# Patient Record
Sex: FEMALE | Race: WHITE | Employment: OTHER | URBAN - METROPOLITAN AREA
[De-identification: names, ages, dates, MRNs, and addresses within clinical notes are randomized per-mention and may not be internally consistent; named-entity substitution may affect disease eponyms.]

---

## 2019-04-09 ENCOUNTER — APPOINTMENT (OUTPATIENT)
Dept: GENERAL RADIOLOGY | Age: 79
DRG: 392 | End: 2019-04-09
Payer: MEDICARE

## 2019-04-09 ENCOUNTER — APPOINTMENT (OUTPATIENT)
Dept: CT IMAGING | Age: 79
DRG: 392 | End: 2019-04-09
Payer: MEDICARE

## 2019-04-09 ENCOUNTER — HOSPITAL ENCOUNTER (INPATIENT)
Age: 79
LOS: 2 days | Discharge: HOME | DRG: 392 | End: 2019-04-11
Attending: EMERGENCY MEDICINE | Admitting: INTERNAL MEDICINE
Payer: MEDICARE

## 2019-04-09 DIAGNOSIS — K57.32 DIVERTICULITIS OF COLON: Primary | ICD-10-CM

## 2019-04-09 DIAGNOSIS — K56.699 OTHER SPECIFIED INTESTINAL OBSTRUCTION, UNSPECIFIED WHETHER PARTIAL OR COMPLETE (HCC): ICD-10-CM

## 2019-04-09 PROBLEM — K57.92 DIVERTICULITIS: Status: ACTIVE | Noted: 2019-04-09

## 2019-04-09 LAB
ALBUMIN SERPL-MCNC: 4.2 G/DL (ref 3.5–5.2)
ALP BLD-CCNC: 88 U/L (ref 35–104)
ALT SERPL-CCNC: 29 U/L (ref 0–32)
AMYLASE: 79 U/L (ref 20–100)
ANION GAP SERPL CALCULATED.3IONS-SCNC: 9 MMOL/L (ref 7–16)
AST SERPL-CCNC: 30 U/L (ref 0–31)
BILIRUB SERPL-MCNC: 0.4 MG/DL (ref 0–1.2)
BILIRUBIN DIRECT: <0.2 MG/DL (ref 0–0.3)
BILIRUBIN, INDIRECT: NORMAL MG/DL (ref 0–1)
BUN BLDV-MCNC: 13 MG/DL (ref 8–23)
CALCIUM SERPL-MCNC: 9.5 MG/DL (ref 8.6–10.2)
CHLORIDE BLD-SCNC: 103 MMOL/L (ref 98–107)
CO2: 25 MMOL/L (ref 22–29)
CREAT SERPL-MCNC: 0.7 MG/DL (ref 0.5–1)
GFR AFRICAN AMERICAN: >60
GFR NON-AFRICAN AMERICAN: >60 ML/MIN/1.73
GLUCOSE BLD-MCNC: 118 MG/DL (ref 74–99)
HCT VFR BLD CALC: 43.9 % (ref 34–48)
HEMOGLOBIN: 14.2 G/DL (ref 11.5–15.5)
LACTIC ACID: 1.5 MMOL/L (ref 0.5–2.2)
LIPASE: 36 U/L (ref 13–60)
MCH RBC QN AUTO: 29.3 PG (ref 26–35)
MCHC RBC AUTO-ENTMCNC: 32.3 % (ref 32–34.5)
MCV RBC AUTO: 90.7 FL (ref 80–99.9)
PDW BLD-RTO: 13 FL (ref 11.5–15)
PLATELET # BLD: 196 E9/L (ref 130–450)
PMV BLD AUTO: 11.9 FL (ref 7–12)
POTASSIUM SERPL-SCNC: 4 MMOL/L (ref 3.5–5)
RBC # BLD: 4.84 E12/L (ref 3.5–5.5)
SODIUM BLD-SCNC: 137 MMOL/L (ref 132–146)
TOTAL PROTEIN: 7.5 G/DL (ref 6.4–8.3)
WBC # BLD: 16.4 E9/L (ref 4.5–11.5)

## 2019-04-09 PROCEDURE — 82150 ASSAY OF AMYLASE: CPT

## 2019-04-09 PROCEDURE — 73502 X-RAY EXAM HIP UNI 2-3 VIEWS: CPT

## 2019-04-09 PROCEDURE — 36415 COLL VENOUS BLD VENIPUNCTURE: CPT

## 2019-04-09 PROCEDURE — 83605 ASSAY OF LACTIC ACID: CPT

## 2019-04-09 PROCEDURE — 99284 EMERGENCY DEPT VISIT MOD MDM: CPT

## 2019-04-09 PROCEDURE — 80048 BASIC METABOLIC PNL TOTAL CA: CPT

## 2019-04-09 PROCEDURE — 83690 ASSAY OF LIPASE: CPT

## 2019-04-09 PROCEDURE — 81003 URINALYSIS AUTO W/O SCOPE: CPT

## 2019-04-09 PROCEDURE — 85027 COMPLETE CBC AUTOMATED: CPT

## 2019-04-09 PROCEDURE — 80076 HEPATIC FUNCTION PANEL: CPT

## 2019-04-09 PROCEDURE — 6360000004 HC RX CONTRAST MEDICATION: Performed by: RADIOLOGY

## 2019-04-09 PROCEDURE — 2580000003 HC RX 258: Performed by: EMERGENCY MEDICINE

## 2019-04-09 PROCEDURE — 1200000000 HC SEMI PRIVATE

## 2019-04-09 PROCEDURE — 74177 CT ABD & PELVIS W/CONTRAST: CPT

## 2019-04-09 PROCEDURE — 87088 URINE BACTERIA CULTURE: CPT

## 2019-04-09 RX ORDER — SODIUM CHLORIDE 9 MG/ML
INJECTION, SOLUTION INTRAVENOUS CONTINUOUS
Status: DISCONTINUED | OUTPATIENT
Start: 2019-04-09 | End: 2019-04-11

## 2019-04-09 RX ADMIN — IOPAMIDOL 110 ML: 755 INJECTION, SOLUTION INTRAVENOUS at 22:38

## 2019-04-09 RX ADMIN — SODIUM CHLORIDE: 9 INJECTION, SOLUTION INTRAVENOUS at 21:23

## 2019-04-09 ASSESSMENT — PAIN SCALES - GENERAL: PAINLEVEL_OUTOF10: 8

## 2019-04-09 ASSESSMENT — PAIN DESCRIPTION - DESCRIPTORS: DESCRIPTORS: SHARP

## 2019-04-09 ASSESSMENT — PAIN DESCRIPTION - PAIN TYPE: TYPE: ACUTE PAIN

## 2019-04-09 ASSESSMENT — PAIN DESCRIPTION - LOCATION: LOCATION: HIP;FLANK

## 2019-04-09 ASSESSMENT — PAIN DESCRIPTION - ORIENTATION: ORIENTATION: LEFT

## 2019-04-10 LAB
BILIRUBIN URINE: NEGATIVE
BLOOD, URINE: NEGATIVE
CLARITY: CLEAR
COLOR: NORMAL
GLUCOSE URINE: NEGATIVE MG/DL
KETONES, URINE: NEGATIVE MG/DL
LEUKOCYTE ESTERASE, URINE: NEGATIVE
NITRITE, URINE: NEGATIVE
PH UA: 6.5 (ref 5–9)
PROTEIN UA: NEGATIVE MG/DL
SPECIFIC GRAVITY UA: <=1.005 (ref 1–1.03)
UROBILINOGEN, URINE: 0.2 E.U./DL

## 2019-04-10 PROCEDURE — 2580000003 HC RX 258: Performed by: INTERNAL MEDICINE

## 2019-04-10 PROCEDURE — 1200000000 HC SEMI PRIVATE

## 2019-04-10 PROCEDURE — 2580000003 HC RX 258: Performed by: EMERGENCY MEDICINE

## 2019-04-10 PROCEDURE — 6360000002 HC RX W HCPCS: Performed by: EMERGENCY MEDICINE

## 2019-04-10 PROCEDURE — 87040 BLOOD CULTURE FOR BACTERIA: CPT

## 2019-04-10 PROCEDURE — 6360000002 HC RX W HCPCS: Performed by: INTERNAL MEDICINE

## 2019-04-10 PROCEDURE — 2500000003 HC RX 250 WO HCPCS: Performed by: EMERGENCY MEDICINE

## 2019-04-10 PROCEDURE — 6370000000 HC RX 637 (ALT 250 FOR IP): Performed by: INTERNAL MEDICINE

## 2019-04-10 PROCEDURE — 36415 COLL VENOUS BLD VENIPUNCTURE: CPT

## 2019-04-10 PROCEDURE — 2500000003 HC RX 250 WO HCPCS: Performed by: INTERNAL MEDICINE

## 2019-04-10 RX ORDER — SODIUM CHLORIDE 0.9 % (FLUSH) 0.9 %
10 SYRINGE (ML) INJECTION EVERY 12 HOURS SCHEDULED
Status: DISCONTINUED | OUTPATIENT
Start: 2019-04-10 | End: 2019-04-10 | Stop reason: SDUPTHER

## 2019-04-10 RX ORDER — BRIMONIDINE TARTRATE 2 MG/ML
1 SOLUTION/ DROPS OPHTHALMIC 2 TIMES DAILY
Status: DISCONTINUED | OUTPATIENT
Start: 2019-04-10 | End: 2019-04-11 | Stop reason: HOSPADM

## 2019-04-10 RX ORDER — METOPROLOL SUCCINATE 25 MG/1
25 TABLET, EXTENDED RELEASE ORAL DAILY
COMMUNITY

## 2019-04-10 RX ORDER — METOPROLOL SUCCINATE 25 MG/1
25 TABLET, EXTENDED RELEASE ORAL DAILY
Status: DISCONTINUED | OUTPATIENT
Start: 2019-04-10 | End: 2019-04-11 | Stop reason: HOSPADM

## 2019-04-10 RX ORDER — ONDANSETRON 2 MG/ML
4 INJECTION INTRAMUSCULAR; INTRAVENOUS EVERY 6 HOURS PRN
Status: DISCONTINUED | OUTPATIENT
Start: 2019-04-10 | End: 2019-04-11 | Stop reason: HOSPADM

## 2019-04-10 RX ORDER — LISINOPRIL 10 MG/1
20 TABLET ORAL EVERY EVENING
Status: DISCONTINUED | OUTPATIENT
Start: 2019-04-10 | End: 2019-04-11 | Stop reason: HOSPADM

## 2019-04-10 RX ORDER — TIMOLOL MALEATE 5 MG/ML
1 SOLUTION/ DROPS OPHTHALMIC 2 TIMES DAILY
Status: DISCONTINUED | OUTPATIENT
Start: 2019-04-10 | End: 2019-04-10

## 2019-04-10 RX ORDER — BRIMONIDINE TARTRATE, TIMOLOL MALEATE 2; 5 MG/ML; MG/ML
1 SOLUTION/ DROPS OPHTHALMIC EVERY 12 HOURS
Status: DISCONTINUED | OUTPATIENT
Start: 2019-04-10 | End: 2019-04-10 | Stop reason: CLARIF

## 2019-04-10 RX ORDER — SODIUM CHLORIDE 0.9 % (FLUSH) 0.9 %
10 SYRINGE (ML) INJECTION PRN
Status: DISCONTINUED | OUTPATIENT
Start: 2019-04-10 | End: 2019-04-10 | Stop reason: SDUPTHER

## 2019-04-10 RX ORDER — LATANOPROST 50 UG/ML
1 SOLUTION/ DROPS OPHTHALMIC NIGHTLY
Status: DISCONTINUED | OUTPATIENT
Start: 2019-04-10 | End: 2019-04-11 | Stop reason: HOSPADM

## 2019-04-10 RX ORDER — DORZOLAMIDE HCL 20 MG/ML
1 SOLUTION/ DROPS OPHTHALMIC 2 TIMES DAILY
Status: DISCONTINUED | OUTPATIENT
Start: 2019-04-10 | End: 2019-04-11 | Stop reason: HOSPADM

## 2019-04-10 RX ORDER — SODIUM CHLORIDE, SODIUM LACTATE, POTASSIUM CHLORIDE, CALCIUM CHLORIDE 600; 310; 30; 20 MG/100ML; MG/100ML; MG/100ML; MG/100ML
INJECTION, SOLUTION INTRAVENOUS CONTINUOUS
Status: DISCONTINUED | OUTPATIENT
Start: 2019-04-10 | End: 2019-04-11

## 2019-04-10 RX ORDER — RALOXIFENE HYDROCHLORIDE 60 MG/1
60 TABLET, FILM COATED ORAL EVERY EVENING
Status: DISCONTINUED | OUTPATIENT
Start: 2019-04-10 | End: 2019-04-10 | Stop reason: CLARIF

## 2019-04-10 RX ORDER — TIMOLOL MALEATE 5 MG/ML
1 SOLUTION/ DROPS OPHTHALMIC 2 TIMES DAILY
Status: DISCONTINUED | OUTPATIENT
Start: 2019-04-10 | End: 2019-04-11 | Stop reason: HOSPADM

## 2019-04-10 RX ORDER — ACETAMINOPHEN 325 MG/1
650 TABLET ORAL EVERY 4 HOURS PRN
Status: DISCONTINUED | OUTPATIENT
Start: 2019-04-10 | End: 2019-04-11 | Stop reason: HOSPADM

## 2019-04-10 RX ORDER — TRAVOPROST OPHTHALMIC SOLUTION 0.04 MG/ML
1 SOLUTION OPHTHALMIC NIGHTLY
Status: DISCONTINUED | OUTPATIENT
Start: 2019-04-10 | End: 2019-04-10 | Stop reason: CLARIF

## 2019-04-10 RX ORDER — DORZOLAMIDE HCL 20 MG/ML
1 SOLUTION/ DROPS OPHTHALMIC 2 TIMES DAILY
COMMUNITY

## 2019-04-10 RX ORDER — DORZOLAMIDE HCL 20 MG/ML
2 SOLUTION/ DROPS OPHTHALMIC 3 TIMES DAILY
Status: DISCONTINUED | OUTPATIENT
Start: 2019-04-10 | End: 2019-04-10

## 2019-04-10 RX ORDER — SODIUM CHLORIDE 0.9 % (FLUSH) 0.9 %
10 SYRINGE (ML) INJECTION EVERY 12 HOURS SCHEDULED
Status: DISCONTINUED | OUTPATIENT
Start: 2019-04-10 | End: 2019-04-11 | Stop reason: HOSPADM

## 2019-04-10 RX ORDER — BRIMONIDINE TARTRATE 2 MG/ML
1 SOLUTION/ DROPS OPHTHALMIC 2 TIMES DAILY
Status: DISCONTINUED | OUTPATIENT
Start: 2019-04-10 | End: 2019-04-10

## 2019-04-10 RX ORDER — SODIUM CHLORIDE 0.9 % (FLUSH) 0.9 %
10 SYRINGE (ML) INJECTION PRN
Status: DISCONTINUED | OUTPATIENT
Start: 2019-04-10 | End: 2019-04-11 | Stop reason: HOSPADM

## 2019-04-10 RX ADMIN — ENOXAPARIN SODIUM 40 MG: 40 INJECTION SUBCUTANEOUS at 11:56

## 2019-04-10 RX ADMIN — DORZOLAMIDE HYDROCHLORIDE 1 DROP: 20 SOLUTION/ DROPS OPHTHALMIC at 21:11

## 2019-04-10 RX ADMIN — METOPROLOL SUCCINATE 25 MG: 25 TABLET, EXTENDED RELEASE ORAL at 11:56

## 2019-04-10 RX ADMIN — BRIMONIDINE TARTRATE 1 DROP: 2 SOLUTION OPHTHALMIC at 21:11

## 2019-04-10 RX ADMIN — METRONIDAZOLE 500 MG: 500 INJECTION, SOLUTION INTRAVENOUS at 11:57

## 2019-04-10 RX ADMIN — CEFTRIAXONE SODIUM 1 G: 1 INJECTION, POWDER, FOR SOLUTION INTRAMUSCULAR; INTRAVENOUS at 07:55

## 2019-04-10 RX ADMIN — CEFTRIAXONE SODIUM 1 G: 1 INJECTION, POWDER, FOR SOLUTION INTRAMUSCULAR; INTRAVENOUS at 00:03

## 2019-04-10 RX ADMIN — TIMOLOL MALEATE 1 DROP: 5 SOLUTION OPHTHALMIC at 21:10

## 2019-04-10 RX ADMIN — METRONIDAZOLE 500 MG: 500 INJECTION, SOLUTION INTRAVENOUS at 00:50

## 2019-04-10 RX ADMIN — BRIMONIDINE TARTRATE 1 DROP: 2 SOLUTION OPHTHALMIC at 11:55

## 2019-04-10 RX ADMIN — ACETAMINOPHEN 650 MG: 325 TABLET, FILM COATED ORAL at 12:12

## 2019-04-10 RX ADMIN — LATANOPROST 1 DROP: 50 SOLUTION OPHTHALMIC at 21:15

## 2019-04-10 RX ADMIN — METRONIDAZOLE 500 MG: 500 INJECTION, SOLUTION INTRAVENOUS at 21:09

## 2019-04-10 RX ADMIN — DORZOLAMIDE HYDROCHLORIDE 1 DROP: 20 SOLUTION/ DROPS OPHTHALMIC at 11:55

## 2019-04-10 RX ADMIN — Medication 10 ML: at 11:57

## 2019-04-10 RX ADMIN — SODIUM CHLORIDE, POTASSIUM CHLORIDE, SODIUM LACTATE AND CALCIUM CHLORIDE: 600; 310; 30; 20 INJECTION, SOLUTION INTRAVENOUS at 07:55

## 2019-04-10 ASSESSMENT — PAIN DESCRIPTION - ORIENTATION
ORIENTATION: LEFT;LOWER
ORIENTATION: LEFT;LOWER

## 2019-04-10 ASSESSMENT — PAIN DESCRIPTION - FREQUENCY: FREQUENCY: INTERMITTENT

## 2019-04-10 ASSESSMENT — PAIN DESCRIPTION - DESCRIPTORS
DESCRIPTORS: ACHING;CRAMPING;DISCOMFORT
DESCRIPTORS: ACHING;CRAMPING;DISCOMFORT

## 2019-04-10 ASSESSMENT — PAIN SCALES - GENERAL
PAINLEVEL_OUTOF10: 3
PAINLEVEL_OUTOF10: 3
PAINLEVEL_OUTOF10: 1

## 2019-04-10 ASSESSMENT — PAIN DESCRIPTION - PAIN TYPE
TYPE: ACUTE PAIN
TYPE: ACUTE PAIN

## 2019-04-10 ASSESSMENT — PAIN - FUNCTIONAL ASSESSMENT: PAIN_FUNCTIONAL_ASSESSMENT: ACTIVITIES ARE NOT PREVENTED

## 2019-04-10 ASSESSMENT — PAIN DESCRIPTION - LOCATION
LOCATION: ABDOMEN
LOCATION: ABDOMEN

## 2019-04-10 ASSESSMENT — PAIN DESCRIPTION - ONSET: ONSET: ON-GOING

## 2019-04-10 ASSESSMENT — PAIN DESCRIPTION - PROGRESSION
CLINICAL_PROGRESSION: GRADUALLY IMPROVING
CLINICAL_PROGRESSION: RAPIDLY IMPROVING

## 2019-04-10 NOTE — CARE COORDINATION
JACI Discharge planning:    SW met with patient. Patient lives alone in a 2 story home with 4 steps to enter. Patient owns a ww, shower chair, bsc, and a cane. Patient has no history of paloma or hhc. Pharmacy is [facility] on [address]. Plan is to return home when medically stable with no anticipated discharge needs. Patients friend Radha Kim to provide the transportation home.  Agnes [place]

## 2019-04-10 NOTE — CONSULTS
Department of General Surgery - Adult  Surgical Service   Attending Consult Note      Reason for Consult:  Abdominal pain  Requesting Physician:  Renuka Mckeon MD    CHIEF COMPLAINT:  Left sided abdominal pain    History Obtained From:  patient, electronic medical record    HISTORY OF PRESENT ILLNESS:                The patient is a [de-identified] y.o. female who presents with 48 hours of left sided abdominal pain.     Past Medical History:        Diagnosis Date    Glaucoma associated with ocular disorder     Hyperlipidemia     Hypertension     Osteopenia      Past Surgical History:        Procedure Laterality Date    ANKLE SURGERY Right     orif    EYE SURGERY      shunt right eye     Current Medications:   Current Facility-Administered Medications: sodium chloride flush 0.9 % injection 10 mL, 10 mL, Intravenous, 2 times per day  sodium chloride flush 0.9 % injection 10 mL, 10 mL, Intravenous, PRN  acetaminophen (TYLENOL) tablet 650 mg, 650 mg, Oral, Q4H PRN  cefTRIAXone (ROCEPHIN) 1 g in dextrose 5 % 50 mL IVPB (vial-mate), 1 g, Intravenous, Q24H  metronidazole (FLAGYL) 500 mg in NaCl 100 mL IVPB premix, 500 mg, Intravenous, Q8H  lisinopril (PRINIVIL;ZESTRIL) tablet 20 mg, 20 mg, Oral, QPM  metoprolol succinate (TOPROL XL) extended release tablet 25 mg, 25 mg, Oral, Daily  magnesium hydroxide (MILK OF MAGNESIA) 400 MG/5ML suspension 30 mL, 30 mL, Oral, Daily PRN  ondansetron (ZOFRAN) injection 4 mg, 4 mg, Intravenous, Q6H PRN  enoxaparin (LOVENOX) injection 40 mg, 40 mg, Subcutaneous, Daily  lactated ringers infusion, , Intravenous, Continuous  latanoprost (XALATAN) 0.005 % ophthalmic solution 1 drop, 1 drop, Both Eyes, Nightly  dorzolamide (TRUSOPT) 2 % ophthalmic solution 1 drop, 1 drop, Left Eye, BID  brimonidine (ALPHAGAN) 0.2 % ophthalmic solution 1 drop, 1 drop, Both Eyes, BID **OR** timolol (TIMOPTIC) 0.5 % ophthalmic solution 1 drop, 1 drop, Right Eye, BID  0.9 % sodium chloride infusion, , Intravenous, Continuous  Allergies:  Ciprofloxacin and Sulfa antibiotics    Social History:   N/C  Family History:   History reviewed. No pertinent family history. REVIEW OF SYSTEMS:    H&P    PHYSICAL EXAM:    VITALS:  /66   Pulse 73   Temp 97.9 °F (36.6 °C) (Temporal)   Resp 14   Ht 5' 6\" (1.676 m)   Wt 191 lb (86.6 kg)   SpO2 94%   BMI 30.83 kg/m²   24HR INTAKE/OUTPUT:  No intake or output data in the 24 hours ending 04/10/19 1524  TEMPERATURE:  Current - Temp: 97.9 °F (36.6 °C);  Max - Temp  Av.3 °F (36.8 °C)  Min: 97.9 °F (36.6 °C)  Max: 98.6 °F (37 °C)  RESPIRATIONS RANGE: Resp  Av.8  Min: 14  Max: 16  PULSE RANGE: Pulse  Av.6  Min: 72  Max: 82  BLOOD PRESSURE RANGE:  Systolic (27EVQ), EKM:459 , Min:139 , FBC:601   ; Diastolic (00JCN), VEK:98, Min:66, Max:92    PULSE OXIMETRY RANGE: SpO2  Av.6 %  Min: 94 %  Max: 96 %  CONSTITUTIONAL:  awake, alert, cooperative, no apparent distress, appears older than stated age and moderately obese  EYES:  lids and lashes normal, pupils equal, round and reactive to light, extra-ocular muscles intact, sclera clear, conjunctiva normal   NECK:  supple, symmetrical, trachea midline, skin normal, no stridor and no jugular venous distension  LUNGS:  no increased work of breathing, good air exchange, no retractions and clear to auscultation  CARDIOVASCULAR:  normal apical pulses, regular rate and rhythm and normal S1 and S2  ABDOMEN:  hypoactive bowel sounds, soft, non-distended, tenderness noted in the left upper quadrant, voluntary guarding absent and no masses palpated  DATA:    CBC with Differential:    Lab Results   Component Value Date    WBC 16.4 2019    RBC 4.84 2019    HGB 14.2 2019    HCT 43.9 2019     2019    MCV 90.7 2019    MCH 29.3 2019    MCHC 32.3 2019    RDW 13.0 2019    SEGSPCT 77 2011    LYMPHOPCT 36.0 10/30/2017    MONOPCT 11.2 10/30/2017    BASOPCT 0.5 10/30/2017    MONOSABS 1.09 10/30/2017    LYMPHSABS 3.52 10/30/2017    EOSABS 0.14 10/30/2017    BASOSABS 0.05 10/30/2017     CMP:    Lab Results   Component Value Date     04/09/2019    K 4.0 04/09/2019     04/09/2019    CO2 25 04/09/2019    BUN 13 04/09/2019    CREATININE 0.7 04/09/2019    GFRAA >60 04/09/2019    LABGLOM >60 04/09/2019    GLUCOSE 118 04/09/2019    GLUCOSE 113 06/27/2011    PROT 7.5 04/09/2019    LABALBU 4.2 04/09/2019    CALCIUM 9.5 04/09/2019    BILITOT 0.4 04/09/2019    ALKPHOS 88 04/09/2019    AST 30 04/09/2019    ALT 29 04/09/2019       IMPRESSION/RECOMMENDATIONS: acute diverticulitis with early improvement with ABX  Plan 24 hours of IV ABX then reassess before transitioning to oral ABX   Follow up CT with oral and IV contrast as out patient

## 2019-04-10 NOTE — ED PROVIDER NOTES
HPI:  4/9/19, Time: 9:03 PM         Nighat Murillo is a [de-identified] y.o. female presenting to the ED for flank pain abdominal pain, beginning The complaint has been persistent, moderate in severity, and worsened by nothing. Patient reports left-sided abdominal  pain and hip pain. Patient reports no fever chills or GI bleed. There is no vomiting there is no headache there is no leg pain. Patient has history of diverticulosis    ROS:   Pertinent positives and negatives are stated within HPI, all other systems reviewed and are negative.  --------------------------------------------- PAST HISTORY ---------------------------------------------  Past Medical History:  has a past medical history of Glaucoma associated with ocular disorder, Hyperlipidemia, Hypertension, and Osteopenia. Past Surgical History:  has a past surgical history that includes Ankle surgery (Right) and eye surgery. Social History:  reports that she has never smoked. She has never used smokeless tobacco. She reports that she does not drink alcohol or use drugs. Family History: family history is not on file. The patients home medications have been reviewed. Allergies: Ciprofloxacin and Sulfa antibiotics    ---------------------------------------------------PHYSICAL EXAM--------------------------------------    Constitutional/General: Alert and oriented x3, well appearing, non toxic in NAD  Head: Normocephalic and atraumatic  Eyes: PERRL, EOMI  Mouth: Oropharynx clear, handling secretions, no trismus  Neck: Supple, full ROM, non tender to palpation in the midline, no stridor, no crepitus, no meningeal signs  Pulmonary: Lungs clear to auscultation bilaterally, no wheezes, rales, or rhonchi. Not in respiratory distress  Cardiovascular:  Regular rate. Regular rhythm. No murmurs, gallops, or rubs. 2+ distal pulses  Chest: no chest wall tenderness  Abdomen: Soft. Tender left lateral abdomen Non distended. +BS. No rebound, guarding, or rigidity.  No pulsatile masses appreciated. Musculoskeletal: Moves all extremities x 4. Warm and well perfused, no clubbing, cyanosis, or edema. Capillary refill <3 seconds  Skin: warm and dry. No rashes. Neurologic: GCS 15, CN 2-12 grossly intact, no focal deficits, symmetric strength 5/5 in the upper and lower extremities bilaterally  Psych: Normal Affect    -------------------------------------------------- RESULTS -------------------------------------------------  I have personally reviewed all laboratory and imaging results for this patient. Results are listed below.      LABS:  Results for orders placed or performed during the hospital encounter of 04/09/19   CBC   Result Value Ref Range    WBC 16.4 (H) 4.5 - 11.5 E9/L    RBC 4.84 3.50 - 5.50 E12/L    Hemoglobin 14.2 11.5 - 15.5 g/dL    Hematocrit 43.9 34.0 - 48.0 %    MCV 90.7 80.0 - 99.9 fL    MCH 29.3 26.0 - 35.0 pg    MCHC 32.3 32.0 - 34.5 %    RDW 13.0 11.5 - 15.0 fL    Platelets 498 674 - 332 E9/L    MPV 11.9 7.0 - 12.0 fL   Basic metabolic panel   Result Value Ref Range    Sodium 137 132 - 146 mmol/L    Potassium 4.0 3.5 - 5.0 mmol/L    Chloride 103 98 - 107 mmol/L    CO2 25 22 - 29 mmol/L    Anion Gap 9 7 - 16 mmol/L    Glucose 118 (H) 74 - 99 mg/dL    BUN 13 8 - 23 mg/dL    CREATININE 0.7 0.5 - 1.0 mg/dL    GFR Non-African American >60 >=60 mL/min/1.73    GFR African American >60     Calcium 9.5 8.6 - 10.2 mg/dL   Lipase   Result Value Ref Range    Lipase 36 13 - 60 U/L   Amylase   Result Value Ref Range    Amylase 79 20 - 100 U/L   Lactic acid, plasma   Result Value Ref Range    Lactic Acid 1.5 0.5 - 2.2 mmol/L   Hepatic function panel   Result Value Ref Range    Total Protein 7.5 6.4 - 8.3 g/dL    Alb 4.2 3.5 - 5.2 g/dL    Alkaline Phosphatase 88 35 - 104 U/L    ALT 29 0 - 32 U/L    AST 30 0 - 31 U/L    Total Bilirubin 0.4 0.0 - 1.2 mg/dL    Bilirubin, Direct <0.2 0.0 - 0.3 mg/dL    Bilirubin, Indirect see below 0.0 - 1.0 mg/dL       RADIOLOGY:  Interpreted by change  Patient rechecked and made aware of findings     Consultations:      Spoke to Dr Arya Rouse and surgeon Dr Damon Constant: This patient's ED course included: a personal history and physicial eaxmination    This patient has remained hemodynamically stable during their ED course. Counseling: The emergency provider has spoken with the patient and discussed todays results, in addition to providing specific details for the plan of care and counseling regarding the diagnosis and prognosis. Questions are answered at this time and they are agreeable with the plan.       --------------------------------- IMPRESSION AND DISPOSITION ---------------------------------    IMPRESSION  1. Diverticulitis of colon    2. Other specified intestinal obstruction, unspecified whether partial or complete ([facility] [address]        DISPOSITION  Disposition: Admit  Patient condition is stable        NOTE: This report was transcribed using voice recognition software.  Every effort was made to ensure accuracy; however, inadvertent computerized transcription errors may be present          Katia Kearns MD  04/09/19 3044       Katia Kearns MD  04/09/19 8197

## 2019-04-10 NOTE — H&P
History and Physical                                                                                    Natalia Will MD, FACP                   Patient Name: Benjamin Molnia                   Age:  [de-identified] y.o. Gender:   female    CC: abdominal pain    HPI: History conveyed by ER:            Benjamin Molina is a [de-identified] y.o. female presenting to the ED for flank pain abdominal pain, Patient reports left-sided abdominal  pain and hip pain. Patient reports no fever chills or GI bleed. There is no vomiting there is no headache there is no leg pain. Patient has history of diverticulosis    Patient was interviewed and examined in the ER:  She states she started having left lower quadrant pain-thought it was her hip-then as it evolved she recognized this as having had it before-came to the ER:  She had no other GI or constitutional symptoms    She is still having the pain but is otherwise comfortable          Past Medical History:   Diagnosis Date    Glaucoma associated with ocular disorder     Hyperlipidemia     Hypertension     Osteopenia        Past Surgical History:   Procedure Laterality Date    ANKLE SURGERY Right     orif    EYE SURGERY      shunt right eye       No family status information on file. Prior to Admission medications    Medication Sig Start Date End Date Taking? Authorizing Provider   metoprolol succinate (TOPROL XL) 25 MG extended release tablet Take 25 mg by mouth daily   Yes Historical Provider, MD   dorzolamide (TRUSOPT) 2 % ophthalmic solution Place 2 drops into the right eye 3 times daily   Yes Historical Provider, MD   lisinopril (PRINIVIL;ZESTRIL) 20 MG tablet Take 20 mg by mouth every evening. Yes Historical Provider, MD   simvastatin (ZOCOR) 20 MG tablet Take 20 mg by mouth nightly. Yes Historical Provider, MD   raloxifene (EVISTA) 60 MG tablet Take 60 mg by mouth every evening.    Yes Historical Provider, MD brimonidine-timolol (COMBIGAN) 0.2-0.5 % ophthalmic solution Place 1 drop into the right eye every 12 hours. Yes Historical Provider, MD   Travoprost, ISABELLA Free, (TRAVATAN Z) 0.004 % SOLN ophthalmic solution Place 1 drop into both eyes nightly. Yes Historical Provider, MD   Multiple Vitamin (MULTI-VITAMIN PO) Take 1 tablet by mouth daily. Yes Historical Provider, MD   atenolol (TENORMIN) 25 MG tablet Take 25 mg by mouth daily. Historical Provider, MD        Social History     Socioeconomic History    Marital status:      Spouse name: None    Number of children: None    Years of education: None    Highest education level: None   Occupational History    None   Social Needs    Financial resource strain: None    Food insecurity:     Worry: None     Inability: None    Transportation needs:     Medical: None     Non-medical: None   Tobacco Use    Smoking status: Never Smoker    Smokeless tobacco: Never Used   Substance and Sexual Activity    Alcohol use: No    Drug use: No    Sexual activity: None   Lifestyle    Physical activity:     Days per week: None     Minutes per session: None    Stress: None   Relationships    Social connections:     Talks on phone: None     Gets together: None     Attends Sikhism service: None     Active member of club or organization: None     Attends meetings of clubs or organizations: None     Relationship status: None    Intimate partner violence:     Fear of current or ex partner: None     Emotionally abused: None     Physically abused: None     Forced sexual activity: None   Other Topics Concern    None   Social History Narrative    None       Allergies   Allergen Reactions    Ciprofloxacin Diarrhea and Nausea And Vomiting    Sulfa Antibiotics Rash       The patient's medical records have been reviewed. Review of Systems:   · General: Denies malaise or weakness. Denies fever or chills. · Eyes: No visual changes or diplopia.  No swelling or pain.  · ENT: No Headaches, tinnitus or vertigo. No mouth sores or sore throat. · Cardiovascular: No chest pain, dyspnea on exertion, palpitations, syncope. · Respiratory: No cough or wheezing, hemoptysis, sob, pleuritic pain. · Gastrointestinal: No anorexia, hematochezia, melena, hematemesis or change in bowels. · Genitourinary: No dysuria, trouble voiding, or hematuria. No change in urination. · Musculoskeletal:  No joint pain or inflammation. No limb weakness. · Integumentary: No rash or pruritis. No abnormal pigmentation,  masses, hair or nail changes  · Neurological: No unusual headaches, weakness, numbness or tingling. No change in gait, balance, coordination, memory, mentation, behavior. · Psychiatric: No anxiety, or depression. Mood and affect reported as normal  · Endocrine: No temperature intolerance. No polydipsia or polyuria. · Hematologic: No abnormal bruising or bleeding, blood clots or swollen lymph nodes. no anemia, abnormal bleeding/bruising, fever,chills, night sweats, swollen glands. · Allergic/Immunologic: No nasal congestion or hives. Physical Examination:      Wt Readings from Last 3 Encounters:   04/09/19 191 lb (86.6 kg)   10/30/17 193 lb (87.5 kg)   06/24/14 190 lb (86.2 kg)     Temp Readings from Last 3 Encounters:   04/10/19 98.5 °F (36.9 °C) (Oral)   10/30/17 96.6 °F (35.9 °C) (Temporal)   06/24/14 98 °F (36.7 °C) (Temporal)     BP Readings from Last 3 Encounters:   04/10/19 (!) 166/80   10/30/17 (!) 140/66   06/24/14 118/54     Pulse Readings from Last 3 Encounters:   04/10/19 72   10/30/17 72   06/24/14 58       General appearance: Normal, awake, alert no distress. Skin: Color, texture, turgor normal. No rashes or lesions. Head: Normocephalic. No masses, lesions, tenderness or abnormalities   Face: Symmetric no visible lesions  Eyes: Conjunctivae/cornea clear. Gevena Snowball. Sclera non icteric.    Ears: External appearance normal. Hearing grossly normal  Nose/Sinuses: Nares normal. No paranasal sinus tenderness. Mouth: Lips and tongue appear normal. Dentition noted  Neck:  Symmetric. No adenopathy. Thyroid symmetric, normal size, without nodules. Trachea is midline. Carotids palpable-and assessed. Chest: Even excursion   Lungs: Clear to auscultation. No rhonchi, crackles or rales. Heart: S1 > S2. Regular rate and rhythm. No gallop rub or murmur. Abdomen: Soft, mildly protuberant, LLQ tenderness with guarding. BS normal. No masses, organomegaly. Anatomic contours appear normal.   Extremities: No deformities, edema, or skin discoloration. Peripheral perfusion assessed in all exremities. No cyanosis  Musculoskeletal: No unusual pain or swelling. Muscular strength intact. Neuro:   · Cranial nerves grossly intact. · Motor: Strength grossly normal. No focal weakness. · Sensory: grossly normal to touch. · No cerebellar signs---Coordination intact. Mental status: Awake, alert, cognizant and interactive. Patient appears capable of directing self care   Mood: Normal and appropriate affect  Gait & balance: not assessed:     Labs     CBC:   Lab Results   Component Value Date    WBC 16.4 04/09/2019    RBC 4.84 04/09/2019    HGB 14.2 04/09/2019    HCT 43.9 04/09/2019     04/09/2019    MCV 90.7 04/09/2019     BMP:    Lab Results   Component Value Date     04/09/2019    K 4.0 04/09/2019     04/09/2019    CO2 25 04/09/2019    BUN 13 04/09/2019    CREATININE 0.7 04/09/2019    GLUCOSE 118 04/09/2019    GLUCOSE 113 06/27/2011    CALCIUM 9.5 04/09/2019     Hepatic Function Panel:    Lab Results   Component Value Date    ALKPHOS 88 04/09/2019    AST 30 04/09/2019    ALT 29 04/09/2019    PROT 7.5 04/09/2019    LABALBU 4.2 04/09/2019    BILITOT 0.4 04/09/2019     Magnesium:    Lab Results   Component Value Date    MG 2.2 10/30/2017     Cardiac Enzymes:   Lab Results   Component Value Date    CKTOTAL 137 06/27/2011    CKMB 2.5 06/27/2011    TROPONINI <0.01 10/30/2017    TROPONINI 0.02 06/27/2011     LDH:  No results found for: LDH  PT/INR:    Lab Results   Component Value Date    PROTIME 11.7 06/28/2011    INR 1.2 06/28/2011     BNP: No results for input(s): BNP in the last 72 hours. TSH: No results found for: TSH   Cardiac Injury Profile: No results for input(s): CKTOTAL, CKMB, CKMBINDEX, TROPONINI in the last 72 hours.    Lipid Profile: No results found for: TRIG, HDL, LDLCALC, CHOL   Hemoglobin A1C: No components found for: HGBA1C   U/A:   Lab Results   Component Value Date    LEUKOCYTESUR Negative 04/09/2019    PHUR 6.5 04/09/2019    WBCUA NONE 06/27/2011    RBCUA NONE 06/27/2011    BACTERIA NONE 06/27/2011    SPECGRAV <=1.005 04/09/2019    BLOODU Negative 04/09/2019    GLUCOSEU Negative 04/09/2019    GLUCOSEU NEGATIVE 06/27/2011         ADMISSION SCHEDULED MEDS:   Current Facility-Administered Medications   Medication Dose Route Frequency Provider Last Rate Last Dose    sodium chloride flush 0.9 % injection 10 mL  10 mL Intravenous 2 times per day Fadumo Valenzuela MD        sodium chloride flush 0.9 % injection 10 mL  10 mL Intravenous PRN Fadumo Valenzuela MD        acetaminophen (TYLENOL) tablet 650 mg  650 mg Oral Q4H PRN Fadumo Valenzuela MD        cefTRIAXone (ROCEPHIN) 1 g in dextrose 5 % 50 mL IVPB (vial-mate)  1 g Intravenous Q24H Fadumo Valenzuela  mL/hr at 04/10/19 0755 1 g at 04/10/19 0755    metronidazole (FLAGYL) 500 mg in NaCl 100 mL IVPB premix  500 mg Intravenous Q8H Fadumo Valenzuela MD        dorzolamide (TRUSOPT) 2 % ophthalmic solution 2 drop  2 drop Right Eye TID Fadumo Valenzuela MD        lisinopril (PRINIVIL;ZESTRIL) tablet 20 mg  20 mg Oral QPM Fadumo Valenzuela MD        metoprolol succinate (TOPROL XL) extended release tablet 25 mg  25 mg Oral Daily Fadumo Valenzuela MD        magnesium hydroxide (MILK OF MAGNESIA) 400 MG/5ML suspension 30 mL  30 mL Oral Daily PRN Fadumo Valenzuela MD  ondansetron (ZOFRAN) injection 4 mg  4 mg Intravenous Q6H PRN Dewitte Pain, MD        enoxaparin (LOVENOX) injection 40 mg  40 mg Subcutaneous Daily Dewitte Pain, MD        lactated ringers infusion   Intravenous Continuous Dewitte Pain, MD 75 mL/hr at 04/10/19 0755      latanoprost (XALATAN) 0.005 % ophthalmic solution 1 drop  1 drop Both Eyes Nightly Dewitte Pain, MD        brimonidine (ALPHAGAN) 0.2 % ophthalmic solution 1 drop  1 drop Right Eye BID Dewitte Pain, MD        Or    timolol (TIMOPTIC) 0.5 % ophthalmic solution 1 drop  1 drop Right Eye BID Dewitte Pain, MD        0.9 % sodium chloride infusion   Intravenous Continuous Dewitte Pain,  mL/hr at 04/09/19 2123         Current  Infusions   lactated ringers 75 mL/hr at 04/10/19 0755    sodium chloride 100 mL/hr at 04/09/19 2123       Prn Meds  sodium chloride flush, acetaminophen, magnesium hydroxide, ondansetron    Radiology Review:  CT ABDOMEN PELVIS W IV CONTRAST Additional Contrast? None   Final Result   1. Multifocal lesions in the spleen. Differential diagnostic can   include metastasis. 2. Hypoenhancing area in the inner anterior wall of the uterus to the   right of midline impressing the central endometrial cavity. This could   be manifestation of endometrial malleolus. See above comments and   recommendations. 3. Pattern of small bowel obstruction. The etiology of the small bowel   obstruction is not determined. Point of obstruction is towards the   distal jejunum. 4. Signs for acute diverticulitis the proximal segment of the sigmoid   colon. ALERT:  THIS IS AN ABNORMAL REPORT      XR HIP 2-3 VW W PELVIS LEFT   Final Result   Unremarkable x-ray series of the pelvic bones including   both hip joints.             ASSESSMENT:  Acute diverticulitis without systemic symptoms or signs-appears uncomplicated  CT shows mild inflammation sigmoid  Patient Active Problem List   Diagnosis   

## 2019-04-11 VITALS
SYSTOLIC BLOOD PRESSURE: 151 MMHG | RESPIRATION RATE: 16 BRPM | WEIGHT: 191 LBS | DIASTOLIC BLOOD PRESSURE: 68 MMHG | HEART RATE: 63 BPM | OXYGEN SATURATION: 95 % | BODY MASS INDEX: 30.7 KG/M2 | HEIGHT: 66 IN | TEMPERATURE: 98.3 F

## 2019-04-11 LAB
ALBUMIN SERPL-MCNC: 3.4 G/DL (ref 3.5–5.2)
ALP BLD-CCNC: 54 U/L (ref 35–104)
ALT SERPL-CCNC: 18 U/L (ref 0–32)
ANION GAP SERPL CALCULATED.3IONS-SCNC: 8 MMOL/L (ref 7–16)
AST SERPL-CCNC: 20 U/L (ref 0–31)
BILIRUB SERPL-MCNC: 0.5 MG/DL (ref 0–1.2)
BUN BLDV-MCNC: 8 MG/DL (ref 8–23)
CALCIUM SERPL-MCNC: 8.5 MG/DL (ref 8.6–10.2)
CHLORIDE BLD-SCNC: 107 MMOL/L (ref 98–107)
CO2: 24 MMOL/L (ref 22–29)
CREAT SERPL-MCNC: 0.7 MG/DL (ref 0.5–1)
GFR AFRICAN AMERICAN: >60
GFR NON-AFRICAN AMERICAN: >60 ML/MIN/1.73
GLUCOSE BLD-MCNC: 103 MG/DL (ref 74–99)
HCT VFR BLD CALC: 38.7 % (ref 34–48)
HEMOGLOBIN: 12.7 G/DL (ref 11.5–15.5)
MCH RBC QN AUTO: 30.2 PG (ref 26–35)
MCHC RBC AUTO-ENTMCNC: 32.8 % (ref 32–34.5)
MCV RBC AUTO: 92.1 FL (ref 80–99.9)
PDW BLD-RTO: 13.3 FL (ref 11.5–15)
PLATELET # BLD: 173 E9/L (ref 130–450)
PMV BLD AUTO: 11.9 FL (ref 7–12)
POTASSIUM REFLEX MAGNESIUM: 3.9 MMOL/L (ref 3.5–5)
POTASSIUM SERPL-SCNC: 3.9 MMOL/L (ref 3.5–5)
RBC # BLD: 4.2 E12/L (ref 3.5–5.5)
SEDIMENTATION RATE, ERYTHROCYTE: 25 MM/HR (ref 0–20)
SODIUM BLD-SCNC: 139 MMOL/L (ref 132–146)
TOTAL PROTEIN: 6.2 G/DL (ref 6.4–8.3)
WBC # BLD: 8.5 E9/L (ref 4.5–11.5)

## 2019-04-11 PROCEDURE — 2500000003 HC RX 250 WO HCPCS: Performed by: INTERNAL MEDICINE

## 2019-04-11 PROCEDURE — 6370000000 HC RX 637 (ALT 250 FOR IP): Performed by: INTERNAL MEDICINE

## 2019-04-11 PROCEDURE — 6360000002 HC RX W HCPCS: Performed by: INTERNAL MEDICINE

## 2019-04-11 PROCEDURE — 80048 BASIC METABOLIC PNL TOTAL CA: CPT

## 2019-04-11 PROCEDURE — 85651 RBC SED RATE NONAUTOMATED: CPT

## 2019-04-11 PROCEDURE — 36415 COLL VENOUS BLD VENIPUNCTURE: CPT

## 2019-04-11 PROCEDURE — 85027 COMPLETE CBC AUTOMATED: CPT

## 2019-04-11 PROCEDURE — 2580000003 HC RX 258: Performed by: INTERNAL MEDICINE

## 2019-04-11 PROCEDURE — 80053 COMPREHEN METABOLIC PANEL: CPT

## 2019-04-11 RX ORDER — METRONIDAZOLE 500 MG/1
500 TABLET ORAL EVERY 8 HOURS SCHEDULED
Qty: 30 TABLET | Refills: 0 | Status: SHIPPED | OUTPATIENT
Start: 2019-04-11 | End: 2019-04-21

## 2019-04-11 RX ORDER — METRONIDAZOLE 500 MG/1
500 TABLET ORAL EVERY 8 HOURS SCHEDULED
Status: DISCONTINUED | OUTPATIENT
Start: 2019-04-11 | End: 2019-04-11 | Stop reason: HOSPADM

## 2019-04-11 RX ADMIN — METOPROLOL SUCCINATE 25 MG: 25 TABLET, EXTENDED RELEASE ORAL at 09:19

## 2019-04-11 RX ADMIN — DORZOLAMIDE HYDROCHLORIDE 1 DROP: 20 SOLUTION/ DROPS OPHTHALMIC at 09:18

## 2019-04-11 RX ADMIN — METRONIDAZOLE 500 MG: 500 TABLET, FILM COATED ORAL at 12:34

## 2019-04-11 RX ADMIN — Medication 10 ML: at 08:29

## 2019-04-11 RX ADMIN — METRONIDAZOLE 500 MG: 500 INJECTION, SOLUTION INTRAVENOUS at 04:24

## 2019-04-11 RX ADMIN — CEFTRIAXONE SODIUM 1 G: 1 INJECTION, POWDER, FOR SOLUTION INTRAMUSCULAR; INTRAVENOUS at 08:26

## 2019-04-11 RX ADMIN — Medication 10 ML: at 09:08

## 2019-04-11 RX ADMIN — TIMOLOL MALEATE 1 DROP: 5 SOLUTION OPHTHALMIC at 09:17

## 2019-04-11 RX ADMIN — ENOXAPARIN SODIUM 40 MG: 40 INJECTION SUBCUTANEOUS at 08:58

## 2019-04-11 RX ADMIN — BRIMONIDINE TARTRATE 1 DROP: 2 SOLUTION OPHTHALMIC at 09:17

## 2019-04-11 ASSESSMENT — PAIN SCALES - GENERAL: PAINLEVEL_OUTOF10: 0

## 2019-04-11 NOTE — PROGRESS NOTES
Department of General Surgery - Adult  Surgical Service   Attending Progress Note      SUBJECTIVE:  Much improved clinically    OBJECTIVE  WBC normal range    Physical    VITALS:  BP (!) 151/68   Pulse 63   Temp 98.3 °F (36.8 °C) (Oral)   Resp 16   Ht 5' 6\" (1.676 m)   Wt 191 lb (86.6 kg)   SpO2 95%   BMI 30.83 kg/m²   INTAKE/OUTPUT:    Intake/Output Summary (Last 24 hours) at 2019 1200  Last data filed at 4/10/2019 1929  Gross per 24 hour   Intake 540 ml   Output --   Net 540 ml     TEMPERATURE:  Current - Temp: 98.3 °F (36.8 °C);  Max - Temp  Av.3 °F (36.8 °C)  Min: 97.8 °F (36.6 °C)  Max: 98.6 °F (37 °C)  RESPIRATIONS RANGE: Resp  Av.3  Min: 16  Max: 17  PULSE RANGE: Pulse  Av.3  Min: 60  Max: 72  BLOOD PRESSURE RANGE:  Systolic (91RRV), TDV:103 , Min:121 , GDJ:597   ; Diastolic (36FUQ), OMN:81, Min:58, Max:78    CONSTITUTIONAL:  awake, alert, cooperative, no apparent distress, appears stated age and mildly obese  EYES:  lids and lashes normal, pupils equal, round and reactive to light, extra-ocular muscles intact, sclera clear and conjunctiva normal  NECK:  supple, symmetrical, trachea midline, skin normal and no stridor  LUNGS:  no increased work of breathing, good air exchange, no retractions and clear to auscultation  CARDIOVASCULAR:  normal apical pulses, regular rate and rhythm and normal S1 and S2  ABDOMEN:  normal bowel sounds, soft, non-distended, non-tender, voluntary guarding absent and no masses palpated  Data  CBC with Differential:    Lab Results   Component Value Date    WBC 8.5 2019    RBC 4.20 2019    HGB 12.7 2019    HCT 38.7 2019     2019    MCV 92.1 2019    MCH 30.2 2019    MCHC 32.8 2019    RDW 13.3 2019    SEGSPCT 77 2011    LYMPHOPCT 36.0 10/30/2017    MONOPCT 11.2 10/30/2017    BASOPCT 0.5 10/30/2017    MONOSABS 1.09 10/30/2017    LYMPHSABS 3.52 10/30/2017    EOSABS 0.14 10/30/2017    BASOSABS 0.05 10/30/2017     CMP:    Lab Results   Component Value Date     04/11/2019    K 3.9 04/11/2019    K 3.9 04/11/2019     04/11/2019    CO2 24 04/11/2019    BUN 8 04/11/2019    CREATININE 0.7 04/11/2019    GFRAA >60 04/11/2019    LABGLOM >60 04/11/2019    GLUCOSE 103 04/11/2019    GLUCOSE 113 06/27/2011    PROT 6.2 04/11/2019    LABALBU 3.4 04/11/2019    CALCIUM 8.5 04/11/2019    BILITOT 0.5 04/11/2019    ALKPHOS 54 04/11/2019    AST 20 04/11/2019    ALT 18 04/11/2019       Current Inpatient Medications    Current Facility-Administered Medications: metroNIDAZOLE (FLAGYL) tablet 500 mg, 500 mg, Oral, 3 times per day  sodium chloride flush 0.9 % injection 10 mL, 10 mL, Intravenous, 2 times per day  sodium chloride flush 0.9 % injection 10 mL, 10 mL, Intravenous, PRN  acetaminophen (TYLENOL) tablet 650 mg, 650 mg, Oral, Q4H PRN  lisinopril (PRINIVIL;ZESTRIL) tablet 20 mg, 20 mg, Oral, QPM  metoprolol succinate (TOPROL XL) extended release tablet 25 mg, 25 mg, Oral, Daily  magnesium hydroxide (MILK OF MAGNESIA) 400 MG/5ML suspension 30 mL, 30 mL, Oral, Daily PRN  ondansetron (ZOFRAN) injection 4 mg, 4 mg, Intravenous, Q6H PRN  enoxaparin (LOVENOX) injection 40 mg, 40 mg, Subcutaneous, Daily  latanoprost (XALATAN) 0.005 % ophthalmic solution 1 drop, 1 drop, Both Eyes, Nightly  dorzolamide (TRUSOPT) 2 % ophthalmic solution 1 drop, 1 drop, Left Eye, BID  brimonidine (ALPHAGAN) 0.2 % ophthalmic solution 1 drop, 1 drop, Both Eyes, BID **AND** timolol (TIMOPTIC) 0.5 % ophthalmic solution 1 drop, 1 drop, Both Eyes, BID    ASSESSMENT AND PLAN favorable response to Rx  Oral ABX for 10 days  Low residue diet for 2 weeks  Out patient follow up to schedule

## 2019-04-11 NOTE — DISCHARGE SUMMARY
Discharge Summary    Baylee Mitchell  :  1940  MRN:  [id number]    Admit date:  2019  Discharge date:  2019 10:24 AM    Admitting Physician:  Katherine King MD    Discharge Diagnoses:    Patient Active Problem List    Diagnosis Date Noted    Diverticulitis 2019       Past Medical Hx :   Past Medical History:   Diagnosis Date    Glaucoma associated with ocular disorder     Hyperlipidemia     Hypertension     Osteopenia        Past Surgical Hx :   Past Surgical History:   Procedure Laterality Date    ANKLE SURGERY Right     orif    EYE SURGERY      shunt right eye       Admission Condition:  fair    Discharged Condition:  good    Labs:  CBC:   Lab Results   Component Value Date    WBC 8.5 2019    RBC 4.20 2019    HGB 12.7 2019    HCT 38.7 2019    MCV 92.1 2019    MCH 30.2 2019    MCHC 32.8 2019    RDW 13.3 2019     2019    MPV 11.9 2019     CMP:    Lab Results   Component Value Date     2019    K 3.9 2019    K 3.9 2019     2019    CO2 24 2019    BUN 8 2019    CREATININE 0.7 2019    GFRAA >60 2019    LABGLOM >60 2019    GLUCOSE 103 2019    GLUCOSE 113 2011    PROT 6.2 2019    LABALBU 3.4 2019    CALCIUM 8.5 2019    BILITOT 0.5 2019    ALKPHOS 54 2019    AST 20 2019    ALT 18 2019        Radiology Results: Ct Abdomen Pelvis W Iv Contrast Additional Contrast? None    Result Date: 2019  Patient MRN:  [id number] : 1940 Age: [de-identified] years Gender: Female Order Date:  2019 9:30 PM TECHNIQUE/NUMBER OF IMAGES/COMPARISON/CLINICAL HISTORY: CT abdomen and pelvis IV contrast Axial images were obtained with sagittal and coronal reconstructions. 348 images. Clinical history abdominal pain. Flank pain. 4year-old female patient.  FINDINGS: The jejunal segments appears to be moderately distended with predominant fluid articulation but also demonstrate air-fluid levels with discrete thickening of the wall and enhancement. There is a overall decompressed ileum. These findings indicate small bowel obstruction. Maximum diameter is up to 2.4 cm of the jejunal loops. Point of the obstruction is more likely in the distal jejunum. The cause for the obstruction is not conspicuously determined from this study. The colon demonstrates a normal distribution of fecal content and gas and there are is scattered diverticula formation throughout the entire colon. Some mild inflammatory changes are seen in the proximal sigmoid colon compatible with diverticulitis. There appears to be some thickening and enhancement of the bowel wall multiple diverticula formation is seen in these areas including renetta diverticula formations which are chronic findings. There is no free intraperitoneal air. There is no free fluid in the correlation the peritoneal cavity. There are normal size and enhancement for the liver. A small cyst like lesion is seen in the left lobe the liver, segment II diaphragmatic surface, measuring 9 mm. The the gallbladder is normally distended, biliary tree is not dilated. The pancreas demonstrates some relative atrophy. The pancreatic duct is well visualized measuring close to 2 mm but within normal range. The spleen has normal size. There are multifocal hyperintense this lesion is seen in the spleen the largest one measures up to 1.4 cm. Adrenals demonstrates some fullness with slight nodularity more on the left than on the right. Nodules are up to 6 mm. Kidneys have normal size and cortical thickness and enhancement and excretion of the AV contrast. The bladder appear unremarkable. The uterus measures 6.8 x 3.9 x 5.4 cm. There is a hypoenhancing ill-defined lesion in the anterior wall of the uterus measuring about 2 x 0.8 x 1.6 cm which impresses the central endometrial cavity.  This finding requires additional evaluation

## 2019-04-11 NOTE — PLAN OF CARE
Problem: Pain:  Goal: Pain level will decrease  Description  Pain level will decrease  Outcome: Met This Shift

## 2019-04-11 NOTE — PROGRESS NOTES
Impression:        1. Multifocal lesions in the spleen. Differential diagnostic can  include metastasis. 2. Hypoenhancing area in the inner anterior wall of the uterus to the  right of midline impressing the central endometrial cavity. This could  be manifestation of endometrial malleolus. See above comments and  recommendations. 3. Pattern of small bowel obstruction. The etiology of the small bowel  obstruction is not determined. Point of obstruction is towards the  distal jejunum. 4. Signs for acute diverticulitis the proximal segment of the sigmoid  colon.

## 2019-04-12 LAB — URINE CULTURE, ROUTINE: NORMAL

## 2019-04-15 LAB
BLOOD CULTURE, ROUTINE: NORMAL
CULTURE, BLOOD 2: NORMAL

## 2020-03-03 VITALS
DIASTOLIC BLOOD PRESSURE: 72 MMHG | HEIGHT: 66 IN | HEART RATE: 66 BPM | SYSTOLIC BLOOD PRESSURE: 150 MMHG | BODY MASS INDEX: 30.05 KG/M2 | WEIGHT: 187 LBS

## 2020-03-03 RX ORDER — ESTRADIOL 0.1 MG/G
2 CREAM VAGINAL
COMMUNITY
End: 2020-11-23 | Stop reason: SDUPTHER

## 2020-09-08 NOTE — PROGRESS NOTES
[facility] Cardiology Progress Note  Dr. Edmund Dunne      Referring Physician: Aaron Wang MD  CHIEF COMPLAINT:   Chief Complaint   Patient presents with    Follow-up     Annual. Pt has no cardiac complaints today       HISTORY OF PRESENT ILLNESS:   Patient is 78year old female referred to Cardiology for PVCs and abnormal is here for annual follow-up appointment  Infrequent palpitations, Patient denies any chest pain, no shortness of breath, no lightheadedness, no dizziness, no pedal edema, no PND, no orthopnea, no syncope, no presyncopal episodes. Functional capacity is good for age         Past Medical History:   Diagnosis Date    Glaucoma associated with ocular disorder     Hyperlipidemia     Hypertension     Osteopenia     Palpitations     PVC (premature ventricular contraction)          Past Surgical History:   Procedure Laterality Date    ANKLE SURGERY Right     orif    EYE SURGERY      shunt right eye         Current Outpatient Medications   Medication Sig Dispense Refill    vitamin D (CHOLECALCIFEROL) 25 MCG (1000 UT) TABS tablet Vitamin D3 1,000 unit capsule   Take 1 capsule every day by oral route.  Multiple Vitamins-Minerals (THERAPEUTIC MULTIVITAMIN-MINERALS) tablet Take 1 tablet by mouth daily      estradiol (ESTRACE) 0.1 MG/GM vaginal cream Place 2 g vaginally Twice a Week      metoprolol succinate (TOPROL XL) 25 MG extended release tablet Take 25 mg by mouth daily      dorzolamide (TRUSOPT) 2 % ophthalmic solution Place 1 drop into the left eye 2 times daily       vitamin D (CHOLECALCIFEROL) 1000 UNIT TABS tablet Take 1,000 Units by mouth daily      lisinopril (PRINIVIL;ZESTRIL) 20 MG tablet Take 20 mg by mouth every evening.  simvastatin (ZOCOR) 20 MG tablet Take 20 mg by mouth nightly.  raloxifene (EVISTA) 60 MG tablet Take 60 mg by mouth every evening.       brimonidine-timolol (COMBIGAN) 0.2-0.5 % ophthalmic solution Place 1 drop into both eyes every 12 hours  Travoprost, ISABELLA Free, (TRAVATAN Z) 0.004 % SOLN ophthalmic solution Place 1 drop into both eyes nightly. No current facility-administered medications for this visit.           Allergies as of 09/11/2020 - Review Complete 09/11/2020   Allergen Reaction Noted    Ciprofloxacin Diarrhea and Nausea And Vomiting 10/30/2017    Sulfa antibiotics Rash 06/19/2014       Social History     Socioeconomic History    Marital status:      Spouse name: Not on file    Number of children: Not on file    Years of education: Not on file    Highest education level: Not on file   Occupational History    Not on file   Social Needs    Financial resource strain: Not on file    Food insecurity     Worry: Not on file     Inability: Not on file   [employer] needs     Medical: Not on file     Non-medical: Not on file   Tobacco Use    Smoking status: Never Smoker    Smokeless tobacco: Never Used   Substance and Sexual Activity    Alcohol use: No     Comment: occasional pop    Drug use: No    Sexual activity: Not on file   Lifestyle    Physical activity     Days per week: Not on file     Minutes per session: Not on file    Stress: Not on file   Relationships    Social connections     Talks on phone: Not on file     Gets together: Not on file     Attends [employer] service: Not on file     Active member of club or organization: Not on file     Attends meetings of clubs or organizations: Not on file     Relationship status: Not on file    Intimate partner violence     Fear of current or ex partner: Not on file     Emotionally abused: Not on file     Physically abused: Not on file     Forced sexual activity: Not on file   Other Topics Concern    Not on file   Social History Narrative    Not on file       No family history of early CAD    REVIEW OF SYSTEMS:     CONSTITUTIONAL:  negative for  fevers, chills, sweats and fatigue  HEENT:  negative for  tinnitus, earaches, nasal congestion and epistaxis  RESPIRATORY:  negative for  dry cough, cough with sputum, dyspnea, wheezing and hemoptysis  GASTROINTESTINAL:  negative for nausea, vomiting, diarrhea, constipation, pruritus and jaundice  HEMATOLOGIC/LYMPHATIC:  negative for easy bruising, bleeding, lymphadenopathy and petechiae  ENDOCRINE:  negative for heat intolerance, cold intolerance, tremor, hair loss and diabetic symptoms including neither polyuria nor polydipsia nor blurred vision  MUSCULOSKELETAL:  negative for  myalgias, arthralgias, joint swelling, stiff joints and decreased range of motion  NEUROLOGICAL:  negative for memory problems, speech problems, visual disturbance, dysphagia, weakness and numbness    PHYSICAL EXAM:   Constitutional:  Awake, alert cooperative, no apparent distress, and appears stated age. HEENT:  Moist and pink mucous membranes, normocephalic, without obvious abnormality, atraumatic, normal ears and nose. NECK:  Supple, symmetrical, trachea midline, no JVD, no adenopathy, thyroid symmetric, not enlarged and no tenderness, good carotid upstroke bilaterally, no carotid bruit, skin normal.   LUNGS: No increased work of breathing, good air exchange, clear to auscultation bilaterally, no crackles or wheezing. Cardiovascular: Normal apical impulse, regular rate and rhythm, normal S1 and S2, no S3 or S4, no murmur, no pedal edema, good carotid upstroke bilaterally, no carotid bruit, no JVD, no abdominal pulsating masses. ABDOMEN: Soft, nontender, no hepatomegaly, no splenomegaly, bowel sound positive. CHEST:  Expands symmetrically, nontender to palpation. Musculoskeletal:  No clubbing or cyanosis. No redness, warmth, or swelling of the joints. Neurological: Alert, awake, and oriented X3. SKIN: No bruises, no bleeding, normal skin color, texture, turgor and no redness, warmth or swelling.     /68 (Site: Right Upper Arm, Position: Sitting, Cuff Size: Medium Adult)   Pulse 60   Ht 5' 6\" (1.676 m)   Wt 182 lb TROP  BNP:  No results found for: BNP  FASTING LIPID PANEL:  No results found for: CHOL, HDL, TRIG  No orders to display     I have personally reviewed the laboratory, cardiac diagnostic and radiographic testing as outlined above:      IMPRESSION:  1. Abnormal EKG: With left bundle branch block         2. Palpitations: Secondary to PVCs, significantly better    3. Essential (primary) hypertension  :   controlled, continue current treatment. 4.  Hyperlipidemia, unspecified   : On statin                  RECOMMENDATIONS:   1. Continue current treatment  2. Preventive cardiology: Low-salt, low-cholesterol diet, daily exercise,were all advised. 3.  Follow-up with Dr. Bekah Krishna as scheduled  4. Follow-up with Dr. Danny Mitchell in 1 year, sooner if symptomatic for any reason    I have reviewed my findings and recommendations with patient    Electronically signed by Shaina Rosado MD on 9/11/2020 at 11:48 AM    NOTE: This report was transcribed using voice recognition software.  Every effort was made to ensure accuracy; however, inadvertent computerized transcription errors may be present  Please see scanned note in epic

## 2020-09-11 ENCOUNTER — OFFICE VISIT (OUTPATIENT)
Dept: CARDIOLOGY CLINIC | Age: 80
End: 2020-09-11
Payer: MEDICARE

## 2020-09-11 VITALS
DIASTOLIC BLOOD PRESSURE: 68 MMHG | BODY MASS INDEX: 29.25 KG/M2 | WEIGHT: 182 LBS | HEIGHT: 66 IN | SYSTOLIC BLOOD PRESSURE: 130 MMHG | HEART RATE: 60 BPM

## 2020-09-11 PROCEDURE — 99213 OFFICE O/P EST LOW 20 MIN: CPT | Performed by: INTERNAL MEDICINE

## 2020-09-11 RX ORDER — M-VIT,TX,IRON,MINS/CALC/FOLIC 27MG-0.4MG
1 TABLET ORAL DAILY
COMMUNITY

## 2020-09-14 PROCEDURE — 93000 ELECTROCARDIOGRAM COMPLETE: CPT | Performed by: INTERNAL MEDICINE

## 2021-10-27 NOTE — PROGRESS NOTES
eyes nightly. No current facility-administered medications for this visit. Allergies as of 10/28/2021 - Fully Reviewed 10/28/2021   Allergen Reaction Noted    Ciprofloxacin Diarrhea and Nausea And Vomiting 10/30/2017    Sulfa antibiotics Rash 06/19/2014       Social History     Socioeconomic History    Marital status:      Spouse name: Not on file    Number of children: Not on file    Years of education: Not on file    Highest education level: Not on file   Occupational History    Not on file   Tobacco Use    Smoking status: Never Smoker    Smokeless tobacco: Never Used   Vaping Use    Vaping Use: Never used   Substance and Sexual Activity    Alcohol use: No     Comment: occasional pop    Drug use: No    Sexual activity: Not Currently   Other Topics Concern    Not on file   Social History Narrative    Not on file     Social Determinants of Health     Financial Resource Strain:     Difficulty of Paying Living Expenses:    Food Insecurity:     Worried About [address] in the Last Year:     [address] [employer] in the Last Year:    Transportation Needs:     Lack of Transportation (Medical):      Lack of Transportation (Non-Medical):    Physical Activity:     Days of Exercise per Week:     Minutes of Exercise per Session:    Stress:     Feeling of Stress :    Social Connections:     Frequency of Communication with Friends and Family:     Frequency of Social Gatherings with Friends and Family:     Attends Mu-ism Services:     Active Member of Clubs or Organizations:     Attends Club or Organization Meetings:     Marital Status:    Intimate Partner Violence:     Fear of Current or Ex-Partner:     Emotionally Abused:     Physically Abused:     Sexually Abused:        No family history of early CAD    REVIEW OF SYSTEMS:     CONSTITUTIONAL:  negative for  fevers, chills, sweats and fatigue  HEENT:  negative for  tinnitus, earaches, nasal congestion and epistaxis  RESPIRATORY:  negative for  dry cough, cough with sputum, dyspnea, wheezing and hemoptysis  GASTROINTESTINAL:  negative for nausea, vomiting, diarrhea, constipation, pruritus and jaundice  HEMATOLOGIC/LYMPHATIC:  negative for easy bruising, bleeding, lymphadenopathy and petechiae  ENDOCRINE:  negative for heat intolerance, cold intolerance, tremor, hair loss and diabetic symptoms including neither polyuria nor polydipsia nor blurred vision  MUSCULOSKELETAL:  negative for  myalgias, arthralgias, joint swelling, stiff joints and decreased range of motion  NEUROLOGICAL:  negative for memory problems, speech problems, visual disturbance, dysphagia, weakness and numbness    PHYSICAL EXAM:   Constitutional:  Awake, alert cooperative, no apparent distress, and appears stated age. HEENT:  Moist and pink mucous membranes, normocephalic, without obvious abnormality, atraumatic, normal ears and nose. NECK:  Supple, symmetrical, trachea midline, no JVD, no adenopathy, thyroid symmetric, not enlarged and no tenderness, good carotid upstroke bilaterally, no carotid bruit, skin normal.   LUNGS: No increased work of breathing, good air exchange, clear to auscultation bilaterally, no crackles or wheezing. Cardiovascular: Normal apical impulse, regular rate and rhythm, normal S1 and S2, no S3 or S4, no murmur, no pedal edema, good carotid upstroke bilaterally, no carotid bruit, no JVD, no abdominal pulsating masses. ABDOMEN: Soft, nontender, no hepatomegaly, no splenomegaly, bowel sound positive. CHEST:  Expands symmetrically, nontender to palpation. Musculoskeletal:  No clubbing or cyanosis. No redness, warmth, or swelling of the joints. Neurological: Alert, awake, and oriented X3. SKIN: No bruises, no bleeding, normal skin color, texture, turgor and no redness, warmth or swelling.     /74 (Site: Left Upper Arm, Position: Sitting, Cuff Size: Large Adult)   Pulse 61   Ht 5' 6\" (1.676 m)   Wt 183 lb (83 kg)   BMI 29.54 kg/m²     DATA:   I personally reviewed the visit EKG with the following interpretation: Sinus rhythm, left bundle branch block,normal axis. EKG - 9/11/20 Sinus rhythm, left bundle branch block    ECHO: Not performed to date    Stress Test: 1/14/17 1. Negative Lexiscan stress test for ischemic symptoms  2. Probably normal Cardiolite perfusion scan showed mild decrease uptake of the radioactive tracer in the apex and the septum most likely represents soft tissue/breast attenuation artifacts  3. Normal left ventricular systolic function with normal motion  4. There is no comparison study  5.   The results of the study predict a low probability for significant coronary artery disease or future cardiac events    Angiography: Not performed to date  Cardiology Labs: BMP:    Lab Results   Component Value Date     04/11/2019    K 3.9 04/11/2019    K 3.9 04/11/2019     04/11/2019    CO2 24 04/11/2019    BUN 8 04/11/2019    CREATININE 0.7 04/11/2019     CMP:    Lab Results   Component Value Date     04/11/2019    K 3.9 04/11/2019    K 3.9 04/11/2019     04/11/2019    CO2 24 04/11/2019    BUN 8 04/11/2019    CREATININE 0.7 04/11/2019    PROT 6.2 04/11/2019     CBC:    Lab Results   Component Value Date    WBC 8.5 04/11/2019    RBC 4.20 04/11/2019    HGB 12.7 04/11/2019    HCT 38.7 04/11/2019    MCV 92.1 04/11/2019    RDW 13.3 04/11/2019     04/11/2019     PT/INR:  No results found for: PTINR  PT/INR Warfarin:  No components found for: PTPATWAR, PTINRWAR  PTT:    Lab Results   Component Value Date    APTT 26.4 06/28/2011     PTT Heparin:  No components found for: APTTHEP  Magnesium:    Lab Results   Component Value Date    MG 2.2 10/30/2017     TSH:  No results found for: TSH  TROPONIN:  No components found for: TROP  BNP:  No results found for: BNP  FASTING LIPID PANEL:  No results found for: CHOL, HDL, TRIG  No orders to display     I have personally reviewed the laboratory, cardiac diagnostic and radiographic testing as outlined above:      IMPRESSION:  1. Abnormal EKG: With left bundle branch block       2. Palpitations: Resolved  3. Hypertension: Controlled, continue current treatment. 4.  Hyperlipidemia, unspecified   : On statin                  RECOMMENDATIONS:   1. Continue current treatment  2. Preventive cardiology: Low-salt, low-cholesterol diet, daily exercise,were all advised. 3.  Follow-up with Dr. Kati Russ as scheduled  4. Follow-up with Dr. Mckay Joyce as needed    I have reviewed my findings and recommendations with patient    Electronically signed by Esequiel Thomas MD on 10/28/2021 at 1:42 PM    NOTE: This report was transcribed using voice recognition software.  Every effort was made to ensure accuracy; however, inadvertent computerized transcription errors may be present  Please see scanned note in epic

## 2021-10-28 ENCOUNTER — OFFICE VISIT (OUTPATIENT)
Dept: CARDIOLOGY CLINIC | Age: 81
End: 2021-10-28
Payer: MEDICARE

## 2021-10-28 VITALS
DIASTOLIC BLOOD PRESSURE: 74 MMHG | HEART RATE: 61 BPM | SYSTOLIC BLOOD PRESSURE: 130 MMHG | WEIGHT: 183 LBS | HEIGHT: 66 IN | BODY MASS INDEX: 29.41 KG/M2

## 2021-10-28 DIAGNOSIS — I10 PRIMARY HYPERTENSION: Primary | ICD-10-CM

## 2021-10-28 PROCEDURE — 93000 ELECTROCARDIOGRAM COMPLETE: CPT | Performed by: INTERNAL MEDICINE

## 2021-10-28 PROCEDURE — 4040F PNEUMOC VAC/ADMIN/RCVD: CPT | Performed by: INTERNAL MEDICINE

## 2021-10-28 PROCEDURE — G8417 CALC BMI ABV UP PARAM F/U: HCPCS | Performed by: INTERNAL MEDICINE

## 2021-10-28 PROCEDURE — 99213 OFFICE O/P EST LOW 20 MIN: CPT | Performed by: INTERNAL MEDICINE

## 2021-10-28 PROCEDURE — 1123F ACP DISCUSS/DSCN MKR DOCD: CPT | Performed by: INTERNAL MEDICINE

## 2021-10-28 PROCEDURE — G8427 DOCREV CUR MEDS BY ELIG CLIN: HCPCS | Performed by: INTERNAL MEDICINE

## 2021-10-28 PROCEDURE — 1090F PRES/ABSN URINE INCON ASSESS: CPT | Performed by: INTERNAL MEDICINE

## 2021-10-28 PROCEDURE — 1036F TOBACCO NON-USER: CPT | Performed by: INTERNAL MEDICINE

## 2021-10-28 PROCEDURE — G8484 FLU IMMUNIZE NO ADMIN: HCPCS | Performed by: INTERNAL MEDICINE

## 2021-10-28 PROCEDURE — G8400 PT W/DXA NO RESULTS DOC: HCPCS | Performed by: INTERNAL MEDICINE

## 2024-04-07 ENCOUNTER — APPOINTMENT (OUTPATIENT)
Dept: GENERAL RADIOLOGY | Age: 84
End: 2024-04-07
Payer: MEDICARE

## 2024-04-07 ENCOUNTER — APPOINTMENT (OUTPATIENT)
Dept: CT IMAGING | Age: 84
End: 2024-04-07
Payer: MEDICARE

## 2024-04-07 ENCOUNTER — HOSPITAL ENCOUNTER (OUTPATIENT)
Age: 84
Setting detail: OBSERVATION
Discharge: HOME | End: 2024-04-08
Attending: EMERGENCY MEDICINE | Admitting: INTERNAL MEDICINE
Payer: MEDICARE

## 2024-04-07 DIAGNOSIS — G43.109 OCULAR MIGRAINE: ICD-10-CM

## 2024-04-07 DIAGNOSIS — R00.2 PALPITATIONS: Primary | ICD-10-CM

## 2024-04-07 DIAGNOSIS — R42 LIGHTHEADEDNESS: ICD-10-CM

## 2024-04-07 DIAGNOSIS — R79.89 ELEVATED TROPONIN: ICD-10-CM

## 2024-04-07 DIAGNOSIS — T88.7XXA MEDICATION SIDE EFFECT: ICD-10-CM

## 2024-04-07 LAB
ALBUMIN SERPL-MCNC: 3.9 G/DL (ref 3.5–5.2)
ALP SERPL-CCNC: 73 U/L (ref 35–104)
ALT SERPL-CCNC: 17 U/L (ref 0–32)
ANION GAP SERPL CALCULATED.3IONS-SCNC: 12 MMOL/L (ref 7–16)
AST SERPL-CCNC: 43 U/L (ref 0–31)
BASOPHILS # BLD: 0.04 K/UL (ref 0–0.2)
BASOPHILS NFR BLD: 0 % (ref 0–2)
BILIRUB SERPL-MCNC: 0.3 MG/DL (ref 0–1.2)
BILIRUB UR QL STRIP: NEGATIVE
BUN SERPL-MCNC: 14 MG/DL (ref 6–23)
CALCIUM SERPL-MCNC: 9.5 MG/DL (ref 8.6–10.2)
CHLORIDE SERPL-SCNC: 100 MMOL/L (ref 98–107)
CLARITY UR: CLEAR
CO2 SERPL-SCNC: 23 MMOL/L (ref 22–29)
COLOR UR: YELLOW
CREAT SERPL-MCNC: 0.6 MG/DL (ref 0.5–1)
EOSINOPHIL # BLD: 0.19 K/UL (ref 0.05–0.5)
EOSINOPHILS RELATIVE PERCENT: 2 % (ref 0–6)
ERYTHROCYTE [DISTWIDTH] IN BLOOD BY AUTOMATED COUNT: 14.1 % (ref 11.5–15)
FLUAV RNA RESP QL NAA+PROBE: NOT DETECTED
FLUBV RNA RESP QL NAA+PROBE: NOT DETECTED
GFR SERPL CREATININE-BSD FRML MDRD: 88 ML/MIN/1.73M2
GLUCOSE SERPL-MCNC: 111 MG/DL (ref 74–99)
GLUCOSE UR STRIP-MCNC: NEGATIVE MG/DL
HCT VFR BLD AUTO: 46.3 % (ref 34–48)
HGB BLD-MCNC: 14.8 G/DL (ref 11.5–15.5)
HGB UR QL STRIP.AUTO: ABNORMAL
IMM GRANULOCYTES # BLD AUTO: 0.03 K/UL (ref 0–0.58)
IMM GRANULOCYTES NFR BLD: 0 % (ref 0–5)
KETONES UR STRIP-MCNC: NEGATIVE MG/DL
LACTATE BLDV-SCNC: 2.3 MMOL/L (ref 0.5–2.2)
LEUKOCYTE ESTERASE UR QL STRIP: NEGATIVE
LYMPHOCYTES NFR BLD: 4.13 K/UL (ref 1.5–4)
LYMPHOCYTES RELATIVE PERCENT: 37 % (ref 20–42)
MAGNESIUM SERPL-MCNC: 2.3 MG/DL (ref 1.6–2.6)
MCH RBC QN AUTO: 27.8 PG (ref 26–35)
MCHC RBC AUTO-ENTMCNC: 32 G/DL (ref 32–34.5)
MCV RBC AUTO: 87 FL (ref 80–99.9)
MONOCYTES NFR BLD: 1.28 K/UL (ref 0.1–0.95)
MONOCYTES NFR BLD: 11 % (ref 2–12)
NEUTROPHILS NFR BLD: 50 % (ref 43–80)
NEUTS SEG NFR BLD: 5.61 K/UL (ref 1.8–7.3)
NITRITE UR QL STRIP: NEGATIVE
PH UR STRIP: 6 [PH] (ref 5–9)
PLATELET CONFIRMATION: NORMAL
PLATELET, FLUORESCENCE: 189 K/UL (ref 130–450)
PMV BLD AUTO: 12.5 FL (ref 7–12)
POTASSIUM SERPL-SCNC: 4.9 MMOL/L (ref 3.5–5)
PROT SERPL-MCNC: 7.3 G/DL (ref 6.4–8.3)
PROT UR STRIP-MCNC: NEGATIVE MG/DL
RBC # BLD AUTO: 5.32 M/UL (ref 3.5–5.5)
RBC #/AREA URNS HPF: NORMAL /HPF
SARS-COV-2 RNA RESP QL NAA+PROBE: NOT DETECTED
SODIUM SERPL-SCNC: 135 MMOL/L (ref 132–146)
SOURCE: NORMAL
SP GR UR STRIP: 1.02 (ref 1–1.03)
SPECIMEN DESCRIPTION: NORMAL
UROBILINOGEN UR STRIP-ACNC: 0.2 EU/DL (ref 0–1)
WBC #/AREA URNS HPF: NORMAL /HPF
WBC OTHER # BLD: 11.3 K/UL (ref 4.5–11.5)

## 2024-04-07 PROCEDURE — 80053 COMPREHEN METABOLIC PANEL: CPT

## 2024-04-07 PROCEDURE — 6370000000 HC RX 637 (ALT 250 FOR IP): Performed by: NURSE PRACTITIONER

## 2024-04-07 PROCEDURE — 84484 ASSAY OF TROPONIN QUANT: CPT

## 2024-04-07 PROCEDURE — 83605 ASSAY OF LACTIC ACID: CPT

## 2024-04-07 PROCEDURE — 96374 THER/PROPH/DIAG INJ IV PUSH: CPT

## 2024-04-07 PROCEDURE — 96361 HYDRATE IV INFUSION ADD-ON: CPT

## 2024-04-07 PROCEDURE — 71045 X-RAY EXAM CHEST 1 VIEW: CPT

## 2024-04-07 PROCEDURE — 93005 ELECTROCARDIOGRAM TRACING: CPT | Performed by: NURSE PRACTITIONER

## 2024-04-07 PROCEDURE — 85025 COMPLETE CBC W/AUTO DIFF WBC: CPT

## 2024-04-07 PROCEDURE — 87636 SARSCOV2 & INF A&B AMP PRB: CPT

## 2024-04-07 PROCEDURE — 81001 URINALYSIS AUTO W/SCOPE: CPT

## 2024-04-07 PROCEDURE — 83735 ASSAY OF MAGNESIUM: CPT

## 2024-04-07 PROCEDURE — 6360000002 HC RX W HCPCS: Performed by: NURSE PRACTITIONER

## 2024-04-07 PROCEDURE — 96375 TX/PRO/DX INJ NEW DRUG ADDON: CPT

## 2024-04-07 PROCEDURE — 2580000003 HC RX 258: Performed by: NURSE PRACTITIONER

## 2024-04-07 PROCEDURE — 99285 EMERGENCY DEPT VISIT HI MDM: CPT

## 2024-04-07 PROCEDURE — 70450 CT HEAD/BRAIN W/O DYE: CPT

## 2024-04-07 RX ORDER — 0.9 % SODIUM CHLORIDE 0.9 %
1000 INTRAVENOUS SOLUTION INTRAVENOUS ONCE
Status: COMPLETED | OUTPATIENT
Start: 2024-04-07 | End: 2024-04-07

## 2024-04-07 RX ORDER — DIPHENHYDRAMINE HYDROCHLORIDE 50 MG/ML
12.5 INJECTION INTRAMUSCULAR; INTRAVENOUS ONCE
Status: COMPLETED | OUTPATIENT
Start: 2024-04-07 | End: 2024-04-07

## 2024-04-07 RX ORDER — METOCLOPRAMIDE HYDROCHLORIDE 5 MG/ML
10 INJECTION INTRAMUSCULAR; INTRAVENOUS ONCE
Status: COMPLETED | OUTPATIENT
Start: 2024-04-07 | End: 2024-04-07

## 2024-04-07 RX ORDER — ACETAMINOPHEN 500 MG
1000 TABLET ORAL ONCE
Status: COMPLETED | OUTPATIENT
Start: 2024-04-07 | End: 2024-04-07

## 2024-04-07 RX ADMIN — DIPHENHYDRAMINE HYDROCHLORIDE 12.5 MG: 50 INJECTION INTRAMUSCULAR; INTRAVENOUS at 22:12

## 2024-04-07 RX ADMIN — METOCLOPRAMIDE 10 MG: 5 INJECTION, SOLUTION INTRAMUSCULAR; INTRAVENOUS at 22:13

## 2024-04-07 RX ADMIN — ACETAMINOPHEN 1000 MG: 500 TABLET ORAL at 22:11

## 2024-04-07 RX ADMIN — SODIUM CHLORIDE 1000 ML: 9 INJECTION, SOLUTION INTRAVENOUS at 22:09

## 2024-04-07 ASSESSMENT — PAIN SCALES - GENERAL: PAINLEVEL_OUTOF10: 10

## 2024-04-07 ASSESSMENT — PAIN DESCRIPTION - LOCATION: LOCATION: HEAD

## 2024-04-07 ASSESSMENT — PAIN DESCRIPTION - DESCRIPTORS: DESCRIPTORS: ACHING

## 2024-04-08 ENCOUNTER — APPOINTMENT (OUTPATIENT)
Dept: NUCLEAR MEDICINE | Age: 84
End: 2024-04-08
Payer: MEDICARE

## 2024-04-08 ENCOUNTER — APPOINTMENT (OUTPATIENT)
Age: 84
End: 2024-04-08
Payer: MEDICARE

## 2024-04-08 ENCOUNTER — TELEPHONE (OUTPATIENT)
Dept: CARDIOLOGY CLINIC | Age: 84
End: 2024-04-08

## 2024-04-08 VITALS
HEIGHT: 66 IN | RESPIRATION RATE: 16 BRPM | DIASTOLIC BLOOD PRESSURE: 77 MMHG | BODY MASS INDEX: 27 KG/M2 | HEART RATE: 63 BPM | OXYGEN SATURATION: 98 % | SYSTOLIC BLOOD PRESSURE: 151 MMHG | TEMPERATURE: 97.6 F | WEIGHT: 168 LBS

## 2024-04-08 PROBLEM — R00.2 PALPITATIONS: Status: ACTIVE | Noted: 2024-04-08

## 2024-04-08 PROBLEM — R42 LIGHTHEADEDNESS: Status: ACTIVE | Noted: 2024-04-08

## 2024-04-08 PROBLEM — R79.89 ELEVATED TROPONIN: Status: ACTIVE | Noted: 2024-04-08

## 2024-04-08 LAB
ALBUMIN SERPL-MCNC: 3.7 G/DL (ref 3.5–5.2)
ALBUMIN SERPL-MCNC: 3.8 G/DL (ref 3.5–5.2)
ALP SERPL-CCNC: 68 U/L (ref 35–104)
ALP SERPL-CCNC: 72 U/L (ref 35–104)
ALT SERPL-CCNC: 15 U/L (ref 0–32)
ALT SERPL-CCNC: 16 U/L (ref 0–32)
ANION GAP SERPL CALCULATED.3IONS-SCNC: 11 MMOL/L (ref 7–16)
ANION GAP SERPL CALCULATED.3IONS-SCNC: 11 MMOL/L (ref 7–16)
AST SERPL-CCNC: 21 U/L (ref 0–31)
AST SERPL-CCNC: 28 U/L (ref 0–31)
BASOPHILS # BLD: 0.04 K/UL (ref 0–0.2)
BASOPHILS NFR BLD: 0 % (ref 0–2)
BILIRUB SERPL-MCNC: 0.3 MG/DL (ref 0–1.2)
BILIRUB SERPL-MCNC: 0.4 MG/DL (ref 0–1.2)
BUN SERPL-MCNC: 13 MG/DL (ref 6–23)
BUN SERPL-MCNC: 13 MG/DL (ref 6–23)
CALCIUM SERPL-MCNC: 8.6 MG/DL (ref 8.6–10.2)
CALCIUM SERPL-MCNC: 9.2 MG/DL (ref 8.6–10.2)
CHLORIDE SERPL-SCNC: 100 MMOL/L (ref 98–107)
CHLORIDE SERPL-SCNC: 106 MMOL/L (ref 98–107)
CO2 SERPL-SCNC: 23 MMOL/L (ref 22–29)
CO2 SERPL-SCNC: 25 MMOL/L (ref 22–29)
CREAT SERPL-MCNC: 0.6 MG/DL (ref 0.5–1)
CREAT SERPL-MCNC: 0.6 MG/DL (ref 0.5–1)
ECHO BSA: 1.88 M2
EOSINOPHIL # BLD: 0.15 K/UL (ref 0.05–0.5)
EOSINOPHILS RELATIVE PERCENT: 1 % (ref 0–6)
ERYTHROCYTE [DISTWIDTH] IN BLOOD BY AUTOMATED COUNT: 14.1 % (ref 11.5–15)
GFR SERPL CREATININE-BSD FRML MDRD: 87 ML/MIN/1.73M2
GFR SERPL CREATININE-BSD FRML MDRD: 88 ML/MIN/1.73M2
GLUCOSE SERPL-MCNC: 109 MG/DL (ref 74–99)
GLUCOSE SERPL-MCNC: 93 MG/DL (ref 74–99)
HCT VFR BLD AUTO: 40.6 % (ref 34–48)
HGB BLD-MCNC: 12.8 G/DL (ref 11.5–15.5)
IMM GRANULOCYTES # BLD AUTO: 0.04 K/UL (ref 0–0.58)
IMM GRANULOCYTES NFR BLD: 0 % (ref 0–5)
LACTATE BLDV-SCNC: 1.4 MMOL/L (ref 0.5–2.2)
LYMPHOCYTES NFR BLD: 3.82 K/UL (ref 1.5–4)
LYMPHOCYTES RELATIVE PERCENT: 34 % (ref 20–42)
MAGNESIUM SERPL-MCNC: 2 MG/DL (ref 1.6–2.6)
MCH RBC QN AUTO: 27.3 PG (ref 26–35)
MCHC RBC AUTO-ENTMCNC: 31.5 G/DL (ref 32–34.5)
MCV RBC AUTO: 86.6 FL (ref 80–99.9)
MONOCYTES NFR BLD: 1.21 K/UL (ref 0.1–0.95)
MONOCYTES NFR BLD: 11 % (ref 2–12)
NEUTROPHILS NFR BLD: 53 % (ref 43–80)
NEUTS SEG NFR BLD: 6.05 K/UL (ref 1.8–7.3)
NUC STRESS EJECTION FRACTION: 76 %
PLATELET # BLD AUTO: 216 K/UL (ref 130–450)
PMV BLD AUTO: 12 FL (ref 7–12)
POTASSIUM SERPL-SCNC: 4.1 MMOL/L (ref 3.5–5)
POTASSIUM SERPL-SCNC: 4.2 MMOL/L (ref 3.5–5)
PROT SERPL-MCNC: 6.8 G/DL (ref 6.4–8.3)
PROT SERPL-MCNC: 6.8 G/DL (ref 6.4–8.3)
RBC # BLD AUTO: 4.69 M/UL (ref 3.5–5.5)
SODIUM SERPL-SCNC: 134 MMOL/L (ref 132–146)
SODIUM SERPL-SCNC: 142 MMOL/L (ref 132–146)
STRESS BASELINE DIAS BP: 80 MMHG
STRESS BASELINE HR: 70 BPM
STRESS BASELINE SYS BP: 182 MMHG
STRESS ESTIMATED WORKLOAD: 1 METS
STRESS PEAK DIAS BP: 80 MMHG
STRESS PEAK SYS BP: 182 MMHG
STRESS PERCENT HR ACHIEVED: 67 %
STRESS POST PEAK HR: 90 BPM
STRESS RATE PRESSURE PRODUCT: NORMAL BPM*MMHG
STRESS TARGET HR: 135 BPM
TROPONIN I SERPL HS-MCNC: 61 NG/L (ref 0–9)
TROPONIN I SERPL HS-MCNC: 63 NG/L (ref 0–9)
WBC OTHER # BLD: 11.3 K/UL (ref 4.5–11.5)

## 2024-04-08 PROCEDURE — 6360000002 HC RX W HCPCS: Performed by: INTERNAL MEDICINE

## 2024-04-08 PROCEDURE — 93017 CV STRESS TEST TRACING ONLY: CPT

## 2024-04-08 PROCEDURE — 6370000000 HC RX 637 (ALT 250 FOR IP): Performed by: INTERNAL MEDICINE

## 2024-04-08 PROCEDURE — 6370000000 HC RX 637 (ALT 250 FOR IP)

## 2024-04-08 PROCEDURE — G0378 HOSPITAL OBSERVATION PER HR: HCPCS

## 2024-04-08 PROCEDURE — 83735 ASSAY OF MAGNESIUM: CPT

## 2024-04-08 PROCEDURE — 84484 ASSAY OF TROPONIN QUANT: CPT

## 2024-04-08 PROCEDURE — 2580000003 HC RX 258: Performed by: INTERNAL MEDICINE

## 2024-04-08 PROCEDURE — 96372 THER/PROPH/DIAG INJ SC/IM: CPT

## 2024-04-08 PROCEDURE — 78452 HT MUSCLE IMAGE SPECT MULT: CPT

## 2024-04-08 PROCEDURE — A9500 TC99M SESTAMIBI: HCPCS | Performed by: RADIOLOGY

## 2024-04-08 PROCEDURE — 99222 1ST HOSP IP/OBS MODERATE 55: CPT

## 2024-04-08 PROCEDURE — 93016 CV STRESS TEST SUPVJ ONLY: CPT | Performed by: INTERNAL MEDICINE

## 2024-04-08 PROCEDURE — 85025 COMPLETE CBC W/AUTO DIFF WBC: CPT

## 2024-04-08 PROCEDURE — 78452 HT MUSCLE IMAGE SPECT MULT: CPT | Performed by: INTERNAL MEDICINE

## 2024-04-08 PROCEDURE — 80053 COMPREHEN METABOLIC PANEL: CPT

## 2024-04-08 PROCEDURE — 6360000002 HC RX W HCPCS

## 2024-04-08 PROCEDURE — 3430000000 HC RX DIAGNOSTIC RADIOPHARMACEUTICAL: Performed by: RADIOLOGY

## 2024-04-08 PROCEDURE — 93018 CV STRESS TEST I&R ONLY: CPT | Performed by: INTERNAL MEDICINE

## 2024-04-08 PROCEDURE — 36415 COLL VENOUS BLD VENIPUNCTURE: CPT

## 2024-04-08 RX ORDER — BRIMONIDINE TARTRATE AND TIMOLOL MALEATE 2; 5 MG/ML; MG/ML
1 SOLUTION OPHTHALMIC EVERY 12 HOURS
Status: DISCONTINUED | OUTPATIENT
Start: 2024-04-08 | End: 2024-04-08 | Stop reason: CLARIF

## 2024-04-08 RX ORDER — REGADENOSON 0.08 MG/ML
0.4 INJECTION, SOLUTION INTRAVENOUS
Status: COMPLETED | OUTPATIENT
Start: 2024-04-08 | End: 2024-04-08

## 2024-04-08 RX ORDER — POLYETHYLENE GLYCOL 3350 17 G/17G
17 POWDER, FOR SOLUTION ORAL DAILY PRN
Status: DISCONTINUED | OUTPATIENT
Start: 2024-04-08 | End: 2024-04-08 | Stop reason: HOSPADM

## 2024-04-08 RX ORDER — RALOXIFENE HYDROCHLORIDE 60 MG/1
60 TABLET, FILM COATED ORAL EVERY EVENING
Status: DISCONTINUED | OUTPATIENT
Start: 2024-04-08 | End: 2024-04-08 | Stop reason: CLARIF

## 2024-04-08 RX ORDER — ONDANSETRON 2 MG/ML
4 INJECTION INTRAMUSCULAR; INTRAVENOUS EVERY 6 HOURS PRN
Status: DISCONTINUED | OUTPATIENT
Start: 2024-04-08 | End: 2024-04-08 | Stop reason: HOSPADM

## 2024-04-08 RX ORDER — DORZOLAMIDE HCL 20 MG/ML
1 SOLUTION/ DROPS OPHTHALMIC 2 TIMES DAILY
Status: DISCONTINUED | OUTPATIENT
Start: 2024-04-08 | End: 2024-04-08 | Stop reason: HOSPADM

## 2024-04-08 RX ORDER — POTASSIUM CHLORIDE 20 MEQ/1
40 TABLET, EXTENDED RELEASE ORAL PRN
Status: DISCONTINUED | OUTPATIENT
Start: 2024-04-08 | End: 2024-04-08 | Stop reason: HOSPADM

## 2024-04-08 RX ORDER — ATORVASTATIN CALCIUM 10 MG/1
10 TABLET, FILM COATED ORAL DAILY
Status: DISCONTINUED | OUTPATIENT
Start: 2024-04-08 | End: 2024-04-08 | Stop reason: HOSPADM

## 2024-04-08 RX ORDER — SODIUM CHLORIDE 0.9 % (FLUSH) 0.9 %
5-40 SYRINGE (ML) INJECTION PRN
Status: DISCONTINUED | OUTPATIENT
Start: 2024-04-08 | End: 2024-04-08 | Stop reason: HOSPADM

## 2024-04-08 RX ORDER — LATANOPROST 50 UG/ML
1 SOLUTION/ DROPS OPHTHALMIC NIGHTLY
Status: DISCONTINUED | OUTPATIENT
Start: 2024-04-08 | End: 2024-04-08 | Stop reason: HOSPADM

## 2024-04-08 RX ORDER — ACETAMINOPHEN 325 MG/1
650 TABLET ORAL EVERY 6 HOURS PRN
Status: DISCONTINUED | OUTPATIENT
Start: 2024-04-08 | End: 2024-04-08 | Stop reason: HOSPADM

## 2024-04-08 RX ORDER — LANOLIN ALCOHOL/MO/W.PET/CERES
3 CREAM (GRAM) TOPICAL NIGHTLY
Status: DISCONTINUED | OUTPATIENT
Start: 2024-04-08 | End: 2024-04-08 | Stop reason: HOSPADM

## 2024-04-08 RX ORDER — MAGNESIUM SULFATE IN WATER 40 MG/ML
2000 INJECTION, SOLUTION INTRAVENOUS PRN
Status: DISCONTINUED | OUTPATIENT
Start: 2024-04-08 | End: 2024-04-08 | Stop reason: HOSPADM

## 2024-04-08 RX ORDER — TETRAKIS(2-METHOXYISOBUTYLISOCYANIDE)COPPER(I) TETRAFLUOROBORATE 1 MG/ML
12 INJECTION, POWDER, LYOPHILIZED, FOR SOLUTION INTRAVENOUS
Status: COMPLETED | OUTPATIENT
Start: 2024-04-08 | End: 2024-04-08

## 2024-04-08 RX ORDER — M-VIT,TX,IRON,MINS/CALC/FOLIC 27MG-0.4MG
1 TABLET ORAL DAILY
Status: DISCONTINUED | OUTPATIENT
Start: 2024-04-08 | End: 2024-04-08 | Stop reason: HOSPADM

## 2024-04-08 RX ORDER — TETRAKIS(2-METHOXYISOBUTYLISOCYANIDE)COPPER(I) TETRAFLUOROBORATE 1 MG/ML
35 INJECTION, POWDER, LYOPHILIZED, FOR SOLUTION INTRAVENOUS
Status: COMPLETED | OUTPATIENT
Start: 2024-04-08 | End: 2024-04-08

## 2024-04-08 RX ORDER — LISINOPRIL 20 MG/1
20 TABLET ORAL EVERY EVENING
Status: DISCONTINUED | OUTPATIENT
Start: 2024-04-08 | End: 2024-04-08 | Stop reason: HOSPADM

## 2024-04-08 RX ORDER — ONDANSETRON 4 MG/1
4 TABLET, ORALLY DISINTEGRATING ORAL EVERY 8 HOURS PRN
Status: DISCONTINUED | OUTPATIENT
Start: 2024-04-08 | End: 2024-04-08 | Stop reason: HOSPADM

## 2024-04-08 RX ORDER — METOPROLOL SUCCINATE 25 MG/1
25 TABLET, EXTENDED RELEASE ORAL DAILY
Status: DISCONTINUED | OUTPATIENT
Start: 2024-04-08 | End: 2024-04-08 | Stop reason: HOSPADM

## 2024-04-08 RX ORDER — MAGNESIUM HYDROXIDE/ALUMINUM HYDROXICE/SIMETHICONE 120; 1200; 1200 MG/30ML; MG/30ML; MG/30ML
30 SUSPENSION ORAL EVERY 6 HOURS PRN
Status: DISCONTINUED | OUTPATIENT
Start: 2024-04-08 | End: 2024-04-08 | Stop reason: HOSPADM

## 2024-04-08 RX ORDER — POTASSIUM CHLORIDE 7.45 MG/ML
10 INJECTION INTRAVENOUS PRN
Status: DISCONTINUED | OUTPATIENT
Start: 2024-04-08 | End: 2024-04-08 | Stop reason: HOSPADM

## 2024-04-08 RX ORDER — VITAMIN B COMPLEX
1000 TABLET ORAL DAILY
Status: DISCONTINUED | OUTPATIENT
Start: 2024-04-08 | End: 2024-04-08 | Stop reason: HOSPADM

## 2024-04-08 RX ORDER — SODIUM CHLORIDE 0.9 % (FLUSH) 0.9 %
5-40 SYRINGE (ML) INJECTION EVERY 12 HOURS SCHEDULED
Status: DISCONTINUED | OUTPATIENT
Start: 2024-04-08 | End: 2024-04-08 | Stop reason: HOSPADM

## 2024-04-08 RX ORDER — SODIUM CHLORIDE 9 MG/ML
INJECTION, SOLUTION INTRAVENOUS PRN
Status: DISCONTINUED | OUTPATIENT
Start: 2024-04-08 | End: 2024-04-08 | Stop reason: HOSPADM

## 2024-04-08 RX ORDER — TIMOLOL MALEATE 5 MG/ML
1 SOLUTION/ DROPS OPHTHALMIC EVERY 12 HOURS
Status: DISCONTINUED | OUTPATIENT
Start: 2024-04-08 | End: 2024-04-08 | Stop reason: HOSPADM

## 2024-04-08 RX ORDER — BRIMONIDINE TARTRATE 2 MG/ML
1 SOLUTION/ DROPS OPHTHALMIC EVERY 12 HOURS
Status: DISCONTINUED | OUTPATIENT
Start: 2024-04-08 | End: 2024-04-08 | Stop reason: HOSPADM

## 2024-04-08 RX ORDER — ACETAMINOPHEN 650 MG/1
650 SUPPOSITORY RECTAL EVERY 6 HOURS PRN
Status: DISCONTINUED | OUTPATIENT
Start: 2024-04-08 | End: 2024-04-08 | Stop reason: HOSPADM

## 2024-04-08 RX ORDER — ENOXAPARIN SODIUM 100 MG/ML
40 INJECTION SUBCUTANEOUS DAILY
Status: DISCONTINUED | OUTPATIENT
Start: 2024-04-08 | End: 2024-04-08 | Stop reason: HOSPADM

## 2024-04-08 RX ADMIN — TIMOLOL MALEATE 1 DROP: 5 SOLUTION OPHTHALMIC at 08:49

## 2024-04-08 RX ADMIN — ENOXAPARIN SODIUM 40 MG: 100 INJECTION SUBCUTANEOUS at 08:44

## 2024-04-08 RX ADMIN — METOPROLOL SUCCINATE 25 MG: 25 TABLET, EXTENDED RELEASE ORAL at 08:45

## 2024-04-08 RX ADMIN — Medication 35 MILLICURIE: at 11:26

## 2024-04-08 RX ADMIN — REGADENOSON 0.4 MG: 0.08 INJECTION, SOLUTION INTRAVENOUS at 11:22

## 2024-04-08 RX ADMIN — DORZOLAMIDE HYDROCHLORIDE 1 DROP: 20 SOLUTION/ DROPS OPHTHALMIC at 08:46

## 2024-04-08 RX ADMIN — BRIMONIDINE TARTRATE 1 DROP: 2 SOLUTION/ DROPS OPHTHALMIC at 08:49

## 2024-04-08 RX ADMIN — SODIUM CHLORIDE, PRESERVATIVE FREE 10 ML: 5 INJECTION INTRAVENOUS at 08:44

## 2024-04-08 RX ADMIN — Medication 1000 UNITS: at 08:51

## 2024-04-08 RX ADMIN — Medication 1 TABLET: at 08:46

## 2024-04-08 RX ADMIN — Medication 12 MILLICURIE: at 09:42

## 2024-04-08 ASSESSMENT — PAIN SCALES - GENERAL: PAINLEVEL_OUTOF10: 0

## 2024-04-08 NOTE — CONSULTS
Inpatient Cardiology Consultation      Reason for Consult:  Palpitations, PVCs.  Elevated Troponin     Consulting Physician: Dr Quintero    Requesting Physician:  Dr CON Nick     Date of Consultation: 4/8/2024    HISTORY OF PRESENT ILLNESS: 84 yo elderly  female known to Dr Williamson last seen in office 10/2021       PMH: PVC's, HTN, HLD, cLBBB, ocular migraines, and glaucoma with L eye shunt.    12/2023 diagnosed with ocular migraines    1 week ago developed lightheadedness when she gets up and moves around but denies any worsening of lightheadedness when ambulating up or down a flight of steps (laundry in basement).     4/3/2024  seen @ urgent care a few days ago and told she had an infected tonsil> placed on Doxycycline BID x 10 days after day 2 she felt \"weird\" and stopped taking the doxycyline, because after reading the side effects (one was blindness) she stopped it given her issues with glaucoma and ocular migraines.    2 days prior to admission started seeing \"threads/stripes\" on the wall.     Denies any CP/pressure, SOB. Does state she has been having intermittent infrequent palpitations. Telemetry reviewed rare PVC on monitor.     [facility] 4/7/2024 /93  ST, afebrile, and O2 saturation 95% on RA.    Troponin 63>61, Bun/Cr 14/0.6, K+ 4.9, Magnesium 2.3, lactic acid 2.3>1.4, AST 43, CBC WNL, influenza/COVID-19 NEGATIVE, UA negative for UTI    ED Reglan, 1 liter NSS, 1 gram Tylenol, and benadryl     Orthostatic BP's done last evening on 4/7/2024 with a 12 point drop in systolic BP from sitting to standing.     Currently /80 HR 60's SR, with rare PVC on monitor, remains afebrile, and O@ saturation not recorded this am.    4/8/2024 Lexiscan MPS: No evidence of ischemia. Probable breast attenuation artifact. EF 76%. SR with 1st degree AVB. LBBB.       Please note: past medical records were reviewed per electronic medical record (EMR) - see detailed reports under Past Medical/

## 2024-04-08 NOTE — CARE COORDINATION
4/8/24, Patient admitted for Lightheadedness.  SW went to meet with patient-who was off floor for testing.  Last admission-patient was residing alone in a 2 story home with 4 steps to enter the home.  DME is [facility], shower chair, BSC and a cane.  PCP is Dr. Collins and Pharmacy is [facility] on [address].  SW will follow for any home going needs.      DARVIN Khanna  [facility] Case Management  [phone]

## 2024-04-08 NOTE — PLAN OF CARE
Patient is an 83-year-old female with past medical history of hyperlipidemia, hypertension, osteopenia, migraine headaches and PVCs who was admitted overnight by my colleague.  She presented to the emergency department with palpitations and headache.  Patient was found to have a first-degree AV block with PVCs and elevated troponin.  Cardiology is consulted.  Patient also complaining of visual changes.  Ophthalmology was also consulted.  Continue plan as per H&P.

## 2024-04-08 NOTE — TELEPHONE ENCOUNTER
Joaquina Quintero MD McGee, Shelia, MA  I ordered an outpatient echo (patient belongs to Dr Williamson). Patient to be discharged today, can you please call patient to set up/schedule echo?    Thank you    Can we get this scheduled.

## 2024-04-08 NOTE — PROGRESS NOTES
Stress test showed no evidence of stress-induced ischemia with normal left ventricular systolic function.  Patient may be discharged from cardiac standpoint.  Cardiology will sign off.    Electronically signed by Joaquina Quintero MD on 4/8/2024 at 2:47 PM

## 2024-04-08 NOTE — H&P
Hospitalist History & Physical      PCP: Ed Collins Jr., MD    Date of Service: Pt seen/examined on 4/8/2024       Placed in Observation.    Chief Complaint:  had concerns including Headache (Headache, sore throat, and intermittent lightheadedness. Pt reports being seen Wednesday at  for sore throat symptoms, was started on Doxy, and then abruptly stopped abx 2 days after reading that blindness is in the possible adverse effects. Hx of migraines.).    History of Present Illness:    Ms. Malissa Chen, a 83 y.o. year old female  who  has a past medical history of Diverticulitis, Glaucoma associated with ocular disorder, Hyperlipidemia, Hypertension, Osteopenia, Palpitations, and PVC (premature ventricular contraction).  Patient presented to emergency department with multiple complaints that include: Upper respiratory symptoms, headache, palpitations as well as visual hallucinations.  Patient states that she began to develop upper respiratory symptoms last week then went to urgent care while she was started on doxycycline, took a dose of Doxy on Thursday, as well as Friday, Friday evening she began to have migraine headache with visual hallucinations and immediately stopped the medication.  Also states that she may have had visual hallucinations prior to starting Doxy and describes them as lites, fluid and leaf-like shapes in her eyes..  Patient denies any psychiatric history.  Patient also has complaints of heart palpitations and states that her \"PVCs are acting up\".  Patient does have a history of heart palpitations and PVCs and follows with cardiology.  The last appointment was on 10/28/2021 and EKG was read as abnormal with left bundle branch block.  Last stress test was 1/14/2017 and was read as low risk.  Current hospital course largely unremarkable including CT of head and chest x-ray.  EKG with first-degree AV block with PVCs and also elevated troponin of 63.  Patient was admitted to medicine for

## 2024-04-08 NOTE — ED PROVIDER NOTES
tenderness  Extremities: Moves all extremities x 4. Warm and well perfused  Skin: warm and dry without rash  Neurologic: GCS 15, cranial nerves II through XII grossly intact no acute neurovascular deficit noted.  Speech clear and coherent strength strong and equal bilaterally.  Negative for meningeal irritation.  NIH 0.  Psych: Normal Affect-clear thought process.  Patient reports that when she does close her eyes and is completely awake she does have visual hallucinations but the minute she opens her eyes they are completely gone-patient did report now that the headache is letting up the hallucinations are not letting up      ------------------------------ ED COURSE/MEDICAL DECISION MAKING----------------------  Medications   sodium chloride 0.9 % bolus 1,000 mL (0 mLs IntraVENous Stopped 4/7/24 2335)   diphenhydrAMINE (BENADRYL) injection 12.5 mg (12.5 mg IntraVENous Given 4/7/24 2212)   metoclopramide (REGLAN) injection 10 mg (10 mg IntraVENous Given 4/7/24 2213)   acetaminophen (TYLENOL) tablet 1,000 mg (1,000 mg Oral Given 4/7/24 2211)         ED COURSE: Twelve-lead EKG as interpreted by the emergency room attending showing heart rate of 85, normal sinus rhythm with first-degree AV block with frequent PVCs cyst, left axis deviation, left bundle branch block.  No acute ST elevation or depression noted.  This was compared to previous EKG from September 2020 and November 2021 which at that time also showed a left bundle branch block as well as left axis deviation.  Unchanged.       Medical Decision Making:      Malissa Chen is a 83 y.o. female presenting to the ED for sinus pain and pressure as well as headache.  Patient presents to the emergency department states that over a week ago she began to not feel well.  She does follow closely with her eye doctor and she was informed that she has ocular migraines.  She does complain of a headache to the mid area of her forehead that is been constant over the last

## 2024-04-08 NOTE — DISCHARGE SUMMARY
[facility] Hospitalist Physician Discharge Summary       Ed Collins Jr., MD  [address] [address]  [phone]    Call in 3 days        Activity level: As tolerated     Dispo: Home      Condition on discharge: Stable     Patient ID:  Malissa Chen  [id number]  83 y.o.  1940    Admit date: 4/7/2024    Discharge date and time:  4/8/2024  3:51 PM    Admission Diagnoses: Principal Problem:    Lightheadedness  Active Problems:    Elevated troponin    Palpitations  Resolved Problems:    * No resolved hospital problems. *      Discharge Diagnoses: Principal Problem:    Lightheadedness  Active Problems:    Elevated troponin    Palpitations  Resolved Problems:    * No resolved hospital problems. *      Consults:  IP CONSULT TO INTERNAL MEDICINE  IP CONSULT TO OPHTHALMOLOGY  IP CONSULT TO CARDIOLOGY    Hospital Course:   Patient Malissa Chen is a 83 y.o. presented with Palpitations [R00.2]  Lightheadedness [R42]  Ocular migraine [G43.109]  Elevated troponin [R79.89]  Medication side effect [T88.7XXA]    Ms. Malissa Chen, a 83 y.o. year old female  who  has a past medical history of Diverticulitis, Glaucoma associated with ocular disorder, Hyperlipidemia, Hypertension, Osteopenia, Palpitations, and PVC (premature ventricular contraction).  Patient presented to emergency department with multiple complaints that include: Upper respiratory symptoms, headache, palpitations as well as visual hallucinations.  Patient states that she began to develop upper respiratory symptoms last week then went to urgent care while she was started on doxycycline, took a dose of Doxy on Thursday, as well as Friday, Friday evening she began to have migraine headache with visual hallucinations and immediately stopped the medication.  Also states that she may have had visual hallucinations prior to starting Doxy and describes them as lites, fluid and leaf-like shapes in her eyes..  Patient denies any

## 2024-04-08 NOTE — PROGRESS NOTES
4 Eyes Skin Assessment     NAME:  Malissa Chen  YOB: 1940  MEDICAL RECORD NUMBER:  [id number]    The patient is being assessed for  Admission    I agree that at least one RN has performed a thorough Head to Toe Skin Assessment on the patient. ALL assessment sites listed below have been assessed.      Areas assessed by both nurses:    Head, Face, Ears, Shoulders, Back, Chest, Arms, Elbows, Hands, Sacrum. Buttock, Coccyx, Ischium, and Legs. Feet and Heels        Does the Patient have a Wound? No noted wound(s)       Homero Prevention initiated by RN: No  Wound Care Orders initiated by RN: No    Pressure Injury (Stage 3,4, Unstageable, DTI, NWPT, and Complex wounds) if present, place Wound referral order by RN under : No    New Ostomies, if present place, Ostomy referral order under : No     Nurse 1 eSignature: Electronically signed by Cherise Elizondo RN on 4/8/24 at 3:52 AM EDT    **SHARE this note so that the co-signing nurse can place an eSignature**    Nurse 2 eSignature: Electronically signed by Wes Dial RN on 4/8/24 at 4:04 AM EDT

## 2024-04-09 LAB
EKG ATRIAL RATE: 85 BPM
EKG P AXIS: 74 DEGREES
EKG P-R INTERVAL: 224 MS
EKG Q-T INTERVAL: 400 MS
EKG QRS DURATION: 132 MS
EKG QTC CALCULATION (BAZETT): 476 MS
EKG R AXIS: -35 DEGREES
EKG T AXIS: 119 DEGREES
EKG VENTRICULAR RATE: 85 BPM

## 2024-04-09 PROCEDURE — 93010 ELECTROCARDIOGRAM REPORT: CPT | Performed by: INTERNAL MEDICINE

## 2024-04-16 ENCOUNTER — TRANSCRIBE ORDERS (OUTPATIENT)
Dept: ADMINISTRATIVE | Age: 84
End: 2024-04-16

## 2024-04-16 DIAGNOSIS — R09.89 BRUIT: Primary | ICD-10-CM

## 2024-04-19 ENCOUNTER — HOSPITAL ENCOUNTER (OUTPATIENT)
Dept: CARDIOLOGY | Age: 84
End: 2024-04-19
Attending: INTERNAL MEDICINE
Payer: MEDICARE

## 2024-04-19 VITALS — HEIGHT: 66 IN | WEIGHT: 168 LBS | BODY MASS INDEX: 27 KG/M2

## 2024-04-19 DIAGNOSIS — R79.89 ELEVATED TROPONIN: ICD-10-CM

## 2024-04-19 DIAGNOSIS — R00.2 PALPITATIONS: ICD-10-CM

## 2024-04-19 LAB
ECHO AV AREA PEAK VELOCITY: 2.3 CM2
ECHO AV AREA VTI: 2.3 CM2
ECHO AV AREA/BSA PEAK VELOCITY: 1.2 CM2/M2
ECHO AV AREA/BSA VTI: 1.2 CM2/M2
ECHO AV CUSP MM: 2.1 CM
ECHO AV MEAN GRADIENT: 3 MMHG
ECHO AV MEAN VELOCITY: 0.8 M/S
ECHO AV PEAK GRADIENT: 5 MMHG
ECHO AV PEAK VELOCITY: 1.1 M/S
ECHO AV VELOCITY RATIO: 0.73
ECHO AV VTI: 28.9 CM
ECHO BSA: 1.88 M2
ECHO LA DIAMETER INDEX: 1.77 CM/M2
ECHO LA DIAMETER: 3.3 CM
ECHO LA VOL A-L A2C: 52 ML (ref 22–52)
ECHO LA VOL A-L A4C: 44 ML (ref 22–52)
ECHO LA VOL MOD A2C: 50 ML (ref 22–52)
ECHO LA VOL MOD A4C: 41 ML (ref 22–52)
ECHO LA VOLUME AREA LENGTH: 48 ML
ECHO LA VOLUME INDEX A-L A2C: 28 ML/M2 (ref 16–34)
ECHO LA VOLUME INDEX A-L A4C: 24 ML/M2 (ref 16–34)
ECHO LA VOLUME INDEX AREA LENGTH: 26 ML/M2 (ref 16–34)
ECHO LA VOLUME INDEX MOD A2C: 27 ML/M2 (ref 16–34)
ECHO LA VOLUME INDEX MOD A4C: 22 ML/M2 (ref 16–34)
ECHO LV EJECTION FRACTION A2C: 55 %
ECHO LV EJECTION FRACTION A4C: 52 %
ECHO LV FRACTIONAL SHORTENING: 34 % (ref 28–44)
ECHO LV INTERNAL DIMENSION DIASTOLE INDEX: 2.37 CM/M2
ECHO LV INTERNAL DIMENSION DIASTOLIC: 4.4 CM (ref 3.9–5.3)
ECHO LV INTERNAL DIMENSION SYSTOLIC INDEX: 1.56 CM/M2
ECHO LV INTERNAL DIMENSION SYSTOLIC: 2.9 CM
ECHO LV ISOVOLUMETRIC RELAXATION TIME (IVRT): 117.7 MS
ECHO LV IVSD: 1.3 CM (ref 0.6–0.9)
ECHO LV MASS 2D: 191.3 G (ref 67–162)
ECHO LV MASS INDEX 2D: 102.9 G/M2 (ref 43–95)
ECHO LV POSTERIOR WALL DIASTOLIC: 1.1 CM (ref 0.6–0.9)
ECHO LV RELATIVE WALL THICKNESS RATIO: 0.5
ECHO LVOT AREA: 3.1 CM2
ECHO LVOT AV VTI INDEX: 0.72
ECHO LVOT DIAM: 2 CM
ECHO LVOT MEAN GRADIENT: 2 MMHG
ECHO LVOT PEAK GRADIENT: 3 MMHG
ECHO LVOT PEAK VELOCITY: 0.8 M/S
ECHO LVOT STROKE VOLUME INDEX: 35.1 ML/M2
ECHO LVOT SV: 65.3 ML
ECHO LVOT VTI: 20.8 CM
ECHO MV "A" WAVE DURATION: 138.4 MSEC
ECHO MV A VELOCITY: 0.98 M/S
ECHO MV AREA PHT: 2.9 CM2
ECHO MV AREA VTI: 2.4 CM2
ECHO MV E DECELERATION TIME (DT): 243.7 MS
ECHO MV E VELOCITY: 0.61 M/S
ECHO MV E/A RATIO: 0.62
ECHO MV LVOT VTI INDEX: 1.29
ECHO MV MAX VELOCITY: 1.1 M/S
ECHO MV MEAN GRADIENT: 1 MMHG
ECHO MV MEAN VELOCITY: 0.6 M/S
ECHO MV PEAK GRADIENT: 4 MMHG
ECHO MV PRESSURE HALF TIME (PHT): 76.4 MS
ECHO MV VTI: 26.9 CM
ECHO PV MAX VELOCITY: 0.9 M/S
ECHO PV MEAN GRADIENT: 2 MMHG
ECHO PV MEAN VELOCITY: 0.7 M/S
ECHO PV PEAK GRADIENT: 3 MMHG
ECHO PV VTI: 18.2 CM
ECHO RV INTERNAL DIMENSION: 2.4 CM
ECHO TV REGURGITANT MAX VELOCITY: 2.66 M/S
ECHO TV REGURGITANT PEAK GRADIENT: 28 MMHG

## 2024-04-19 PROCEDURE — 93306 TTE W/DOPPLER COMPLETE: CPT

## 2024-04-23 LAB
ECHO AV AREA PEAK VELOCITY: 2.3 CM2
ECHO AV AREA VTI: 2.3 CM2
ECHO AV AREA/BSA PEAK VELOCITY: 1.2 CM2/M2
ECHO AV AREA/BSA VTI: 1.2 CM2/M2
ECHO AV CUSP MM: 2.1 CM
ECHO AV MEAN GRADIENT: 3 MMHG
ECHO AV MEAN VELOCITY: 0.8 M/S
ECHO AV PEAK GRADIENT: 5 MMHG
ECHO AV PEAK VELOCITY: 1.1 M/S
ECHO AV VELOCITY RATIO: 0.73
ECHO AV VTI: 28.9 CM
ECHO BSA: 1.88 M2
ECHO LA DIAMETER INDEX: 1.77 CM/M2
ECHO LA DIAMETER: 3.3 CM
ECHO LA VOL A-L A2C: 52 ML (ref 22–52)
ECHO LA VOL A-L A4C: 44 ML (ref 22–52)
ECHO LA VOL MOD A2C: 50 ML (ref 22–52)
ECHO LA VOL MOD A4C: 41 ML (ref 22–52)
ECHO LA VOLUME AREA LENGTH: 48 ML
ECHO LA VOLUME INDEX A-L A2C: 28 ML/M2 (ref 16–34)
ECHO LA VOLUME INDEX A-L A4C: 24 ML/M2 (ref 16–34)
ECHO LA VOLUME INDEX AREA LENGTH: 26 ML/M2 (ref 16–34)
ECHO LA VOLUME INDEX MOD A2C: 27 ML/M2 (ref 16–34)
ECHO LA VOLUME INDEX MOD A4C: 22 ML/M2 (ref 16–34)
ECHO LV EF PHYSICIAN: 60 %
ECHO LV EJECTION FRACTION A2C: 55 %
ECHO LV EJECTION FRACTION A4C: 52 %
ECHO LV FRACTIONAL SHORTENING: 34 % (ref 28–44)
ECHO LV INTERNAL DIMENSION DIASTOLE INDEX: 2.37 CM/M2
ECHO LV INTERNAL DIMENSION DIASTOLIC: 4.4 CM (ref 3.9–5.3)
ECHO LV INTERNAL DIMENSION SYSTOLIC INDEX: 1.56 CM/M2
ECHO LV INTERNAL DIMENSION SYSTOLIC: 2.9 CM
ECHO LV ISOVOLUMETRIC RELAXATION TIME (IVRT): 117.7 MS
ECHO LV IVSD: 1.3 CM (ref 0.6–0.9)
ECHO LV MASS 2D: 191.3 G (ref 67–162)
ECHO LV MASS INDEX 2D: 102.9 G/M2 (ref 43–95)
ECHO LV POSTERIOR WALL DIASTOLIC: 1.1 CM (ref 0.6–0.9)
ECHO LV RELATIVE WALL THICKNESS RATIO: 0.5
ECHO LVOT AREA: 3.1 CM2
ECHO LVOT AV VTI INDEX: 0.72
ECHO LVOT DIAM: 2 CM
ECHO LVOT MEAN GRADIENT: 2 MMHG
ECHO LVOT PEAK GRADIENT: 3 MMHG
ECHO LVOT PEAK VELOCITY: 0.8 M/S
ECHO LVOT STROKE VOLUME INDEX: 35.1 ML/M2
ECHO LVOT SV: 65.3 ML
ECHO LVOT VTI: 20.8 CM
ECHO MV "A" WAVE DURATION: 138.4 MSEC
ECHO MV A VELOCITY: 0.98 M/S
ECHO MV AREA PHT: 2.9 CM2
ECHO MV AREA VTI: 2.4 CM2
ECHO MV E DECELERATION TIME (DT): 243.7 MS
ECHO MV E VELOCITY: 0.61 M/S
ECHO MV E/A RATIO: 0.62
ECHO MV LVOT VTI INDEX: 1.29
ECHO MV MAX VELOCITY: 1.1 M/S
ECHO MV MEAN GRADIENT: 1 MMHG
ECHO MV MEAN VELOCITY: 0.6 M/S
ECHO MV PEAK GRADIENT: 4 MMHG
ECHO MV PRESSURE HALF TIME (PHT): 76.4 MS
ECHO MV VTI: 26.9 CM
ECHO PV MAX VELOCITY: 0.9 M/S
ECHO PV MEAN GRADIENT: 2 MMHG
ECHO PV MEAN VELOCITY: 0.7 M/S
ECHO PV PEAK GRADIENT: 3 MMHG
ECHO PV VTI: 18.2 CM
ECHO RV INTERNAL DIMENSION: 2.4 CM
ECHO TV REGURGITANT MAX VELOCITY: 2.66 M/S
ECHO TV REGURGITANT PEAK GRADIENT: 28 MMHG

## 2024-04-23 PROCEDURE — 93306 TTE W/DOPPLER COMPLETE: CPT | Performed by: INTERNAL MEDICINE

## 2024-04-24 ENCOUNTER — HOSPITAL ENCOUNTER (OUTPATIENT)
Dept: INTERVENTIONAL RADIOLOGY/VASCULAR | Age: 84
Discharge: HOME | End: 2024-04-26
Payer: MEDICARE

## 2024-04-24 DIAGNOSIS — R09.89 BRUIT: ICD-10-CM

## 2024-04-24 PROCEDURE — 93880 EXTRACRANIAL BILAT STUDY: CPT

## 2024-05-08 PROBLEM — R79.89 ELEVATED TROPONIN: Status: RESOLVED | Noted: 2024-04-08 | Resolved: 2024-05-08

## 2024-05-30 ENCOUNTER — APPOINTMENT (OUTPATIENT)
Dept: CT IMAGING | Age: 84
DRG: 003 | End: 2024-05-30
Attending: STUDENT IN AN ORGANIZED HEALTH CARE EDUCATION/TRAINING PROGRAM
Payer: MEDICARE

## 2024-05-30 ENCOUNTER — HOSPITAL ENCOUNTER (INPATIENT)
Age: 84
LOS: 33 days | Discharge: NURSING, REHAB OR LONG-TERM CARE | DRG: 003 | End: 2024-07-03
Attending: STUDENT IN AN ORGANIZED HEALTH CARE EDUCATION/TRAINING PROGRAM | Admitting: STUDENT IN AN ORGANIZED HEALTH CARE EDUCATION/TRAINING PROGRAM
Payer: MEDICARE

## 2024-05-30 ENCOUNTER — APPOINTMENT (OUTPATIENT)
Dept: GENERAL RADIOLOGY | Age: 84
DRG: 003 | End: 2024-05-30
Payer: MEDICARE

## 2024-05-30 DIAGNOSIS — I72.9 PSEUDOANEURYSM (HCC): ICD-10-CM

## 2024-05-30 DIAGNOSIS — R29.90 STROKE-LIKE SYMPTOM: ICD-10-CM

## 2024-05-30 DIAGNOSIS — R29.90 STROKE-LIKE SYMPTOMS: Primary | ICD-10-CM

## 2024-05-30 DIAGNOSIS — I77.74 VERTEBRAL ARTERY DISSECTION (HCC): ICD-10-CM

## 2024-05-30 DIAGNOSIS — I16.0 HYPERTENSIVE URGENCY: ICD-10-CM

## 2024-05-30 DIAGNOSIS — I77.77 DISSECTION OF RIGHT FEMORAL ARTERY (HCC): ICD-10-CM

## 2024-05-30 LAB
ALBUMIN SERPL-MCNC: 4.1 G/DL (ref 3.5–5.2)
ALP SERPL-CCNC: 74 U/L (ref 35–104)
ALT SERPL-CCNC: 14 U/L (ref 0–32)
ANION GAP SERPL CALCULATED.3IONS-SCNC: 12 MMOL/L (ref 7–16)
AST SERPL-CCNC: 24 U/L (ref 0–31)
BASOPHILS # BLD: 0.06 K/UL (ref 0–0.2)
BASOPHILS NFR BLD: 0 % (ref 0–2)
BILIRUB SERPL-MCNC: 0.4 MG/DL (ref 0–1.2)
BILIRUB UR QL STRIP: NEGATIVE
BUN SERPL-MCNC: 16 MG/DL (ref 6–23)
CALCIUM SERPL-MCNC: 9.3 MG/DL (ref 8.6–10.2)
CHLORIDE SERPL-SCNC: 101 MMOL/L (ref 98–107)
CLARITY UR: CLEAR
CO2 SERPL-SCNC: 24 MMOL/L (ref 22–29)
COLOR UR: YELLOW
CREAT SERPL-MCNC: 0.7 MG/DL (ref 0.5–1)
EKG ATRIAL RATE: 65 BPM
EKG P AXIS: 82 DEGREES
EKG P-R INTERVAL: 226 MS
EKG Q-T INTERVAL: 424 MS
EKG QRS DURATION: 98 MS
EKG QTC CALCULATION (BAZETT): 440 MS
EKG T AXIS: 120 DEGREES
EKG VENTRICULAR RATE: 65 BPM
EOSINOPHIL # BLD: 0.21 K/UL (ref 0.05–0.5)
EOSINOPHILS RELATIVE PERCENT: 2 % (ref 0–6)
ERYTHROCYTE [DISTWIDTH] IN BLOOD BY AUTOMATED COUNT: 15.4 % (ref 11.5–15)
GFR, ESTIMATED: 82 ML/MIN/1.73M2
GLUCOSE BLD-MCNC: 146 MG/DL (ref 74–99)
GLUCOSE SERPL-MCNC: 121 MG/DL (ref 74–99)
GLUCOSE UR STRIP-MCNC: NEGATIVE MG/DL
HCT VFR BLD AUTO: 44.3 % (ref 34–48)
HGB BLD-MCNC: 13.8 G/DL (ref 11.5–15.5)
HGB UR QL STRIP.AUTO: NEGATIVE
IMM GRANULOCYTES # BLD AUTO: 0.06 K/UL (ref 0–0.58)
IMM GRANULOCYTES NFR BLD: 0 % (ref 0–5)
KETONES UR STRIP-MCNC: ABNORMAL MG/DL
LEUKOCYTE ESTERASE UR QL STRIP: NEGATIVE
LYMPHOCYTES NFR BLD: 3.64 K/UL (ref 1.5–4)
LYMPHOCYTES RELATIVE PERCENT: 27 % (ref 20–42)
MAGNESIUM SERPL-MCNC: 2.4 MG/DL (ref 1.6–2.6)
MCH RBC QN AUTO: 27.6 PG (ref 26–35)
MCHC RBC AUTO-ENTMCNC: 31.2 G/DL (ref 32–34.5)
MCV RBC AUTO: 88.6 FL (ref 80–99.9)
MONOCYTES NFR BLD: 1.41 K/UL (ref 0.1–0.95)
MONOCYTES NFR BLD: 10 % (ref 2–12)
NEUTROPHILS NFR BLD: 61 % (ref 43–80)
NEUTS SEG NFR BLD: 8.38 K/UL (ref 1.8–7.3)
NITRITE UR QL STRIP: NEGATIVE
PH UR STRIP: 6 [PH] (ref 5–9)
PLATELET # BLD AUTO: 247 K/UL (ref 130–450)
PMV BLD AUTO: 11.8 FL (ref 7–12)
POTASSIUM SERPL-SCNC: 4.4 MMOL/L (ref 3.5–5)
PROT SERPL-MCNC: 7.1 G/DL (ref 6.4–8.3)
PROT UR STRIP-MCNC: NEGATIVE MG/DL
RBC # BLD AUTO: 5 M/UL (ref 3.5–5.5)
RBC #/AREA URNS HPF: ABNORMAL /HPF
SODIUM SERPL-SCNC: 137 MMOL/L (ref 132–146)
SP GR UR STRIP: 1.02 (ref 1–1.03)
TROPONIN I SERPL HS-MCNC: 20 NG/L (ref 0–9)
TROPONIN I SERPL HS-MCNC: 29 NG/L (ref 0–9)
UROBILINOGEN UR STRIP-ACNC: 0.2 EU/DL (ref 0–1)
WBC #/AREA URNS HPF: ABNORMAL /HPF
WBC OTHER # BLD: 13.8 K/UL (ref 4.5–11.5)

## 2024-05-30 PROCEDURE — 83735 ASSAY OF MAGNESIUM: CPT

## 2024-05-30 PROCEDURE — 2580000003 HC RX 258: Performed by: FAMILY MEDICINE

## 2024-05-30 PROCEDURE — 6370000000 HC RX 637 (ALT 250 FOR IP): Performed by: STUDENT IN AN ORGANIZED HEALTH CARE EDUCATION/TRAINING PROGRAM

## 2024-05-30 PROCEDURE — 6370000000 HC RX 637 (ALT 250 FOR IP): Performed by: FAMILY MEDICINE

## 2024-05-30 PROCEDURE — 82962 GLUCOSE BLOOD TEST: CPT

## 2024-05-30 PROCEDURE — 80053 COMPREHEN METABOLIC PANEL: CPT

## 2024-05-30 PROCEDURE — 93005 ELECTROCARDIOGRAM TRACING: CPT | Performed by: STUDENT IN AN ORGANIZED HEALTH CARE EDUCATION/TRAINING PROGRAM

## 2024-05-30 PROCEDURE — 81001 URINALYSIS AUTO W/SCOPE: CPT

## 2024-05-30 PROCEDURE — 71045 X-RAY EXAM CHEST 1 VIEW: CPT

## 2024-05-30 PROCEDURE — 93010 ELECTROCARDIOGRAM REPORT: CPT | Performed by: INTERNAL MEDICINE

## 2024-05-30 PROCEDURE — 99222 1ST HOSP IP/OBS MODERATE 55: CPT | Performed by: FAMILY MEDICINE

## 2024-05-30 PROCEDURE — G0378 HOSPITAL OBSERVATION PER HR: HCPCS

## 2024-05-30 PROCEDURE — 99285 EMERGENCY DEPT VISIT HI MDM: CPT

## 2024-05-30 PROCEDURE — 85025 COMPLETE CBC W/AUTO DIFF WBC: CPT

## 2024-05-30 PROCEDURE — 84484 ASSAY OF TROPONIN QUANT: CPT

## 2024-05-30 PROCEDURE — 2580000003 HC RX 258: Performed by: STUDENT IN AN ORGANIZED HEALTH CARE EDUCATION/TRAINING PROGRAM

## 2024-05-30 PROCEDURE — 70450 CT HEAD/BRAIN W/O DYE: CPT

## 2024-05-30 RX ORDER — RALOXIFENE HYDROCHLORIDE 60 MG/1
60 TABLET, FILM COATED ORAL EVERY EVENING
Status: DISCONTINUED | OUTPATIENT
Start: 2024-05-30 | End: 2024-06-06

## 2024-05-30 RX ORDER — ONDANSETRON 4 MG/1
4 TABLET, ORALLY DISINTEGRATING ORAL EVERY 8 HOURS PRN
Status: DISCONTINUED | OUTPATIENT
Start: 2024-05-30 | End: 2024-06-03 | Stop reason: SDUPTHER

## 2024-05-30 RX ORDER — BRIMONIDINE TARTRATE 2 MG/ML
1 SOLUTION/ DROPS OPHTHALMIC 2 TIMES DAILY
Status: DISCONTINUED | OUTPATIENT
Start: 2024-05-30 | End: 2024-07-03 | Stop reason: HOSPADM

## 2024-05-30 RX ORDER — ATORVASTATIN CALCIUM 40 MG/1
80 TABLET, FILM COATED ORAL NIGHTLY
Status: DISCONTINUED | OUTPATIENT
Start: 2024-05-30 | End: 2024-06-01

## 2024-05-30 RX ORDER — BRIMONIDINE TARTRATE AND TIMOLOL MALEATE 2; 5 MG/ML; MG/ML
1 SOLUTION OPHTHALMIC EVERY 12 HOURS
Status: DISCONTINUED | OUTPATIENT
Start: 2024-05-30 | End: 2024-05-30 | Stop reason: CLARIF

## 2024-05-30 RX ORDER — LATANOPROST 50 UG/ML
1 SOLUTION/ DROPS OPHTHALMIC NIGHTLY
Status: DISCONTINUED | OUTPATIENT
Start: 2024-05-30 | End: 2024-07-03 | Stop reason: HOSPADM

## 2024-05-30 RX ORDER — LISINOPRIL 20 MG/1
20 TABLET ORAL EVERY EVENING
Status: DISCONTINUED | OUTPATIENT
Start: 2024-05-30 | End: 2024-05-30

## 2024-05-30 RX ORDER — METOPROLOL SUCCINATE 25 MG/1
25 TABLET, EXTENDED RELEASE ORAL DAILY
Status: DISCONTINUED | OUTPATIENT
Start: 2024-05-31 | End: 2024-07-03 | Stop reason: HOSPADM

## 2024-05-30 RX ORDER — 0.9 % SODIUM CHLORIDE 0.9 %
1000 INTRAVENOUS SOLUTION INTRAVENOUS ONCE
Status: COMPLETED | OUTPATIENT
Start: 2024-05-30 | End: 2024-05-30

## 2024-05-30 RX ORDER — LISINOPRIL 20 MG/1
20 TABLET ORAL EVERY EVENING
Status: DISCONTINUED | OUTPATIENT
Start: 2024-05-31 | End: 2024-05-31

## 2024-05-30 RX ORDER — TIMOLOL MALEATE 5 MG/ML
1 SOLUTION/ DROPS OPHTHALMIC 2 TIMES DAILY
Status: DISCONTINUED | OUTPATIENT
Start: 2024-05-30 | End: 2024-07-03 | Stop reason: HOSPADM

## 2024-05-30 RX ORDER — DORZOLAMIDE HCL 20 MG/ML
1 SOLUTION/ DROPS OPHTHALMIC 2 TIMES DAILY
Status: DISCONTINUED | OUTPATIENT
Start: 2024-05-30 | End: 2024-07-03 | Stop reason: HOSPADM

## 2024-05-30 RX ORDER — ASPIRIN 81 MG/1
324 TABLET, CHEWABLE ORAL ONCE
Status: COMPLETED | OUTPATIENT
Start: 2024-05-30 | End: 2024-05-30

## 2024-05-30 RX ORDER — SODIUM CHLORIDE 0.9 % (FLUSH) 0.9 %
5-40 SYRINGE (ML) INJECTION PRN
Status: DISCONTINUED | OUTPATIENT
Start: 2024-05-30 | End: 2024-07-03 | Stop reason: HOSPADM

## 2024-05-30 RX ORDER — ESTRADIOL 0.1 MG/G
0.5 CREAM VAGINAL
Status: DISCONTINUED | OUTPATIENT
Start: 2024-05-30 | End: 2024-05-30

## 2024-05-30 RX ORDER — VITAMIN B COMPLEX
1000 TABLET ORAL DAILY
Status: DISCONTINUED | OUTPATIENT
Start: 2024-05-31 | End: 2024-06-08

## 2024-05-30 RX ORDER — CLOPIDOGREL BISULFATE 75 MG/1
75 TABLET ORAL DAILY
Status: DISCONTINUED | OUTPATIENT
Start: 2024-05-31 | End: 2024-06-03

## 2024-05-30 RX ORDER — HYDRALAZINE HYDROCHLORIDE 20 MG/ML
5 INJECTION INTRAMUSCULAR; INTRAVENOUS EVERY 6 HOURS PRN
Status: DISCONTINUED | OUTPATIENT
Start: 2024-05-30 | End: 2024-06-01

## 2024-05-30 RX ORDER — M-VIT,TX,IRON,MINS/CALC/FOLIC 27MG-0.4MG
1 TABLET ORAL DAILY
Status: DISCONTINUED | OUTPATIENT
Start: 2024-05-31 | End: 2024-06-08

## 2024-05-30 RX ORDER — ASPIRIN 81 MG/1
81 TABLET ORAL DAILY
Status: DISCONTINUED | OUTPATIENT
Start: 2024-05-31 | End: 2024-06-05

## 2024-05-30 RX ORDER — SODIUM CHLORIDE 0.9 % (FLUSH) 0.9 %
5-40 SYRINGE (ML) INJECTION EVERY 12 HOURS SCHEDULED
Status: DISCONTINUED | OUTPATIENT
Start: 2024-05-30 | End: 2024-06-13

## 2024-05-30 RX ORDER — SODIUM CHLORIDE 9 MG/ML
INJECTION, SOLUTION INTRAVENOUS PRN
Status: DISCONTINUED | OUTPATIENT
Start: 2024-05-30 | End: 2024-06-03 | Stop reason: SDUPTHER

## 2024-05-30 RX ORDER — ONDANSETRON 2 MG/ML
4 INJECTION INTRAMUSCULAR; INTRAVENOUS EVERY 6 HOURS PRN
Status: DISCONTINUED | OUTPATIENT
Start: 2024-05-30 | End: 2024-06-03 | Stop reason: SDUPTHER

## 2024-05-30 RX ORDER — ENOXAPARIN SODIUM 100 MG/ML
40 INJECTION SUBCUTANEOUS DAILY
Status: DISCONTINUED | OUTPATIENT
Start: 2024-05-30 | End: 2024-06-03 | Stop reason: SDUPTHER

## 2024-05-30 RX ORDER — POLYETHYLENE GLYCOL 3350 17 G/17G
17 POWDER, FOR SOLUTION ORAL DAILY PRN
Status: DISCONTINUED | OUTPATIENT
Start: 2024-05-30 | End: 2024-06-13

## 2024-05-30 RX ORDER — METOPROLOL SUCCINATE 25 MG/1
25 TABLET, EXTENDED RELEASE ORAL DAILY
Status: DISCONTINUED | OUTPATIENT
Start: 2024-05-30 | End: 2024-05-30

## 2024-05-30 RX ADMIN — BRIMONIDINE TARTRATE 1 DROP: 2 SOLUTION OPHTHALMIC at 21:43

## 2024-05-30 RX ADMIN — SODIUM CHLORIDE 1000 ML: 9 INJECTION, SOLUTION INTRAVENOUS at 13:22

## 2024-05-30 RX ADMIN — ASPIRIN 324 MG: 81 TABLET, CHEWABLE ORAL at 16:42

## 2024-05-30 RX ADMIN — TIMOLOL MALEATE 1 DROP: 5 SOLUTION OPHTHALMIC at 21:43

## 2024-05-30 RX ADMIN — ATORVASTATIN CALCIUM 80 MG: 40 TABLET, FILM COATED ORAL at 21:42

## 2024-05-30 RX ADMIN — DORZOLAMIDE HYDROCHLORIDE 1 DROP: 20 SOLUTION/ DROPS OPHTHALMIC at 21:43

## 2024-05-30 RX ADMIN — LATANOPROST 1 DROP: 50 SOLUTION OPHTHALMIC at 21:43

## 2024-05-30 RX ADMIN — SODIUM CHLORIDE, PRESERVATIVE FREE 10 ML: 5 INJECTION INTRAVENOUS at 21:42

## 2024-05-30 ASSESSMENT — LIFESTYLE VARIABLES
HOW MANY STANDARD DRINKS CONTAINING ALCOHOL DO YOU HAVE ON A TYPICAL DAY: PATIENT DOES NOT DRINK
HOW OFTEN DO YOU HAVE A DRINK CONTAINING ALCOHOL: NEVER

## 2024-05-30 NOTE — ED PROVIDER NOTES
[facility]  EMERGENCY DEPARTMENT ENCOUNTER    Pt Name: Malissa Chen  MRN: [id number]  Birthdate 1940  Date of evaluation: 5/30/2024  Provider: Milton Andrade MD  PCP: Ed Collins Jr., MD  Note Started: 12:55 PM EDT 5/30/24    HPI     Patient is a 83 y.o. female presents with a chief complaint of   Chief Complaint   Patient presents with    Dizziness     Patient states she was having weakness and \"felt floppy\" lkw 11:00   .  Patient presents for left-sided weakness.  Patient stated that it started about at 11:00 today.  Has since resolved.  Patient stated that he has been having dizziness since April and was recently admitted to the hospital in April for this.  Patient denies any chest pain or shortness of breath.  States that the symptoms are now resolved.  Denies any fevers, chills, nausea, vomiting, abdominal pain, changes in urinary or bowel habits    Nursing Notes were all reviewed and agreed with or any disagreements were addressed in the HPI.    History From: Patient    Review of Systems   Pertinent positives and negatives as per HPI.     Physical Exam  Vitals and nursing note reviewed.   Constitutional:       Appearance: She is well-developed.   HENT:      Head: Normocephalic and atraumatic.   Eyes:      Conjunctiva/sclera: Conjunctivae normal.   Cardiovascular:      Rate and Rhythm: Normal rate and regular rhythm.      Heart sounds: Normal heart sounds. No murmur heard.  Pulmonary:      Effort: Pulmonary effort is normal. No respiratory distress.      Breath sounds: Normal breath sounds. No wheezing or rales.   Abdominal:      General: Bowel sounds are normal.      Palpations: Abdomen is soft.      Tenderness: There is no abdominal tenderness. There is no guarding or rebound.   Musculoskeletal:      Cervical back: Normal range of motion and neck supple.   Skin:     General: Skin is warm and dry.   Neurological:      Mental Status: She is alert and oriented to person, place, and time.

## 2024-05-30 NOTE — H&P
Hospital Medicine History & Physical      PCP: Ed Collins Jr., MD    Date of Admission: 5/30/2024    Date of Service: Pt seen/examined on 5/30/2024 and Placed in Observation.    Chief Complaint: Strokelike symptoms      History Of Present Illness:    83 y.o. female who presented to [facility] with strokelike symptoms with left-sided weakness lasted for an hour or started around 11 AM today.  Hypertension presented to ER NIHSS was 0.  CT head obtained negative for any acute findings.  She was found to have elevated blood pressure of 202/81 on presentation.  Of note patient recently had a stress test about a month ago which was negative for ischemia.  Patient also reported she has had some intermittent dizziness.      Past Medical History:          Diagnosis Date    Diverticulitis     Glaucoma associated with ocular disorder     Hyperlipidemia     Hypertension     Osteopenia     Palpitations     PVC (premature ventricular contraction)        Past Surgical History:          Procedure Laterality Date    ANKLE SURGERY Right     orif    EYE SURGERY      shunt right eye       Medications Prior to Admission:      Prior to Admission medications    Medication Sig Start Date End Date Taking? Authorizing Provider   estradiol (ESTRACE VAGINAL) 0.1 MG/GM vaginal cream Place 0.5 g vaginally Twice a Week 3/10/22   Ancelmo Norton MD   Multiple Vitamins-Minerals (THERAPEUTIC MULTIVITAMIN-MINERALS) tablet Take 1 tablet by mouth daily    Ezequiel Lincoln MD   metoprolol succinate (TOPROL XL) 25 MG extended release tablet Take 1 tablet by mouth daily    Ezequiel Lincoln MD   dorzolamide (TRUSOPT) 2 % ophthalmic solution Place 1 drop into the left eye 2 times daily    Ezequiel Lincoln MD   vitamin D (CHOLECALCIFEROL) 1000 UNIT TABS tablet Take 1 tablet by mouth daily    Ezequiel Lincoln MD   lisinopril (PRINIVIL;ZESTRIL) 20 MG tablet Take 1 tablet by mouth every evening    Provider

## 2024-05-31 ENCOUNTER — APPOINTMENT (OUTPATIENT)
Dept: GENERAL RADIOLOGY | Age: 84
DRG: 003 | End: 2024-05-31
Payer: MEDICARE

## 2024-05-31 ENCOUNTER — APPOINTMENT (OUTPATIENT)
Dept: CT IMAGING | Age: 84
DRG: 003 | End: 2024-05-31
Attending: FAMILY MEDICINE
Payer: MEDICARE

## 2024-05-31 ENCOUNTER — APPOINTMENT (OUTPATIENT)
Dept: MRI IMAGING | Age: 84
DRG: 003 | End: 2024-05-31
Payer: MEDICARE

## 2024-05-31 PROBLEM — R29.90 STROKE-LIKE SYMPTOM: Status: ACTIVE | Noted: 2024-05-31

## 2024-05-31 LAB
B.E.: 0.5 MMOL/L (ref -3–3)
CHOLEST SERPL-MCNC: 159 MG/DL
COHB: 0.4 % (ref 0–1.5)
CRITICAL: ABNORMAL
CRP SERPL HS-MCNC: <3 MG/L (ref 0–5)
DATE ANALYZED: ABNORMAL
DATE OF COLLECTION: ABNORMAL
ERYTHROCYTE [DISTWIDTH] IN BLOOD BY AUTOMATED COUNT: 15.3 % (ref 11.5–15)
ERYTHROCYTE [SEDIMENTATION RATE] IN BLOOD BY WESTERGREN METHOD: 4 MM/HR (ref 0–20)
FOLATE SERPL-MCNC: 19.4 NG/ML (ref 4.8–24.2)
HBA1C MFR BLD: 5.9 % (ref 4–5.6)
HCO3: 25.2 MMOL/L (ref 22–26)
HCT VFR BLD AUTO: 42.4 % (ref 34–48)
HDLC SERPL-MCNC: 48 MG/DL
HGB BLD-MCNC: 13.1 G/DL (ref 11.5–15.5)
HHB: 4.9 % (ref 0–5)
LAB: ABNORMAL
LDLC SERPL CALC-MCNC: 85 MG/DL
Lab: 100
MCH RBC QN AUTO: 27.9 PG (ref 26–35)
MCHC RBC AUTO-ENTMCNC: 30.9 G/DL (ref 32–34.5)
MCV RBC AUTO: 90.2 FL (ref 80–99.9)
METHB: 0.4 % (ref 0–1.5)
MODE: ABNORMAL
O2 CONTENT: 18.2 ML/DL
O2 SATURATION: 95.1 % (ref 92–98.5)
O2HB: 94.3 % (ref 94–97)
OPERATOR ID: 2860
PATIENT TEMP: 37 C
PCO2: 40.8 MMHG (ref 35–45)
PH BLOOD GAS: 7.41 (ref 7.35–7.45)
PLATELET # BLD AUTO: 211 K/UL (ref 130–450)
PMV BLD AUTO: 12.2 FL (ref 7–12)
PO2: 74.2 MMHG (ref 75–100)
RBC # BLD AUTO: 4.7 M/UL (ref 3.5–5.5)
SOURCE, BLOOD GAS: ABNORMAL
THB: 13.7 G/DL (ref 11.5–16.5)
TIME ANALYZED: 103
TRIGL SERPL-MCNC: 131 MG/DL
TSH SERPL DL<=0.05 MIU/L-ACNC: 0.73 UIU/ML (ref 0.27–4.2)
VIT B12 SERPL-MCNC: 551 PG/ML (ref 211–946)
VLDLC SERPL CALC-MCNC: 26 MG/DL
WBC OTHER # BLD: 12.9 K/UL (ref 4.5–11.5)

## 2024-05-31 PROCEDURE — 70498 CT ANGIOGRAPHY NECK: CPT

## 2024-05-31 PROCEDURE — 82805 BLOOD GASES W/O2 SATURATION: CPT

## 2024-05-31 PROCEDURE — 99222 1ST HOSP IP/OBS MODERATE 55: CPT | Performed by: PSYCHIATRY & NEUROLOGY

## 2024-05-31 PROCEDURE — 97165 OT EVAL LOW COMPLEX 30 MIN: CPT

## 2024-05-31 PROCEDURE — 84155 ASSAY OF PROTEIN SERUM: CPT

## 2024-05-31 PROCEDURE — 97535 SELF CARE MNGMENT TRAINING: CPT

## 2024-05-31 PROCEDURE — 86039 ANTINUCLEAR ANTIBODIES (ANA): CPT

## 2024-05-31 PROCEDURE — 94660 CPAP INITIATION&MGMT: CPT

## 2024-05-31 PROCEDURE — 6370000000 HC RX 637 (ALT 250 FOR IP): Performed by: FAMILY MEDICINE

## 2024-05-31 PROCEDURE — 84443 ASSAY THYROID STIM HORMONE: CPT

## 2024-05-31 PROCEDURE — 6370000000 HC RX 637 (ALT 250 FOR IP): Performed by: STUDENT IN AN ORGANIZED HEALTH CARE EDUCATION/TRAINING PROGRAM

## 2024-05-31 PROCEDURE — 80061 LIPID PANEL: CPT

## 2024-05-31 PROCEDURE — 2060000000 HC ICU INTERMEDIATE R&B

## 2024-05-31 PROCEDURE — 86334 IMMUNOFIX E-PHORESIS SERUM: CPT

## 2024-05-31 PROCEDURE — 5A09557 ASSISTANCE WITH RESPIRATORY VENTILATION, GREATER THAN 96 CONSECUTIVE HOURS, CONTINUOUS POSITIVE AIRWAY PRESSURE: ICD-10-PCS | Performed by: STUDENT IN AN ORGANIZED HEALTH CARE EDUCATION/TRAINING PROGRAM

## 2024-05-31 PROCEDURE — 83036 HEMOGLOBIN GLYCOSYLATED A1C: CPT

## 2024-05-31 PROCEDURE — 82607 VITAMIN B-12: CPT

## 2024-05-31 PROCEDURE — 2580000003 HC RX 258: Performed by: FAMILY MEDICINE

## 2024-05-31 PROCEDURE — 86038 ANTINUCLEAR ANTIBODIES: CPT

## 2024-05-31 PROCEDURE — 6360000002 HC RX W HCPCS: Performed by: FAMILY MEDICINE

## 2024-05-31 PROCEDURE — 97161 PT EVAL LOW COMPLEX 20 MIN: CPT

## 2024-05-31 PROCEDURE — 97530 THERAPEUTIC ACTIVITIES: CPT

## 2024-05-31 PROCEDURE — 85652 RBC SED RATE AUTOMATED: CPT

## 2024-05-31 PROCEDURE — 86140 C-REACTIVE PROTEIN: CPT

## 2024-05-31 PROCEDURE — 85027 COMPLETE CBC AUTOMATED: CPT

## 2024-05-31 PROCEDURE — 36600 WITHDRAWAL OF ARTERIAL BLOOD: CPT

## 2024-05-31 PROCEDURE — 6360000004 HC RX CONTRAST MEDICATION: Performed by: RADIOLOGY

## 2024-05-31 PROCEDURE — 36415 COLL VENOUS BLD VENIPUNCTURE: CPT

## 2024-05-31 PROCEDURE — 2580000003 HC RX 258: Performed by: RADIOLOGY

## 2024-05-31 PROCEDURE — 82746 ASSAY OF FOLIC ACID SERUM: CPT

## 2024-05-31 PROCEDURE — 84165 PROTEIN E-PHORESIS SERUM: CPT

## 2024-05-31 PROCEDURE — 99232 SBSQ HOSP IP/OBS MODERATE 35: CPT | Performed by: STUDENT IN AN ORGANIZED HEALTH CARE EDUCATION/TRAINING PROGRAM

## 2024-05-31 PROCEDURE — 70030 X-RAY EYE FOR FOREIGN BODY: CPT

## 2024-05-31 PROCEDURE — 70496 CT ANGIOGRAPHY HEAD: CPT

## 2024-05-31 RX ORDER — SODIUM CHLORIDE 9 MG/ML
INJECTION, SOLUTION INTRAVENOUS CONTINUOUS
Status: ACTIVE | OUTPATIENT
Start: 2024-06-01 | End: 2024-06-01

## 2024-05-31 RX ORDER — LOSARTAN POTASSIUM 50 MG/1
50 TABLET ORAL DAILY
Status: DISCONTINUED | OUTPATIENT
Start: 2024-05-31 | End: 2024-07-03 | Stop reason: HOSPADM

## 2024-05-31 RX ORDER — OLMESARTAN MEDOXOMIL 20 MG/1
20 TABLET ORAL DAILY
COMMUNITY
Start: 2024-05-15

## 2024-05-31 RX ORDER — SODIUM CHLORIDE 0.9 % (FLUSH) 0.9 %
10 SYRINGE (ML) INJECTION 2 TIMES DAILY
Status: DISCONTINUED | OUTPATIENT
Start: 2024-05-31 | End: 2024-06-06

## 2024-05-31 RX ADMIN — ENOXAPARIN SODIUM 40 MG: 100 INJECTION SUBCUTANEOUS at 08:43

## 2024-05-31 RX ADMIN — SODIUM CHLORIDE, PRESERVATIVE FREE 10 ML: 5 INJECTION INTRAVENOUS at 20:09

## 2024-05-31 RX ADMIN — DORZOLAMIDE HYDROCHLORIDE 1 DROP: 20 SOLUTION/ DROPS OPHTHALMIC at 08:44

## 2024-05-31 RX ADMIN — BRIMONIDINE TARTRATE 1 DROP: 2 SOLUTION OPHTHALMIC at 21:47

## 2024-05-31 RX ADMIN — CLOPIDOGREL BISULFATE 75 MG: 75 TABLET ORAL at 08:42

## 2024-05-31 RX ADMIN — IOPAMIDOL 60 ML: 755 INJECTION, SOLUTION INTRAVENOUS at 06:53

## 2024-05-31 RX ADMIN — ASPIRIN 81 MG: 81 TABLET, COATED ORAL at 08:42

## 2024-05-31 RX ADMIN — TIMOLOL MALEATE 1 DROP: 5 SOLUTION OPHTHALMIC at 21:47

## 2024-05-31 RX ADMIN — DORZOLAMIDE HYDROCHLORIDE 1 DROP: 20 SOLUTION/ DROPS OPHTHALMIC at 21:47

## 2024-05-31 RX ADMIN — METOPROLOL SUCCINATE 25 MG: 25 TABLET, EXTENDED RELEASE ORAL at 08:42

## 2024-05-31 RX ADMIN — LATANOPROST 1 DROP: 50 SOLUTION OPHTHALMIC at 21:47

## 2024-05-31 RX ADMIN — TIMOLOL MALEATE 1 DROP: 5 SOLUTION OPHTHALMIC at 08:43

## 2024-05-31 RX ADMIN — Medication 1 TABLET: at 08:43

## 2024-05-31 RX ADMIN — LOSARTAN POTASSIUM 50 MG: 50 TABLET, FILM COATED ORAL at 18:03

## 2024-05-31 RX ADMIN — BRIMONIDINE TARTRATE 1 DROP: 2 SOLUTION OPHTHALMIC at 08:43

## 2024-05-31 RX ADMIN — Medication 1000 UNITS: at 08:42

## 2024-05-31 RX ADMIN — SODIUM CHLORIDE, PRESERVATIVE FREE 10 ML: 5 INJECTION INTRAVENOUS at 08:44

## 2024-05-31 RX ADMIN — ATORVASTATIN CALCIUM 80 MG: 40 TABLET, FILM COATED ORAL at 20:09

## 2024-05-31 ASSESSMENT — PAIN SCALES - GENERAL
PAINLEVEL_OUTOF10: 0

## 2024-05-31 NOTE — CARE COORDINATION
05/31/24 Transition of care:  Pt admitted for stroke like symptoms CT shows Acute 4 cm infarct within the medial aspect of the right occipital lobe Neurology consulted PT/OT pending Met with pt to discuss transition of care and discharge preferences Pt does not wish to include family in discharge planning Pt lives by herself in 2 story home with 4 stairs and handrail to enter Pt is independent with ADLS PTA and uses no AD for ambulation PCP is [facility] is [facility] [address] Pt is unsure about discharge plan home VS ARU Will need therapy evals to determine if pt has any deficits from stroke If home family will transport CM/SW to follow Electronically signed by Franko James RN CM on 5/31/2024 at 10:37 AM     3:53pm Pt is agreeable to [facility] bed [facility] is limited at [facility] pt is agreeable to [facility] as well Referral made to Marilee at [facility] she will follow Electronically signed by Franko James RN on 5/31/2024 at 3:55 PM      Case Management Assessment  Initial Evaluation    Date/Time of Evaluation: 5/31/2024 10:37 AM  Assessment Completed by: Franko James RN    If patient is discharged prior to next notation, then this note serves as note for discharge by case management.    Patient Name: Malissa Chen                   YOB: 1940  Diagnosis: Stroke-like symptoms [R29.90]  Stroke-like symptom [R29.90]                   Date / Time: 5/30/2024 12:53 PM    Patient Admission Status: Inpatient   Readmission Risk (Low < 19, Mod (19-27), High > 27): No data recorded  Current PCP: Ed Collins Jr., MD  PCP verified by ? Yes    Chart Reviewed: Yes      History Provided by: Patient  Patient Orientation: Alert and Oriented    Patient Cognition: Alert    Hospitalization in the last 30 days (Readmission):  No    If yes, Readmission Assessment in  Navigator will be completed.    Advance Directives:      Code Status: Full Code   Patient's Primary Decision Maker is: Legal Next of

## 2024-06-01 LAB
ANION GAP SERPL CALCULATED.3IONS-SCNC: 13 MMOL/L (ref 7–16)
BASOPHILS # BLD: 0.05 K/UL (ref 0–0.2)
BASOPHILS NFR BLD: 0 % (ref 0–2)
BUN SERPL-MCNC: 11 MG/DL (ref 6–23)
CALCIUM SERPL-MCNC: 8.7 MG/DL (ref 8.6–10.2)
CHLORIDE SERPL-SCNC: 105 MMOL/L (ref 98–107)
CO2 SERPL-SCNC: 23 MMOL/L (ref 22–29)
CREAT SERPL-MCNC: 0.7 MG/DL (ref 0.5–1)
EOSINOPHIL # BLD: 0.12 K/UL (ref 0.05–0.5)
EOSINOPHILS RELATIVE PERCENT: 1 % (ref 0–6)
ERYTHROCYTE [DISTWIDTH] IN BLOOD BY AUTOMATED COUNT: 15.1 % (ref 11.5–15)
GFR, ESTIMATED: 81 ML/MIN/1.73M2
GLUCOSE SERPL-MCNC: 101 MG/DL (ref 74–99)
HCT VFR BLD AUTO: 41.8 % (ref 34–48)
HGB BLD-MCNC: 13.4 G/DL (ref 11.5–15.5)
IMM GRANULOCYTES # BLD AUTO: 0.04 K/UL (ref 0–0.58)
IMM GRANULOCYTES NFR BLD: 0 % (ref 0–5)
INR PPP: 1.1
LYMPHOCYTES NFR BLD: 3.03 K/UL (ref 1.5–4)
LYMPHOCYTES RELATIVE PERCENT: 26 % (ref 20–42)
MCH RBC QN AUTO: 28.5 PG (ref 26–35)
MCHC RBC AUTO-ENTMCNC: 32.1 G/DL (ref 32–34.5)
MCV RBC AUTO: 88.9 FL (ref 80–99.9)
MONOCYTES NFR BLD: 1.24 K/UL (ref 0.1–0.95)
MONOCYTES NFR BLD: 11 % (ref 2–12)
NEUTROPHILS NFR BLD: 61 % (ref 43–80)
NEUTS SEG NFR BLD: 7.08 K/UL (ref 1.8–7.3)
PARTIAL THROMBOPLASTIN TIME: 31.3 SEC (ref 24.5–35.1)
PLATELET # BLD AUTO: 203 K/UL (ref 130–450)
PMV BLD AUTO: 12.1 FL (ref 7–12)
POTASSIUM SERPL-SCNC: 4.1 MMOL/L (ref 3.5–5)
PROTHROMBIN TIME: 12 SEC (ref 9.3–12.4)
RBC # BLD AUTO: 4.7 M/UL (ref 3.5–5.5)
SODIUM SERPL-SCNC: 141 MMOL/L (ref 132–146)
WBC OTHER # BLD: 11.6 K/UL (ref 4.5–11.5)

## 2024-06-01 PROCEDURE — 2580000003 HC RX 258: Performed by: STUDENT IN AN ORGANIZED HEALTH CARE EDUCATION/TRAINING PROGRAM

## 2024-06-01 PROCEDURE — 99232 SBSQ HOSP IP/OBS MODERATE 35: CPT | Performed by: STUDENT IN AN ORGANIZED HEALTH CARE EDUCATION/TRAINING PROGRAM

## 2024-06-01 PROCEDURE — 85730 THROMBOPLASTIN TIME PARTIAL: CPT

## 2024-06-01 PROCEDURE — 2580000003 HC RX 258: Performed by: RADIOLOGY

## 2024-06-01 PROCEDURE — 2060000000 HC ICU INTERMEDIATE R&B

## 2024-06-01 PROCEDURE — 6370000000 HC RX 637 (ALT 250 FOR IP): Performed by: STUDENT IN AN ORGANIZED HEALTH CARE EDUCATION/TRAINING PROGRAM

## 2024-06-01 PROCEDURE — 36415 COLL VENOUS BLD VENIPUNCTURE: CPT

## 2024-06-01 PROCEDURE — 2580000003 HC RX 258: Performed by: FAMILY MEDICINE

## 2024-06-01 PROCEDURE — 99223 1ST HOSP IP/OBS HIGH 75: CPT | Performed by: STUDENT IN AN ORGANIZED HEALTH CARE EDUCATION/TRAINING PROGRAM

## 2024-06-01 PROCEDURE — 6370000000 HC RX 637 (ALT 250 FOR IP): Performed by: FAMILY MEDICINE

## 2024-06-01 PROCEDURE — 85025 COMPLETE CBC W/AUTO DIFF WBC: CPT

## 2024-06-01 PROCEDURE — 85610 PROTHROMBIN TIME: CPT

## 2024-06-01 PROCEDURE — 80048 BASIC METABOLIC PNL TOTAL CA: CPT

## 2024-06-01 RX ORDER — AMLODIPINE BESYLATE 5 MG/1
5 TABLET ORAL DAILY
Status: DISCONTINUED | OUTPATIENT
Start: 2024-06-01 | End: 2024-06-08

## 2024-06-01 RX ORDER — ATORVASTATIN CALCIUM 40 MG/1
40 TABLET, FILM COATED ORAL NIGHTLY
Status: DISCONTINUED | OUTPATIENT
Start: 2024-06-01 | End: 2024-06-08

## 2024-06-01 RX ORDER — HYDRALAZINE HYDROCHLORIDE 20 MG/ML
10 INJECTION INTRAMUSCULAR; INTRAVENOUS EVERY 6 HOURS PRN
Status: DISCONTINUED | OUTPATIENT
Start: 2024-06-01 | End: 2024-06-05

## 2024-06-01 RX ADMIN — LATANOPROST 1 DROP: 50 SOLUTION OPHTHALMIC at 21:09

## 2024-06-01 RX ADMIN — BRIMONIDINE TARTRATE 1 DROP: 2 SOLUTION OPHTHALMIC at 09:00

## 2024-06-01 RX ADMIN — BRIMONIDINE TARTRATE 1 DROP: 2 SOLUTION OPHTHALMIC at 21:09

## 2024-06-01 RX ADMIN — AMLODIPINE BESYLATE 5 MG: 5 TABLET ORAL at 10:54

## 2024-06-01 RX ADMIN — CLOPIDOGREL BISULFATE 75 MG: 75 TABLET ORAL at 08:58

## 2024-06-01 RX ADMIN — Medication 1 TABLET: at 08:58

## 2024-06-01 RX ADMIN — DORZOLAMIDE HYDROCHLORIDE 1 DROP: 20 SOLUTION/ DROPS OPHTHALMIC at 09:01

## 2024-06-01 RX ADMIN — TIMOLOL MALEATE 1 DROP: 5 SOLUTION OPHTHALMIC at 09:00

## 2024-06-01 RX ADMIN — SODIUM CHLORIDE, PRESERVATIVE FREE 10 ML: 5 INJECTION INTRAVENOUS at 21:06

## 2024-06-01 RX ADMIN — DORZOLAMIDE HYDROCHLORIDE 1 DROP: 20 SOLUTION/ DROPS OPHTHALMIC at 21:09

## 2024-06-01 RX ADMIN — SODIUM CHLORIDE, PRESERVATIVE FREE 10 ML: 5 INJECTION INTRAVENOUS at 08:59

## 2024-06-01 RX ADMIN — ATORVASTATIN CALCIUM 40 MG: 40 TABLET, FILM COATED ORAL at 21:06

## 2024-06-01 RX ADMIN — TIMOLOL MALEATE 1 DROP: 5 SOLUTION OPHTHALMIC at 21:09

## 2024-06-01 RX ADMIN — SODIUM CHLORIDE, PRESERVATIVE FREE 10 ML: 5 INJECTION INTRAVENOUS at 09:01

## 2024-06-01 RX ADMIN — METOPROLOL SUCCINATE 25 MG: 25 TABLET, EXTENDED RELEASE ORAL at 08:58

## 2024-06-01 RX ADMIN — SODIUM CHLORIDE: 9 INJECTION, SOLUTION INTRAVENOUS at 01:00

## 2024-06-01 RX ADMIN — Medication 1000 UNITS: at 08:58

## 2024-06-01 RX ADMIN — ASPIRIN 81 MG: 81 TABLET, COATED ORAL at 08:58

## 2024-06-01 RX ADMIN — LOSARTAN POTASSIUM 50 MG: 50 TABLET, FILM COATED ORAL at 08:58

## 2024-06-01 ASSESSMENT — PAIN SCALES - GENERAL
PAINLEVEL_OUTOF10: 0

## 2024-06-01 NOTE — PROCEDURES
PROCEDURE NOTE  Date: 5/31/2024   Name: Malissa Chen  YOB: 1940    Procedures        Pt stated her glaucoma shunt may have been put in at the [facility].

## 2024-06-01 NOTE — PROCEDURES
PROCEDURE NOTE  Date: 5/31/2024   Name: Malissa Chen  YOB: 1940    Procedures        Per Dr. Mahoney Pt's shunt in Lt eye has a metallic componet therefore cannot be deemed MRI safe at this time.  RN will need to find make and model of shunt usually from OP report from hospital/facility that did the shunt insertion in the first place.

## 2024-06-02 LAB
ANION GAP SERPL CALCULATED.3IONS-SCNC: 10 MMOL/L (ref 7–16)
BASOPHILS # BLD: 0 K/UL (ref 0–0.2)
BASOPHILS NFR BLD: 0 % (ref 0–2)
BUN SERPL-MCNC: 15 MG/DL (ref 6–23)
CALCIUM SERPL-MCNC: 9 MG/DL (ref 8.6–10.2)
CHLORIDE SERPL-SCNC: 102 MMOL/L (ref 98–107)
CO2 SERPL-SCNC: 24 MMOL/L (ref 22–29)
CREAT SERPL-MCNC: 0.8 MG/DL (ref 0.5–1)
EOSINOPHIL # BLD: 0.12 K/UL (ref 0.05–0.5)
EOSINOPHILS RELATIVE PERCENT: 1 % (ref 0–6)
ERYTHROCYTE [DISTWIDTH] IN BLOOD BY AUTOMATED COUNT: 15.4 % (ref 11.5–15)
GFR, ESTIMATED: 76 ML/MIN/1.73M2
GLUCOSE SERPL-MCNC: 100 MG/DL (ref 74–99)
HCT VFR BLD AUTO: 41.7 % (ref 34–48)
HGB BLD-MCNC: 13.5 G/DL (ref 11.5–15.5)
LYMPHOCYTES NFR BLD: 2.88 K/UL (ref 1.5–4)
LYMPHOCYTES RELATIVE PERCENT: 21 % (ref 20–42)
MCH RBC QN AUTO: 28.8 PG (ref 26–35)
MCHC RBC AUTO-ENTMCNC: 32.4 G/DL (ref 32–34.5)
MCV RBC AUTO: 89.1 FL (ref 80–99.9)
MONOCYTES NFR BLD: 0.96 K/UL (ref 0.1–0.95)
MONOCYTES NFR BLD: 7 % (ref 2–12)
NEUTROPHILS NFR BLD: 71 % (ref 43–80)
NEUTS SEG NFR BLD: 9.73 K/UL (ref 1.8–7.3)
PLATELET # BLD AUTO: 205 K/UL (ref 130–450)
PMV BLD AUTO: 12.3 FL (ref 7–12)
POTASSIUM SERPL-SCNC: 4 MMOL/L (ref 3.5–5)
RBC # BLD AUTO: 4.68 M/UL (ref 3.5–5.5)
RBC # BLD: ABNORMAL 10*6/UL
SODIUM SERPL-SCNC: 136 MMOL/L (ref 132–146)
WBC OTHER # BLD: 13.7 K/UL (ref 4.5–11.5)

## 2024-06-02 PROCEDURE — 85025 COMPLETE CBC W/AUTO DIFF WBC: CPT

## 2024-06-02 PROCEDURE — 6370000000 HC RX 637 (ALT 250 FOR IP): Performed by: FAMILY MEDICINE

## 2024-06-02 PROCEDURE — 2580000003 HC RX 258: Performed by: RADIOLOGY

## 2024-06-02 PROCEDURE — 36415 COLL VENOUS BLD VENIPUNCTURE: CPT

## 2024-06-02 PROCEDURE — 2580000003 HC RX 258: Performed by: FAMILY MEDICINE

## 2024-06-02 PROCEDURE — 80048 BASIC METABOLIC PNL TOTAL CA: CPT

## 2024-06-02 PROCEDURE — 97530 THERAPEUTIC ACTIVITIES: CPT

## 2024-06-02 PROCEDURE — 97535 SELF CARE MNGMENT TRAINING: CPT

## 2024-06-02 PROCEDURE — 6370000000 HC RX 637 (ALT 250 FOR IP): Performed by: STUDENT IN AN ORGANIZED HEALTH CARE EDUCATION/TRAINING PROGRAM

## 2024-06-02 PROCEDURE — 2060000000 HC ICU INTERMEDIATE R&B

## 2024-06-02 PROCEDURE — 99232 SBSQ HOSP IP/OBS MODERATE 35: CPT | Performed by: STUDENT IN AN ORGANIZED HEALTH CARE EDUCATION/TRAINING PROGRAM

## 2024-06-02 RX ADMIN — BRIMONIDINE TARTRATE 1 DROP: 2 SOLUTION OPHTHALMIC at 10:12

## 2024-06-02 RX ADMIN — BRIMONIDINE TARTRATE 1 DROP: 2 SOLUTION OPHTHALMIC at 21:36

## 2024-06-02 RX ADMIN — SODIUM CHLORIDE, PRESERVATIVE FREE 10 ML: 5 INJECTION INTRAVENOUS at 10:11

## 2024-06-02 RX ADMIN — LATANOPROST 1 DROP: 50 SOLUTION OPHTHALMIC at 21:36

## 2024-06-02 RX ADMIN — Medication 1000 UNITS: at 10:11

## 2024-06-02 RX ADMIN — ASPIRIN 81 MG: 81 TABLET, COATED ORAL at 10:11

## 2024-06-02 RX ADMIN — AMLODIPINE BESYLATE 5 MG: 5 TABLET ORAL at 10:11

## 2024-06-02 RX ADMIN — TIMOLOL MALEATE 1 DROP: 5 SOLUTION OPHTHALMIC at 10:12

## 2024-06-02 RX ADMIN — TIMOLOL MALEATE 1 DROP: 5 SOLUTION OPHTHALMIC at 21:36

## 2024-06-02 RX ADMIN — DORZOLAMIDE HYDROCHLORIDE 1 DROP: 20 SOLUTION/ DROPS OPHTHALMIC at 21:36

## 2024-06-02 RX ADMIN — Medication 1 TABLET: at 10:11

## 2024-06-02 RX ADMIN — ATORVASTATIN CALCIUM 40 MG: 40 TABLET, FILM COATED ORAL at 21:33

## 2024-06-02 RX ADMIN — LOSARTAN POTASSIUM 50 MG: 50 TABLET, FILM COATED ORAL at 10:11

## 2024-06-02 RX ADMIN — SODIUM CHLORIDE, PRESERVATIVE FREE 10 ML: 5 INJECTION INTRAVENOUS at 21:33

## 2024-06-02 RX ADMIN — DORZOLAMIDE HYDROCHLORIDE 1 DROP: 20 SOLUTION/ DROPS OPHTHALMIC at 10:12

## 2024-06-02 RX ADMIN — CLOPIDOGREL BISULFATE 75 MG: 75 TABLET ORAL at 10:11

## 2024-06-02 RX ADMIN — METOPROLOL SUCCINATE 25 MG: 25 TABLET, EXTENDED RELEASE ORAL at 10:11

## 2024-06-02 ASSESSMENT — PAIN SCALES - GENERAL
PAINLEVEL_OUTOF10: 0

## 2024-06-03 ENCOUNTER — APPOINTMENT (OUTPATIENT)
Dept: INTERVENTIONAL RADIOLOGY/VASCULAR | Age: 84
DRG: 003 | End: 2024-06-03
Payer: MEDICARE

## 2024-06-03 ENCOUNTER — ANESTHESIA (OUTPATIENT)
Dept: INTERVENTIONAL RADIOLOGY/VASCULAR | Age: 84
End: 2024-06-03
Payer: MEDICARE

## 2024-06-03 ENCOUNTER — APPOINTMENT (OUTPATIENT)
Dept: CT IMAGING | Age: 84
DRG: 003 | End: 2024-06-03
Payer: MEDICARE

## 2024-06-03 ENCOUNTER — ANESTHESIA EVENT (OUTPATIENT)
Dept: INTERVENTIONAL RADIOLOGY/VASCULAR | Age: 84
End: 2024-06-03
Payer: MEDICARE

## 2024-06-03 PROBLEM — I77.74 VERTEBRAL ARTERY DISSECTION (HCC): Status: ACTIVE | Noted: 2024-06-03

## 2024-06-03 LAB
ALBUMIN SERPL-MCNC: 3.1 G/DL (ref 3.5–4.7)
ALPHA1 GLOB SERPL ELPH-MCNC: 0.3 G/DL (ref 0.2–0.4)
ALPHA2 GLOB SERPL ELPH-MCNC: 1 G/DL (ref 0.5–1)
ANA SER QL IA: NEGATIVE
ANION GAP SERPL CALCULATED.3IONS-SCNC: 15 MMOL/L (ref 7–16)
B-GLOBULIN SERPL ELPH-MCNC: 1.1 G/DL (ref 0.8–1.3)
BASOPHILS # BLD: 0 K/UL (ref 0–0.2)
BASOPHILS NFR BLD: 0 % (ref 0–2)
BUN SERPL-MCNC: 18 MG/DL (ref 6–23)
CALCIUM SERPL-MCNC: 8.9 MG/DL (ref 8.6–10.2)
CHLORIDE SERPL-SCNC: 104 MMOL/L (ref 98–107)
CO2 SERPL-SCNC: 19 MMOL/L (ref 22–29)
CREAT SERPL-MCNC: 0.7 MG/DL (ref 0.5–1)
EOSINOPHIL # BLD: 0.12 K/UL (ref 0.05–0.5)
EOSINOPHILS RELATIVE PERCENT: 1 % (ref 0–6)
ERYTHROCYTE [DISTWIDTH] IN BLOOD BY AUTOMATED COUNT: 15.2 % (ref 11.5–15)
GAMMA GLOB SERPL ELPH-MCNC: 1.2 G/DL (ref 0.7–1.6)
GFR, ESTIMATED: 86 ML/MIN/1.73M2
GLUCOSE SERPL-MCNC: 102 MG/DL (ref 74–99)
HCT VFR BLD AUTO: 43.4 % (ref 34–48)
HGB BLD-MCNC: 14.3 G/DL (ref 11.5–15.5)
INTERPRETATION SERPL IFE-IMP: NORMAL
LYMPHOCYTES NFR BLD: 3.68 K/UL (ref 1.5–4)
LYMPHOCYTES RELATIVE PERCENT: 26 % (ref 20–42)
MCH RBC QN AUTO: 29.4 PG (ref 26–35)
MCHC RBC AUTO-ENTMCNC: 32.9 G/DL (ref 32–34.5)
MCV RBC AUTO: 89.1 FL (ref 80–99.9)
MONOCYTES NFR BLD: 1.1 K/UL (ref 0.1–0.95)
MONOCYTES NFR BLD: 8 % (ref 2–12)
NEUTROPHILS NFR BLD: 65 % (ref 43–80)
NEUTS SEG NFR BLD: 9.2 K/UL (ref 1.8–7.3)
PATH REV: NORMAL
PATHOLOGIST: ABNORMAL
PLATELET # BLD AUTO: 222 K/UL (ref 130–450)
PMV BLD AUTO: 12.1 FL (ref 7–12)
POTASSIUM SERPL-SCNC: 3.9 MMOL/L (ref 3.5–5)
PROT PATTERN SERPL ELPH-IMP: ABNORMAL
PROT SERPL-MCNC: 6.7 G/DL (ref 6.4–8.3)
RBC # BLD AUTO: 4.87 M/UL (ref 3.5–5.5)
RBC # BLD: NORMAL 10*6/UL
SODIUM SERPL-SCNC: 138 MMOL/L (ref 132–146)
WBC OTHER # BLD: 14.1 K/UL (ref 4.5–11.5)

## 2024-06-03 PROCEDURE — 92523 SPEECH SOUND LANG COMPREHEN: CPT | Performed by: SPEECH-LANGUAGE PATHOLOGIST

## 2024-06-03 PROCEDURE — 99291 CRITICAL CARE FIRST HOUR: CPT | Performed by: PHYSICIAN ASSISTANT

## 2024-06-03 PROCEDURE — 36140 INTRO NDL ICATH UPR/LXTR ART: CPT | Performed by: PSYCHIATRY & NEUROLOGY

## 2024-06-03 PROCEDURE — 36140 INTRO NDL ICATH UPR/LXTR ART: CPT

## 2024-06-03 PROCEDURE — 70450 CT HEAD/BRAIN W/O DYE: CPT

## 2024-06-03 PROCEDURE — 36620 INSERTION CATHETER ARTERY: CPT | Performed by: ANESTHESIOLOGY

## 2024-06-03 PROCEDURE — 6360000004 HC RX CONTRAST MEDICATION: Performed by: RADIOLOGY

## 2024-06-03 PROCEDURE — 03CG3Z7 EXTIRPATION OF MATTER FROM INTRACRANIAL ARTERY USING STENT RETRIEVER, PERCUTANEOUS APPROACH: ICD-10-PCS | Performed by: PSYCHIATRY & NEUROLOGY

## 2024-06-03 PROCEDURE — 37236 OPEN/PERQ PLACE STENT 1ST: CPT

## 2024-06-03 PROCEDURE — 92610 EVALUATE SWALLOWING FUNCTION: CPT | Performed by: SPEECH-LANGUAGE PATHOLOGIST

## 2024-06-03 PROCEDURE — 85025 COMPLETE CBC W/AUTO DIFF WBC: CPT

## 2024-06-03 PROCEDURE — 70498 CT ANGIOGRAPHY NECK: CPT

## 2024-06-03 PROCEDURE — 3E03317 INTRODUCTION OF OTHER THROMBOLYTIC INTO PERIPHERAL VEIN, PERCUTANEOUS APPROACH: ICD-10-PCS | Performed by: STUDENT IN AN ORGANIZED HEALTH CARE EDUCATION/TRAINING PROGRAM

## 2024-06-03 PROCEDURE — 99223 1ST HOSP IP/OBS HIGH 75: CPT | Performed by: PSYCHIATRY & NEUROLOGY

## 2024-06-03 PROCEDURE — 61645 PERQ ART M-THROMBECT &/NFS: CPT

## 2024-06-03 PROCEDURE — C1892 INTRO/SHEATH,FIXED,PEEL-AWAY: HCPCS

## 2024-06-03 PROCEDURE — 2500000003 HC RX 250 WO HCPCS

## 2024-06-03 PROCEDURE — 80048 BASIC METABOLIC PNL TOTAL CA: CPT

## 2024-06-03 PROCEDURE — 2580000003 HC RX 258

## 2024-06-03 PROCEDURE — 7100000000 HC PACU RECOVERY - FIRST 15 MIN

## 2024-06-03 PROCEDURE — 36415 COLL VENOUS BLD VENIPUNCTURE: CPT

## 2024-06-03 PROCEDURE — 6360000002 HC RX W HCPCS: Performed by: PSYCHIATRY & NEUROLOGY

## 2024-06-03 PROCEDURE — 6370000000 HC RX 637 (ALT 250 FOR IP): Performed by: STUDENT IN AN ORGANIZED HEALTH CARE EDUCATION/TRAINING PROGRAM

## 2024-06-03 PROCEDURE — 97129 THER IVNTJ 1ST 15 MIN: CPT | Performed by: SPEECH-LANGUAGE PATHOLOGIST

## 2024-06-03 PROCEDURE — 6360000002 HC RX W HCPCS

## 2024-06-03 PROCEDURE — 6360000004 HC RX CONTRAST MEDICATION: Performed by: PSYCHIATRY & NEUROLOGY

## 2024-06-03 PROCEDURE — 4A03X5D MEASUREMENT OF ARTERIAL FLOW, INTRACRANIAL, EXTERNAL APPROACH: ICD-10-PCS | Performed by: STUDENT IN AN ORGANIZED HEALTH CARE EDUCATION/TRAINING PROGRAM

## 2024-06-03 PROCEDURE — 2580000003 HC RX 258: Performed by: RADIOLOGY

## 2024-06-03 PROCEDURE — 6370000000 HC RX 637 (ALT 250 FOR IP): Performed by: FAMILY MEDICINE

## 2024-06-03 PROCEDURE — 2580000003 HC RX 258: Performed by: PHYSICIAN ASSISTANT

## 2024-06-03 PROCEDURE — 0042T CT BRAIN PERFUSION: CPT

## 2024-06-03 PROCEDURE — 37236 OPEN/PERQ PLACE STENT 1ST: CPT | Performed by: PSYCHIATRY & NEUROLOGY

## 2024-06-03 PROCEDURE — 61645 PERQ ART M-THROMBECT &/NFS: CPT | Performed by: PSYCHIATRY & NEUROLOGY

## 2024-06-03 PROCEDURE — 7100000001 HC PACU RECOVERY - ADDTL 15 MIN

## 2024-06-03 PROCEDURE — 2580000003 HC RX 258: Performed by: FAMILY MEDICINE

## 2024-06-03 PROCEDURE — 2000000000 HC ICU R&B

## 2024-06-03 PROCEDURE — 70496 CT ANGIOGRAPHY HEAD: CPT

## 2024-06-03 PROCEDURE — 2580000003 HC RX 258: Performed by: PSYCHIATRY & NEUROLOGY

## 2024-06-03 PROCEDURE — 99232 SBSQ HOSP IP/OBS MODERATE 35: CPT | Performed by: STUDENT IN AN ORGANIZED HEALTH CARE EDUCATION/TRAINING PROGRAM

## 2024-06-03 PROCEDURE — 2500000003 HC RX 250 WO HCPCS: Performed by: PSYCHIATRY & NEUROLOGY

## 2024-06-03 PROCEDURE — 2060000000 HC ICU INTERMEDIATE R&B

## 2024-06-03 PROCEDURE — 03CQ3Z7 EXTIRPATION OF MATTER FROM LEFT VERTEBRAL ARTERY USING STENT RETRIEVER, PERCUTANEOUS APPROACH: ICD-10-PCS | Performed by: PSYCHIATRY & NEUROLOGY

## 2024-06-03 RX ORDER — DEXAMETHASONE SODIUM PHOSPHATE 10 MG/ML
INJECTION INTRAMUSCULAR; INTRAVENOUS PRN
Status: DISCONTINUED | OUTPATIENT
Start: 2024-06-03 | End: 2024-06-03 | Stop reason: SDUPTHER

## 2024-06-03 RX ORDER — SODIUM CHLORIDE 9 MG/ML
INJECTION, SOLUTION INTRAVENOUS PRN
Status: DISCONTINUED | OUTPATIENT
Start: 2024-06-03 | End: 2024-07-03 | Stop reason: HOSPADM

## 2024-06-03 RX ORDER — FENTANYL CITRATE 50 UG/ML
INJECTION, SOLUTION INTRAMUSCULAR; INTRAVENOUS PRN
Status: DISCONTINUED | OUTPATIENT
Start: 2024-06-03 | End: 2024-06-03 | Stop reason: SDUPTHER

## 2024-06-03 RX ORDER — SUCCINYLCHOLINE CHLORIDE 20 MG/ML
INJECTION INTRAMUSCULAR; INTRAVENOUS PRN
Status: DISCONTINUED | OUTPATIENT
Start: 2024-06-03 | End: 2024-06-03 | Stop reason: SDUPTHER

## 2024-06-03 RX ORDER — VECURONIUM BROMIDE 1 MG/ML
INJECTION, POWDER, LYOPHILIZED, FOR SOLUTION INTRAVENOUS PRN
Status: DISCONTINUED | OUTPATIENT
Start: 2024-06-03 | End: 2024-06-03 | Stop reason: SDUPTHER

## 2024-06-03 RX ORDER — GLYCOPYRROLATE 1 MG/5 ML
SYRINGE (ML) INTRAVENOUS PRN
Status: DISCONTINUED | OUTPATIENT
Start: 2024-06-03 | End: 2024-06-03 | Stop reason: SDUPTHER

## 2024-06-03 RX ORDER — ACETAMINOPHEN 325 MG/1
650 TABLET ORAL EVERY 4 HOURS PRN
Status: DISCONTINUED | OUTPATIENT
Start: 2024-06-03 | End: 2024-06-11 | Stop reason: SDUPTHER

## 2024-06-03 RX ORDER — IOPAMIDOL 612 MG/ML
INJECTION, SOLUTION INTRATHECAL PRN
Status: COMPLETED | OUTPATIENT
Start: 2024-06-03 | End: 2024-06-03

## 2024-06-03 RX ORDER — SODIUM CHLORIDE 0.9 % (FLUSH) 0.9 %
5-40 SYRINGE (ML) INJECTION PRN
Status: DISCONTINUED | OUTPATIENT
Start: 2024-06-03 | End: 2024-06-11

## 2024-06-03 RX ORDER — 0.9 % SODIUM CHLORIDE 0.9 %
500 INTRAVENOUS SOLUTION INTRAVENOUS ONCE
Status: COMPLETED | OUTPATIENT
Start: 2024-06-03 | End: 2024-06-03

## 2024-06-03 RX ORDER — ONDANSETRON 2 MG/ML
4 INJECTION INTRAMUSCULAR; INTRAVENOUS EVERY 6 HOURS PRN
Status: DISCONTINUED | OUTPATIENT
Start: 2024-06-03 | End: 2024-06-13

## 2024-06-03 RX ORDER — EPTIFIBATIDE 0.75 MG/ML
0.5 INJECTION, SOLUTION INTRAVENOUS CONTINUOUS
Status: DISCONTINUED | OUTPATIENT
Start: 2024-06-03 | End: 2024-06-04

## 2024-06-03 RX ORDER — ROCURONIUM BROMIDE 10 MG/ML
INJECTION, SOLUTION INTRAVENOUS PRN
Status: DISCONTINUED | OUTPATIENT
Start: 2024-06-03 | End: 2024-06-03

## 2024-06-03 RX ORDER — ONDANSETRON 4 MG/1
4 TABLET, ORALLY DISINTEGRATING ORAL EVERY 8 HOURS PRN
Status: DISCONTINUED | OUTPATIENT
Start: 2024-06-03 | End: 2024-06-13

## 2024-06-03 RX ORDER — LABETALOL HYDROCHLORIDE 5 MG/ML
5 INJECTION, SOLUTION INTRAVENOUS EVERY 10 MIN PRN
Status: DISCONTINUED | OUTPATIENT
Start: 2024-06-03 | End: 2024-07-03 | Stop reason: HOSPADM

## 2024-06-03 RX ORDER — SODIUM CHLORIDE 0.9 % (FLUSH) 0.9 %
5-40 SYRINGE (ML) INJECTION EVERY 12 HOURS SCHEDULED
Status: DISCONTINUED | OUTPATIENT
Start: 2024-06-03 | End: 2024-06-06

## 2024-06-03 RX ORDER — EPHEDRINE SULFATE/0.9% NACL/PF 25 MG/5 ML
SYRINGE (ML) INTRAVENOUS PRN
Status: DISCONTINUED | OUTPATIENT
Start: 2024-06-03 | End: 2024-06-03 | Stop reason: SDUPTHER

## 2024-06-03 RX ORDER — ONDANSETRON 2 MG/ML
INJECTION INTRAMUSCULAR; INTRAVENOUS PRN
Status: DISCONTINUED | OUTPATIENT
Start: 2024-06-03 | End: 2024-06-03 | Stop reason: SDUPTHER

## 2024-06-03 RX ORDER — SODIUM CHLORIDE 9 MG/ML
INJECTION, SOLUTION INTRAVENOUS CONTINUOUS PRN
Status: DISCONTINUED | OUTPATIENT
Start: 2024-06-03 | End: 2024-06-03 | Stop reason: SDUPTHER

## 2024-06-03 RX ORDER — PROPOFOL 10 MG/ML
INJECTION, EMULSION INTRAVENOUS PRN
Status: DISCONTINUED | OUTPATIENT
Start: 2024-06-03 | End: 2024-06-03 | Stop reason: SDUPTHER

## 2024-06-03 RX ORDER — ENOXAPARIN SODIUM 100 MG/ML
40 INJECTION SUBCUTANEOUS DAILY
Status: DISCONTINUED | OUTPATIENT
Start: 2024-06-04 | End: 2024-06-13

## 2024-06-03 RX ORDER — HEPARIN SODIUM 10000 [USP'U]/ML
INJECTION, SOLUTION INTRAVENOUS; SUBCUTANEOUS PRN
Status: COMPLETED | OUTPATIENT
Start: 2024-06-03 | End: 2024-06-03

## 2024-06-03 RX ORDER — LIDOCAINE HYDROCHLORIDE 20 MG/ML
INJECTION, SOLUTION EPIDURAL; INFILTRATION; INTRACAUDAL; PERINEURAL PRN
Status: DISCONTINUED | OUTPATIENT
Start: 2024-06-03 | End: 2024-06-03 | Stop reason: SDUPTHER

## 2024-06-03 RX ORDER — EPTIFIBATIDE 0.75 MG/ML
INJECTION, SOLUTION INTRAVENOUS CONTINUOUS PRN
Status: DISCONTINUED | OUTPATIENT
Start: 2024-06-03 | End: 2024-06-03 | Stop reason: SDUPTHER

## 2024-06-03 RX ORDER — HYDRALAZINE HYDROCHLORIDE 20 MG/ML
5 INJECTION INTRAMUSCULAR; INTRAVENOUS EVERY 10 MIN PRN
Status: DISCONTINUED | OUTPATIENT
Start: 2024-06-03 | End: 2024-06-11

## 2024-06-03 RX ORDER — ATORVASTATIN CALCIUM 40 MG/1
40 TABLET, FILM COATED ORAL NIGHTLY
Status: DISCONTINUED | OUTPATIENT
Start: 2024-06-03 | End: 2024-06-03 | Stop reason: SDUPTHER

## 2024-06-03 RX ADMIN — PHENYLEPHRINE HYDROCHLORIDE 50 MCG: 10 INJECTION INTRAVENOUS at 17:54

## 2024-06-03 RX ADMIN — PROPOFOL 100 MG: 10 INJECTION, EMULSION INTRAVENOUS at 16:40

## 2024-06-03 RX ADMIN — PHENYLEPHRINE HYDROCHLORIDE 50 MCG: 10 INJECTION INTRAVENOUS at 18:10

## 2024-06-03 RX ADMIN — FENTANYL CITRATE 100 MCG: 50 INJECTION, SOLUTION INTRAMUSCULAR; INTRAVENOUS at 16:40

## 2024-06-03 RX ADMIN — SODIUM CHLORIDE, PRESERVATIVE FREE 10 ML: 5 INJECTION INTRAVENOUS at 20:43

## 2024-06-03 RX ADMIN — SODIUM CHLORIDE, PRESERVATIVE FREE 10 ML: 5 INJECTION INTRAVENOUS at 20:44

## 2024-06-03 RX ADMIN — ONDANSETRON 4 MG: 2 INJECTION INTRAMUSCULAR; INTRAVENOUS at 18:43

## 2024-06-03 RX ADMIN — BRIMONIDINE TARTRATE 1 DROP: 2 SOLUTION OPHTHALMIC at 20:43

## 2024-06-03 RX ADMIN — PHENYLEPHRINE HYDROCHLORIDE 200 MCG: 10 INJECTION INTRAVENOUS at 17:00

## 2024-06-03 RX ADMIN — EPHEDRINE SULFATE 10 MG: 5 INJECTION INTRAVENOUS at 17:04

## 2024-06-03 RX ADMIN — PROPOFOL 50 MG: 10 INJECTION, EMULSION INTRAVENOUS at 16:54

## 2024-06-03 RX ADMIN — Medication 5000 UNITS: at 16:56

## 2024-06-03 RX ADMIN — DORZOLAMIDE HYDROCHLORIDE 1 DROP: 20 SOLUTION/ DROPS OPHTHALMIC at 20:43

## 2024-06-03 RX ADMIN — CLOPIDOGREL BISULFATE 75 MG: 75 TABLET ORAL at 09:19

## 2024-06-03 RX ADMIN — DORZOLAMIDE HYDROCHLORIDE 1 DROP: 20 SOLUTION/ DROPS OPHTHALMIC at 09:23

## 2024-06-03 RX ADMIN — TIMOLOL MALEATE 1 DROP: 5 SOLUTION OPHTHALMIC at 20:43

## 2024-06-03 RX ADMIN — PHENYLEPHRINE HYDROCHLORIDE 50 MCG: 10 INJECTION INTRAVENOUS at 18:06

## 2024-06-03 RX ADMIN — Medication 1 TABLET: at 09:20

## 2024-06-03 RX ADMIN — METOPROLOL SUCCINATE 25 MG: 25 TABLET, EXTENDED RELEASE ORAL at 09:19

## 2024-06-03 RX ADMIN — Medication 1000 ML: at 16:58

## 2024-06-03 RX ADMIN — SODIUM CHLORIDE: 9 INJECTION, SOLUTION INTRAVENOUS at 18:34

## 2024-06-03 RX ADMIN — IOPAMIDOL 105 ML: 755 INJECTION, SOLUTION INTRAVENOUS at 10:16

## 2024-06-03 RX ADMIN — LATANOPROST 1 DROP: 50 SOLUTION OPHTHALMIC at 20:43

## 2024-06-03 RX ADMIN — Medication 1000 ML: at 18:02

## 2024-06-03 RX ADMIN — IOPAMIDOL 100 ML: 612 INJECTION, SOLUTION INTRATHECAL at 18:51

## 2024-06-03 RX ADMIN — SODIUM CHLORIDE, PRESERVATIVE FREE 10 ML: 5 INJECTION INTRAVENOUS at 09:23

## 2024-06-03 RX ADMIN — LABETALOL HYDROCHLORIDE 5 MG: 5 INJECTION, SOLUTION INTRAVENOUS at 20:19

## 2024-06-03 RX ADMIN — LOSARTAN POTASSIUM 50 MG: 50 TABLET, FILM COATED ORAL at 09:19

## 2024-06-03 RX ADMIN — AMLODIPINE BESYLATE 5 MG: 5 TABLET ORAL at 09:20

## 2024-06-03 RX ADMIN — PHENYLEPHRINE HYDROCHLORIDE 200 MCG: 10 INJECTION INTRAVENOUS at 16:57

## 2024-06-03 RX ADMIN — PHENYLEPHRINE HYDROCHLORIDE 100 MCG: 10 INJECTION INTRAVENOUS at 17:35

## 2024-06-03 RX ADMIN — Medication 1000 ML: at 16:57

## 2024-06-03 RX ADMIN — EPHEDRINE SULFATE 5 MG: 5 INJECTION INTRAVENOUS at 17:09

## 2024-06-03 RX ADMIN — Medication 100 MG: at 16:40

## 2024-06-03 RX ADMIN — PHENYLEPHRINE HYDROCHLORIDE 50 MCG: 10 INJECTION INTRAVENOUS at 18:37

## 2024-06-03 RX ADMIN — SODIUM CHLORIDE: 9 INJECTION, SOLUTION INTRAVENOUS at 16:32

## 2024-06-03 RX ADMIN — VECURONIUM BROMIDE 5 MG: 10 INJECTION, POWDER, LYOPHILIZED, FOR SOLUTION INTRAVENOUS at 16:55

## 2024-06-03 RX ADMIN — PHENYLEPHRINE HYDROCHLORIDE 200 MCG: 10 INJECTION INTRAVENOUS at 17:14

## 2024-06-03 RX ADMIN — SUGAMMADEX 200 MG: 100 INJECTION, SOLUTION INTRAVENOUS at 18:45

## 2024-06-03 RX ADMIN — EPTIFIBATIDE 0.5 MCG/KG/MIN: 0.75 INJECTION INTRAVENOUS at 17:30

## 2024-06-03 RX ADMIN — FENTANYL CITRATE 25 MCG: 50 INJECTION, SOLUTION INTRAMUSCULAR; INTRAVENOUS at 18:04

## 2024-06-03 RX ADMIN — EPHEDRINE SULFATE 5 MG: 5 INJECTION INTRAVENOUS at 18:19

## 2024-06-03 RX ADMIN — PHENYLEPHRINE HYDROCHLORIDE 100 MCG: 10 INJECTION INTRAVENOUS at 16:51

## 2024-06-03 RX ADMIN — Medication 5000 UNITS: at 16:57

## 2024-06-03 RX ADMIN — DEXAMETHASONE SODIUM PHOSPHATE 10 MG: 10 INJECTION INTRAMUSCULAR; INTRAVENOUS at 17:18

## 2024-06-03 RX ADMIN — ONDANSETRON 4 MG: 2 INJECTION INTRAMUSCULAR; INTRAVENOUS at 17:20

## 2024-06-03 RX ADMIN — BRIMONIDINE TARTRATE 1 DROP: 2 SOLUTION OPHTHALMIC at 09:22

## 2024-06-03 RX ADMIN — EPHEDRINE SULFATE 5 MG: 5 INJECTION INTRAVENOUS at 17:15

## 2024-06-03 RX ADMIN — TIMOLOL MALEATE 1 DROP: 5 SOLUTION OPHTHALMIC at 09:23

## 2024-06-03 RX ADMIN — EPTIFIBATIDE 10290 MCG: 0.75 INJECTION INTRAVENOUS at 17:25

## 2024-06-03 RX ADMIN — PHENYLEPHRINE HYDROCHLORIDE 50 MCG: 10 INJECTION INTRAVENOUS at 17:52

## 2024-06-03 RX ADMIN — SODIUM CHLORIDE 500 ML: 9 INJECTION, SOLUTION INTRAVENOUS at 10:44

## 2024-06-03 RX ADMIN — Medication 0.2 MG: at 17:01

## 2024-06-03 RX ADMIN — PHENYLEPHRINE HYDROCHLORIDE 100 MCG: 10 INJECTION INTRAVENOUS at 18:14

## 2024-06-03 RX ADMIN — PHENYLEPHRINE HYDROCHLORIDE 100 MCG: 10 INJECTION INTRAVENOUS at 16:45

## 2024-06-03 RX ADMIN — PHENYLEPHRINE HYDROCHLORIDE 50 MCG: 10 INJECTION INTRAVENOUS at 18:25

## 2024-06-03 RX ADMIN — ASPIRIN 81 MG: 81 TABLET, COATED ORAL at 09:19

## 2024-06-03 RX ADMIN — EPHEDRINE SULFATE 5 MG: 5 INJECTION INTRAVENOUS at 17:40

## 2024-06-03 RX ADMIN — EPTIFIBATIDE 0.5 MCG/KG/MIN: 0.75 INJECTION INTRAVENOUS at 19:58

## 2024-06-03 RX ADMIN — SUCCINYLCHOLINE CHLORIDE 100 MG: 20 INJECTION, SOLUTION INTRAMUSCULAR; INTRAVENOUS at 16:40

## 2024-06-03 RX ADMIN — Medication 1000 UNITS: at 09:19

## 2024-06-03 ASSESSMENT — PAIN SCALES - GENERAL: PAINLEVEL_OUTOF10: 0

## 2024-06-03 NOTE — PROCEDURES
NEUROINTERVENTION PROCEDURE NOTE    PATIENT NAME: Malissa Chen  MRN: [id number]  : 1940  DATE OF PROCEDURE: 24    Stroke Metrics  NIHSS prior to procedure: 17  IV TNK Administered: [] Yes  [x]  No  Consent obtained: [x] Yes  []  No  by Dr. Ray   Pedal Pulses checked: +1 bilaterally pre-procedure    Vitals completed per anesthesia    Neurointerventionalist: Puma Bone MD  1st assistant Nanette Buenrostro    RIGHT SIDE  Time Event Device Notes   1650 Access site puncture   Location: right radial       1705 1st pass suction TICI Reperfusion grade: NA    1714 2nd pass stent retriever TICI Reperfusion ndgndrndanddndend:nd2nd 1731 3rd pass stent TICI Reperfusion ndgndrndanddndend:nd2nd 1835 Access site closure  Sheath pulled and  vascband used to close arterial puncture. Puncture site cleansed and dry dressing applied. No bleeding, swelling or complications noted, no change in pulses.      LEFT SIDE  Time Event Device Notes   1747 Access site puncture   Location: right femoral       1756 1st pass suction TICI Reperfusion thgthrthathdtheth:th th4th 1802 2nd pass stent TICI Reperfusion ndgndrndanddndend:nd2nd 1835 Access site closure  Sheath pulled and  Angioseal used to close arterial puncture. Puncture site cleansed and dry dressing applied. No bleeding, swelling or complications noted, no change in pulses.        IA tPA adminstered: [] Yes  [x]  No       1845 Post-procedure NIHSS unable to assess due to anesthesia/sedation and initial site assessment: site WNL, no bleeding or hematoma noted, dressing dry and intact. +1 bilateral pedal pulses.    1855  [facility]  and handoff report given to RN @ bedside    Family updated: [x] Yes  []  No    Blood pressure parameters: SBP <140    Antiplatelet Recommendations:  N/A    Any additional follow up scans:  CT tomorrow

## 2024-06-03 NOTE — ANESTHESIA PRE PROCEDURE
(porcine) 5,000 Units with nitroGLYCERIN 100 mcg in sodium chloride 0.9% 1000 mL irrigation    PRN Puma Bone MD   1,000 mL at 06/03/24 1658    hydrALAZINE (APRESOLINE) injection 10 mg  10 mg IntraVENous Q6H PRN Brock Galindo MD        atorvastatin (LIPITOR) tablet 40 mg  40 mg Oral Nightly Timbo Garza MD   40 mg at 06/02/24 2133    amLODIPine (NORVASC) tablet 5 mg  5 mg Oral Daily Brock Galindo MD   5 mg at 06/03/24 0920    sodium chloride flush 0.9 % injection 10 mL  10 mL IntraVENous BID CATIA Casillas MD   10 mL at 06/02/24 1011    losartan (COZAAR) tablet 50 mg  50 mg Oral Daily Brock Galindo MD   50 mg at 06/03/24 0919    dorzolamide (TRUSOPT) 2 % ophthalmic solution 1 drop  1 drop Left Eye BID Kervin Domingo MD   1 drop at 06/03/24 0923    therapeutic multivitamin-minerals 1 tablet  1 tablet Oral Daily Kervin Domingo MD   1 tablet at 06/03/24 0920    raloxifene (EVISTA) tablet 60 mg  (Patient Supplied)  60 mg Oral QPM Kervin Domingo MD        latanoprost (XALATAN) 0.005 % ophthalmic solution 1 drop  1 drop Both Eyes Nightly Kervin Domingo MD   1 drop at 06/02/24 2136    Vitamin D (CHOLECALCIFEROL) tablet 1,000 Units  1,000 Units Oral Daily Kervin Domingo MD   1,000 Units at 06/03/24 0919    sodium chloride flush 0.9 % injection 5-40 mL  5-40 mL IntraVENous 2 times per day Kervin Domingo MD   10 mL at 06/03/24 0923    sodium chloride flush 0.9 % injection 5-40 mL  5-40 mL IntraVENous PRN Kervin Domingo MD        0.9 % sodium chloride infusion   IntraVENous PRN Kervin Domingo MD        ondansetron (ZOFRAN-ODT) disintegrating tablet 4 mg  4 mg Oral Q8H PRN Kervin Domingo MD        Or    ondansetron (ZOFRAN) injection 4 mg  4 mg IntraVENous Q6H PRN Kervin Domingo MD        polyethylene glycol (GLYCOLAX) packet 17 g  17 g Oral Daily PRN OtKervin villegas MD        [Held by provider] enoxaparin (LOVENOX) injection 40 mg  40 mg

## 2024-06-03 NOTE — CARE COORDINATION
Attempted to see patient at the bedside to discuss ARU however she was being worked up for increased weakness.  Will return at later time to discuss.

## 2024-06-03 NOTE — SIGNIFICANT EVENT
[facility]  STROKE ALERT NOTE     Malissa Chen is a 83 y.o. right handed     History of Present Illness:     Stroke/cathy alert was called at 0948    Patient had an acute change in right sided weakness and right sided facial droop. Last known well was 30 minutes prior to call of stroke alert during med pass by nursing. Patient has already been placed on DAPT, and high intensity statin. She has a planned outpatient diagnostic catheter angiogram with Dr. Garza tentatively planned the week of June 10th.     Change of NIH stroke scale from 3 to 6.     H and P taken from EMR as below:  Malissa Chen is a 83 y.o. right handed female presenting for evaluation of stroke.  The patient states that prior to admission she suddenly had a bad headache and noticed that her left arm and leg were weak. Now today, her right arm and leg are weak.  She is also noted some trouble swallowing over the last day.  She reports no prior history of stroke.  Upon presentation she is noted to have a hypodensity in the right occipital lobe concerning for acute ischemia.  CTA of the head and neck also showed occlusion in the bilateral vertebrals concerning for dissection.  She was admitted with a BP of 202/81     She describes dizzy spells and headaches since April 2024.  Since then she has also been noticing intermittent blurring in her left eye as well as brown spots in her vision.  Some aching in her jaws since April with chewing is also reported.  Past Medical History:     Past Medical History:   Diagnosis Date    Diverticulitis     Glaucoma associated with ocular disorder     Hyperlipidemia     Hypertension     Osteopenia     Palpitations     PVC (premature ventricular contraction)        Past Surgical History:     Past Surgical History:   Procedure Laterality Date    ANKLE SURGERY Right     orif    EYE SURGERY      shunt right eye       Allergies:     Doxycycline, Ciprofloxacin, and Sulfa

## 2024-06-03 NOTE — CARE COORDINATION
Chart reviewed and case reviewed in IDR.  Patient received in transfer from NICU over the weekend.  Plan for angiogram as an outpatient per Dr Garza, medical management is recommended at this time.  Unable to complete MRI's due to Left Eye Shunt.  Patient with Left sided weakness, arm and leg.  Spoke with Whitney, liaison with ARU at [facility].  Dr Concepcion will see for ARU evaluation.  They will have a bed for the patient and olga initiate authorization once his input is received.  Spoke with Neurology.  They were called to the room this morning as the patient developed facial droop, tongue deviation, and is now unable to move her left side.  Stroke alert called per Neurology.  CT, CTA's, and CT brain perfusion ordered this morning per Neurology.  Will continue to follow for further input from Neurology and medical stability to initiate authorization for transition of care to ARU.        Luz Angeles RN.  P:  [phone]

## 2024-06-03 NOTE — BRIEF OP NOTE
Brief Postoperative Note      Patient: Malissa Chen  YOB: 1940  MRN: [id number]    Date of Procedure: 6/3/2024      Neurointerventional POST Procedure Note    This is a brief post operative note that serves as immediate documentation to communicate with other clinicians and update them about the procedure  For Billing and complete documentation purposes the COMPLETE operative report/diagnostic imaging report is placed in PACS. To review this document please go under the images tab and click on the relevant IR procedure.      Date of Service: 24      Patient Name: Malissa Chen   : 1940  Medical record number:  [id number]        Procedure: Diagnostic Cerebral Angiogram with bilateral vertebral artery thrombectomy  Left vertebral artery complete stent assisted V1-V4 repair    Physician: FLOR GRIJALVA MD  Assistant: IR TECH  Access:Right radial for right vert  Left femoral for left vert intervention   Vessels injected:   Hemostasis: achieved 8F angio seal  Merit band  Anesthesia: Please see flowchart  Specimens: None  Blood loss: 100 ml;  Contrast Material:  please see dictation in PACS  Fluoro time: Please see dictation in PACS          Diagnosis/Findings:   1. Occlusion of bilateral vertebral artery , dissection of bilateral vertebral artery     2. Right vertebral artery thrombectomy and reocclusion s/p stent assisted dissection repair.    Successful Left Vertebral artery thrombectomy with stent assisted repair to restore flow    3. Left vertebral artery thrombectomy and complete extracranial V1-V2 stent assisted repair of dissection     4. Left V3 LVIS assisted dissection repair.    Multiple stents used, listed below    Plan:  1. Q15 min neuro checks x2 hours, Q30 for 6 hours and q1h for 24 hours and then q4h/q6h till discharge   2. Maintain SBP <160 per SVIN Guidelines Class 2 Recommendation MAP always greater than 65   3. Neurovascular checks   4. 24 hour CT head or MRI, if

## 2024-06-03 NOTE — ANESTHESIA PROCEDURE NOTES
Arterial Line:    An arterial line was placed using surface landmarks, in the procedure area for the following indication(s): continuous blood pressure monitoring and blood sampling needed.    A 20 gauge (size), 1 and 1/4 inch (length), Arrow (type) catheter was placed, Seldinger technique used, into the left radial artery, secured by Tegaderm.  Anesthesia type: General    Events:  patient tolerated procedure well with no complications and EBL 0mL.6/3/2024 4:40 PM6/3/2024 4:50 PM  Anesthesiologist: Angelito Rogers MD  Resident/CRNA: Alvaro Lockett APRN - BOZENA  Other anesthesia staff: Marlo Almanza RN  Performed: Resident/CRNA   Preanesthetic Checklist  Completed: patient identified, IV checked, site marked, risks and benefits discussed, surgical/procedural consents, equipment checked, pre-op evaluation, timeout performed, anesthesia consent given, oxygen available, monitors applied/VS acknowledged, fire risk safety assessment completed and verbalized and blood product R/B/A discussed and consented

## 2024-06-03 NOTE — PROCEDURES
PROCEDURE NOTE  Date: 6/3/2024   Name: Malissa Chen  YOB: 1940    Procedures        Patient arrived to radiology department for vertebral artery thrombectomy and repair. Allergies, medications, H&P and fasting instructions reviewed. Vital signs taken. IV flushed and prn adapter attached. Procedural instructions given, questions answered, understanding expressed and consent signed. No questions at this time

## 2024-06-04 ENCOUNTER — APPOINTMENT (OUTPATIENT)
Dept: GENERAL RADIOLOGY | Age: 84
DRG: 003 | End: 2024-06-04
Payer: MEDICARE

## 2024-06-04 ENCOUNTER — APPOINTMENT (OUTPATIENT)
Dept: CT IMAGING | Age: 84
DRG: 003 | End: 2024-06-04
Payer: MEDICARE

## 2024-06-04 PROBLEM — R00.1 SINUS BRADYCARDIA: Status: ACTIVE | Noted: 2024-06-04

## 2024-06-04 PROBLEM — I44.7 LBBB (LEFT BUNDLE BRANCH BLOCK): Status: ACTIVE | Noted: 2024-06-04

## 2024-06-04 LAB
ANION GAP SERPL CALCULATED.3IONS-SCNC: 15 MMOL/L (ref 7–16)
BASOPHILS # BLD: 0.02 K/UL (ref 0–0.2)
BASOPHILS NFR BLD: 0 % (ref 0–2)
BUN SERPL-MCNC: 16 MG/DL (ref 6–23)
CA-I BLD-SCNC: 1.2 MMOL/L (ref 1.15–1.33)
CALCIUM SERPL-MCNC: 8.5 MG/DL (ref 8.6–10.2)
CHLORIDE SERPL-SCNC: 106 MMOL/L (ref 98–107)
CO2 SERPL-SCNC: 19 MMOL/L (ref 22–29)
CREAT SERPL-MCNC: 0.6 MG/DL (ref 0.5–1)
EOSINOPHIL # BLD: 0 K/UL (ref 0.05–0.5)
EOSINOPHILS RELATIVE PERCENT: 0 % (ref 0–6)
ERYTHROCYTE [DISTWIDTH] IN BLOOD BY AUTOMATED COUNT: 15.3 % (ref 11.5–15)
GFR, ESTIMATED: 87 ML/MIN/1.73M2
GLUCOSE SERPL-MCNC: 141 MG/DL (ref 74–99)
HCT VFR BLD AUTO: 38.8 % (ref 34–48)
HGB BLD-MCNC: 12.4 G/DL (ref 11.5–15.5)
IMM GRANULOCYTES # BLD AUTO: 0.09 K/UL (ref 0–0.58)
IMM GRANULOCYTES NFR BLD: 1 % (ref 0–5)
LYMPHOCYTES NFR BLD: 1.93 K/UL (ref 1.5–4)
LYMPHOCYTES RELATIVE PERCENT: 10 % (ref 20–42)
MAGNESIUM SERPL-MCNC: 1.8 MG/DL (ref 1.6–2.6)
MCH RBC QN AUTO: 27.9 PG (ref 26–35)
MCHC RBC AUTO-ENTMCNC: 32 G/DL (ref 32–34.5)
MCV RBC AUTO: 87.4 FL (ref 80–99.9)
MONOCYTES NFR BLD: 0.59 K/UL (ref 0.1–0.95)
MONOCYTES NFR BLD: 3 % (ref 2–12)
NEUTROPHILS NFR BLD: 86 % (ref 43–80)
NEUTS SEG NFR BLD: 15.84 K/UL (ref 1.8–7.3)
PHOSPHATE SERPL-MCNC: 3.9 MG/DL (ref 2.5–4.5)
PLATELET # BLD AUTO: 232 K/UL (ref 130–450)
PMV BLD AUTO: 12 FL (ref 7–12)
POTASSIUM SERPL-SCNC: 4.2 MMOL/L (ref 3.5–5)
RBC # BLD AUTO: 4.44 M/UL (ref 3.5–5.5)
SODIUM SERPL-SCNC: 140 MMOL/L (ref 132–146)
WBC OTHER # BLD: 18.5 K/UL (ref 4.5–11.5)

## 2024-06-04 PROCEDURE — 97164 PT RE-EVAL EST PLAN CARE: CPT

## 2024-06-04 PROCEDURE — 2580000003 HC RX 258: Performed by: NURSE PRACTITIONER

## 2024-06-04 PROCEDURE — 97535 SELF CARE MNGMENT TRAINING: CPT

## 2024-06-04 PROCEDURE — 82330 ASSAY OF CALCIUM: CPT

## 2024-06-04 PROCEDURE — 6370000000 HC RX 637 (ALT 250 FOR IP): Performed by: STUDENT IN AN ORGANIZED HEALTH CARE EDUCATION/TRAINING PROGRAM

## 2024-06-04 PROCEDURE — 2580000003 HC RX 258: Performed by: PSYCHIATRY & NEUROLOGY

## 2024-06-04 PROCEDURE — 97168 OT RE-EVAL EST PLAN CARE: CPT

## 2024-06-04 PROCEDURE — 2500000003 HC RX 250 WO HCPCS

## 2024-06-04 PROCEDURE — 2580000003 HC RX 258: Performed by: RADIOLOGY

## 2024-06-04 PROCEDURE — 99222 1ST HOSP IP/OBS MODERATE 55: CPT | Performed by: INTERNAL MEDICINE

## 2024-06-04 PROCEDURE — 70450 CT HEAD/BRAIN W/O DYE: CPT

## 2024-06-04 PROCEDURE — 84100 ASSAY OF PHOSPHORUS: CPT

## 2024-06-04 PROCEDURE — 97530 THERAPEUTIC ACTIVITIES: CPT

## 2024-06-04 PROCEDURE — APPSS45 APP SPLIT SHARED TIME 31-45 MINUTES: Performed by: NURSE PRACTITIONER

## 2024-06-04 PROCEDURE — 6360000002 HC RX W HCPCS: Performed by: PSYCHIATRY & NEUROLOGY

## 2024-06-04 PROCEDURE — 37799 UNLISTED PX VASCULAR SURGERY: CPT

## 2024-06-04 PROCEDURE — 83735 ASSAY OF MAGNESIUM: CPT

## 2024-06-04 PROCEDURE — 85025 COMPLETE CBC W/AUTO DIFF WBC: CPT

## 2024-06-04 PROCEDURE — 80048 BASIC METABOLIC PNL TOTAL CA: CPT

## 2024-06-04 PROCEDURE — 92523 SPEECH SOUND LANG COMPREHEN: CPT | Performed by: SPEECH-LANGUAGE PATHOLOGIST

## 2024-06-04 PROCEDURE — 6370000000 HC RX 637 (ALT 250 FOR IP): Performed by: FAMILY MEDICINE

## 2024-06-04 PROCEDURE — 6370000000 HC RX 637 (ALT 250 FOR IP): Performed by: PSYCHIATRY & NEUROLOGY

## 2024-06-04 PROCEDURE — 92507 TX SP LANG VOICE COMM INDIV: CPT | Performed by: SPEECH-LANGUAGE PATHOLOGIST

## 2024-06-04 PROCEDURE — 74018 RADEX ABDOMEN 1 VIEW: CPT

## 2024-06-04 PROCEDURE — 2000000000 HC ICU R&B

## 2024-06-04 RX ORDER — 0.9 % SODIUM CHLORIDE 0.9 %
500 INTRAVENOUS SOLUTION INTRAVENOUS ONCE
Status: COMPLETED | OUTPATIENT
Start: 2024-06-04 | End: 2024-06-04

## 2024-06-04 RX ORDER — NOREPINEPHRINE BITARTRATE 0.06 MG/ML
1-100 INJECTION, SOLUTION INTRAVENOUS CONTINUOUS
Status: DISCONTINUED | OUTPATIENT
Start: 2024-06-04 | End: 2024-06-05

## 2024-06-04 RX ORDER — SODIUM CHLORIDE 9 MG/ML
INJECTION, SOLUTION INTRAVENOUS CONTINUOUS
Status: DISCONTINUED | OUTPATIENT
Start: 2024-06-04 | End: 2024-06-05

## 2024-06-04 RX ORDER — NOREPINEPHRINE BITARTRATE 0.06 MG/ML
INJECTION, SOLUTION INTRAVENOUS
Status: COMPLETED
Start: 2024-06-04 | End: 2024-06-04

## 2024-06-04 RX ADMIN — TIMOLOL MALEATE 1 DROP: 5 SOLUTION OPHTHALMIC at 07:56

## 2024-06-04 RX ADMIN — SODIUM CHLORIDE, PRESERVATIVE FREE 10 ML: 5 INJECTION INTRAVENOUS at 07:56

## 2024-06-04 RX ADMIN — Medication 5 MCG/MIN: at 10:21

## 2024-06-04 RX ADMIN — Medication 1 TABLET: at 07:57

## 2024-06-04 RX ADMIN — TICAGRELOR 90 MG: 90 TABLET ORAL at 07:57

## 2024-06-04 RX ADMIN — LATANOPROST 1 DROP: 50 SOLUTION OPHTHALMIC at 19:42

## 2024-06-04 RX ADMIN — TIMOLOL MALEATE 1 DROP: 5 SOLUTION OPHTHALMIC at 19:43

## 2024-06-04 RX ADMIN — ATORVASTATIN CALCIUM 40 MG: 40 TABLET, FILM COATED ORAL at 19:42

## 2024-06-04 RX ADMIN — ENOXAPARIN SODIUM 40 MG: 100 INJECTION SUBCUTANEOUS at 08:33

## 2024-06-04 RX ADMIN — SODIUM CHLORIDE, PRESERVATIVE FREE 10 ML: 5 INJECTION INTRAVENOUS at 19:43

## 2024-06-04 RX ADMIN — SODIUM CHLORIDE: 9 INJECTION, SOLUTION INTRAVENOUS at 09:50

## 2024-06-04 RX ADMIN — SODIUM CHLORIDE 500 ML: 9 INJECTION, SOLUTION INTRAVENOUS at 10:19

## 2024-06-04 RX ADMIN — DORZOLAMIDE HYDROCHLORIDE 1 DROP: 20 SOLUTION/ DROPS OPHTHALMIC at 07:56

## 2024-06-04 RX ADMIN — Medication 1000 UNITS: at 07:57

## 2024-06-04 RX ADMIN — NOREPINEPHRINE BITARTRATE 5 MCG/MIN: 0.06 INJECTION, SOLUTION INTRAVENOUS at 10:21

## 2024-06-04 RX ADMIN — BRIMONIDINE TARTRATE 1 DROP: 2 SOLUTION OPHTHALMIC at 07:56

## 2024-06-04 RX ADMIN — ASPIRIN 81 MG: 81 TABLET, COATED ORAL at 07:59

## 2024-06-04 RX ADMIN — SODIUM CHLORIDE: 9 INJECTION, SOLUTION INTRAVENOUS at 16:17

## 2024-06-04 RX ADMIN — METOPROLOL SUCCINATE 25 MG: 25 TABLET, EXTENDED RELEASE ORAL at 07:56

## 2024-06-04 RX ADMIN — LOSARTAN POTASSIUM 50 MG: 50 TABLET, FILM COATED ORAL at 07:57

## 2024-06-04 RX ADMIN — AMLODIPINE BESYLATE 5 MG: 5 TABLET ORAL at 07:57

## 2024-06-04 RX ADMIN — DORZOLAMIDE HYDROCHLORIDE 1 DROP: 20 SOLUTION/ DROPS OPHTHALMIC at 19:43

## 2024-06-04 RX ADMIN — BRIMONIDINE TARTRATE 1 DROP: 2 SOLUTION OPHTHALMIC at 19:42

## 2024-06-04 RX ADMIN — SODIUM CHLORIDE: 9 INJECTION, SOLUTION INTRAVENOUS at 10:11

## 2024-06-04 RX ADMIN — TICAGRELOR 90 MG: 90 TABLET ORAL at 19:42

## 2024-06-04 ASSESSMENT — PAIN SCALES - GENERAL
PAINLEVEL_OUTOF10: 0

## 2024-06-04 NOTE — CARE COORDINATION
CM Update: Met with patient at bedside to discuss transition of care plan. Patient response to voice but did not attempt to speak. Discharge plan is SNF vs ARU.     ARU saw patient prior to Left Vertebral artery thrombectomy with stent placement 6/3. They said they would reevaluate patient post procedure.     SNF: Spoke with Anaya (friend) and Tania (friend). Both tell me the patient has cousins. One cousin is a female with dementia and the other is a male. They don't have their names or contact information. Anaya tell me all the other relatives of the patient have passed away.     Patient came to hospital with stroke like symptoms. S/P Left Vertebral artery thrombectomy with stent placement 6/3. Currently in NICU. [facility]/JACI to follow. ALEX Santamaria5-Called [employer] [phone] to inquire about guardianship services. Page 1 of Refferal for Services completed and in soft chart. Statement of Expert Evaluation form ready for physician signature and in soft chart. Awaiting call back from Inna from [employer]. [phone] for advice on completing pages 2 through 4 of the referral for services document. [email or web address] SALV[facility]/JACI to follow. MT

## 2024-06-04 NOTE — MANAGEMENT PLAN
Brief Stroke Team Update note    Medications:  ASA Brilinta.   Recommendations: Keep MAP >65, Need NG tube for medications    Dispo Continue ICU    Mary Mcpherson RN,BSN,Stroke Navigator      This note is meant for timely update and informational purposes to communicate with other teams, nursing and case management about plan during stroke table rounds.  This is not a official clinical documentation note.  Please see attending neurology physician note for further final plan of care and updates.

## 2024-06-04 NOTE — ANESTHESIA POSTPROCEDURE EVALUATION
Department of Anesthesiology  Postprocedure Note    Patient: Malissa Chen  MRN: [id number]  YOB: 1940  Date of evaluation: 6/4/2024    Procedure Summary       Date: 06/03/24 Room / Location: [facility] Special Procedures; [facility] Radiology    Anesthesia Start: 1632 Anesthesia Stop: 1912    Procedures:       IR MECHANICAL ART THROMBECTOMY INTRACRANIAL      IR INTRO NDL OR CATH EXTREM ART      IR TRANSCATH PLACE STENT UPPER W PTA INIT ARTERY Diagnosis:       (STROKE)      (stroke)      (stroke)    Scheduled Providers: Puma Bone MD Responsible Provider: Angelito Rogers MD    Anesthesia Type: general ASA Status: 3            Anesthesia Type: No value filed.    Nahomy Phase I: Nahomy Score: 7    Nahomy Phase II:      Anesthesia Post Evaluation    Patient location during evaluation: ICU  Patient participation: complete - patient participated  Level of consciousness: awake and alert  Airway patency: patent  Nausea & Vomiting: no nausea and no vomiting  Cardiovascular status: hemodynamically stable  Respiratory status: acceptable  Hydration status: euvolemic  There was medical reason for not using a multimodal analgesia pain management approach.Pain management: adequate    No notable events documented.

## 2024-06-05 LAB
ALBUMIN SERPL-MCNC: 3.1 G/DL (ref 3.5–5.2)
ALP SERPL-CCNC: 53 U/L (ref 35–104)
ALT SERPL-CCNC: 19 U/L (ref 0–32)
ANION GAP SERPL CALCULATED.3IONS-SCNC: 9 MMOL/L (ref 7–16)
AST SERPL-CCNC: 29 U/L (ref 0–31)
BILIRUB SERPL-MCNC: 0.3 MG/DL (ref 0–1.2)
BUN SERPL-MCNC: 20 MG/DL (ref 6–23)
CA-I BLD-SCNC: 1.19 MMOL/L (ref 1.15–1.33)
CALCIUM SERPL-MCNC: 8.3 MG/DL (ref 8.6–10.2)
CHLORIDE SERPL-SCNC: 104 MMOL/L (ref 98–107)
CO2 SERPL-SCNC: 23 MMOL/L (ref 22–29)
CREAT SERPL-MCNC: 0.6 MG/DL (ref 0.5–1)
ERYTHROCYTE [DISTWIDTH] IN BLOOD BY AUTOMATED COUNT: 15.6 % (ref 11.5–15)
GFR, ESTIMATED: 88 ML/MIN/1.73M2
GLUCOSE SERPL-MCNC: 152 MG/DL (ref 74–99)
HCT VFR BLD AUTO: 33.6 % (ref 34–48)
HGB BLD-MCNC: 10.8 G/DL (ref 11.5–15.5)
MAGNESIUM SERPL-MCNC: 1.9 MG/DL (ref 1.6–2.6)
MCH RBC QN AUTO: 28.1 PG (ref 26–35)
MCHC RBC AUTO-ENTMCNC: 32.1 G/DL (ref 32–34.5)
MCV RBC AUTO: 87.3 FL (ref 80–99.9)
PHOSPHATE SERPL-MCNC: 2.3 MG/DL (ref 2.5–4.5)
PLATELET # BLD AUTO: 197 K/UL (ref 130–450)
PMV BLD AUTO: 12 FL (ref 7–12)
POTASSIUM SERPL-SCNC: 4.3 MMOL/L (ref 3.5–5)
PROT SERPL-MCNC: 5.5 G/DL (ref 6.4–8.3)
RBC # BLD AUTO: 3.85 M/UL (ref 3.5–5.5)
SODIUM SERPL-SCNC: 136 MMOL/L (ref 132–146)
WBC OTHER # BLD: 15.4 K/UL (ref 4.5–11.5)

## 2024-06-05 PROCEDURE — 97530 THERAPEUTIC ACTIVITIES: CPT

## 2024-06-05 PROCEDURE — 6360000002 HC RX W HCPCS: Performed by: PSYCHIATRY & NEUROLOGY

## 2024-06-05 PROCEDURE — 80053 COMPREHEN METABOLIC PANEL: CPT

## 2024-06-05 PROCEDURE — 6360000002 HC RX W HCPCS: Performed by: NURSE PRACTITIONER

## 2024-06-05 PROCEDURE — 6370000000 HC RX 637 (ALT 250 FOR IP): Performed by: STUDENT IN AN ORGANIZED HEALTH CARE EDUCATION/TRAINING PROGRAM

## 2024-06-05 PROCEDURE — 99232 SBSQ HOSP IP/OBS MODERATE 35: CPT | Performed by: NURSE PRACTITIONER

## 2024-06-05 PROCEDURE — 82962 GLUCOSE BLOOD TEST: CPT

## 2024-06-05 PROCEDURE — 2580000003 HC RX 258: Performed by: FAMILY MEDICINE

## 2024-06-05 PROCEDURE — 6370000000 HC RX 637 (ALT 250 FOR IP): Performed by: PSYCHIATRY & NEUROLOGY

## 2024-06-05 PROCEDURE — 97535 SELF CARE MNGMENT TRAINING: CPT

## 2024-06-05 PROCEDURE — 2700000000 HC OXYGEN THERAPY PER DAY

## 2024-06-05 PROCEDURE — 6370000000 HC RX 637 (ALT 250 FOR IP): Performed by: NURSE PRACTITIONER

## 2024-06-05 PROCEDURE — 2000000000 HC ICU R&B

## 2024-06-05 PROCEDURE — 2580000003 HC RX 258: Performed by: NURSE PRACTITIONER

## 2024-06-05 PROCEDURE — 99232 SBSQ HOSP IP/OBS MODERATE 35: CPT | Performed by: INTERNAL MEDICINE

## 2024-06-05 PROCEDURE — 37799 UNLISTED PX VASCULAR SURGERY: CPT

## 2024-06-05 PROCEDURE — 6370000000 HC RX 637 (ALT 250 FOR IP): Performed by: FAMILY MEDICINE

## 2024-06-05 PROCEDURE — 82330 ASSAY OF CALCIUM: CPT

## 2024-06-05 PROCEDURE — 83735 ASSAY OF MAGNESIUM: CPT

## 2024-06-05 PROCEDURE — 84100 ASSAY OF PHOSPHORUS: CPT

## 2024-06-05 PROCEDURE — 94640 AIRWAY INHALATION TREATMENT: CPT

## 2024-06-05 PROCEDURE — 85027 COMPLETE CBC AUTOMATED: CPT

## 2024-06-05 PROCEDURE — 2580000003 HC RX 258: Performed by: RADIOLOGY

## 2024-06-05 PROCEDURE — 94667 MNPJ CHEST WALL 1ST: CPT

## 2024-06-05 PROCEDURE — 99231 SBSQ HOSP IP/OBS SF/LOW 25: CPT | Performed by: STUDENT IN AN ORGANIZED HEALTH CARE EDUCATION/TRAINING PROGRAM

## 2024-06-05 RX ORDER — ASPIRIN 81 MG/1
81 TABLET, CHEWABLE ORAL DAILY
Status: DISCONTINUED | OUTPATIENT
Start: 2024-06-05 | End: 2024-07-03 | Stop reason: HOSPADM

## 2024-06-05 RX ORDER — ALBUTEROL SULFATE 2.5 MG/3ML
2.5 SOLUTION RESPIRATORY (INHALATION)
Status: DISCONTINUED | OUTPATIENT
Start: 2024-06-05 | End: 2024-06-13

## 2024-06-05 RX ADMIN — ATORVASTATIN CALCIUM 40 MG: 40 TABLET, FILM COATED ORAL at 20:19

## 2024-06-05 RX ADMIN — DORZOLAMIDE HYDROCHLORIDE 1 DROP: 20 SOLUTION/ DROPS OPHTHALMIC at 23:44

## 2024-06-05 RX ADMIN — ENOXAPARIN SODIUM 40 MG: 100 INJECTION SUBCUTANEOUS at 07:26

## 2024-06-05 RX ADMIN — TICAGRELOR 90 MG: 90 TABLET ORAL at 07:26

## 2024-06-05 RX ADMIN — SODIUM CHLORIDE, PRESERVATIVE FREE 10 ML: 5 INJECTION INTRAVENOUS at 20:21

## 2024-06-05 RX ADMIN — BRIMONIDINE TARTRATE 1 DROP: 2 SOLUTION OPHTHALMIC at 07:27

## 2024-06-05 RX ADMIN — TIMOLOL MALEATE 1 DROP: 5 SOLUTION OPHTHALMIC at 07:27

## 2024-06-05 RX ADMIN — SODIUM CHLORIDE, PRESERVATIVE FREE 10 ML: 5 INJECTION INTRAVENOUS at 20:20

## 2024-06-05 RX ADMIN — WATER 2000 MG: 1 INJECTION INTRAMUSCULAR; INTRAVENOUS; SUBCUTANEOUS at 14:46

## 2024-06-05 RX ADMIN — TICAGRELOR 90 MG: 90 TABLET ORAL at 20:19

## 2024-06-05 RX ADMIN — ALBUTEROL SULFATE 2.5 MG: 2.5 SOLUTION RESPIRATORY (INHALATION) at 19:54

## 2024-06-05 RX ADMIN — LATANOPROST 1 DROP: 50 SOLUTION OPHTHALMIC at 23:39

## 2024-06-05 RX ADMIN — BRIMONIDINE TARTRATE 1 DROP: 2 SOLUTION OPHTHALMIC at 23:44

## 2024-06-05 RX ADMIN — ALBUTEROL SULFATE 2.5 MG: 2.5 SOLUTION RESPIRATORY (INHALATION) at 16:34

## 2024-06-05 RX ADMIN — Medication 500 MG: at 11:34

## 2024-06-05 RX ADMIN — Medication 1 TABLET: at 07:26

## 2024-06-05 RX ADMIN — Medication 1000 UNITS: at 07:26

## 2024-06-05 RX ADMIN — DORZOLAMIDE HYDROCHLORIDE 1 DROP: 20 SOLUTION/ DROPS OPHTHALMIC at 07:27

## 2024-06-05 RX ADMIN — ASPIRIN 81 MG 81 MG: 81 TABLET ORAL at 11:34

## 2024-06-05 ASSESSMENT — PAIN SCALES - GENERAL
PAINLEVEL_OUTOF10: 0
PAINLEVEL_OUTOF10: 0

## 2024-06-05 NOTE — CARE COORDINATION
CM Update: Met with patient at bedside and called Anaya (Friend) to discuss transition of care plan. Patient alert. Mouthing words. Responding appropriately. Discharge plan is SNF vs ARU. Went over skilled nursing facility list. Patient Choiced 1. [facility], 2. [facility]. 3. [facility]. 4. [facility]. Referral called to [phone] She said she would review patient and call back. Informed Bell that the second choice is [facility].     ARU saw patient prior to Left Vertebral artery thrombectomy with stent placement 6/3. They said they would reevaluate patient post procedure.     Patient came to hospital with stroke like symptoms. S/P Left Vertebral artery thrombectomy with stent placement 6/3. Currently in NICU. [facility]/JACI to follow. MT

## 2024-06-06 ENCOUNTER — APPOINTMENT (OUTPATIENT)
Dept: CT IMAGING | Age: 84
DRG: 003 | End: 2024-06-06
Payer: MEDICARE

## 2024-06-06 ENCOUNTER — APPOINTMENT (OUTPATIENT)
Dept: GENERAL RADIOLOGY | Age: 84
DRG: 003 | End: 2024-06-06
Payer: MEDICARE

## 2024-06-06 PROBLEM — J69.0 ASPIRATION PNEUMONIA OF RIGHT LOWER LOBE (HCC): Status: ACTIVE | Noted: 2024-06-06

## 2024-06-06 LAB
ALBUMIN SERPL-MCNC: 2.8 G/DL (ref 3.5–5.2)
ALP SERPL-CCNC: 51 U/L (ref 35–104)
ALT SERPL-CCNC: 15 U/L (ref 0–32)
ANION GAP SERPL CALCULATED.3IONS-SCNC: 13 MMOL/L (ref 7–16)
AST SERPL-CCNC: 27 U/L (ref 0–31)
B.E.: 3 MMOL/L (ref -3–3)
BILIRUB SERPL-MCNC: 0.4 MG/DL (ref 0–1.2)
BUN SERPL-MCNC: 24 MG/DL (ref 6–23)
CA-I BLD-SCNC: 1.19 MMOL/L (ref 1.15–1.33)
CALCIUM SERPL-MCNC: 8.6 MG/DL (ref 8.6–10.2)
CHLORIDE SERPL-SCNC: 104 MMOL/L (ref 98–107)
CO2 SERPL-SCNC: 19 MMOL/L (ref 22–29)
COHB: 0.3 % (ref 0–1.5)
CREAT SERPL-MCNC: 0.6 MG/DL (ref 0.5–1)
CRITICAL: ABNORMAL
DATE ANALYZED: ABNORMAL
DATE OF COLLECTION: ABNORMAL
ERYTHROCYTE [DISTWIDTH] IN BLOOD BY AUTOMATED COUNT: 15.6 % (ref 11.5–15)
GFR, ESTIMATED: 88 ML/MIN/1.73M2
GLUCOSE BLD-MCNC: 173 MG/DL (ref 74–99)
GLUCOSE SERPL-MCNC: 158 MG/DL (ref 74–99)
HCO3: 25.6 MMOL/L (ref 22–26)
HCT VFR BLD AUTO: 34.4 % (ref 34–48)
HGB BLD-MCNC: 11.6 G/DL (ref 11.5–15.5)
HHB: 7.8 % (ref 0–5)
LAB: ABNORMAL
Lab: 833
MAGNESIUM SERPL-MCNC: 1.8 MG/DL (ref 1.6–2.6)
MCH RBC QN AUTO: 29.4 PG (ref 26–35)
MCHC RBC AUTO-ENTMCNC: 33.7 G/DL (ref 32–34.5)
MCV RBC AUTO: 87.1 FL (ref 80–99.9)
METHB: 0.6 % (ref 0–1.5)
MODE: ABNORMAL
O2 SATURATION: 92.5 % (ref 92–98.5)
O2HB: 91.3 % (ref 94–97)
OPERATOR ID: 1768
PATIENT TEMP: 37 C
PCO2: 33.1 MMHG (ref 35–45)
PH BLOOD GAS: 7.51 (ref 7.35–7.45)
PHOSPHATE SERPL-MCNC: 2.3 MG/DL (ref 2.5–4.5)
PLATELET # BLD AUTO: 205 K/UL (ref 130–450)
PMV BLD AUTO: 12.3 FL (ref 7–12)
PO2: 57.5 MMHG (ref 75–100)
POTASSIUM SERPL-SCNC: 3.8 MMOL/L (ref 3.5–5)
PROCALCITONIN SERPL-MCNC: 0.15 NG/ML (ref 0–0.08)
PROT SERPL-MCNC: 5.5 G/DL (ref 6.4–8.3)
RBC # BLD AUTO: 3.95 M/UL (ref 3.5–5.5)
SODIUM SERPL-SCNC: 136 MMOL/L (ref 132–146)
SOURCE, BLOOD GAS: ABNORMAL
THB: 12.6 G/DL (ref 11.5–16.5)
TIME ANALYZED: 833
WBC OTHER # BLD: 14.6 K/UL (ref 4.5–11.5)

## 2024-06-06 PROCEDURE — 87081 CULTURE SCREEN ONLY: CPT

## 2024-06-06 PROCEDURE — 80053 COMPREHEN METABOLIC PANEL: CPT

## 2024-06-06 PROCEDURE — 6370000000 HC RX 637 (ALT 250 FOR IP): Performed by: STUDENT IN AN ORGANIZED HEALTH CARE EDUCATION/TRAINING PROGRAM

## 2024-06-06 PROCEDURE — 31720 CLEARANCE OF AIRWAYS: CPT

## 2024-06-06 PROCEDURE — 2580000003 HC RX 258: Performed by: NURSE PRACTITIONER

## 2024-06-06 PROCEDURE — 2580000003 HC RX 258: Performed by: FAMILY MEDICINE

## 2024-06-06 PROCEDURE — 36600 WITHDRAWAL OF ARTERIAL BLOOD: CPT

## 2024-06-06 PROCEDURE — 82805 BLOOD GASES W/O2 SATURATION: CPT

## 2024-06-06 PROCEDURE — 94668 MNPJ CHEST WALL SBSQ: CPT

## 2024-06-06 PROCEDURE — 87070 CULTURE OTHR SPECIMN AEROBIC: CPT

## 2024-06-06 PROCEDURE — 99291 CRITICAL CARE FIRST HOUR: CPT | Performed by: SURGERY

## 2024-06-06 PROCEDURE — 99291 CRITICAL CARE FIRST HOUR: CPT | Performed by: NURSE PRACTITIONER

## 2024-06-06 PROCEDURE — 6360000002 HC RX W HCPCS: Performed by: NURSE PRACTITIONER

## 2024-06-06 PROCEDURE — 6370000000 HC RX 637 (ALT 250 FOR IP): Performed by: PSYCHIATRY & NEUROLOGY

## 2024-06-06 PROCEDURE — 84100 ASSAY OF PHOSPHORUS: CPT

## 2024-06-06 PROCEDURE — 82330 ASSAY OF CALCIUM: CPT

## 2024-06-06 PROCEDURE — 99232 SBSQ HOSP IP/OBS MODERATE 35: CPT | Performed by: INTERNAL MEDICINE

## 2024-06-06 PROCEDURE — 51798 US URINE CAPACITY MEASURE: CPT

## 2024-06-06 PROCEDURE — 94640 AIRWAY INHALATION TREATMENT: CPT

## 2024-06-06 PROCEDURE — 6370000000 HC RX 637 (ALT 250 FOR IP): Performed by: SURGERY

## 2024-06-06 PROCEDURE — 2000000000 HC ICU R&B

## 2024-06-06 PROCEDURE — 6360000002 HC RX W HCPCS: Performed by: STUDENT IN AN ORGANIZED HEALTH CARE EDUCATION/TRAINING PROGRAM

## 2024-06-06 PROCEDURE — 2700000000 HC OXYGEN THERAPY PER DAY

## 2024-06-06 PROCEDURE — 6370000000 HC RX 637 (ALT 250 FOR IP): Performed by: FAMILY MEDICINE

## 2024-06-06 PROCEDURE — 86140 C-REACTIVE PROTEIN: CPT

## 2024-06-06 PROCEDURE — 87205 SMEAR GRAM STAIN: CPT

## 2024-06-06 PROCEDURE — 2580000003 HC RX 258: Performed by: STUDENT IN AN ORGANIZED HEALTH CARE EDUCATION/TRAINING PROGRAM

## 2024-06-06 PROCEDURE — 71045 X-RAY EXAM CHEST 1 VIEW: CPT

## 2024-06-06 PROCEDURE — 94660 CPAP INITIATION&MGMT: CPT

## 2024-06-06 PROCEDURE — 6360000002 HC RX W HCPCS: Performed by: PSYCHIATRY & NEUROLOGY

## 2024-06-06 PROCEDURE — 2580000003 HC RX 258: Performed by: RADIOLOGY

## 2024-06-06 PROCEDURE — 85027 COMPLETE CBC AUTOMATED: CPT

## 2024-06-06 PROCEDURE — 84145 PROCALCITONIN (PCT): CPT

## 2024-06-06 PROCEDURE — 70450 CT HEAD/BRAIN W/O DYE: CPT

## 2024-06-06 PROCEDURE — 6370000000 HC RX 637 (ALT 250 FOR IP): Performed by: NURSE PRACTITIONER

## 2024-06-06 PROCEDURE — 83735 ASSAY OF MAGNESIUM: CPT

## 2024-06-06 RX ORDER — LANSOPRAZOLE 30 MG/1
30 TABLET, ORALLY DISINTEGRATING, DELAYED RELEASE ORAL
Status: DISCONTINUED | OUTPATIENT
Start: 2024-06-07 | End: 2024-06-08

## 2024-06-06 RX ORDER — MANNITOL 250 MG/ML
12.5 INJECTION, SOLUTION INTRAVENOUS ONCE
Status: DISCONTINUED | OUTPATIENT
Start: 2024-06-06 | End: 2024-06-06

## 2024-06-06 RX ORDER — MANNITOL 250 MG/ML
6.25 INJECTION, SOLUTION INTRAVENOUS ONCE
Status: COMPLETED | OUTPATIENT
Start: 2024-06-06 | End: 2024-06-06

## 2024-06-06 RX ORDER — SCOLOPAMINE TRANSDERMAL SYSTEM 1 MG/1
1 PATCH, EXTENDED RELEASE TRANSDERMAL
Status: DISCONTINUED | OUTPATIENT
Start: 2024-06-06 | End: 2024-06-08

## 2024-06-06 RX ORDER — DIMETHICONE, OXYBENZONE, AND PADIMATE O 2; 2.5; 6.6 G/100G; G/100G; G/100G
STICK TOPICAL PRN
Status: DISCONTINUED | OUTPATIENT
Start: 2024-06-06 | End: 2024-07-03 | Stop reason: HOSPADM

## 2024-06-06 RX ADMIN — ENOXAPARIN SODIUM 40 MG: 100 INJECTION SUBCUTANEOUS at 07:31

## 2024-06-06 RX ADMIN — ASPIRIN 81 MG 81 MG: 81 TABLET ORAL at 07:31

## 2024-06-06 RX ADMIN — ALBUTEROL SULFATE 2.5 MG: 2.5 SOLUTION RESPIRATORY (INHALATION) at 16:23

## 2024-06-06 RX ADMIN — WATER 2000 MG: 1 INJECTION INTRAMUSCULAR; INTRAVENOUS; SUBCUTANEOUS at 12:22

## 2024-06-06 RX ADMIN — TICAGRELOR 90 MG: 90 TABLET ORAL at 20:28

## 2024-06-06 RX ADMIN — ALBUTEROL SULFATE 2.5 MG: 2.5 SOLUTION RESPIRATORY (INHALATION) at 08:44

## 2024-06-06 RX ADMIN — Medication 250 MG: at 12:21

## 2024-06-06 RX ADMIN — TIMOLOL MALEATE 1 DROP: 5 SOLUTION OPHTHALMIC at 07:31

## 2024-06-06 RX ADMIN — SODIUM CHLORIDE, PRESERVATIVE FREE 10 ML: 5 INJECTION INTRAVENOUS at 07:31

## 2024-06-06 RX ADMIN — Medication 250 MG: at 18:16

## 2024-06-06 RX ADMIN — TIMOLOL MALEATE 1 DROP: 5 SOLUTION OPHTHALMIC at 20:29

## 2024-06-06 RX ADMIN — LATANOPROST 1 DROP: 50 SOLUTION OPHTHALMIC at 20:29

## 2024-06-06 RX ADMIN — ATORVASTATIN CALCIUM 40 MG: 40 TABLET, FILM COATED ORAL at 20:28

## 2024-06-06 RX ADMIN — BRIMONIDINE TARTRATE 1 DROP: 2 SOLUTION OPHTHALMIC at 20:29

## 2024-06-06 RX ADMIN — PIPERACILLIN AND TAZOBACTAM 4500 MG: 4; .5 INJECTION, POWDER, FOR SOLUTION INTRAVENOUS at 15:27

## 2024-06-06 RX ADMIN — Medication 250 MG: at 20:28

## 2024-06-06 RX ADMIN — ALBUTEROL SULFATE 2.5 MG: 2.5 SOLUTION RESPIRATORY (INHALATION) at 12:39

## 2024-06-06 RX ADMIN — DORZOLAMIDE HYDROCHLORIDE 1 DROP: 20 SOLUTION/ DROPS OPHTHALMIC at 07:31

## 2024-06-06 RX ADMIN — MANNITOL 6.25 G: 250 INJECTION, SOLUTION INTRAVENOUS at 14:40

## 2024-06-06 RX ADMIN — Medication 1000 UNITS: at 07:31

## 2024-06-06 RX ADMIN — SODIUM CHLORIDE, PRESERVATIVE FREE 10 ML: 5 INJECTION INTRAVENOUS at 20:28

## 2024-06-06 RX ADMIN — BRIMONIDINE TARTRATE 1 DROP: 2 SOLUTION OPHTHALMIC at 07:31

## 2024-06-06 RX ADMIN — Medication: at 20:28

## 2024-06-06 RX ADMIN — TICAGRELOR 90 MG: 90 TABLET ORAL at 07:31

## 2024-06-06 RX ADMIN — DORZOLAMIDE HYDROCHLORIDE 1 DROP: 20 SOLUTION/ DROPS OPHTHALMIC at 20:29

## 2024-06-06 RX ADMIN — ALBUTEROL SULFATE 2.5 MG: 2.5 SOLUTION RESPIRATORY (INHALATION) at 21:04

## 2024-06-06 RX ADMIN — Medication 1 TABLET: at 07:31

## 2024-06-06 ASSESSMENT — PAIN SCALES - GENERAL
PAINLEVEL_OUTOF10: 0

## 2024-06-07 ENCOUNTER — APPOINTMENT (OUTPATIENT)
Dept: GENERAL RADIOLOGY | Age: 84
DRG: 003 | End: 2024-06-07
Payer: MEDICARE

## 2024-06-07 LAB
ALBUMIN SERPL-MCNC: 2.9 G/DL (ref 3.5–5.2)
ALP SERPL-CCNC: 52 U/L (ref 35–104)
ALT SERPL-CCNC: 15 U/L (ref 0–32)
ANION GAP SERPL CALCULATED.3IONS-SCNC: 10 MMOL/L (ref 7–16)
AST SERPL-CCNC: 25 U/L (ref 0–31)
BILIRUB SERPL-MCNC: 0.4 MG/DL (ref 0–1.2)
BUN SERPL-MCNC: 27 MG/DL (ref 6–23)
CA-I BLD-SCNC: 1.19 MMOL/L (ref 1.15–1.33)
CALCIUM SERPL-MCNC: 8.6 MG/DL (ref 8.6–10.2)
CHLORIDE SERPL-SCNC: 103 MMOL/L (ref 98–107)
CO2 SERPL-SCNC: 24 MMOL/L (ref 22–29)
CREAT SERPL-MCNC: 0.7 MG/DL (ref 0.5–1)
CRP SERPL HS-MCNC: 97 MG/L (ref 0–5)
ERYTHROCYTE [DISTWIDTH] IN BLOOD BY AUTOMATED COUNT: 15.7 % (ref 11.5–15)
GFR, ESTIMATED: 86 ML/MIN/1.73M2
GLUCOSE SERPL-MCNC: 152 MG/DL (ref 74–99)
HCT VFR BLD AUTO: 33.2 % (ref 34–48)
HGB BLD-MCNC: 10.8 G/DL (ref 11.5–15.5)
MAGNESIUM SERPL-MCNC: 2.1 MG/DL (ref 1.6–2.6)
MCH RBC QN AUTO: 28.5 PG (ref 26–35)
MCHC RBC AUTO-ENTMCNC: 32.5 G/DL (ref 32–34.5)
MCV RBC AUTO: 87.6 FL (ref 80–99.9)
MICROORGANISM SPEC CULT: NORMAL
PHOSPHATE SERPL-MCNC: 2.3 MG/DL (ref 2.5–4.5)
PLATELET # BLD AUTO: 177 K/UL (ref 130–450)
PMV BLD AUTO: 12.1 FL (ref 7–12)
POTASSIUM SERPL-SCNC: 3.7 MMOL/L (ref 3.5–5)
PROT SERPL-MCNC: 5.4 G/DL (ref 6.4–8.3)
RBC # BLD AUTO: 3.79 M/UL (ref 3.5–5.5)
SODIUM SERPL-SCNC: 137 MMOL/L (ref 132–146)
SPECIMEN DESCRIPTION: NORMAL
WBC OTHER # BLD: 14.3 K/UL (ref 4.5–11.5)

## 2024-06-07 PROCEDURE — 6360000002 HC RX W HCPCS: Performed by: NURSE PRACTITIONER

## 2024-06-07 PROCEDURE — 85027 COMPLETE CBC AUTOMATED: CPT

## 2024-06-07 PROCEDURE — 6370000000 HC RX 637 (ALT 250 FOR IP): Performed by: SURGERY

## 2024-06-07 PROCEDURE — 6370000000 HC RX 637 (ALT 250 FOR IP): Performed by: PSYCHIATRY & NEUROLOGY

## 2024-06-07 PROCEDURE — 83735 ASSAY OF MAGNESIUM: CPT

## 2024-06-07 PROCEDURE — 2580000003 HC RX 258: Performed by: FAMILY MEDICINE

## 2024-06-07 PROCEDURE — 82330 ASSAY OF CALCIUM: CPT

## 2024-06-07 PROCEDURE — 94660 CPAP INITIATION&MGMT: CPT

## 2024-06-07 PROCEDURE — 71045 X-RAY EXAM CHEST 1 VIEW: CPT

## 2024-06-07 PROCEDURE — 84100 ASSAY OF PHOSPHORUS: CPT

## 2024-06-07 PROCEDURE — APPSS45 APP SPLIT SHARED TIME 31-45 MINUTES: Performed by: NURSE PRACTITIONER

## 2024-06-07 PROCEDURE — 80053 COMPREHEN METABOLIC PANEL: CPT

## 2024-06-07 PROCEDURE — 2700000000 HC OXYGEN THERAPY PER DAY

## 2024-06-07 PROCEDURE — 6370000000 HC RX 637 (ALT 250 FOR IP): Performed by: STUDENT IN AN ORGANIZED HEALTH CARE EDUCATION/TRAINING PROGRAM

## 2024-06-07 PROCEDURE — 2000000000 HC ICU R&B

## 2024-06-07 PROCEDURE — 2580000003 HC RX 258: Performed by: NURSE PRACTITIONER

## 2024-06-07 PROCEDURE — 6370000000 HC RX 637 (ALT 250 FOR IP): Performed by: FAMILY MEDICINE

## 2024-06-07 PROCEDURE — 6370000000 HC RX 637 (ALT 250 FOR IP): Performed by: NURSE PRACTITIONER

## 2024-06-07 PROCEDURE — 94669 MECHANICAL CHEST WALL OSCILL: CPT

## 2024-06-07 PROCEDURE — 99291 CRITICAL CARE FIRST HOUR: CPT | Performed by: SURGERY

## 2024-06-07 PROCEDURE — 6360000002 HC RX W HCPCS: Performed by: PSYCHIATRY & NEUROLOGY

## 2024-06-07 PROCEDURE — 94640 AIRWAY INHALATION TREATMENT: CPT

## 2024-06-07 PROCEDURE — 99231 SBSQ HOSP IP/OBS SF/LOW 25: CPT | Performed by: STUDENT IN AN ORGANIZED HEALTH CARE EDUCATION/TRAINING PROGRAM

## 2024-06-07 RX ADMIN — ALBUTEROL SULFATE 2.5 MG: 2.5 SOLUTION RESPIRATORY (INHALATION) at 20:04

## 2024-06-07 RX ADMIN — BRIMONIDINE TARTRATE 1 DROP: 2 SOLUTION OPHTHALMIC at 07:09

## 2024-06-07 RX ADMIN — DORZOLAMIDE HYDROCHLORIDE 1 DROP: 20 SOLUTION/ DROPS OPHTHALMIC at 07:07

## 2024-06-07 RX ADMIN — WATER 2000 MG: 1 INJECTION INTRAMUSCULAR; INTRAVENOUS; SUBCUTANEOUS at 11:53

## 2024-06-07 RX ADMIN — TICAGRELOR 90 MG: 90 TABLET ORAL at 19:33

## 2024-06-07 RX ADMIN — TIMOLOL MALEATE 1 DROP: 5 SOLUTION OPHTHALMIC at 07:07

## 2024-06-07 RX ADMIN — DORZOLAMIDE HYDROCHLORIDE 1 DROP: 20 SOLUTION/ DROPS OPHTHALMIC at 19:34

## 2024-06-07 RX ADMIN — ASPIRIN 81 MG 81 MG: 81 TABLET ORAL at 07:07

## 2024-06-07 RX ADMIN — TICAGRELOR 90 MG: 90 TABLET ORAL at 07:07

## 2024-06-07 RX ADMIN — Medication 250 MG: at 07:07

## 2024-06-07 RX ADMIN — ATORVASTATIN CALCIUM 40 MG: 40 TABLET, FILM COATED ORAL at 19:33

## 2024-06-07 RX ADMIN — LANSOPRAZOLE 30 MG: 30 TABLET, ORALLY DISINTEGRATING, DELAYED RELEASE ORAL at 06:17

## 2024-06-07 RX ADMIN — ALBUTEROL SULFATE 2.5 MG: 2.5 SOLUTION RESPIRATORY (INHALATION) at 15:15

## 2024-06-07 RX ADMIN — BRIMONIDINE TARTRATE 1 DROP: 2 SOLUTION OPHTHALMIC at 19:34

## 2024-06-07 RX ADMIN — ALBUTEROL SULFATE 2.5 MG: 2.5 SOLUTION RESPIRATORY (INHALATION) at 07:33

## 2024-06-07 RX ADMIN — Medication 500 MG: at 15:09

## 2024-06-07 RX ADMIN — ALBUTEROL SULFATE 2.5 MG: 2.5 SOLUTION RESPIRATORY (INHALATION) at 10:55

## 2024-06-07 RX ADMIN — Medication 500 MG: at 11:53

## 2024-06-07 RX ADMIN — Medication 1 TABLET: at 07:07

## 2024-06-07 RX ADMIN — Medication 500 MG: at 19:33

## 2024-06-07 RX ADMIN — TIMOLOL MALEATE 1 DROP: 5 SOLUTION OPHTHALMIC at 19:33

## 2024-06-07 RX ADMIN — Medication 1000 UNITS: at 07:07

## 2024-06-07 RX ADMIN — LATANOPROST 1 DROP: 50 SOLUTION OPHTHALMIC at 19:33

## 2024-06-07 RX ADMIN — ENOXAPARIN SODIUM 40 MG: 100 INJECTION SUBCUTANEOUS at 07:07

## 2024-06-07 RX ADMIN — SODIUM CHLORIDE, PRESERVATIVE FREE 10 ML: 5 INJECTION INTRAVENOUS at 07:09

## 2024-06-07 RX ADMIN — SODIUM CHLORIDE, PRESERVATIVE FREE 10 ML: 5 INJECTION INTRAVENOUS at 19:33

## 2024-06-07 ASSESSMENT — PAIN SCALES - GENERAL
PAINLEVEL_OUTOF10: 0
PAINLEVEL_OUTOF10: 0

## 2024-06-07 NOTE — CARE COORDINATION
CM Update: Previous CM choiced pt for [facility] via writing. No next of kin. Met with Evangelical members at the bedside yesterday and they are trying to get Living Will from attorneys office or name of . Pt apparently disclosed this with her Evangelical friends. Spoke to friends listed and they are willing to be HCPOA if pt appoints them. Will complete paperwork once pt is alert and oriented to appoint someone. [facility] and [facility] unable to accept. Referral pending to [facility]. Pt remains in NSICU. S/P bilateral vertebral thrombectomies. Significant aphasia and rt sided weakness. Mannitol given yesterday for possible increased ICP. BP control. CM will continue to follow(TF)        1415--[facility] has accepted, initiating precert today. ATIYA/destination completed. PASRR and ambulance form on soft chart(TF)        NERISSA MarieN,RN  Case Management  [phone]

## 2024-06-07 NOTE — DISCHARGE INSTR - COC
Continuity of Care Form    Patient Name: Malissa Chen   :  1940  MRN:  [id number]    Admit date:  2024  Discharge date:  2024    Code Status Order: Full Code   Advance Directives:     Admitting Physician:  Brock Galindo MD  PCP: Ed Collins Jr., MD    Discharging Nurse: Kenneth Bennett RN  Discharging Hospital Unit/Room#: 4521/4521-A  Discharging Unit Phone Number: [phone]    Emergency Contact:   Extended Emergency Contact Information  Primary Emergency Contact: Anaya Orosco  Home Phone: [phone]  Relation: Other  Secondary Emergency Contact: Tania Talavera  Address: [address] [facility]  Home Phone: [phone]  Relation: Other    Past Surgical History:  Past Surgical History:   Procedure Laterality Date    ANKLE SURGERY Right     orif    EYE SURGERY      shunt right eye       Immunization History:     There is no immunization history on file for this patient.    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    Sinus bradycardia R00.1    LBBB (left bundle branch block) I44.7    Aspiration pneumonia of right lower lobe (HCC) J69.0       Isolation/Infection:   Isolation            No Isolation          Patient Infection Status       None to display                     Nurse Assessment:  Last Vital Signs: BP (!) 120/50   Pulse 70   Temp 98.5 °F (36.9 °C) (Temporal)   Resp 20   Ht 1.676 m (5' 6\")   Wt 80.7 kg (178 lb)   SpO2 96%   BMI 28.73 kg/m²     Last documented pain score (0-10 scale): Pain Level: 0  Last Weight:   Wt Readings from Last 1 Encounters:   24 80.7 kg (178 lb)     Mental Status:   trached, non interactive, non verbal    IV Access:  - None    Nursing Mobility/ADLs:  Walking   Dependent  Transfer  Dependent  Bathing  Dependent  Dressing  Dependent  Toileting

## 2024-06-08 ENCOUNTER — APPOINTMENT (OUTPATIENT)
Dept: GENERAL RADIOLOGY | Age: 84
DRG: 003 | End: 2024-06-08
Payer: MEDICARE

## 2024-06-08 LAB
ALBUMIN SERPL-MCNC: 2.8 G/DL (ref 3.5–5.2)
ALP SERPL-CCNC: 69 U/L (ref 35–104)
ALT SERPL-CCNC: 28 U/L (ref 0–32)
ANION GAP SERPL CALCULATED.3IONS-SCNC: 9 MMOL/L (ref 7–16)
AST SERPL-CCNC: 40 U/L (ref 0–31)
BILIRUB SERPL-MCNC: 0.4 MG/DL (ref 0–1.2)
BUN SERPL-MCNC: 29 MG/DL (ref 6–23)
CA-I BLD-SCNC: 1.2 MMOL/L (ref 1.15–1.33)
CALCIUM SERPL-MCNC: 9 MG/DL (ref 8.6–10.2)
CHLORIDE SERPL-SCNC: 104 MMOL/L (ref 98–107)
CO2 SERPL-SCNC: 27 MMOL/L (ref 22–29)
CREAT SERPL-MCNC: 0.6 MG/DL (ref 0.5–1)
ERYTHROCYTE [DISTWIDTH] IN BLOOD BY AUTOMATED COUNT: 15.8 % (ref 11.5–15)
GFR, ESTIMATED: 87 ML/MIN/1.73M2
GLUCOSE SERPL-MCNC: 163 MG/DL (ref 74–99)
HCT VFR BLD AUTO: 33.7 % (ref 34–48)
HGB BLD-MCNC: 10.5 G/DL (ref 11.5–15.5)
MAGNESIUM SERPL-MCNC: 2.3 MG/DL (ref 1.6–2.6)
MCH RBC QN AUTO: 27.8 PG (ref 26–35)
MCHC RBC AUTO-ENTMCNC: 31.2 G/DL (ref 32–34.5)
MCV RBC AUTO: 89.2 FL (ref 80–99.9)
MICROORGANISM SPEC CULT: ABNORMAL
MICROORGANISM/AGENT SPEC: ABNORMAL
PHOSPHATE SERPL-MCNC: 2.9 MG/DL (ref 2.5–4.5)
PLATELET # BLD AUTO: 206 K/UL (ref 130–450)
PMV BLD AUTO: 12.1 FL (ref 7–12)
POTASSIUM SERPL-SCNC: 4.4 MMOL/L (ref 3.5–5)
PROT SERPL-MCNC: 5.8 G/DL (ref 6.4–8.3)
RBC # BLD AUTO: 3.78 M/UL (ref 3.5–5.5)
SODIUM SERPL-SCNC: 140 MMOL/L (ref 132–146)
SPECIMEN DESCRIPTION: ABNORMAL
WBC OTHER # BLD: 15 K/UL (ref 4.5–11.5)

## 2024-06-08 PROCEDURE — 6360000002 HC RX W HCPCS: Performed by: PSYCHIATRY & NEUROLOGY

## 2024-06-08 PROCEDURE — 82330 ASSAY OF CALCIUM: CPT

## 2024-06-08 PROCEDURE — 51798 US URINE CAPACITY MEASURE: CPT

## 2024-06-08 PROCEDURE — 80053 COMPREHEN METABOLIC PANEL: CPT

## 2024-06-08 PROCEDURE — 2700000000 HC OXYGEN THERAPY PER DAY

## 2024-06-08 PROCEDURE — 2580000003 HC RX 258: Performed by: NURSE PRACTITIONER

## 2024-06-08 PROCEDURE — 85027 COMPLETE CBC AUTOMATED: CPT

## 2024-06-08 PROCEDURE — 94640 AIRWAY INHALATION TREATMENT: CPT

## 2024-06-08 PROCEDURE — 99291 CRITICAL CARE FIRST HOUR: CPT | Performed by: SURGERY

## 2024-06-08 PROCEDURE — 94669 MECHANICAL CHEST WALL OSCILL: CPT

## 2024-06-08 PROCEDURE — 6370000000 HC RX 637 (ALT 250 FOR IP): Performed by: NURSE PRACTITIONER

## 2024-06-08 PROCEDURE — 6370000000 HC RX 637 (ALT 250 FOR IP): Performed by: PSYCHIATRY & NEUROLOGY

## 2024-06-08 PROCEDURE — 6370000000 HC RX 637 (ALT 250 FOR IP): Performed by: SURGERY

## 2024-06-08 PROCEDURE — 84100 ASSAY OF PHOSPHORUS: CPT

## 2024-06-08 PROCEDURE — 6360000002 HC RX W HCPCS: Performed by: NURSE PRACTITIONER

## 2024-06-08 PROCEDURE — 94660 CPAP INITIATION&MGMT: CPT

## 2024-06-08 PROCEDURE — 6360000002 HC RX W HCPCS

## 2024-06-08 PROCEDURE — 2000000000 HC ICU R&B

## 2024-06-08 PROCEDURE — 83735 ASSAY OF MAGNESIUM: CPT

## 2024-06-08 PROCEDURE — 6370000000 HC RX 637 (ALT 250 FOR IP): Performed by: STUDENT IN AN ORGANIZED HEALTH CARE EDUCATION/TRAINING PROGRAM

## 2024-06-08 PROCEDURE — 2580000003 HC RX 258: Performed by: FAMILY MEDICINE

## 2024-06-08 PROCEDURE — 92610 EVALUATE SWALLOWING FUNCTION: CPT | Performed by: SPEECH-LANGUAGE PATHOLOGIST

## 2024-06-08 PROCEDURE — 71045 X-RAY EXAM CHEST 1 VIEW: CPT

## 2024-06-08 PROCEDURE — 6370000000 HC RX 637 (ALT 250 FOR IP): Performed by: FAMILY MEDICINE

## 2024-06-08 RX ORDER — AMLODIPINE BESYLATE 5 MG/1
5 TABLET ORAL DAILY
Status: DISCONTINUED | OUTPATIENT
Start: 2024-06-09 | End: 2024-07-03 | Stop reason: HOSPADM

## 2024-06-08 RX ORDER — VITAMIN B COMPLEX
1000 TABLET ORAL DAILY
Status: DISCONTINUED | OUTPATIENT
Start: 2024-06-09 | End: 2024-07-03 | Stop reason: HOSPADM

## 2024-06-08 RX ORDER — LANSOPRAZOLE 30 MG/1
30 TABLET, ORALLY DISINTEGRATING, DELAYED RELEASE ORAL
Status: DISCONTINUED | OUTPATIENT
Start: 2024-06-09 | End: 2024-06-13

## 2024-06-08 RX ORDER — M-VIT,TX,IRON,MINS/CALC/FOLIC 27MG-0.4MG
1 TABLET ORAL DAILY
Status: DISCONTINUED | OUTPATIENT
Start: 2024-06-09 | End: 2024-06-12

## 2024-06-08 RX ORDER — ACETYLCYSTEINE 200 MG/ML
600 SOLUTION ORAL; RESPIRATORY (INHALATION)
Status: DISCONTINUED | OUTPATIENT
Start: 2024-06-08 | End: 2024-06-24

## 2024-06-08 RX ORDER — ATORVASTATIN CALCIUM 40 MG/1
40 TABLET, FILM COATED ORAL NIGHTLY
Status: DISCONTINUED | OUTPATIENT
Start: 2024-06-08 | End: 2024-07-03 | Stop reason: HOSPADM

## 2024-06-08 RX ADMIN — AMLODIPINE BESYLATE 5 MG: 5 TABLET ORAL at 08:39

## 2024-06-08 RX ADMIN — DORZOLAMIDE HYDROCHLORIDE 1 DROP: 20 SOLUTION/ DROPS OPHTHALMIC at 07:54

## 2024-06-08 RX ADMIN — Medication 500 MG: at 12:32

## 2024-06-08 RX ADMIN — ALBUTEROL SULFATE 2.5 MG: 2.5 SOLUTION RESPIRATORY (INHALATION) at 19:57

## 2024-06-08 RX ADMIN — TIMOLOL MALEATE 1 DROP: 5 SOLUTION OPHTHALMIC at 21:27

## 2024-06-08 RX ADMIN — Medication 1 TABLET: at 08:39

## 2024-06-08 RX ADMIN — TICAGRELOR 90 MG: 90 TABLET ORAL at 21:26

## 2024-06-08 RX ADMIN — SODIUM CHLORIDE, PRESERVATIVE FREE 10 ML: 5 INJECTION INTRAVENOUS at 08:40

## 2024-06-08 RX ADMIN — DORZOLAMIDE HYDROCHLORIDE 1 DROP: 20 SOLUTION/ DROPS OPHTHALMIC at 21:28

## 2024-06-08 RX ADMIN — ENOXAPARIN SODIUM 40 MG: 100 INJECTION SUBCUTANEOUS at 08:39

## 2024-06-08 RX ADMIN — Medication: at 21:31

## 2024-06-08 RX ADMIN — Medication 1000 UNITS: at 08:39

## 2024-06-08 RX ADMIN — ALBUTEROL SULFATE 2.5 MG: 2.5 SOLUTION RESPIRATORY (INHALATION) at 12:04

## 2024-06-08 RX ADMIN — BRIMONIDINE TARTRATE 1 DROP: 2 SOLUTION OPHTHALMIC at 07:54

## 2024-06-08 RX ADMIN — ALBUTEROL SULFATE 2.5 MG: 2.5 SOLUTION RESPIRATORY (INHALATION) at 16:38

## 2024-06-08 RX ADMIN — Medication 500 MG: at 17:40

## 2024-06-08 RX ADMIN — BRIMONIDINE TARTRATE 1 DROP: 2 SOLUTION OPHTHALMIC at 21:27

## 2024-06-08 RX ADMIN — SODIUM CHLORIDE, PRESERVATIVE FREE 10 ML: 5 INJECTION INTRAVENOUS at 21:27

## 2024-06-08 RX ADMIN — ACETYLCYSTEINE 600 MG: 200 SOLUTION ORAL; RESPIRATORY (INHALATION) at 09:20

## 2024-06-08 RX ADMIN — WATER 2000 MG: 1 INJECTION INTRAMUSCULAR; INTRAVENOUS; SUBCUTANEOUS at 12:32

## 2024-06-08 RX ADMIN — Medication 500 MG: at 21:26

## 2024-06-08 RX ADMIN — PSYLLIUM HUSK 1 PACKET: 3.4 POWDER ORAL at 21:26

## 2024-06-08 RX ADMIN — LATANOPROST 1 DROP: 50 SOLUTION OPHTHALMIC at 21:27

## 2024-06-08 RX ADMIN — ACETAMINOPHEN 650 MG: 325 TABLET ORAL at 21:26

## 2024-06-08 RX ADMIN — TIMOLOL MALEATE 1 DROP: 5 SOLUTION OPHTHALMIC at 07:54

## 2024-06-08 RX ADMIN — LANSOPRAZOLE 30 MG: 30 TABLET, ORALLY DISINTEGRATING, DELAYED RELEASE ORAL at 06:15

## 2024-06-08 RX ADMIN — TICAGRELOR 90 MG: 90 TABLET ORAL at 08:39

## 2024-06-08 RX ADMIN — PSYLLIUM HUSK 1 PACKET: 3.4 POWDER ORAL at 08:39

## 2024-06-08 RX ADMIN — ALBUTEROL SULFATE 2.5 MG: 2.5 SOLUTION RESPIRATORY (INHALATION) at 07:26

## 2024-06-08 RX ADMIN — ATORVASTATIN CALCIUM 40 MG: 40 TABLET, FILM COATED ORAL at 21:27

## 2024-06-08 RX ADMIN — Medication 500 MG: at 08:39

## 2024-06-08 RX ADMIN — ASPIRIN 81 MG 81 MG: 81 TABLET ORAL at 08:39

## 2024-06-08 RX ADMIN — ACETYLCYSTEINE 600 MG: 200 SOLUTION ORAL; RESPIRATORY (INHALATION) at 19:57

## 2024-06-08 ASSESSMENT — PAIN SCALES - GENERAL
PAINLEVEL_OUTOF10: 0

## 2024-06-09 LAB
ALBUMIN SERPL-MCNC: 3.3 G/DL (ref 3.5–5.2)
ALP SERPL-CCNC: 80 U/L (ref 35–104)
ALT SERPL-CCNC: 52 U/L (ref 0–32)
ANION GAP SERPL CALCULATED.3IONS-SCNC: 7 MMOL/L (ref 7–16)
AST SERPL-CCNC: 55 U/L (ref 0–31)
BILIRUB SERPL-MCNC: 0.5 MG/DL (ref 0–1.2)
BUN SERPL-MCNC: 26 MG/DL (ref 6–23)
CA-I BLD-SCNC: 1.24 MMOL/L (ref 1.15–1.33)
CALCIUM SERPL-MCNC: 9.2 MG/DL (ref 8.6–10.2)
CHLORIDE SERPL-SCNC: 103 MMOL/L (ref 98–107)
CO2 SERPL-SCNC: 30 MMOL/L (ref 22–29)
CREAT SERPL-MCNC: 0.6 MG/DL (ref 0.5–1)
ERYTHROCYTE [DISTWIDTH] IN BLOOD BY AUTOMATED COUNT: 15.4 % (ref 11.5–15)
GFR, ESTIMATED: 87 ML/MIN/1.73M2
GLUCOSE SERPL-MCNC: 142 MG/DL (ref 74–99)
HCT VFR BLD AUTO: 32.9 % (ref 34–48)
HGB BLD-MCNC: 10.7 G/DL (ref 11.5–15.5)
MAGNESIUM SERPL-MCNC: 2.4 MG/DL (ref 1.6–2.6)
MCH RBC QN AUTO: 29.1 PG (ref 26–35)
MCHC RBC AUTO-ENTMCNC: 32.5 G/DL (ref 32–34.5)
MCV RBC AUTO: 89.4 FL (ref 80–99.9)
PHOSPHATE SERPL-MCNC: 3.3 MG/DL (ref 2.5–4.5)
PLATELET # BLD AUTO: 251 K/UL (ref 130–450)
PMV BLD AUTO: 11.5 FL (ref 7–12)
POTASSIUM SERPL-SCNC: 4.1 MMOL/L (ref 3.5–5)
PROT SERPL-MCNC: 6.4 G/DL (ref 6.4–8.3)
RBC # BLD AUTO: 3.68 M/UL (ref 3.5–5.5)
SODIUM SERPL-SCNC: 140 MMOL/L (ref 132–146)
WBC OTHER # BLD: 19.2 K/UL (ref 4.5–11.5)

## 2024-06-09 PROCEDURE — 6370000000 HC RX 637 (ALT 250 FOR IP): Performed by: NURSE PRACTITIONER

## 2024-06-09 PROCEDURE — 2580000003 HC RX 258: Performed by: FAMILY MEDICINE

## 2024-06-09 PROCEDURE — 82330 ASSAY OF CALCIUM: CPT

## 2024-06-09 PROCEDURE — 85027 COMPLETE CBC AUTOMATED: CPT

## 2024-06-09 PROCEDURE — 6360000002 HC RX W HCPCS: Performed by: PSYCHIATRY & NEUROLOGY

## 2024-06-09 PROCEDURE — 83735 ASSAY OF MAGNESIUM: CPT

## 2024-06-09 PROCEDURE — 94660 CPAP INITIATION&MGMT: CPT

## 2024-06-09 PROCEDURE — 2580000003 HC RX 258: Performed by: SURGERY

## 2024-06-09 PROCEDURE — 80053 COMPREHEN METABOLIC PANEL: CPT

## 2024-06-09 PROCEDURE — 2000000000 HC ICU R&B

## 2024-06-09 PROCEDURE — 99291 CRITICAL CARE FIRST HOUR: CPT | Performed by: SURGERY

## 2024-06-09 PROCEDURE — 6360000002 HC RX W HCPCS: Performed by: SURGERY

## 2024-06-09 PROCEDURE — 2700000000 HC OXYGEN THERAPY PER DAY

## 2024-06-09 PROCEDURE — 99231 SBSQ HOSP IP/OBS SF/LOW 25: CPT | Performed by: STUDENT IN AN ORGANIZED HEALTH CARE EDUCATION/TRAINING PROGRAM

## 2024-06-09 PROCEDURE — 94640 AIRWAY INHALATION TREATMENT: CPT

## 2024-06-09 PROCEDURE — APPSS30 APP SPLIT SHARED TIME 16-30 MINUTES: Performed by: NURSE PRACTITIONER

## 2024-06-09 PROCEDURE — 84100 ASSAY OF PHOSPHORUS: CPT

## 2024-06-09 PROCEDURE — 6360000002 HC RX W HCPCS: Performed by: NURSE PRACTITIONER

## 2024-06-09 PROCEDURE — 6370000000 HC RX 637 (ALT 250 FOR IP): Performed by: SURGERY

## 2024-06-09 RX ADMIN — DORZOLAMIDE HYDROCHLORIDE 1 DROP: 20 SOLUTION/ DROPS OPHTHALMIC at 20:14

## 2024-06-09 RX ADMIN — ALBUTEROL SULFATE 2.5 MG: 2.5 SOLUTION RESPIRATORY (INHALATION) at 16:42

## 2024-06-09 RX ADMIN — PSYLLIUM HUSK 1 PACKET: 3.4 POWDER ORAL at 08:49

## 2024-06-09 RX ADMIN — SODIUM CHLORIDE, PRESERVATIVE FREE 10 ML: 5 INJECTION INTRAVENOUS at 07:36

## 2024-06-09 RX ADMIN — WATER 2000 MG: 1 INJECTION INTRAMUSCULAR; INTRAVENOUS; SUBCUTANEOUS at 12:35

## 2024-06-09 RX ADMIN — ACETYLCYSTEINE 600 MG: 200 SOLUTION ORAL; RESPIRATORY (INHALATION) at 19:56

## 2024-06-09 RX ADMIN — AMLODIPINE BESYLATE 5 MG: 5 TABLET ORAL at 08:49

## 2024-06-09 RX ADMIN — BRIMONIDINE TARTRATE 1 DROP: 2 SOLUTION OPHTHALMIC at 20:14

## 2024-06-09 RX ADMIN — DORZOLAMIDE HYDROCHLORIDE 1 DROP: 20 SOLUTION/ DROPS OPHTHALMIC at 07:36

## 2024-06-09 RX ADMIN — ALBUTEROL SULFATE 2.5 MG: 2.5 SOLUTION RESPIRATORY (INHALATION) at 19:56

## 2024-06-09 RX ADMIN — LANSOPRAZOLE 30 MG: 30 TABLET, ORALLY DISINTEGRATING, DELAYED RELEASE ORAL at 06:37

## 2024-06-09 RX ADMIN — ALBUTEROL SULFATE 2.5 MG: 2.5 SOLUTION RESPIRATORY (INHALATION) at 12:45

## 2024-06-09 RX ADMIN — Medication 1 TABLET: at 08:49

## 2024-06-09 RX ADMIN — SODIUM CHLORIDE, PRESERVATIVE FREE 10 ML: 5 INJECTION INTRAVENOUS at 20:13

## 2024-06-09 RX ADMIN — PSYLLIUM HUSK 1 PACKET: 3.4 POWDER ORAL at 20:12

## 2024-06-09 RX ADMIN — TICAGRELOR 90 MG: 90 TABLET ORAL at 08:49

## 2024-06-09 RX ADMIN — Medication 1000 UNITS: at 08:49

## 2024-06-09 RX ADMIN — ACETYLCYSTEINE 600 MG: 200 SOLUTION ORAL; RESPIRATORY (INHALATION) at 08:08

## 2024-06-09 RX ADMIN — ALBUTEROL SULFATE 2.5 MG: 2.5 SOLUTION RESPIRATORY (INHALATION) at 08:08

## 2024-06-09 RX ADMIN — TIMOLOL MALEATE 1 DROP: 5 SOLUTION OPHTHALMIC at 07:36

## 2024-06-09 RX ADMIN — TIMOLOL MALEATE 1 DROP: 5 SOLUTION OPHTHALMIC at 20:13

## 2024-06-09 RX ADMIN — ENOXAPARIN SODIUM 40 MG: 100 INJECTION SUBCUTANEOUS at 08:49

## 2024-06-09 RX ADMIN — ASPIRIN 81 MG 81 MG: 81 TABLET ORAL at 08:49

## 2024-06-09 RX ADMIN — Medication: at 20:13

## 2024-06-09 RX ADMIN — TICAGRELOR 90 MG: 90 TABLET ORAL at 20:12

## 2024-06-09 RX ADMIN — LATANOPROST 1 DROP: 50 SOLUTION OPHTHALMIC at 20:14

## 2024-06-09 RX ADMIN — ATORVASTATIN CALCIUM 40 MG: 40 TABLET, FILM COATED ORAL at 20:12

## 2024-06-09 RX ADMIN — Medication 500 MG: at 08:49

## 2024-06-09 RX ADMIN — BRIMONIDINE TARTRATE 1 DROP: 2 SOLUTION OPHTHALMIC at 07:36

## 2024-06-10 LAB
ALBUMIN SERPL-MCNC: 3.3 G/DL (ref 3.5–5.2)
ALP SERPL-CCNC: 70 U/L (ref 35–104)
ALT SERPL-CCNC: 45 U/L (ref 0–32)
ANION GAP SERPL CALCULATED.3IONS-SCNC: 9 MMOL/L (ref 7–16)
AST SERPL-CCNC: 42 U/L (ref 0–31)
BILIRUB SERPL-MCNC: 0.7 MG/DL (ref 0–1.2)
BUN SERPL-MCNC: 24 MG/DL (ref 6–23)
CA-I BLD-SCNC: 1.22 MMOL/L (ref 1.15–1.33)
CALCIUM SERPL-MCNC: 9.2 MG/DL (ref 8.6–10.2)
CHLORIDE SERPL-SCNC: 103 MMOL/L (ref 98–107)
CO2 SERPL-SCNC: 27 MMOL/L (ref 22–29)
CREAT SERPL-MCNC: 0.6 MG/DL (ref 0.5–1)
ERYTHROCYTE [DISTWIDTH] IN BLOOD BY AUTOMATED COUNT: 15.3 % (ref 11.5–15)
GFR, ESTIMATED: 87 ML/MIN/1.73M2
GLUCOSE SERPL-MCNC: 118 MG/DL (ref 74–99)
HCT VFR BLD AUTO: 36.4 % (ref 34–48)
HGB BLD-MCNC: 11.5 G/DL (ref 11.5–15.5)
MAGNESIUM SERPL-MCNC: 2.6 MG/DL (ref 1.6–2.6)
MCH RBC QN AUTO: 27.9 PG (ref 26–35)
MCHC RBC AUTO-ENTMCNC: 31.6 G/DL (ref 32–34.5)
MCV RBC AUTO: 88.3 FL (ref 80–99.9)
PHOSPHATE SERPL-MCNC: 3.4 MG/DL (ref 2.5–4.5)
PLATELET # BLD AUTO: 284 K/UL (ref 130–450)
PMV BLD AUTO: 11.2 FL (ref 7–12)
POTASSIUM SERPL-SCNC: 4.3 MMOL/L (ref 3.5–5)
PROT SERPL-MCNC: 6.7 G/DL (ref 6.4–8.3)
RBC # BLD AUTO: 4.12 M/UL (ref 3.5–5.5)
SODIUM SERPL-SCNC: 139 MMOL/L (ref 132–146)
WBC OTHER # BLD: 18.3 K/UL (ref 4.5–11.5)

## 2024-06-10 PROCEDURE — 92610 EVALUATE SWALLOWING FUNCTION: CPT | Performed by: SPEECH-LANGUAGE PATHOLOGIST

## 2024-06-10 PROCEDURE — 6370000000 HC RX 637 (ALT 250 FOR IP): Performed by: NURSE PRACTITIONER

## 2024-06-10 PROCEDURE — 6360000002 HC RX W HCPCS: Performed by: PSYCHIATRY & NEUROLOGY

## 2024-06-10 PROCEDURE — 97535 SELF CARE MNGMENT TRAINING: CPT

## 2024-06-10 PROCEDURE — 2700000000 HC OXYGEN THERAPY PER DAY

## 2024-06-10 PROCEDURE — 2580000003 HC RX 258: Performed by: FAMILY MEDICINE

## 2024-06-10 PROCEDURE — 97530 THERAPEUTIC ACTIVITIES: CPT

## 2024-06-10 PROCEDURE — 85027 COMPLETE CBC AUTOMATED: CPT

## 2024-06-10 PROCEDURE — 2000000000 HC ICU R&B

## 2024-06-10 PROCEDURE — 94669 MECHANICAL CHEST WALL OSCILL: CPT

## 2024-06-10 PROCEDURE — 6360000002 HC RX W HCPCS: Performed by: NURSE PRACTITIONER

## 2024-06-10 PROCEDURE — 84100 ASSAY OF PHOSPHORUS: CPT

## 2024-06-10 PROCEDURE — 82330 ASSAY OF CALCIUM: CPT

## 2024-06-10 PROCEDURE — 83735 ASSAY OF MAGNESIUM: CPT

## 2024-06-10 PROCEDURE — 80053 COMPREHEN METABOLIC PANEL: CPT

## 2024-06-10 PROCEDURE — 94640 AIRWAY INHALATION TREATMENT: CPT

## 2024-06-10 PROCEDURE — 99232 SBSQ HOSP IP/OBS MODERATE 35: CPT | Performed by: CLINICAL NURSE SPECIALIST

## 2024-06-10 PROCEDURE — 6360000002 HC RX W HCPCS: Performed by: SURGERY

## 2024-06-10 PROCEDURE — 2580000003 HC RX 258: Performed by: SURGERY

## 2024-06-10 RX ADMIN — SODIUM CHLORIDE, PRESERVATIVE FREE 10 ML: 5 INJECTION INTRAVENOUS at 08:48

## 2024-06-10 RX ADMIN — TICAGRELOR 90 MG: 90 TABLET ORAL at 19:54

## 2024-06-10 RX ADMIN — BRIMONIDINE TARTRATE 1 DROP: 2 SOLUTION OPHTHALMIC at 08:48

## 2024-06-10 RX ADMIN — Medication 1000 UNITS: at 08:32

## 2024-06-10 RX ADMIN — AMLODIPINE BESYLATE 5 MG: 5 TABLET ORAL at 08:33

## 2024-06-10 RX ADMIN — ALBUTEROL SULFATE 2.5 MG: 2.5 SOLUTION RESPIRATORY (INHALATION) at 07:36

## 2024-06-10 RX ADMIN — DORZOLAMIDE HYDROCHLORIDE 1 DROP: 20 SOLUTION/ DROPS OPHTHALMIC at 08:47

## 2024-06-10 RX ADMIN — ENOXAPARIN SODIUM 40 MG: 100 INJECTION SUBCUTANEOUS at 08:33

## 2024-06-10 RX ADMIN — DORZOLAMIDE HYDROCHLORIDE 1 DROP: 20 SOLUTION/ DROPS OPHTHALMIC at 19:54

## 2024-06-10 RX ADMIN — BRIMONIDINE TARTRATE 1 DROP: 2 SOLUTION OPHTHALMIC at 19:54

## 2024-06-10 RX ADMIN — TIMOLOL MALEATE 1 DROP: 5 SOLUTION OPHTHALMIC at 19:54

## 2024-06-10 RX ADMIN — TIMOLOL MALEATE 1 DROP: 5 SOLUTION OPHTHALMIC at 08:47

## 2024-06-10 RX ADMIN — ACETYLCYSTEINE 600 MG: 200 SOLUTION ORAL; RESPIRATORY (INHALATION) at 20:37

## 2024-06-10 RX ADMIN — TICAGRELOR 90 MG: 90 TABLET ORAL at 08:32

## 2024-06-10 RX ADMIN — Medication 1 TABLET: at 08:33

## 2024-06-10 RX ADMIN — LANSOPRAZOLE 30 MG: 30 TABLET, ORALLY DISINTEGRATING, DELAYED RELEASE ORAL at 06:40

## 2024-06-10 RX ADMIN — ALBUTEROL SULFATE 2.5 MG: 2.5 SOLUTION RESPIRATORY (INHALATION) at 15:59

## 2024-06-10 RX ADMIN — SODIUM CHLORIDE, PRESERVATIVE FREE 10 ML: 5 INJECTION INTRAVENOUS at 19:54

## 2024-06-10 RX ADMIN — ASPIRIN 81 MG 81 MG: 81 TABLET ORAL at 08:33

## 2024-06-10 RX ADMIN — ACETYLCYSTEINE 600 MG: 200 SOLUTION ORAL; RESPIRATORY (INHALATION) at 07:36

## 2024-06-10 RX ADMIN — ATORVASTATIN CALCIUM 40 MG: 40 TABLET, FILM COATED ORAL at 19:54

## 2024-06-10 RX ADMIN — PSYLLIUM HUSK 1 PACKET: 3.4 POWDER ORAL at 19:53

## 2024-06-10 RX ADMIN — ALBUTEROL SULFATE 2.5 MG: 2.5 SOLUTION RESPIRATORY (INHALATION) at 20:37

## 2024-06-10 RX ADMIN — WATER 2000 MG: 1 INJECTION INTRAMUSCULAR; INTRAVENOUS; SUBCUTANEOUS at 11:28

## 2024-06-10 RX ADMIN — PSYLLIUM HUSK 1 PACKET: 3.4 POWDER ORAL at 08:32

## 2024-06-10 RX ADMIN — LATANOPROST 1 DROP: 50 SOLUTION OPHTHALMIC at 19:54

## 2024-06-10 RX ADMIN — ALBUTEROL SULFATE 2.5 MG: 2.5 SOLUTION RESPIRATORY (INHALATION) at 10:38

## 2024-06-10 ASSESSMENT — PAIN SCALES - GENERAL: PAINLEVEL_OUTOF10: 0

## 2024-06-10 NOTE — CARE COORDINATION
Spoke with [facility], patient accepted and precert pending since Friday. They asked for updated PT/OT in the event insurance requests today. Called and requested treat today. They can accept with current NGT as long as there is a documented 30 day plan for PEG if cannot pass swallow during that time. For transfer to intermediate floor. Pasrr and ambulance on soft chart.     For questions I can be reached at [phone]. DARVIN Estevez

## 2024-06-10 NOTE — MANAGEMENT PLAN
Brief Stroke Team Update note    Medications:  Continue ASA and Brilinta for now, if need Peg, bridge with Lovenox and ASA. Recommmend not holding Brilinta for more than 2 days given multiple fresh stents. Once peg placed, can resume DAPT      Dispo: transfer to monitored bed  Follow up in 3 month(s)   Discussed with Dr. Greg Mcpherson, RN,BSN, Stroke Navigator      This note is meant for timely update and informational purposes to communicate with other teams, nursing and case management about plan during stroke table rounds.  This is not a official clinical documentation note.  Please see attending neurology physician note for further final plan of care and updates.

## 2024-06-11 ENCOUNTER — APPOINTMENT (OUTPATIENT)
Dept: GENERAL RADIOLOGY | Age: 84
DRG: 003 | End: 2024-06-11
Payer: MEDICARE

## 2024-06-11 LAB
ALBUMIN SERPL-MCNC: 3 G/DL (ref 3.5–5.2)
ALP SERPL-CCNC: 83 U/L (ref 35–104)
ALT SERPL-CCNC: 48 U/L (ref 0–32)
ANION GAP SERPL CALCULATED.3IONS-SCNC: 11 MMOL/L (ref 7–16)
AST SERPL-CCNC: 47 U/L (ref 0–31)
BILIRUB SERPL-MCNC: 0.5 MG/DL (ref 0–1.2)
BUN SERPL-MCNC: 26 MG/DL (ref 6–23)
CA-I BLD-SCNC: 1.2 MMOL/L (ref 1.15–1.33)
CALCIUM SERPL-MCNC: 8.5 MG/DL (ref 8.6–10.2)
CHLORIDE SERPL-SCNC: 99 MMOL/L (ref 98–107)
CO2 SERPL-SCNC: 25 MMOL/L (ref 22–29)
CREAT SERPL-MCNC: 0.6 MG/DL (ref 0.5–1)
ERYTHROCYTE [DISTWIDTH] IN BLOOD BY AUTOMATED COUNT: 14.9 % (ref 11.5–15)
GFR, ESTIMATED: 89 ML/MIN/1.73M2
GLUCOSE SERPL-MCNC: 147 MG/DL (ref 74–99)
HCT VFR BLD AUTO: 33.2 % (ref 34–48)
HGB BLD-MCNC: 10.8 G/DL (ref 11.5–15.5)
MAGNESIUM SERPL-MCNC: 2.4 MG/DL (ref 1.6–2.6)
MCH RBC QN AUTO: 28.6 PG (ref 26–35)
MCHC RBC AUTO-ENTMCNC: 32.5 G/DL (ref 32–34.5)
MCV RBC AUTO: 88.1 FL (ref 80–99.9)
PHOSPHATE SERPL-MCNC: 2.8 MG/DL (ref 2.5–4.5)
PLATELET # BLD AUTO: 289 K/UL (ref 130–450)
PMV BLD AUTO: 11.3 FL (ref 7–12)
POTASSIUM SERPL-SCNC: 4.4 MMOL/L (ref 3.5–5)
PROT SERPL-MCNC: 6 G/DL (ref 6.4–8.3)
RBC # BLD AUTO: 3.77 M/UL (ref 3.5–5.5)
SODIUM SERPL-SCNC: 135 MMOL/L (ref 132–146)
WBC OTHER # BLD: 19.6 K/UL (ref 4.5–11.5)

## 2024-06-11 PROCEDURE — 6370000000 HC RX 637 (ALT 250 FOR IP): Performed by: NURSE PRACTITIONER

## 2024-06-11 PROCEDURE — 94668 MNPJ CHEST WALL SBSQ: CPT

## 2024-06-11 PROCEDURE — 6360000002 HC RX W HCPCS: Performed by: NURSE PRACTITIONER

## 2024-06-11 PROCEDURE — 83735 ASSAY OF MAGNESIUM: CPT

## 2024-06-11 PROCEDURE — 6360000002 HC RX W HCPCS: Performed by: PSYCHIATRY & NEUROLOGY

## 2024-06-11 PROCEDURE — 94669 MECHANICAL CHEST WALL OSCILL: CPT

## 2024-06-11 PROCEDURE — 6360000002 HC RX W HCPCS: Performed by: CLINICAL NURSE SPECIALIST

## 2024-06-11 PROCEDURE — 84100 ASSAY OF PHOSPHORUS: CPT

## 2024-06-11 PROCEDURE — 2060000000 HC ICU INTERMEDIATE R&B

## 2024-06-11 PROCEDURE — 85027 COMPLETE CBC AUTOMATED: CPT

## 2024-06-11 PROCEDURE — 2580000003 HC RX 258: Performed by: FAMILY MEDICINE

## 2024-06-11 PROCEDURE — 6370000000 HC RX 637 (ALT 250 FOR IP): Performed by: CLINICAL NURSE SPECIALIST

## 2024-06-11 PROCEDURE — 71045 X-RAY EXAM CHEST 1 VIEW: CPT

## 2024-06-11 PROCEDURE — 99232 SBSQ HOSP IP/OBS MODERATE 35: CPT | Performed by: INTERNAL MEDICINE

## 2024-06-11 PROCEDURE — 94640 AIRWAY INHALATION TREATMENT: CPT

## 2024-06-11 PROCEDURE — 2580000003 HC RX 258: Performed by: CLINICAL NURSE SPECIALIST

## 2024-06-11 PROCEDURE — 80053 COMPREHEN METABOLIC PANEL: CPT

## 2024-06-11 PROCEDURE — 82330 ASSAY OF CALCIUM: CPT

## 2024-06-11 RX ORDER — ACETAMINOPHEN 160 MG/5ML
650 LIQUID ORAL EVERY 4 HOURS PRN
Status: DISCONTINUED | OUTPATIENT
Start: 2024-06-11 | End: 2024-06-15

## 2024-06-11 RX ORDER — HYDRALAZINE HYDROCHLORIDE 20 MG/ML
5 INJECTION INTRAMUSCULAR; INTRAVENOUS EVERY 6 HOURS PRN
Status: DISCONTINUED | OUTPATIENT
Start: 2024-06-11 | End: 2024-07-03 | Stop reason: HOSPADM

## 2024-06-11 RX ADMIN — SODIUM CHLORIDE, PRESERVATIVE FREE 10 ML: 5 INJECTION INTRAVENOUS at 07:51

## 2024-06-11 RX ADMIN — SODIUM CHLORIDE, PRESERVATIVE FREE 10 ML: 5 INJECTION INTRAVENOUS at 20:42

## 2024-06-11 RX ADMIN — DORZOLAMIDE HYDROCHLORIDE 1 DROP: 20 SOLUTION/ DROPS OPHTHALMIC at 07:52

## 2024-06-11 RX ADMIN — ALBUTEROL SULFATE 2.5 MG: 2.5 SOLUTION RESPIRATORY (INHALATION) at 08:15

## 2024-06-11 RX ADMIN — ASPIRIN 81 MG 81 MG: 81 TABLET ORAL at 07:51

## 2024-06-11 RX ADMIN — Medication 1000 UNITS: at 07:51

## 2024-06-11 RX ADMIN — DORZOLAMIDE HYDROCHLORIDE 1 DROP: 20 SOLUTION/ DROPS OPHTHALMIC at 20:42

## 2024-06-11 RX ADMIN — PSYLLIUM HUSK 1 PACKET: 3.4 POWDER ORAL at 07:51

## 2024-06-11 RX ADMIN — PSYLLIUM HUSK 1 PACKET: 3.4 POWDER ORAL at 20:42

## 2024-06-11 RX ADMIN — WATER 2000 MG: 1 INJECTION INTRAMUSCULAR; INTRAVENOUS; SUBCUTANEOUS at 11:45

## 2024-06-11 RX ADMIN — LANSOPRAZOLE 30 MG: 30 TABLET, ORALLY DISINTEGRATING, DELAYED RELEASE ORAL at 06:42

## 2024-06-11 RX ADMIN — ACETYLCYSTEINE 600 MG: 200 SOLUTION ORAL; RESPIRATORY (INHALATION) at 20:03

## 2024-06-11 RX ADMIN — LATANOPROST 1 DROP: 50 SOLUTION OPHTHALMIC at 20:42

## 2024-06-11 RX ADMIN — ALBUTEROL SULFATE 2.5 MG: 2.5 SOLUTION RESPIRATORY (INHALATION) at 11:52

## 2024-06-11 RX ADMIN — TICAGRELOR 90 MG: 90 TABLET ORAL at 07:51

## 2024-06-11 RX ADMIN — Medication 1 TABLET: at 07:51

## 2024-06-11 RX ADMIN — TIMOLOL MALEATE 1 DROP: 5 SOLUTION OPHTHALMIC at 20:42

## 2024-06-11 RX ADMIN — TICAGRELOR 90 MG: 90 TABLET ORAL at 20:42

## 2024-06-11 RX ADMIN — ATORVASTATIN CALCIUM 40 MG: 40 TABLET, FILM COATED ORAL at 20:42

## 2024-06-11 RX ADMIN — ALBUTEROL SULFATE 2.5 MG: 2.5 SOLUTION RESPIRATORY (INHALATION) at 20:03

## 2024-06-11 RX ADMIN — BRIMONIDINE TARTRATE 1 DROP: 2 SOLUTION OPHTHALMIC at 07:52

## 2024-06-11 RX ADMIN — AMLODIPINE BESYLATE 5 MG: 5 TABLET ORAL at 07:51

## 2024-06-11 RX ADMIN — ALBUTEROL SULFATE 2.5 MG: 2.5 SOLUTION RESPIRATORY (INHALATION) at 16:46

## 2024-06-11 RX ADMIN — ENOXAPARIN SODIUM 40 MG: 100 INJECTION SUBCUTANEOUS at 07:51

## 2024-06-11 RX ADMIN — TIMOLOL MALEATE 1 DROP: 5 SOLUTION OPHTHALMIC at 07:53

## 2024-06-11 RX ADMIN — BRIMONIDINE TARTRATE 1 DROP: 2 SOLUTION OPHTHALMIC at 20:42

## 2024-06-11 RX ADMIN — ACETYLCYSTEINE 600 MG: 200 SOLUTION ORAL; RESPIRATORY (INHALATION) at 08:15

## 2024-06-11 ASSESSMENT — PAIN SCALES - GENERAL
PAINLEVEL_OUTOF10: 0

## 2024-06-12 PROBLEM — Z51.5 PALLIATIVE CARE ENCOUNTER: Status: ACTIVE | Noted: 2024-06-12

## 2024-06-12 PROBLEM — Z71.89 GOALS OF CARE, COUNSELING/DISCUSSION: Status: ACTIVE | Noted: 2024-06-12

## 2024-06-12 LAB
ALBUMIN SERPL-MCNC: 3.3 G/DL (ref 3.5–5.2)
ALP SERPL-CCNC: 69 U/L (ref 35–104)
ALT SERPL-CCNC: 41 U/L (ref 0–32)
ANION GAP SERPL CALCULATED.3IONS-SCNC: 10 MMOL/L (ref 7–16)
AST SERPL-CCNC: 37 U/L (ref 0–31)
BILIRUB SERPL-MCNC: 0.8 MG/DL (ref 0–1.2)
BUN SERPL-MCNC: 21 MG/DL (ref 6–23)
CALCIUM SERPL-MCNC: 9 MG/DL (ref 8.6–10.2)
CHLORIDE SERPL-SCNC: 97 MMOL/L (ref 98–107)
CO2 SERPL-SCNC: 28 MMOL/L (ref 22–29)
CREAT SERPL-MCNC: 0.6 MG/DL (ref 0.5–1)
ERYTHROCYTE [DISTWIDTH] IN BLOOD BY AUTOMATED COUNT: 14.9 % (ref 11.5–15)
GFR, ESTIMATED: 87 ML/MIN/1.73M2
GLUCOSE SERPL-MCNC: 113 MG/DL (ref 74–99)
HCT VFR BLD AUTO: 34.7 % (ref 34–48)
HGB BLD-MCNC: 11.2 G/DL (ref 11.5–15.5)
MCH RBC QN AUTO: 28.7 PG (ref 26–35)
MCHC RBC AUTO-ENTMCNC: 32.3 G/DL (ref 32–34.5)
MCV RBC AUTO: 89 FL (ref 80–99.9)
PLATELET # BLD AUTO: 314 K/UL (ref 130–450)
PMV BLD AUTO: 11.2 FL (ref 7–12)
POTASSIUM SERPL-SCNC: 4.5 MMOL/L (ref 3.5–5)
PROT SERPL-MCNC: 6.5 G/DL (ref 6.4–8.3)
RBC # BLD AUTO: 3.9 M/UL (ref 3.5–5.5)
SODIUM SERPL-SCNC: 135 MMOL/L (ref 132–146)
WBC OTHER # BLD: 15.7 K/UL (ref 4.5–11.5)

## 2024-06-12 PROCEDURE — 2060000000 HC ICU INTERMEDIATE R&B

## 2024-06-12 PROCEDURE — 6370000000 HC RX 637 (ALT 250 FOR IP): Performed by: CLINICAL NURSE SPECIALIST

## 2024-06-12 PROCEDURE — 80053 COMPREHEN METABOLIC PANEL: CPT

## 2024-06-12 PROCEDURE — 85027 COMPLETE CBC AUTOMATED: CPT

## 2024-06-12 PROCEDURE — 36415 COLL VENOUS BLD VENIPUNCTURE: CPT

## 2024-06-12 PROCEDURE — 99232 SBSQ HOSP IP/OBS MODERATE 35: CPT | Performed by: INTERNAL MEDICINE

## 2024-06-12 PROCEDURE — 6360000002 HC RX W HCPCS: Performed by: CLINICAL NURSE SPECIALIST

## 2024-06-12 PROCEDURE — 2580000003 HC RX 258: Performed by: CLINICAL NURSE SPECIALIST

## 2024-06-12 PROCEDURE — 94669 MECHANICAL CHEST WALL OSCILL: CPT

## 2024-06-12 PROCEDURE — 99221 1ST HOSP IP/OBS SF/LOW 40: CPT | Performed by: CLINICAL NURSE SPECIALIST

## 2024-06-12 PROCEDURE — 94640 AIRWAY INHALATION TREATMENT: CPT

## 2024-06-12 RX ORDER — MULTIVIT WITH IRON,MINERALS
15 LIQUID (ML) ORAL DAILY
Status: DISCONTINUED | OUTPATIENT
Start: 2024-06-12 | End: 2024-07-03 | Stop reason: HOSPADM

## 2024-06-12 RX ADMIN — ATORVASTATIN CALCIUM 40 MG: 40 TABLET, FILM COATED ORAL at 21:28

## 2024-06-12 RX ADMIN — LATANOPROST 1 DROP: 50 SOLUTION OPHTHALMIC at 21:33

## 2024-06-12 RX ADMIN — TICAGRELOR 90 MG: 90 TABLET ORAL at 10:16

## 2024-06-12 RX ADMIN — DORZOLAMIDE HYDROCHLORIDE 1 DROP: 20 SOLUTION/ DROPS OPHTHALMIC at 21:32

## 2024-06-12 RX ADMIN — BRIMONIDINE TARTRATE 1 DROP: 2 SOLUTION OPHTHALMIC at 21:33

## 2024-06-12 RX ADMIN — BRIMONIDINE TARTRATE 1 DROP: 2 SOLUTION OPHTHALMIC at 10:17

## 2024-06-12 RX ADMIN — PSYLLIUM HUSK 1 PACKET: 3.4 POWDER ORAL at 21:28

## 2024-06-12 RX ADMIN — TICAGRELOR 90 MG: 90 TABLET ORAL at 21:28

## 2024-06-12 RX ADMIN — ALBUTEROL SULFATE 2.5 MG: 2.5 SOLUTION RESPIRATORY (INHALATION) at 20:20

## 2024-06-12 RX ADMIN — SODIUM CHLORIDE, PRESERVATIVE FREE 10 ML: 5 INJECTION INTRAVENOUS at 21:29

## 2024-06-12 RX ADMIN — ALBUTEROL SULFATE 2.5 MG: 2.5 SOLUTION RESPIRATORY (INHALATION) at 16:07

## 2024-06-12 RX ADMIN — AMLODIPINE BESYLATE 5 MG: 5 TABLET ORAL at 10:16

## 2024-06-12 RX ADMIN — TIMOLOL MALEATE 1 DROP: 5 SOLUTION OPHTHALMIC at 21:33

## 2024-06-12 RX ADMIN — PSYLLIUM HUSK 1 PACKET: 3.4 POWDER ORAL at 10:16

## 2024-06-12 RX ADMIN — ALBUTEROL SULFATE 2.5 MG: 2.5 SOLUTION RESPIRATORY (INHALATION) at 12:24

## 2024-06-12 RX ADMIN — DORZOLAMIDE HYDROCHLORIDE 1 DROP: 20 SOLUTION/ DROPS OPHTHALMIC at 10:17

## 2024-06-12 RX ADMIN — ENOXAPARIN SODIUM 40 MG: 100 INJECTION SUBCUTANEOUS at 10:16

## 2024-06-12 RX ADMIN — ACETYLCYSTEINE 600 MG: 200 SOLUTION ORAL; RESPIRATORY (INHALATION) at 08:47

## 2024-06-12 RX ADMIN — Medication 1000 UNITS: at 10:16

## 2024-06-12 RX ADMIN — ASPIRIN 81 MG 81 MG: 81 TABLET ORAL at 10:16

## 2024-06-12 RX ADMIN — ACETYLCYSTEINE 600 MG: 200 SOLUTION ORAL; RESPIRATORY (INHALATION) at 20:20

## 2024-06-12 RX ADMIN — ALBUTEROL SULFATE 2.5 MG: 2.5 SOLUTION RESPIRATORY (INHALATION) at 08:46

## 2024-06-12 RX ADMIN — TIMOLOL MALEATE 1 DROP: 5 SOLUTION OPHTHALMIC at 10:17

## 2024-06-12 ASSESSMENT — PAIN SCALES - GENERAL
PAINLEVEL_OUTOF10: 0
PAINLEVEL_OUTOF10: 0

## 2024-06-12 NOTE — ACP (ADVANCE CARE PLANNING)
Advance Care Planning      Palliative Medicine Provider (MD/NP)  Advance Care Planning (ACP) Conversation      Date of Conversation: 24  The patient and/or authorized decision maker consented to a voluntary Advance Care Planning conversation.   Individuals present for the conversation:   Patient    Legal Healthcare Agent(s):  NOK-no immediate family    ACP documents available in EMR prior to discussion:  -None    Primary Palliative Diagnosis(es):  Right PCA stroke  Right sided paralysis    Conversation Summary:  No family known- all . Has friends only. Pt making her own decisions.     Resuscitation Status:    Code Status: Full Code    Outcomes / Completed Documentation:  -No new documents completed.      I spent 30 minutes providing separately identifiable ACP services with the patient and/or surrogate decision maker in a voluntary, in-person conversation discussing the patient's wishes and goals as detailed in the above note.       Deepali Barrett, APRN - CNS

## 2024-06-12 NOTE — CARE COORDINATION
SOCIAL WORK/CASEMANAGEMENT TRANSITION OF CARE PLANNING( GREG COTTON, -[phone]): I met with pt in the room this a.m. she has a ng tube and has been accepted to [facility] [place] with it and a statement in the progress note for a 30 day plan from the pcp. If pt needs a tracheostomy per pulmonary's recommendation they can take pt to [facility]. All discharge paper work with passary is in place for [facility] and precert has been pending since last Friday. PT and OT saw on 6/10 with ampac of 7 and 9. Pallative care consulted. Speech and pulmonary are following. General surgery was now consulted for evaluation for trache and peg tube. S/p cva. DARVIN Medina  6/12/2024  Precert was obtained for [facility] [place]. It is good thru 6/24. If gets a trach then it will have to be switched to [facility]. Will need to tell pt once general surgery decides on the trach. DARVIN Medina  6/12/2024    There are guardianship papers in the soft chart if pt needs one and I left sticky note for the pcp here to sign and complete them if she feels it is necessary and left me know. DARVIN Medina  6/12/2024

## 2024-06-13 ENCOUNTER — APPOINTMENT (OUTPATIENT)
Dept: CT IMAGING | Age: 84
DRG: 003 | End: 2024-06-13
Payer: MEDICARE

## 2024-06-13 ENCOUNTER — APPOINTMENT (OUTPATIENT)
Dept: GENERAL RADIOLOGY | Age: 84
DRG: 003 | End: 2024-06-13
Payer: MEDICARE

## 2024-06-13 LAB
AADO2: 310.5 MMHG
AADO2: 360.7 MMHG
ALBUMIN SERPL-MCNC: 2.9 G/DL (ref 3.5–5.2)
ALBUMIN SERPL-MCNC: 3 G/DL (ref 3.5–5.2)
ALP SERPL-CCNC: 75 U/L (ref 35–104)
ALP SERPL-CCNC: 79 U/L (ref 35–104)
ALT SERPL-CCNC: 38 U/L (ref 0–32)
ALT SERPL-CCNC: 41 U/L (ref 0–32)
ANION GAP SERPL CALCULATED.3IONS-SCNC: 12 MMOL/L (ref 7–16)
ANION GAP SERPL CALCULATED.3IONS-SCNC: 14 MMOL/L (ref 7–16)
AST SERPL-CCNC: 46 U/L (ref 0–31)
AST SERPL-CCNC: 50 U/L (ref 0–31)
B PARAP IS1001 DNA NPH QL NAA+NON-PROBE: NOT DETECTED
B PERT DNA SPEC QL NAA+PROBE: NOT DETECTED
B.E.: 1 MMOL/L (ref -3–3)
B.E.: 1.6 MMOL/L (ref -3–3)
B.E.: 3 MMOL/L (ref -3–3)
BILIRUB SERPL-MCNC: 0.9 MG/DL (ref 0–1.2)
BILIRUB SERPL-MCNC: 1.1 MG/DL (ref 0–1.2)
BUN SERPL-MCNC: 25 MG/DL (ref 6–23)
BUN SERPL-MCNC: 27 MG/DL (ref 6–23)
C PNEUM DNA NPH QL NAA+NON-PROBE: NOT DETECTED
CALCIUM SERPL-MCNC: 8.6 MG/DL (ref 8.6–10.2)
CALCIUM SERPL-MCNC: 9.1 MG/DL (ref 8.6–10.2)
CHLORIDE SERPL-SCNC: 98 MMOL/L (ref 98–107)
CHLORIDE SERPL-SCNC: 98 MMOL/L (ref 98–107)
CO2 SERPL-SCNC: 22 MMOL/L (ref 22–29)
CO2 SERPL-SCNC: 23 MMOL/L (ref 22–29)
COHB: 0.2 % (ref 0–1.5)
COHB: 0.3 % (ref 0–1.5)
COHB: 0.7 % (ref 0–1.5)
CREAT SERPL-MCNC: 0.6 MG/DL (ref 0.5–1)
CREAT SERPL-MCNC: 0.6 MG/DL (ref 0.5–1)
CRITICAL: ABNORMAL
DATE ANALYZED: ABNORMAL
DATE OF COLLECTION: ABNORMAL
ERYTHROCYTE [DISTWIDTH] IN BLOOD BY AUTOMATED COUNT: 14.8 % (ref 11.5–15)
FIO2: 70 %
FIO2: 75 %
FLUAV RNA NPH QL NAA+NON-PROBE: NOT DETECTED
FLUBV RNA NPH QL NAA+NON-PROBE: NOT DETECTED
GFR, ESTIMATED: 87 ML/MIN/1.73M2
GFR, ESTIMATED: 89 ML/MIN/1.73M2
GLUCOSE BLD-MCNC: 122 MG/DL (ref 74–99)
GLUCOSE SERPL-MCNC: 135 MG/DL (ref 74–99)
GLUCOSE SERPL-MCNC: 143 MG/DL (ref 74–99)
HADV DNA NPH QL NAA+NON-PROBE: NOT DETECTED
HCO3: 24.3 MMOL/L (ref 22–26)
HCO3: 24.5 MMOL/L (ref 22–26)
HCO3: 25 MMOL/L (ref 22–26)
HCOV 229E RNA NPH QL NAA+NON-PROBE: NOT DETECTED
HCOV HKU1 RNA NPH QL NAA+NON-PROBE: NOT DETECTED
HCOV NL63 RNA NPH QL NAA+NON-PROBE: NOT DETECTED
HCOV OC43 RNA NPH QL NAA+NON-PROBE: NOT DETECTED
HCT VFR BLD AUTO: 37.2 % (ref 34–48)
HGB BLD-MCNC: 11.8 G/DL (ref 11.5–15.5)
HHB: 0.9 % (ref 0–5)
HHB: 1.1 % (ref 0–5)
HHB: 6.4 % (ref 0–5)
HMPV RNA NPH QL NAA+NON-PROBE: NOT DETECTED
HPIV1 RNA NPH QL NAA+NON-PROBE: NOT DETECTED
HPIV2 RNA NPH QL NAA+NON-PROBE: NOT DETECTED
HPIV3 RNA NPH QL NAA+NON-PROBE: NOT DETECTED
HPIV4 RNA NPH QL NAA+NON-PROBE: NOT DETECTED
LAB: ABNORMAL
Lab: 1420
Lab: 1825
Lab: 2300
M PNEUMO DNA NPH QL NAA+NON-PROBE: NOT DETECTED
MCH RBC QN AUTO: 28.1 PG (ref 26–35)
MCHC RBC AUTO-ENTMCNC: 31.7 G/DL (ref 32–34.5)
MCV RBC AUTO: 88.6 FL (ref 80–99.9)
METHB: 0.5 % (ref 0–1.5)
MODE: ABNORMAL
MODE: AC
MODE: AC
O2 SATURATION: 94.6 % (ref 92–98.5)
O2 SATURATION: 99.1 % (ref 92–98.5)
O2 SATURATION: 99.1 % (ref 92–98.5)
O2HB: 92.4 % (ref 94–97)
O2HB: 98.2 % (ref 94–97)
O2HB: 98.3 % (ref 94–97)
OPERATOR ID: 1112
OPERATOR ID: 1768
OPERATOR ID: 6032
PATIENT TEMP: 37 C
PCO2: 29.8 MMHG (ref 35–45)
PCO2: 33.1 MMHG (ref 35–45)
PCO2: 34.7 MMHG (ref 35–45)
PEEP/CPAP: 5 CMH2O
PEEP/CPAP: 5 CMH2O
PFO2: 1.9 MMHG/%
PFO2: 2.16 MMHG/%
PH BLOOD GAS: 7.46 (ref 7.35–7.45)
PH BLOOD GAS: 7.49 (ref 7.35–7.45)
PH BLOOD GAS: 7.54 (ref 7.35–7.45)
PLATELET # BLD AUTO: 369 K/UL (ref 130–450)
PMV BLD AUTO: 11.3 FL (ref 7–12)
PO2: 142.4 MMHG (ref 75–100)
PO2: 151.3 MMHG (ref 75–100)
PO2: 65.8 MMHG (ref 75–100)
POTASSIUM SERPL-SCNC: 4.12 MMOL/L (ref 3.5–5)
POTASSIUM SERPL-SCNC: 4.2 MMOL/L (ref 3.5–5)
POTASSIUM SERPL-SCNC: 4.7 MMOL/L (ref 3.5–5)
PROT SERPL-MCNC: 6.4 G/DL (ref 6.4–8.3)
PROT SERPL-MCNC: 6.5 G/DL (ref 6.4–8.3)
RBC # BLD AUTO: 4.2 M/UL (ref 3.5–5.5)
RI(T): 2.05
RI(T): 2.53
RR MECHANICAL: 12 B/MIN
RR MECHANICAL: 16 B/MIN
RSV RNA NPH QL NAA+NON-PROBE: NOT DETECTED
RV+EV RNA NPH QL NAA+NON-PROBE: NOT DETECTED
SARS-COV-2 RNA NPH QL NAA+NON-PROBE: NOT DETECTED
SODIUM SERPL-SCNC: 133 MMOL/L (ref 132–146)
SODIUM SERPL-SCNC: 134 MMOL/L (ref 132–146)
SOURCE, BLOOD GAS: ABNORMAL
SPECIMEN DESCRIPTION: NORMAL
THB: 11.8 G/DL (ref 11.5–16.5)
THB: 12 G/DL (ref 11.5–16.5)
THB: 12.2 G/DL (ref 11.5–16.5)
TIME ANALYZED: 1429
TIME ANALYZED: 1840
TIME ANALYZED: 2304
VT MECHANICAL: 350 ML
VT MECHANICAL: 400 ML
WBC OTHER # BLD: 17.5 K/UL (ref 4.5–11.5)

## 2024-06-13 PROCEDURE — 80053 COMPREHEN METABOLIC PANEL: CPT

## 2024-06-13 PROCEDURE — 2000000000 HC ICU R&B

## 2024-06-13 PROCEDURE — 6360000002 HC RX W HCPCS

## 2024-06-13 PROCEDURE — 6360000002 HC RX W HCPCS: Performed by: INTERNAL MEDICINE

## 2024-06-13 PROCEDURE — 6370000000 HC RX 637 (ALT 250 FOR IP): Performed by: CLINICAL NURSE SPECIALIST

## 2024-06-13 PROCEDURE — 94640 AIRWAY INHALATION TREATMENT: CPT

## 2024-06-13 PROCEDURE — 99231 SBSQ HOSP IP/OBS SF/LOW 25: CPT | Performed by: CLINICAL NURSE SPECIALIST

## 2024-06-13 PROCEDURE — 84132 ASSAY OF SERUM POTASSIUM: CPT

## 2024-06-13 PROCEDURE — 0202U NFCT DS 22 TRGT SARS-COV-2: CPT

## 2024-06-13 PROCEDURE — 36415 COLL VENOUS BLD VENIPUNCTURE: CPT

## 2024-06-13 PROCEDURE — 99232 SBSQ HOSP IP/OBS MODERATE 35: CPT | Performed by: INTERNAL MEDICINE

## 2024-06-13 PROCEDURE — 71045 X-RAY EXAM CHEST 1 VIEW: CPT

## 2024-06-13 PROCEDURE — 6360000002 HC RX W HCPCS: Performed by: CLINICAL NURSE SPECIALIST

## 2024-06-13 PROCEDURE — 6370000000 HC RX 637 (ALT 250 FOR IP)

## 2024-06-13 PROCEDURE — 82962 GLUCOSE BLOOD TEST: CPT

## 2024-06-13 PROCEDURE — 2580000003 HC RX 258: Performed by: CLINICAL NURSE SPECIALIST

## 2024-06-13 PROCEDURE — 31500 INSERT EMERGENCY AIRWAY: CPT

## 2024-06-13 PROCEDURE — 85027 COMPLETE CBC AUTOMATED: CPT

## 2024-06-13 PROCEDURE — 36600 WITHDRAWAL OF ARTERIAL BLOOD: CPT

## 2024-06-13 PROCEDURE — 82805 BLOOD GASES W/O2 SATURATION: CPT

## 2024-06-13 PROCEDURE — 5A1955Z RESPIRATORY VENTILATION, GREATER THAN 96 CONSECUTIVE HOURS: ICD-10-PCS | Performed by: INTERNAL MEDICINE

## 2024-06-13 PROCEDURE — 2500000003 HC RX 250 WO HCPCS

## 2024-06-13 PROCEDURE — 51798 US URINE CAPACITY MEASURE: CPT

## 2024-06-13 PROCEDURE — 0BH17EZ INSERTION OF ENDOTRACHEAL AIRWAY INTO TRACHEA, VIA NATURAL OR ARTIFICIAL OPENING: ICD-10-PCS | Performed by: INTERNAL MEDICINE

## 2024-06-13 PROCEDURE — 70450 CT HEAD/BRAIN W/O DYE: CPT

## 2024-06-13 PROCEDURE — 94002 VENT MGMT INPAT INIT DAY: CPT

## 2024-06-13 PROCEDURE — 2580000003 HC RX 258

## 2024-06-13 PROCEDURE — 87040 BLOOD CULTURE FOR BACTERIA: CPT

## 2024-06-13 RX ORDER — FENTANYL CITRATE-0.9 % NACL/PF 10 MCG/ML
25-200 PLASTIC BAG, INJECTION (ML) INTRAVENOUS CONTINUOUS
Status: DISCONTINUED | OUTPATIENT
Start: 2024-06-13 | End: 2024-06-18

## 2024-06-13 RX ORDER — ETOMIDATE 2 MG/ML
INJECTION INTRAVENOUS
Status: COMPLETED
Start: 2024-06-13 | End: 2024-06-13

## 2024-06-13 RX ORDER — SODIUM CHLORIDE 0.9 % (FLUSH) 0.9 %
5-40 SYRINGE (ML) INJECTION EVERY 12 HOURS SCHEDULED
Status: DISCONTINUED | OUTPATIENT
Start: 2024-06-13 | End: 2024-07-03 | Stop reason: HOSPADM

## 2024-06-13 RX ORDER — PROPOFOL 10 MG/ML
5-50 INJECTION, EMULSION INTRAVENOUS CONTINUOUS
Status: DISCONTINUED | OUTPATIENT
Start: 2024-06-13 | End: 2024-06-15

## 2024-06-13 RX ORDER — MIDAZOLAM HYDROCHLORIDE 1 MG/ML
INJECTION INTRAMUSCULAR; INTRAVENOUS
Status: COMPLETED
Start: 2024-06-13 | End: 2024-06-13

## 2024-06-13 RX ORDER — ENOXAPARIN SODIUM 100 MG/ML
40 INJECTION SUBCUTANEOUS DAILY
Status: DISCONTINUED | OUTPATIENT
Start: 2024-06-13 | End: 2024-06-23

## 2024-06-13 RX ORDER — ETOMIDATE 2 MG/ML
10 INJECTION INTRAVENOUS ONCE
Status: COMPLETED | OUTPATIENT
Start: 2024-06-13 | End: 2024-06-13

## 2024-06-13 RX ORDER — SUCCINYLCHOLINE CHLORIDE 20 MG/ML
100 INJECTION INTRAMUSCULAR; INTRAVENOUS ONCE
Status: COMPLETED | OUTPATIENT
Start: 2024-06-13 | End: 2024-06-13

## 2024-06-13 RX ORDER — SODIUM CHLORIDE 0.9 % (FLUSH) 0.9 %
5-40 SYRINGE (ML) INJECTION PRN
Status: DISCONTINUED | OUTPATIENT
Start: 2024-06-13 | End: 2024-06-20

## 2024-06-13 RX ORDER — SODIUM CHLORIDE 9 MG/ML
INJECTION, SOLUTION INTRAVENOUS PRN
Status: DISCONTINUED | OUTPATIENT
Start: 2024-06-13 | End: 2024-06-13

## 2024-06-13 RX ORDER — MIDAZOLAM HYDROCHLORIDE 2 MG/2ML
2 INJECTION, SOLUTION INTRAMUSCULAR; INTRAVENOUS ONCE
Status: COMPLETED | OUTPATIENT
Start: 2024-06-13 | End: 2024-06-13

## 2024-06-13 RX ORDER — GLUCAGON 1 MG/ML
1 KIT INJECTION PRN
Status: DISCONTINUED | OUTPATIENT
Start: 2024-06-13 | End: 2024-07-03 | Stop reason: HOSPADM

## 2024-06-13 RX ORDER — POLYETHYLENE GLYCOL 3350 17 G/17G
17 POWDER, FOR SOLUTION ORAL DAILY PRN
Status: DISCONTINUED | OUTPATIENT
Start: 2024-06-13 | End: 2024-06-17

## 2024-06-13 RX ORDER — POTASSIUM CHLORIDE 7.45 MG/ML
10 INJECTION INTRAVENOUS PRN
Status: DISCONTINUED | OUTPATIENT
Start: 2024-06-13 | End: 2024-07-03 | Stop reason: HOSPADM

## 2024-06-13 RX ORDER — FENTANYL CITRATE-0.9 % NACL/PF 20 MCG/2ML
50 SYRINGE (ML) INTRAVENOUS EVERY 30 MIN PRN
Status: DISCONTINUED | OUTPATIENT
Start: 2024-06-13 | End: 2024-06-18

## 2024-06-13 RX ORDER — MIDAZOLAM HYDROCHLORIDE 2 MG/2ML
1 INJECTION, SOLUTION INTRAMUSCULAR; INTRAVENOUS ONCE
Status: DISCONTINUED | OUTPATIENT
Start: 2024-06-13 | End: 2024-06-13

## 2024-06-13 RX ORDER — ONDANSETRON 4 MG/1
4 TABLET, ORALLY DISINTEGRATING ORAL EVERY 8 HOURS PRN
Status: DISCONTINUED | OUTPATIENT
Start: 2024-06-13 | End: 2024-07-03 | Stop reason: HOSPADM

## 2024-06-13 RX ORDER — CHLORHEXIDINE GLUCONATE ORAL RINSE 1.2 MG/ML
15 SOLUTION DENTAL 2 TIMES DAILY
Status: DISCONTINUED | OUTPATIENT
Start: 2024-06-13 | End: 2024-07-03 | Stop reason: HOSPADM

## 2024-06-13 RX ORDER — MAGNESIUM SULFATE IN WATER 40 MG/ML
2000 INJECTION, SOLUTION INTRAVENOUS PRN
Status: DISCONTINUED | OUTPATIENT
Start: 2024-06-13 | End: 2024-07-03 | Stop reason: HOSPADM

## 2024-06-13 RX ORDER — POTASSIUM CHLORIDE 29.8 MG/ML
20 INJECTION INTRAVENOUS PRN
Status: DISCONTINUED | OUTPATIENT
Start: 2024-06-13 | End: 2024-07-03 | Stop reason: HOSPADM

## 2024-06-13 RX ORDER — SUCCINYLCHOLINE CHLORIDE 20 MG/ML
10 INJECTION INTRAMUSCULAR; INTRAVENOUS ONCE
Status: DISCONTINUED | OUTPATIENT
Start: 2024-06-13 | End: 2024-06-13

## 2024-06-13 RX ORDER — DEXTROSE MONOHYDRATE 100 MG/ML
INJECTION, SOLUTION INTRAVENOUS CONTINUOUS PRN
Status: DISCONTINUED | OUTPATIENT
Start: 2024-06-13 | End: 2024-07-03 | Stop reason: HOSPADM

## 2024-06-13 RX ORDER — ACETAMINOPHEN 650 MG/1
650 SUPPOSITORY RECTAL EVERY 6 HOURS PRN
Status: DISCONTINUED | OUTPATIENT
Start: 2024-06-13 | End: 2024-07-03 | Stop reason: HOSPADM

## 2024-06-13 RX ORDER — IPRATROPIUM BROMIDE AND ALBUTEROL SULFATE 2.5; .5 MG/3ML; MG/3ML
1 SOLUTION RESPIRATORY (INHALATION)
Status: DISCONTINUED | OUTPATIENT
Start: 2024-06-13 | End: 2024-06-18

## 2024-06-13 RX ORDER — ONDANSETRON 2 MG/ML
4 INJECTION INTRAMUSCULAR; INTRAVENOUS EVERY 6 HOURS PRN
Status: DISCONTINUED | OUTPATIENT
Start: 2024-06-13 | End: 2024-07-03 | Stop reason: HOSPADM

## 2024-06-13 RX ORDER — ACETAMINOPHEN 325 MG/1
650 TABLET ORAL EVERY 6 HOURS PRN
Status: DISCONTINUED | OUTPATIENT
Start: 2024-06-13 | End: 2024-07-03 | Stop reason: HOSPADM

## 2024-06-13 RX ADMIN — ALBUTEROL SULFATE 2.5 MG: 2.5 SOLUTION RESPIRATORY (INHALATION) at 09:03

## 2024-06-13 RX ADMIN — CHLORHEXIDINE GLUCONATE, 0.12% ORAL RINSE 15 ML: 1.2 SOLUTION DENTAL at 21:08

## 2024-06-13 RX ADMIN — AMLODIPINE BESYLATE 5 MG: 5 TABLET ORAL at 08:19

## 2024-06-13 RX ADMIN — LATANOPROST 1 DROP: 50 SOLUTION OPHTHALMIC at 21:51

## 2024-06-13 RX ADMIN — DORZOLAMIDE HYDROCHLORIDE 1 DROP: 20 SOLUTION/ DROPS OPHTHALMIC at 22:54

## 2024-06-13 RX ADMIN — Medication 50 MCG: at 16:39

## 2024-06-13 RX ADMIN — LANSOPRAZOLE 30 MG: 30 TABLET, ORALLY DISINTEGRATING, DELAYED RELEASE ORAL at 05:48

## 2024-06-13 RX ADMIN — MIDAZOLAM 2 MG: 1 INJECTION INTRAMUSCULAR; INTRAVENOUS at 15:40

## 2024-06-13 RX ADMIN — PIPERACILLIN AND TAZOBACTAM 4500 MG: 4; .5 INJECTION, POWDER, LYOPHILIZED, FOR SOLUTION INTRAVENOUS at 17:13

## 2024-06-13 RX ADMIN — TIMOLOL MALEATE 1 DROP: 5 SOLUTION OPHTHALMIC at 21:51

## 2024-06-13 RX ADMIN — ACETYLCYSTEINE 600 MG: 200 SOLUTION ORAL; RESPIRATORY (INHALATION) at 20:32

## 2024-06-13 RX ADMIN — TIMOLOL MALEATE 1 DROP: 5 SOLUTION OPHTHALMIC at 08:21

## 2024-06-13 RX ADMIN — ETOMIDATE 10 MG: 2 INJECTION, SOLUTION INTRAVENOUS at 15:40

## 2024-06-13 RX ADMIN — IPRATROPIUM BROMIDE AND ALBUTEROL SULFATE 1 DOSE: 2.5; .5 SOLUTION RESPIRATORY (INHALATION) at 17:24

## 2024-06-13 RX ADMIN — MIDAZOLAM HYDROCHLORIDE 2 MG: 2 INJECTION, SOLUTION INTRAMUSCULAR; INTRAVENOUS at 15:40

## 2024-06-13 RX ADMIN — PSYLLIUM HUSK 1 PACKET: 3.4 POWDER ORAL at 08:18

## 2024-06-13 RX ADMIN — MULTIVIT AND MINERALS-FERROUS GLUCONATE 9 MG IRON/15 ML ORAL LIQUID 15 ML: at 08:19

## 2024-06-13 RX ADMIN — DORZOLAMIDE HYDROCHLORIDE 1 DROP: 20 SOLUTION/ DROPS OPHTHALMIC at 08:21

## 2024-06-13 RX ADMIN — PSYLLIUM HUSK 1 PACKET: 3.4 POWDER ORAL at 21:09

## 2024-06-13 RX ADMIN — SODIUM CHLORIDE, PRESERVATIVE FREE 10 ML: 5 INJECTION INTRAVENOUS at 08:19

## 2024-06-13 RX ADMIN — ASPIRIN 81 MG 81 MG: 81 TABLET ORAL at 08:18

## 2024-06-13 RX ADMIN — SODIUM CHLORIDE, PRESERVATIVE FREE 20 MG: 5 INJECTION INTRAVENOUS at 21:08

## 2024-06-13 RX ADMIN — BRIMONIDINE TARTRATE 1 DROP: 2 SOLUTION OPHTHALMIC at 21:51

## 2024-06-13 RX ADMIN — PIPERACILLIN AND TAZOBACTAM 4500 MG: 4; .5 INJECTION, POWDER, LYOPHILIZED, FOR SOLUTION INTRAVENOUS at 23:39

## 2024-06-13 RX ADMIN — SUCCINYLCHOLINE CHLORIDE 100 MG: 20 INJECTION, SOLUTION INTRAMUSCULAR; INTRAVENOUS at 15:43

## 2024-06-13 RX ADMIN — ATORVASTATIN CALCIUM 40 MG: 40 TABLET, FILM COATED ORAL at 21:08

## 2024-06-13 RX ADMIN — ENOXAPARIN SODIUM 40 MG: 100 INJECTION SUBCUTANEOUS at 17:03

## 2024-06-13 RX ADMIN — Medication 50 MCG/HR: at 15:56

## 2024-06-13 RX ADMIN — ENOXAPARIN SODIUM 40 MG: 100 INJECTION SUBCUTANEOUS at 08:18

## 2024-06-13 RX ADMIN — SODIUM CHLORIDE, PRESERVATIVE FREE 10 ML: 5 INJECTION INTRAVENOUS at 21:09

## 2024-06-13 RX ADMIN — TICAGRELOR 90 MG: 90 TABLET ORAL at 21:08

## 2024-06-13 RX ADMIN — PROPOFOL 20 MCG/KG/MIN: 10 INJECTION, EMULSION INTRAVENOUS at 15:55

## 2024-06-13 RX ADMIN — ACETYLCYSTEINE 600 MG: 200 SOLUTION ORAL; RESPIRATORY (INHALATION) at 09:03

## 2024-06-13 RX ADMIN — ETOMIDATE 10 MG: 2 INJECTION INTRAVENOUS at 15:40

## 2024-06-13 RX ADMIN — IPRATROPIUM BROMIDE AND ALBUTEROL SULFATE 1 DOSE: 2.5; .5 SOLUTION RESPIRATORY (INHALATION) at 20:32

## 2024-06-13 RX ADMIN — TICAGRELOR 90 MG: 90 TABLET ORAL at 08:19

## 2024-06-13 RX ADMIN — ALBUTEROL SULFATE 2.5 MG: 2.5 SOLUTION RESPIRATORY (INHALATION) at 13:02

## 2024-06-13 RX ADMIN — BRIMONIDINE TARTRATE 1 DROP: 2 SOLUTION OPHTHALMIC at 08:21

## 2024-06-13 RX ADMIN — Medication 1000 UNITS: at 08:18

## 2024-06-13 ASSESSMENT — PAIN SCALES - GENERAL: PAINLEVEL_OUTOF10: 0

## 2024-06-13 NOTE — PROCEDURES
ENDOTRACHEAL INTUBATION PROCEDURE NOTE    Date of Procedure: 6/13/24   Attending: Shane Mcadams DO    Indication: resp failure  Consent: emergent    Medication(s):   100 mg succinylcholine, 10 mg etomidate, 2 mg versed    Description:  Mallampati score: III (soft palate, base of uvula visible).  The patient was pre-oxygenated for 2 minutes with 100% oxygen.  The patient was placed in the appropriate position.  Anesthesia: 100 mg succinylcholine, 10 mg etomidate, 2 mg versed administered in rapid sequence.  Laryngoscopy performed with glidescope. Examination during laryngoscopy with Grade 3 view of the vocal cords.  Additional findings during laryngoscopy: dried secretins.  A size 8.0 ETT was advanced past the vocal cords.  Number of attempts: 1.  Ease of placement:  easy.  Appropriate placement of the ETT confirmed by Chest Xray.  The ETT was secured at 23 cm at the teeth.  CXR ordered to confirm placement.      Complications: none    ELECTRONICALLY SIGNED:  Shane Mcadams DO  6/13/2024  4:58 PM

## 2024-06-13 NOTE — CARE COORDINATION
Transferred from N- ICU on 6/11. Messaged attending this am of need for guardianship paper work to be filled out per prior cm as no family per prior cm notes and now unable to communicate with pt presentlyPer pulmonology- Yesterday her speech was clear enough to decipher what she was attempting to say.  She did agree to a trach and PEG when we had asked her yesterday.  Discharge plan per prior cm/sw notes- Precert was obtained for [facility]. It is good thru 6/24. If gets a trach then it will have to be switched to [facility]. Envelope ambulance form and Pasrr in soft chart. Patient for stat ct scan and then transferring to ICU Cm/sw to follow.Electronically signed by Radha Hunt RN on 6/13/2024 at 1:55 PM

## 2024-06-14 ENCOUNTER — APPOINTMENT (OUTPATIENT)
Dept: GENERAL RADIOLOGY | Age: 84
DRG: 003 | End: 2024-06-14
Payer: MEDICARE

## 2024-06-14 LAB
AADO2: 310.6 MMHG
ALBUMIN SERPL-MCNC: 2.9 G/DL (ref 3.5–5.2)
ALP SERPL-CCNC: 83 U/L (ref 35–104)
ALT SERPL-CCNC: 34 U/L (ref 0–32)
ANION GAP SERPL CALCULATED.3IONS-SCNC: 11 MMOL/L (ref 7–16)
AST SERPL-CCNC: 38 U/L (ref 0–31)
B.E.: 1 MMOL/L (ref -3–3)
BACTERIA URNS QL MICRO: ABNORMAL
BILIRUB SERPL-MCNC: 1 MG/DL (ref 0–1.2)
BILIRUB UR QL STRIP: NEGATIVE
BUN SERPL-MCNC: 32 MG/DL (ref 6–23)
CALCIUM SERPL-MCNC: 8.5 MG/DL (ref 8.6–10.2)
CHLORIDE SERPL-SCNC: 99 MMOL/L (ref 98–107)
CLARITY UR: CLEAR
CO2 SERPL-SCNC: 25 MMOL/L (ref 22–29)
COHB: 0.3 % (ref 0–1.5)
COLOR UR: YELLOW
CREAT SERPL-MCNC: 0.8 MG/DL (ref 0.5–1)
CRITICAL: ABNORMAL
CRYSTALS URNS MICRO: ABNORMAL /HPF
DATE ANALYZED: ABNORMAL
DATE OF COLLECTION: ABNORMAL
ERYTHROCYTE [DISTWIDTH] IN BLOOD BY AUTOMATED COUNT: 14.8 % (ref 11.5–15)
FIO2: 65 %
GFR, ESTIMATED: 69 ML/MIN/1.73M2
GLUCOSE SERPL-MCNC: 118 MG/DL (ref 74–99)
GLUCOSE UR STRIP-MCNC: NEGATIVE MG/DL
HCO3: 24.7 MMOL/L (ref 22–26)
HCT VFR BLD AUTO: 31.2 % (ref 34–48)
HGB BLD-MCNC: 10.2 G/DL (ref 11.5–15.5)
HGB UR QL STRIP.AUTO: NEGATIVE
HHB: 1.8 % (ref 0–5)
INR PPP: 1.3
KETONES UR STRIP-MCNC: NEGATIVE MG/DL
LAB: ABNORMAL
LEUKOCYTE ESTERASE UR QL STRIP: NEGATIVE
Lab: 425
MAGNESIUM SERPL-MCNC: 2.4 MG/DL (ref 1.6–2.6)
MCH RBC QN AUTO: 28.9 PG (ref 26–35)
MCHC RBC AUTO-ENTMCNC: 32.7 G/DL (ref 32–34.5)
MCV RBC AUTO: 88.4 FL (ref 80–99.9)
METHB: 0.5 % (ref 0–1.5)
MODE: AC
NITRITE UR QL STRIP: NEGATIVE
O2 SATURATION: 98.4 % (ref 92–98.5)
O2HB: 97.4 % (ref 94–97)
OPERATOR ID: 1893
PARTIAL THROMBOPLASTIN TIME: 32.8 SEC (ref 24.5–35.1)
PATIENT TEMP: 37 C
PCO2: 36.1 MMHG (ref 35–45)
PEEP/CPAP: 5 CMH2O
PFO2: 1.75 MMHG/%
PH BLOOD GAS: 7.45 (ref 7.35–7.45)
PH UR STRIP: 5.5 [PH] (ref 5–9)
PHOSPHATE SERPL-MCNC: 4.4 MG/DL (ref 2.5–4.5)
PLATELET # BLD AUTO: 343 K/UL (ref 130–450)
PMV BLD AUTO: 11.2 FL (ref 7–12)
PO2: 113.6 MMHG (ref 75–100)
POTASSIUM SERPL-SCNC: 4.2 MMOL/L (ref 3.5–5)
PROCALCITONIN SERPL-MCNC: 0.14 NG/ML (ref 0–0.08)
PROT SERPL-MCNC: 6.1 G/DL (ref 6.4–8.3)
PROT UR STRIP-MCNC: NEGATIVE MG/DL
PROTHROMBIN TIME: 13.9 SEC (ref 9.3–12.4)
RBC # BLD AUTO: 3.53 M/UL (ref 3.5–5.5)
RBC #/AREA URNS HPF: ABNORMAL /HPF
RI(T): 2.73
RR MECHANICAL: 12 B/MIN
SODIUM SERPL-SCNC: 135 MMOL/L (ref 132–146)
SOURCE, BLOOD GAS: ABNORMAL
SP GR UR STRIP: 1.02 (ref 1–1.03)
THB: 11.1 G/DL (ref 11.5–16.5)
TIME ANALYZED: 427
UROBILINOGEN UR STRIP-ACNC: 0.2 EU/DL (ref 0–1)
VT MECHANICAL: 350 ML
WBC #/AREA URNS HPF: ABNORMAL /HPF
WBC OTHER # BLD: 17.2 K/UL (ref 4.5–11.5)

## 2024-06-14 PROCEDURE — 84145 PROCALCITONIN (PCT): CPT

## 2024-06-14 PROCEDURE — 99232 SBSQ HOSP IP/OBS MODERATE 35: CPT | Performed by: INTERNAL MEDICINE

## 2024-06-14 PROCEDURE — 84100 ASSAY OF PHOSPHORUS: CPT

## 2024-06-14 PROCEDURE — 51798 US URINE CAPACITY MEASURE: CPT

## 2024-06-14 PROCEDURE — 6370000000 HC RX 637 (ALT 250 FOR IP): Performed by: CLINICAL NURSE SPECIALIST

## 2024-06-14 PROCEDURE — 71045 X-RAY EXAM CHEST 1 VIEW: CPT

## 2024-06-14 PROCEDURE — 2000000000 HC ICU R&B

## 2024-06-14 PROCEDURE — 6370000000 HC RX 637 (ALT 250 FOR IP)

## 2024-06-14 PROCEDURE — 85730 THROMBOPLASTIN TIME PARTIAL: CPT

## 2024-06-14 PROCEDURE — 51701 INSERT BLADDER CATHETER: CPT

## 2024-06-14 PROCEDURE — 94003 VENT MGMT INPAT SUBQ DAY: CPT

## 2024-06-14 PROCEDURE — 2580000003 HC RX 258

## 2024-06-14 PROCEDURE — 94640 AIRWAY INHALATION TREATMENT: CPT

## 2024-06-14 PROCEDURE — 85027 COMPLETE CBC AUTOMATED: CPT

## 2024-06-14 PROCEDURE — 80053 COMPREHEN METABOLIC PANEL: CPT

## 2024-06-14 PROCEDURE — 83735 ASSAY OF MAGNESIUM: CPT

## 2024-06-14 PROCEDURE — 82805 BLOOD GASES W/O2 SATURATION: CPT

## 2024-06-14 PROCEDURE — 85610 PROTHROMBIN TIME: CPT

## 2024-06-14 PROCEDURE — 2500000003 HC RX 250 WO HCPCS

## 2024-06-14 PROCEDURE — 81001 URINALYSIS AUTO W/SCOPE: CPT

## 2024-06-14 PROCEDURE — 6360000002 HC RX W HCPCS

## 2024-06-14 RX ADMIN — TIMOLOL MALEATE 1 DROP: 5 SOLUTION OPHTHALMIC at 21:24

## 2024-06-14 RX ADMIN — SODIUM CHLORIDE, PRESERVATIVE FREE 20 MG: 5 INJECTION INTRAVENOUS at 08:45

## 2024-06-14 RX ADMIN — TIMOLOL MALEATE 1 DROP: 5 SOLUTION OPHTHALMIC at 08:46

## 2024-06-14 RX ADMIN — PSYLLIUM HUSK 1 PACKET: 3.4 POWDER ORAL at 08:45

## 2024-06-14 RX ADMIN — Medication 100 MCG/HR: at 01:59

## 2024-06-14 RX ADMIN — PIPERACILLIN AND TAZOBACTAM 4500 MG: 4; .5 INJECTION, POWDER, LYOPHILIZED, FOR SOLUTION INTRAVENOUS at 18:25

## 2024-06-14 RX ADMIN — LATANOPROST 1 DROP: 50 SOLUTION OPHTHALMIC at 21:24

## 2024-06-14 RX ADMIN — TICAGRELOR 90 MG: 90 TABLET ORAL at 08:45

## 2024-06-14 RX ADMIN — IPRATROPIUM BROMIDE AND ALBUTEROL SULFATE 1 DOSE: 2.5; .5 SOLUTION RESPIRATORY (INHALATION) at 12:48

## 2024-06-14 RX ADMIN — DORZOLAMIDE HYDROCHLORIDE 1 DROP: 20 SOLUTION/ DROPS OPHTHALMIC at 21:23

## 2024-06-14 RX ADMIN — CHLORHEXIDINE GLUCONATE, 0.12% ORAL RINSE 15 ML: 1.2 SOLUTION DENTAL at 08:45

## 2024-06-14 RX ADMIN — BRIMONIDINE TARTRATE 1 DROP: 2 SOLUTION OPHTHALMIC at 21:24

## 2024-06-14 RX ADMIN — IPRATROPIUM BROMIDE AND ALBUTEROL SULFATE 1 DOSE: 2.5; .5 SOLUTION RESPIRATORY (INHALATION) at 19:51

## 2024-06-14 RX ADMIN — MULTIVIT AND MINERALS-FERROUS GLUCONATE 9 MG IRON/15 ML ORAL LIQUID 15 ML: at 12:08

## 2024-06-14 RX ADMIN — ACETYLCYSTEINE 600 MG: 200 SOLUTION ORAL; RESPIRATORY (INHALATION) at 19:51

## 2024-06-14 RX ADMIN — DORZOLAMIDE HYDROCHLORIDE 1 DROP: 20 SOLUTION/ DROPS OPHTHALMIC at 08:46

## 2024-06-14 RX ADMIN — PSYLLIUM HUSK 1 PACKET: 3.4 POWDER ORAL at 21:24

## 2024-06-14 RX ADMIN — Medication 1000 UNITS: at 08:45

## 2024-06-14 RX ADMIN — ATORVASTATIN CALCIUM 40 MG: 40 TABLET, FILM COATED ORAL at 21:24

## 2024-06-14 RX ADMIN — ASPIRIN 81 MG 81 MG: 81 TABLET ORAL at 08:45

## 2024-06-14 RX ADMIN — Medication 100 MCG/HR: at 12:08

## 2024-06-14 RX ADMIN — ACETYLCYSTEINE 600 MG: 200 SOLUTION ORAL; RESPIRATORY (INHALATION) at 08:17

## 2024-06-14 RX ADMIN — ENOXAPARIN SODIUM 40 MG: 100 INJECTION SUBCUTANEOUS at 08:46

## 2024-06-14 RX ADMIN — CHLORHEXIDINE GLUCONATE, 0.12% ORAL RINSE 15 ML: 1.2 SOLUTION DENTAL at 21:24

## 2024-06-14 RX ADMIN — IPRATROPIUM BROMIDE AND ALBUTEROL SULFATE 1 DOSE: 2.5; .5 SOLUTION RESPIRATORY (INHALATION) at 15:27

## 2024-06-14 RX ADMIN — Medication 100 MCG/HR: at 23:11

## 2024-06-14 RX ADMIN — SODIUM CHLORIDE, PRESERVATIVE FREE 20 MG: 5 INJECTION INTRAVENOUS at 21:24

## 2024-06-14 RX ADMIN — IPRATROPIUM BROMIDE AND ALBUTEROL SULFATE 1 DOSE: 2.5; .5 SOLUTION RESPIRATORY (INHALATION) at 08:17

## 2024-06-14 RX ADMIN — BRIMONIDINE TARTRATE 1 DROP: 2 SOLUTION OPHTHALMIC at 08:46

## 2024-06-14 RX ADMIN — PIPERACILLIN AND TAZOBACTAM 4500 MG: 4; .5 INJECTION, POWDER, LYOPHILIZED, FOR SOLUTION INTRAVENOUS at 08:54

## 2024-06-14 ASSESSMENT — PAIN SCALES - GENERAL
PAINLEVEL_OUTOF10: 0

## 2024-06-15 ENCOUNTER — APPOINTMENT (OUTPATIENT)
Dept: GENERAL RADIOLOGY | Age: 84
DRG: 003 | End: 2024-06-15
Payer: MEDICARE

## 2024-06-15 LAB
AADO2: 131.5 MMHG
ALBUMIN SERPL-MCNC: 2.7 G/DL (ref 3.5–5.2)
ALP SERPL-CCNC: 66 U/L (ref 35–104)
ALT SERPL-CCNC: 30 U/L (ref 0–32)
ANION GAP SERPL CALCULATED.3IONS-SCNC: 13 MMOL/L (ref 7–16)
AST SERPL-CCNC: 42 U/L (ref 0–31)
B.E.: 2.6 MMOL/L (ref -3–3)
BILIRUB SERPL-MCNC: 0.7 MG/DL (ref 0–1.2)
BUN SERPL-MCNC: 30 MG/DL (ref 6–23)
CALCIUM SERPL-MCNC: 8.3 MG/DL (ref 8.6–10.2)
CHLORIDE SERPL-SCNC: 101 MMOL/L (ref 98–107)
CO2 SERPL-SCNC: 22 MMOL/L (ref 22–29)
COHB: 0 % (ref 0–1.5)
CREAT SERPL-MCNC: 0.7 MG/DL (ref 0.5–1)
CRITICAL: ABNORMAL
DATE ANALYZED: ABNORMAL
DATE OF COLLECTION: ABNORMAL
ERYTHROCYTE [DISTWIDTH] IN BLOOD BY AUTOMATED COUNT: 14.8 % (ref 11.5–15)
FIO2: 40 %
GFR, ESTIMATED: 79 ML/MIN/1.73M2
GLUCOSE BLD-MCNC: 165 MG/DL (ref 74–99)
GLUCOSE BLD-MCNC: 189 MG/DL (ref 74–99)
GLUCOSE SERPL-MCNC: 138 MG/DL (ref 74–99)
HCO3: 27.1 MMOL/L (ref 22–26)
HCT VFR BLD AUTO: 32.6 % (ref 34–48)
HGB BLD-MCNC: 10.1 G/DL (ref 11.5–15.5)
HHB: 2.2 % (ref 0–5)
LAB: ABNORMAL
Lab: 556
MAGNESIUM SERPL-MCNC: 2.4 MG/DL (ref 1.6–2.6)
MCH RBC QN AUTO: 27.9 PG (ref 26–35)
MCHC RBC AUTO-ENTMCNC: 31 G/DL (ref 32–34.5)
MCV RBC AUTO: 90.1 FL (ref 80–99.9)
METHB: 0.5 % (ref 0–1.5)
MODE: AC
O2 SATURATION: 98 % (ref 92–98.5)
O2HB: 97.3 % (ref 94–97)
OPERATOR ID: ABNORMAL
PATIENT TEMP: 37 C
PCO2: 41.6 MMHG (ref 35–45)
PEEP/CPAP: 5 CMH2O
PFO2: 2.65 MMHG/%
PH BLOOD GAS: 7.43 (ref 7.35–7.45)
PHOSPHATE SERPL-MCNC: 3.2 MG/DL (ref 2.5–4.5)
PLATELET # BLD AUTO: 330 K/UL (ref 130–450)
PMV BLD AUTO: 11 FL (ref 7–12)
PO2: 105.9 MMHG (ref 75–100)
POTASSIUM SERPL-SCNC: 3.9 MMOL/L (ref 3.5–5)
PROT SERPL-MCNC: 6 G/DL (ref 6.4–8.3)
RBC # BLD AUTO: 3.62 M/UL (ref 3.5–5.5)
RI(T): 1.24
RR MECHANICAL: 12 B/MIN
SODIUM SERPL-SCNC: 136 MMOL/L (ref 132–146)
SOURCE, BLOOD GAS: ABNORMAL
THB: 10.7 G/DL (ref 11.5–16.5)
TIME ANALYZED: 557
VT MECHANICAL: 350 ML
WBC OTHER # BLD: 19.1 K/UL (ref 4.5–11.5)

## 2024-06-15 PROCEDURE — 94640 AIRWAY INHALATION TREATMENT: CPT

## 2024-06-15 PROCEDURE — 84100 ASSAY OF PHOSPHORUS: CPT

## 2024-06-15 PROCEDURE — 51702 INSERT TEMP BLADDER CATH: CPT

## 2024-06-15 PROCEDURE — 6360000002 HC RX W HCPCS

## 2024-06-15 PROCEDURE — 82962 GLUCOSE BLOOD TEST: CPT

## 2024-06-15 PROCEDURE — 83735 ASSAY OF MAGNESIUM: CPT

## 2024-06-15 PROCEDURE — 99232 SBSQ HOSP IP/OBS MODERATE 35: CPT | Performed by: INTERNAL MEDICINE

## 2024-06-15 PROCEDURE — 51798 US URINE CAPACITY MEASURE: CPT

## 2024-06-15 PROCEDURE — 6370000000 HC RX 637 (ALT 250 FOR IP): Performed by: CLINICAL NURSE SPECIALIST

## 2024-06-15 PROCEDURE — 80053 COMPREHEN METABOLIC PANEL: CPT

## 2024-06-15 PROCEDURE — 87205 SMEAR GRAM STAIN: CPT

## 2024-06-15 PROCEDURE — 87077 CULTURE AEROBIC IDENTIFY: CPT

## 2024-06-15 PROCEDURE — 85027 COMPLETE CBC AUTOMATED: CPT

## 2024-06-15 PROCEDURE — 87070 CULTURE OTHR SPECIMN AEROBIC: CPT

## 2024-06-15 PROCEDURE — 71045 X-RAY EXAM CHEST 1 VIEW: CPT

## 2024-06-15 PROCEDURE — 94003 VENT MGMT INPAT SUBQ DAY: CPT

## 2024-06-15 PROCEDURE — 2500000003 HC RX 250 WO HCPCS

## 2024-06-15 PROCEDURE — 2580000003 HC RX 258

## 2024-06-15 PROCEDURE — 82805 BLOOD GASES W/O2 SATURATION: CPT

## 2024-06-15 PROCEDURE — 2000000000 HC ICU R&B

## 2024-06-15 PROCEDURE — 6370000000 HC RX 637 (ALT 250 FOR IP)

## 2024-06-15 RX ADMIN — ATORVASTATIN CALCIUM 40 MG: 40 TABLET, FILM COATED ORAL at 21:20

## 2024-06-15 RX ADMIN — PSYLLIUM HUSK 1 PACKET: 3.4 POWDER ORAL at 08:54

## 2024-06-15 RX ADMIN — Medication 1000 UNITS: at 08:54

## 2024-06-15 RX ADMIN — PIPERACILLIN AND TAZOBACTAM 4500 MG: 4; .5 INJECTION, POWDER, LYOPHILIZED, FOR SOLUTION INTRAVENOUS at 00:29

## 2024-06-15 RX ADMIN — BRIMONIDINE TARTRATE 1 DROP: 2 SOLUTION OPHTHALMIC at 21:20

## 2024-06-15 RX ADMIN — CHLORHEXIDINE GLUCONATE, 0.12% ORAL RINSE 15 ML: 1.2 SOLUTION DENTAL at 08:54

## 2024-06-15 RX ADMIN — IPRATROPIUM BROMIDE AND ALBUTEROL SULFATE 1 DOSE: 2.5; .5 SOLUTION RESPIRATORY (INHALATION) at 16:53

## 2024-06-15 RX ADMIN — ASPIRIN 81 MG 81 MG: 81 TABLET ORAL at 08:54

## 2024-06-15 RX ADMIN — SODIUM CHLORIDE, PRESERVATIVE FREE 20 MG: 5 INJECTION INTRAVENOUS at 21:20

## 2024-06-15 RX ADMIN — TIMOLOL MALEATE 1 DROP: 5 SOLUTION OPHTHALMIC at 08:58

## 2024-06-15 RX ADMIN — ACETYLCYSTEINE 600 MG: 200 SOLUTION ORAL; RESPIRATORY (INHALATION) at 20:45

## 2024-06-15 RX ADMIN — PSYLLIUM HUSK 1 PACKET: 3.4 POWDER ORAL at 21:20

## 2024-06-15 RX ADMIN — PIPERACILLIN AND TAZOBACTAM 4500 MG: 4; .5 INJECTION, POWDER, LYOPHILIZED, FOR SOLUTION INTRAVENOUS at 08:57

## 2024-06-15 RX ADMIN — IPRATROPIUM BROMIDE AND ALBUTEROL SULFATE 1 DOSE: 2.5; .5 SOLUTION RESPIRATORY (INHALATION) at 08:20

## 2024-06-15 RX ADMIN — TIMOLOL MALEATE 1 DROP: 5 SOLUTION OPHTHALMIC at 21:20

## 2024-06-15 RX ADMIN — IPRATROPIUM BROMIDE AND ALBUTEROL SULFATE 1 DOSE: 2.5; .5 SOLUTION RESPIRATORY (INHALATION) at 20:45

## 2024-06-15 RX ADMIN — CHLORHEXIDINE GLUCONATE, 0.12% ORAL RINSE 15 ML: 1.2 SOLUTION DENTAL at 21:20

## 2024-06-15 RX ADMIN — IPRATROPIUM BROMIDE AND ALBUTEROL SULFATE 1 DOSE: 2.5; .5 SOLUTION RESPIRATORY (INHALATION) at 11:53

## 2024-06-15 RX ADMIN — Medication 100 MCG/HR: at 18:20

## 2024-06-15 RX ADMIN — LATANOPROST 1 DROP: 50 SOLUTION OPHTHALMIC at 21:20

## 2024-06-15 RX ADMIN — PIPERACILLIN AND TAZOBACTAM 4500 MG: 4; .5 INJECTION, POWDER, LYOPHILIZED, FOR SOLUTION INTRAVENOUS at 15:38

## 2024-06-15 RX ADMIN — MULTIVIT AND MINERALS-FERROUS GLUCONATE 9 MG IRON/15 ML ORAL LIQUID 15 ML: at 08:54

## 2024-06-15 RX ADMIN — SODIUM CHLORIDE, PRESERVATIVE FREE 20 MG: 5 INJECTION INTRAVENOUS at 08:54

## 2024-06-15 RX ADMIN — ENOXAPARIN SODIUM 40 MG: 100 INJECTION SUBCUTANEOUS at 08:54

## 2024-06-15 RX ADMIN — DORZOLAMIDE HYDROCHLORIDE 1 DROP: 20 SOLUTION/ DROPS OPHTHALMIC at 08:58

## 2024-06-15 RX ADMIN — BRIMONIDINE TARTRATE 1 DROP: 2 SOLUTION OPHTHALMIC at 08:58

## 2024-06-15 RX ADMIN — ACETYLCYSTEINE 600 MG: 200 SOLUTION ORAL; RESPIRATORY (INHALATION) at 08:20

## 2024-06-15 RX ADMIN — DORZOLAMIDE HYDROCHLORIDE 1 DROP: 20 SOLUTION/ DROPS OPHTHALMIC at 21:20

## 2024-06-15 ASSESSMENT — PAIN SCALES - GENERAL
PAINLEVEL_OUTOF10: 0

## 2024-06-16 ENCOUNTER — APPOINTMENT (OUTPATIENT)
Dept: GENERAL RADIOLOGY | Age: 84
DRG: 003 | End: 2024-06-16
Payer: MEDICARE

## 2024-06-16 LAB
AADO2: 123.2 MMHG
ALBUMIN SERPL-MCNC: 2.5 G/DL (ref 3.5–5.2)
ALP SERPL-CCNC: 61 U/L (ref 35–104)
ALT SERPL-CCNC: 26 U/L (ref 0–32)
ANION GAP SERPL CALCULATED.3IONS-SCNC: 8 MMOL/L (ref 7–16)
AST SERPL-CCNC: 35 U/L (ref 0–31)
B.E.: 3.1 MMOL/L (ref -3–3)
BILIRUB SERPL-MCNC: 0.5 MG/DL (ref 0–1.2)
BUN SERPL-MCNC: 25 MG/DL (ref 6–23)
CALCIUM SERPL-MCNC: 8.4 MG/DL (ref 8.6–10.2)
CHLORIDE SERPL-SCNC: 102 MMOL/L (ref 98–107)
CO2 SERPL-SCNC: 25 MMOL/L (ref 22–29)
COHB: 0.1 % (ref 0–1.5)
CREAT SERPL-MCNC: 0.7 MG/DL (ref 0.5–1)
CRITICAL: ABNORMAL
DATE ANALYZED: ABNORMAL
DATE OF COLLECTION: ABNORMAL
ERYTHROCYTE [DISTWIDTH] IN BLOOD BY AUTOMATED COUNT: 14.7 % (ref 11.5–15)
FIO2: 40 %
GFR, ESTIMATED: 83 ML/MIN/1.73M2
GLUCOSE BLD-MCNC: 144 MG/DL (ref 74–99)
GLUCOSE BLD-MCNC: 160 MG/DL (ref 74–99)
GLUCOSE SERPL-MCNC: 139 MG/DL (ref 74–99)
HCO3: 27.5 MMOL/L (ref 22–26)
HCT VFR BLD AUTO: 27.7 % (ref 34–48)
HGB BLD-MCNC: 8.7 G/DL (ref 11.5–15.5)
HHB: 1.8 % (ref 0–5)
LAB: ABNORMAL
Lab: 436
MAGNESIUM SERPL-MCNC: 2.4 MG/DL (ref 1.6–2.6)
MCH RBC QN AUTO: 27.9 PG (ref 26–35)
MCHC RBC AUTO-ENTMCNC: 31.4 G/DL (ref 32–34.5)
MCV RBC AUTO: 88.8 FL (ref 80–99.9)
METHB: 0.4 % (ref 0–1.5)
MODE: AC
O2 SATURATION: 98.3 % (ref 92–98.5)
O2HB: 97.7 % (ref 94–97)
OPERATOR ID: ABNORMAL
PATIENT TEMP: 37 C
PCO2: 41 MMHG (ref 35–45)
PEEP/CPAP: 5 CMH2O
PFO2: 2.87 MMHG/%
PH BLOOD GAS: 7.44 (ref 7.35–7.45)
PHOSPHATE SERPL-MCNC: 2.3 MG/DL (ref 2.5–4.5)
PLATELET # BLD AUTO: 317 K/UL (ref 130–450)
PMV BLD AUTO: 10.8 FL (ref 7–12)
PO2: 114.9 MMHG (ref 75–100)
POTASSIUM SERPL-SCNC: 3.7 MMOL/L (ref 3.5–5)
PROT SERPL-MCNC: 5.6 G/DL (ref 6.4–8.3)
RBC # BLD AUTO: 3.12 M/UL (ref 3.5–5.5)
RI(T): 1.07
RR MECHANICAL: 12 B/MIN
SODIUM SERPL-SCNC: 135 MMOL/L (ref 132–146)
SOURCE, BLOOD GAS: ABNORMAL
THB: 9.9 G/DL (ref 11.5–16.5)
TIME ANALYZED: 442
VT MECHANICAL: 350 ML
WBC OTHER # BLD: 13.3 K/UL (ref 4.5–11.5)

## 2024-06-16 PROCEDURE — 6370000000 HC RX 637 (ALT 250 FOR IP): Performed by: CLINICAL NURSE SPECIALIST

## 2024-06-16 PROCEDURE — 82805 BLOOD GASES W/O2 SATURATION: CPT

## 2024-06-16 PROCEDURE — 84100 ASSAY OF PHOSPHORUS: CPT

## 2024-06-16 PROCEDURE — 2580000003 HC RX 258

## 2024-06-16 PROCEDURE — 94003 VENT MGMT INPAT SUBQ DAY: CPT

## 2024-06-16 PROCEDURE — 71045 X-RAY EXAM CHEST 1 VIEW: CPT

## 2024-06-16 PROCEDURE — 2500000003 HC RX 250 WO HCPCS

## 2024-06-16 PROCEDURE — 99232 SBSQ HOSP IP/OBS MODERATE 35: CPT | Performed by: INTERNAL MEDICINE

## 2024-06-16 PROCEDURE — 6370000000 HC RX 637 (ALT 250 FOR IP)

## 2024-06-16 PROCEDURE — 83735 ASSAY OF MAGNESIUM: CPT

## 2024-06-16 PROCEDURE — 94640 AIRWAY INHALATION TREATMENT: CPT

## 2024-06-16 PROCEDURE — 85027 COMPLETE CBC AUTOMATED: CPT

## 2024-06-16 PROCEDURE — 74018 RADEX ABDOMEN 1 VIEW: CPT

## 2024-06-16 PROCEDURE — 2000000000 HC ICU R&B

## 2024-06-16 PROCEDURE — 6360000002 HC RX W HCPCS

## 2024-06-16 PROCEDURE — 82962 GLUCOSE BLOOD TEST: CPT

## 2024-06-16 PROCEDURE — 80053 COMPREHEN METABOLIC PANEL: CPT

## 2024-06-16 RX ORDER — DOCUSATE SODIUM 50 MG/5ML
100 LIQUID ORAL DAILY
Status: DISCONTINUED | OUTPATIENT
Start: 2024-06-16 | End: 2024-07-03 | Stop reason: HOSPADM

## 2024-06-16 RX ORDER — 0.9 % SODIUM CHLORIDE 0.9 %
500 INTRAVENOUS SOLUTION INTRAVENOUS ONCE
Status: COMPLETED | OUTPATIENT
Start: 2024-06-16 | End: 2024-06-16

## 2024-06-16 RX ADMIN — Medication 1000 UNITS: at 09:03

## 2024-06-16 RX ADMIN — SENNOSIDES 5 ML: 8.8 LIQUID ORAL at 20:39

## 2024-06-16 RX ADMIN — CHLORHEXIDINE GLUCONATE, 0.12% ORAL RINSE 15 ML: 1.2 SOLUTION DENTAL at 20:39

## 2024-06-16 RX ADMIN — SODIUM CHLORIDE, PRESERVATIVE FREE 10 ML: 5 INJECTION INTRAVENOUS at 09:04

## 2024-06-16 RX ADMIN — SODIUM CHLORIDE, PRESERVATIVE FREE 10 ML: 5 INJECTION INTRAVENOUS at 20:40

## 2024-06-16 RX ADMIN — BRIMONIDINE TARTRATE 1 DROP: 2 SOLUTION OPHTHALMIC at 20:40

## 2024-06-16 RX ADMIN — PSYLLIUM HUSK 1 PACKET: 3.4 POWDER ORAL at 20:39

## 2024-06-16 RX ADMIN — ASPIRIN 81 MG 81 MG: 81 TABLET ORAL at 09:03

## 2024-06-16 RX ADMIN — Medication 100 MCG/HR: at 05:12

## 2024-06-16 RX ADMIN — ENOXAPARIN SODIUM 40 MG: 100 INJECTION SUBCUTANEOUS at 09:03

## 2024-06-16 RX ADMIN — IPRATROPIUM BROMIDE AND ALBUTEROL SULFATE 1 DOSE: 2.5; .5 SOLUTION RESPIRATORY (INHALATION) at 09:28

## 2024-06-16 RX ADMIN — CHLORHEXIDINE GLUCONATE, 0.12% ORAL RINSE 15 ML: 1.2 SOLUTION DENTAL at 09:03

## 2024-06-16 RX ADMIN — TIMOLOL MALEATE 1 DROP: 5 SOLUTION OPHTHALMIC at 20:40

## 2024-06-16 RX ADMIN — ACETYLCYSTEINE 600 MG: 200 SOLUTION ORAL; RESPIRATORY (INHALATION) at 21:30

## 2024-06-16 RX ADMIN — SODIUM CHLORIDE, PRESERVATIVE FREE 20 MG: 5 INJECTION INTRAVENOUS at 20:39

## 2024-06-16 RX ADMIN — SODIUM CHLORIDE 500 ML: 9 INJECTION, SOLUTION INTRAVENOUS at 11:39

## 2024-06-16 RX ADMIN — IPRATROPIUM BROMIDE AND ALBUTEROL SULFATE 1 DOSE: 2.5; .5 SOLUTION RESPIRATORY (INHALATION) at 21:30

## 2024-06-16 RX ADMIN — ATORVASTATIN CALCIUM 40 MG: 40 TABLET, FILM COATED ORAL at 20:39

## 2024-06-16 RX ADMIN — PIPERACILLIN AND TAZOBACTAM 4500 MG: 4; .5 INJECTION, POWDER, LYOPHILIZED, FOR SOLUTION INTRAVENOUS at 15:50

## 2024-06-16 RX ADMIN — PSYLLIUM HUSK 1 PACKET: 3.4 POWDER ORAL at 09:03

## 2024-06-16 RX ADMIN — LATANOPROST 1 DROP: 50 SOLUTION OPHTHALMIC at 20:50

## 2024-06-16 RX ADMIN — DORZOLAMIDE HYDROCHLORIDE 1 DROP: 20 SOLUTION/ DROPS OPHTHALMIC at 09:04

## 2024-06-16 RX ADMIN — DOCUSATE SODIUM 100 MG: 50 LIQUID ORAL at 11:35

## 2024-06-16 RX ADMIN — BRIMONIDINE TARTRATE 1 DROP: 2 SOLUTION OPHTHALMIC at 09:04

## 2024-06-16 RX ADMIN — POTASSIUM PHOSPHATE, MONOBASIC AND POTASSIUM PHOSPHATE, DIBASIC 10 MMOL: 224; 236 INJECTION, SOLUTION, CONCENTRATE INTRAVENOUS at 11:41

## 2024-06-16 RX ADMIN — PIPERACILLIN AND TAZOBACTAM 4500 MG: 4; .5 INJECTION, POWDER, LYOPHILIZED, FOR SOLUTION INTRAVENOUS at 00:47

## 2024-06-16 RX ADMIN — IPRATROPIUM BROMIDE AND ALBUTEROL SULFATE 1 DOSE: 2.5; .5 SOLUTION RESPIRATORY (INHALATION) at 13:18

## 2024-06-16 RX ADMIN — IPRATROPIUM BROMIDE AND ALBUTEROL SULFATE 1 DOSE: 2.5; .5 SOLUTION RESPIRATORY (INHALATION) at 17:17

## 2024-06-16 RX ADMIN — PIPERACILLIN AND TAZOBACTAM 4500 MG: 4; .5 INJECTION, POWDER, LYOPHILIZED, FOR SOLUTION INTRAVENOUS at 09:08

## 2024-06-16 RX ADMIN — DORZOLAMIDE HYDROCHLORIDE 1 DROP: 20 SOLUTION/ DROPS OPHTHALMIC at 20:40

## 2024-06-16 RX ADMIN — MULTIVIT AND MINERALS-FERROUS GLUCONATE 9 MG IRON/15 ML ORAL LIQUID 15 ML: at 09:12

## 2024-06-16 RX ADMIN — ACETYLCYSTEINE 600 MG: 200 SOLUTION ORAL; RESPIRATORY (INHALATION) at 09:29

## 2024-06-16 RX ADMIN — TIMOLOL MALEATE 1 DROP: 5 SOLUTION OPHTHALMIC at 09:04

## 2024-06-16 RX ADMIN — SODIUM CHLORIDE, PRESERVATIVE FREE 20 MG: 5 INJECTION INTRAVENOUS at 09:03

## 2024-06-16 RX ADMIN — Medication 100 MCG/HR: at 17:52

## 2024-06-16 ASSESSMENT — PAIN SCALES - GENERAL
PAINLEVEL_OUTOF10: 0

## 2024-06-17 ENCOUNTER — APPOINTMENT (OUTPATIENT)
Dept: GENERAL RADIOLOGY | Age: 84
DRG: 003 | End: 2024-06-17
Payer: MEDICARE

## 2024-06-17 LAB
AADO2: 122.1 MMHG
ALBUMIN SERPL-MCNC: 2.5 G/DL (ref 3.5–5.2)
ALP SERPL-CCNC: 59 U/L (ref 35–104)
ALT SERPL-CCNC: 28 U/L (ref 0–32)
ANION GAP SERPL CALCULATED.3IONS-SCNC: 7 MMOL/L (ref 7–16)
AST SERPL-CCNC: 35 U/L (ref 0–31)
B.E.: 2.1 MMOL/L (ref -3–3)
BILIRUB SERPL-MCNC: 0.5 MG/DL (ref 0–1.2)
BUN SERPL-MCNC: 20 MG/DL (ref 6–23)
CALCIUM SERPL-MCNC: 8.4 MG/DL (ref 8.6–10.2)
CHLORIDE SERPL-SCNC: 105 MMOL/L (ref 98–107)
CO2 SERPL-SCNC: 25 MMOL/L (ref 22–29)
COHB: 1 % (ref 0–1.5)
CREAT SERPL-MCNC: 0.6 MG/DL (ref 0.5–1)
CRITICAL: ABNORMAL
DATE ANALYZED: ABNORMAL
DATE OF COLLECTION: ABNORMAL
ERYTHROCYTE [DISTWIDTH] IN BLOOD BY AUTOMATED COUNT: 14.8 % (ref 11.5–15)
FIO2: 40 %
GFR, ESTIMATED: 89 ML/MIN/1.73M2
GLUCOSE BLD-MCNC: 119 MG/DL (ref 74–99)
GLUCOSE BLD-MCNC: 148 MG/DL (ref 74–99)
GLUCOSE BLD-MCNC: 158 MG/DL (ref 74–99)
GLUCOSE BLD-MCNC: 178 MG/DL (ref 74–99)
GLUCOSE SERPL-MCNC: 163 MG/DL (ref 74–99)
HCO3: 26.1 MMOL/L (ref 22–26)
HCT VFR BLD AUTO: 27.8 % (ref 34–48)
HGB BLD-MCNC: 8.7 G/DL (ref 11.5–15.5)
HHB: 1.4 % (ref 0–5)
LAB: ABNORMAL
Lab: 428
MAGNESIUM SERPL-MCNC: 2.2 MG/DL (ref 1.6–2.6)
MCH RBC QN AUTO: 28.2 PG (ref 26–35)
MCHC RBC AUTO-ENTMCNC: 31.3 G/DL (ref 32–34.5)
MCV RBC AUTO: 90 FL (ref 80–99.9)
METHB: 0.7 % (ref 0–1.5)
MICROORGANISM SPEC CULT: ABNORMAL
MICROORGANISM SPEC CULT: ABNORMAL
MICROORGANISM/AGENT SPEC: ABNORMAL
MODE: AC
O2 SATURATION: 98.5 % (ref 92–98.5)
O2HB: 96.9 % (ref 94–97)
OPERATOR ID: 7221
PATIENT TEMP: 37 C
PCO2: 37.9 MMHG (ref 35–45)
PEEP/CPAP: 5 CMH2O
PFO2: 2.99 MMHG/%
PH BLOOD GAS: 7.46 (ref 7.35–7.45)
PHOSPHATE SERPL-MCNC: 2.3 MG/DL (ref 2.5–4.5)
PLATELET # BLD AUTO: 298 K/UL (ref 130–450)
PMV BLD AUTO: 10.8 FL (ref 7–12)
PO2: 119.5 MMHG (ref 75–100)
POTASSIUM SERPL-SCNC: 3.9 MMOL/L (ref 3.5–5)
PROCALCITONIN SERPL-MCNC: 0.1 NG/ML (ref 0–0.08)
PROT SERPL-MCNC: 5.5 G/DL (ref 6.4–8.3)
RBC # BLD AUTO: 3.09 M/UL (ref 3.5–5.5)
RI(T): 1.02
RR MECHANICAL: 12 B/MIN
SODIUM SERPL-SCNC: 137 MMOL/L (ref 132–146)
SOURCE, BLOOD GAS: ABNORMAL
SPECIMEN DESCRIPTION: ABNORMAL
THB: 6.8 G/DL (ref 11.5–16.5)
TIME ANALYZED: 431
VT MECHANICAL: 350 ML
WBC OTHER # BLD: 14.3 K/UL (ref 4.5–11.5)

## 2024-06-17 PROCEDURE — 6370000000 HC RX 637 (ALT 250 FOR IP)

## 2024-06-17 PROCEDURE — 84145 PROCALCITONIN (PCT): CPT

## 2024-06-17 PROCEDURE — 80053 COMPREHEN METABOLIC PANEL: CPT

## 2024-06-17 PROCEDURE — 6360000002 HC RX W HCPCS

## 2024-06-17 PROCEDURE — 94640 AIRWAY INHALATION TREATMENT: CPT

## 2024-06-17 PROCEDURE — 2000000000 HC ICU R&B

## 2024-06-17 PROCEDURE — 99232 SBSQ HOSP IP/OBS MODERATE 35: CPT | Performed by: INTERNAL MEDICINE

## 2024-06-17 PROCEDURE — 6370000000 HC RX 637 (ALT 250 FOR IP): Performed by: INTERNAL MEDICINE

## 2024-06-17 PROCEDURE — 82805 BLOOD GASES W/O2 SATURATION: CPT

## 2024-06-17 PROCEDURE — 2580000003 HC RX 258

## 2024-06-17 PROCEDURE — 6370000000 HC RX 637 (ALT 250 FOR IP): Performed by: CLINICAL NURSE SPECIALIST

## 2024-06-17 PROCEDURE — 71045 X-RAY EXAM CHEST 1 VIEW: CPT

## 2024-06-17 PROCEDURE — 74018 RADEX ABDOMEN 1 VIEW: CPT

## 2024-06-17 PROCEDURE — 2500000003 HC RX 250 WO HCPCS

## 2024-06-17 PROCEDURE — 94003 VENT MGMT INPAT SUBQ DAY: CPT

## 2024-06-17 PROCEDURE — 85027 COMPLETE CBC AUTOMATED: CPT

## 2024-06-17 PROCEDURE — 99231 SBSQ HOSP IP/OBS SF/LOW 25: CPT

## 2024-06-17 PROCEDURE — 84100 ASSAY OF PHOSPHORUS: CPT

## 2024-06-17 PROCEDURE — 36415 COLL VENOUS BLD VENIPUNCTURE: CPT

## 2024-06-17 PROCEDURE — 99291 CRITICAL CARE FIRST HOUR: CPT | Performed by: INTERNAL MEDICINE

## 2024-06-17 PROCEDURE — 83735 ASSAY OF MAGNESIUM: CPT

## 2024-06-17 PROCEDURE — 82962 GLUCOSE BLOOD TEST: CPT

## 2024-06-17 RX ORDER — POLYETHYLENE GLYCOL 3350 17 G/17G
17 POWDER, FOR SOLUTION ORAL 2 TIMES DAILY
Status: DISCONTINUED | OUTPATIENT
Start: 2024-06-17 | End: 2024-07-03 | Stop reason: HOSPADM

## 2024-06-17 RX ADMIN — TIMOLOL MALEATE 1 DROP: 5 SOLUTION OPHTHALMIC at 08:33

## 2024-06-17 RX ADMIN — PIPERACILLIN AND TAZOBACTAM 4500 MG: 4; .5 INJECTION, POWDER, LYOPHILIZED, FOR SOLUTION INTRAVENOUS at 00:31

## 2024-06-17 RX ADMIN — CHLORHEXIDINE GLUCONATE, 0.12% ORAL RINSE 15 ML: 1.2 SOLUTION DENTAL at 20:42

## 2024-06-17 RX ADMIN — Medication 1000 UNITS: at 08:32

## 2024-06-17 RX ADMIN — ACETYLCYSTEINE 600 MG: 200 SOLUTION ORAL; RESPIRATORY (INHALATION) at 20:16

## 2024-06-17 RX ADMIN — PIPERACILLIN AND TAZOBACTAM 4500 MG: 4; .5 INJECTION, POWDER, LYOPHILIZED, FOR SOLUTION INTRAVENOUS at 08:30

## 2024-06-17 RX ADMIN — SODIUM CHLORIDE, PRESERVATIVE FREE 10 ML: 5 INJECTION INTRAVENOUS at 08:32

## 2024-06-17 RX ADMIN — TIMOLOL MALEATE 1 DROP: 5 SOLUTION OPHTHALMIC at 21:14

## 2024-06-17 RX ADMIN — IPRATROPIUM BROMIDE AND ALBUTEROL SULFATE 1 DOSE: 2.5; .5 SOLUTION RESPIRATORY (INHALATION) at 20:21

## 2024-06-17 RX ADMIN — POLYVINYL ALCOHOL, POVIDONE 1 DROP: 14; 6 SOLUTION/ DROPS OPHTHALMIC at 20:43

## 2024-06-17 RX ADMIN — DORZOLAMIDE HYDROCHLORIDE 1 DROP: 20 SOLUTION/ DROPS OPHTHALMIC at 08:33

## 2024-06-17 RX ADMIN — SODIUM CHLORIDE, PRESERVATIVE FREE 20 MG: 5 INJECTION INTRAVENOUS at 20:43

## 2024-06-17 RX ADMIN — SODIUM CHLORIDE, PRESERVATIVE FREE 10 ML: 5 INJECTION INTRAVENOUS at 21:16

## 2024-06-17 RX ADMIN — IPRATROPIUM BROMIDE AND ALBUTEROL SULFATE 1 DOSE: 2.5; .5 SOLUTION RESPIRATORY (INHALATION) at 16:14

## 2024-06-17 RX ADMIN — IPRATROPIUM BROMIDE AND ALBUTEROL SULFATE 1 DOSE: 2.5; .5 SOLUTION RESPIRATORY (INHALATION) at 09:22

## 2024-06-17 RX ADMIN — ENOXAPARIN SODIUM 40 MG: 100 INJECTION SUBCUTANEOUS at 08:32

## 2024-06-17 RX ADMIN — POLYETHYLENE GLYCOL 3350 17 GRAM ORAL POWDER PACKET 17 G: at 20:42

## 2024-06-17 RX ADMIN — CHLORHEXIDINE GLUCONATE, 0.12% ORAL RINSE 15 ML: 1.2 SOLUTION DENTAL at 08:32

## 2024-06-17 RX ADMIN — MULTIVIT AND MINERALS-FERROUS GLUCONATE 9 MG IRON/15 ML ORAL LIQUID 15 ML: at 08:32

## 2024-06-17 RX ADMIN — PSYLLIUM HUSK 1 PACKET: 3.4 POWDER ORAL at 20:43

## 2024-06-17 RX ADMIN — DOCUSATE SODIUM 100 MG: 50 LIQUID ORAL at 08:32

## 2024-06-17 RX ADMIN — Medication 2 TABLET: at 13:02

## 2024-06-17 RX ADMIN — SENNOSIDES 5 ML: 8.8 LIQUID ORAL at 20:44

## 2024-06-17 RX ADMIN — ACETYLCYSTEINE 600 MG: 200 SOLUTION ORAL; RESPIRATORY (INHALATION) at 09:22

## 2024-06-17 RX ADMIN — LATANOPROST 1 DROP: 50 SOLUTION OPHTHALMIC at 21:15

## 2024-06-17 RX ADMIN — BRIMONIDINE TARTRATE 1 DROP: 2 SOLUTION OPHTHALMIC at 08:33

## 2024-06-17 RX ADMIN — PIPERACILLIN AND TAZOBACTAM 4500 MG: 4; .5 INJECTION, POWDER, LYOPHILIZED, FOR SOLUTION INTRAVENOUS at 15:10

## 2024-06-17 RX ADMIN — POLYETHYLENE GLYCOL 3350 17 GRAM ORAL POWDER PACKET 17 G: at 13:02

## 2024-06-17 RX ADMIN — Medication 100 MCG/HR: at 14:13

## 2024-06-17 RX ADMIN — SODIUM CHLORIDE, PRESERVATIVE FREE 20 MG: 5 INJECTION INTRAVENOUS at 08:33

## 2024-06-17 RX ADMIN — ATORVASTATIN CALCIUM 40 MG: 40 TABLET, FILM COATED ORAL at 20:42

## 2024-06-17 RX ADMIN — ASPIRIN 81 MG 81 MG: 81 TABLET ORAL at 08:32

## 2024-06-17 RX ADMIN — PSYLLIUM HUSK 1 PACKET: 3.4 POWDER ORAL at 08:32

## 2024-06-17 RX ADMIN — DORZOLAMIDE HYDROCHLORIDE 1 DROP: 20 SOLUTION/ DROPS OPHTHALMIC at 21:14

## 2024-06-17 RX ADMIN — Medication 2 TABLET: at 22:44

## 2024-06-17 RX ADMIN — ACETAMINOPHEN 650 MG: 325 TABLET ORAL at 22:43

## 2024-06-17 RX ADMIN — BRIMONIDINE TARTRATE 1 DROP: 2 SOLUTION OPHTHALMIC at 21:14

## 2024-06-17 RX ADMIN — Medication 100 MCG/HR: at 04:07

## 2024-06-17 ASSESSMENT — PAIN SCALES - GENERAL
PAINLEVEL_OUTOF10: 0

## 2024-06-17 NOTE — CARE COORDINATION
Care Coordination: LOS 17 day.  PT transfer to ICU on 6/13/23 with Lethargy and unresponsive on evaluation of physician, may need trach/peg, Intubated.  Palliative care consulted as prior to transfer, guardianship papers to be completed as pt has no family or POA - Friend   Roma Crocker has brought in Living will of pt and does not feel she would want any of this- it is currently on the chart.  Roma called me today, She is trying to pay pt's bills but does not have financial.  She is looking into calling electric, gas and water and letting them know she is in hospital. I offered that I could write a letter stating such as well and she will let me know.  Guardians[facility] was signed on Friday per physician, [facility]  Intubated, fentanyl. Will follow.    Electronically signed by Teressa Mueller RN on 6/17/2024 at 3:47 PM

## 2024-06-17 NOTE — ACP (ADVANCE CARE PLANNING)
Advance Care Planning     Palliative Team Advance Care Planning (ACP) Conversation    Date of Conversation: 06/17/24    Individuals present for the conversation:  Chart review     ACP documents on file prior to discussion:  -None    Previously completed document/s not on file: Not discussed.    Healthcare Decision Maker:   none, no living family no living HCPOA.     Conversation Summary:  Pts friends did provide a copy of pts Living Will dated 2006. Copy placed in soft chart.    Resuscitation Status:   Code Status: Full Code     Documentation Completed:  -Other copy of Living Will verified in soft chart.    I spent 15 minutes with the patient and/or surrogate decision maker discussing the patient's wishes and goals.      DARVIN Segal

## 2024-06-18 ENCOUNTER — APPOINTMENT (OUTPATIENT)
Dept: GENERAL RADIOLOGY | Age: 84
DRG: 003 | End: 2024-06-18
Payer: MEDICARE

## 2024-06-18 LAB
AADO2: 124.1 MMHG
ALBUMIN SERPL-MCNC: 2.5 G/DL (ref 3.5–5.2)
ALP SERPL-CCNC: 68 U/L (ref 35–104)
ALT SERPL-CCNC: 26 U/L (ref 0–32)
ANION GAP SERPL CALCULATED.3IONS-SCNC: 9 MMOL/L (ref 7–16)
AST SERPL-CCNC: 29 U/L (ref 0–31)
B.E.: 2.8 MMOL/L (ref -3–3)
BILIRUB SERPL-MCNC: 0.4 MG/DL (ref 0–1.2)
BUN SERPL-MCNC: 22 MG/DL (ref 6–23)
CALCIUM SERPL-MCNC: 8.3 MG/DL (ref 8.6–10.2)
CHLORIDE SERPL-SCNC: 103 MMOL/L (ref 98–107)
CO2 SERPL-SCNC: 27 MMOL/L (ref 22–29)
COHB: 0.8 % (ref 0–1.5)
CREAT SERPL-MCNC: 0.6 MG/DL (ref 0.5–1)
CRITICAL: ABNORMAL
DATE ANALYZED: ABNORMAL
DATE OF COLLECTION: ABNORMAL
ERYTHROCYTE [DISTWIDTH] IN BLOOD BY AUTOMATED COUNT: 15.1 % (ref 11.5–15)
FIO2: 40 %
GFR, ESTIMATED: 90 ML/MIN/1.73M2
GLUCOSE BLD-MCNC: 179 MG/DL (ref 74–99)
GLUCOSE BLD-MCNC: 185 MG/DL (ref 74–99)
GLUCOSE SERPL-MCNC: 176 MG/DL (ref 74–99)
HCO3: 27.2 MMOL/L (ref 22–26)
HCT VFR BLD AUTO: 25.9 % (ref 34–48)
HGB BLD-MCNC: 8 G/DL (ref 11.5–15.5)
HHB: 1.6 % (ref 0–5)
LAB: ABNORMAL
Lab: 442
MAGNESIUM SERPL-MCNC: 2.1 MG/DL (ref 1.6–2.6)
MCH RBC QN AUTO: 27.8 PG (ref 26–35)
MCHC RBC AUTO-ENTMCNC: 30.9 G/DL (ref 32–34.5)
MCV RBC AUTO: 89.9 FL (ref 80–99.9)
METHB: 0.5 % (ref 0–1.5)
MICROORGANISM SPEC CULT: NORMAL
MICROORGANISM SPEC CULT: NORMAL
MODE: AC
O2 SATURATION: 98.3 % (ref 92–98.5)
O2HB: 97.1 % (ref 94–97)
OPERATOR ID: 3214
PATIENT TEMP: 37 C
PCO2: 41.4 MMHG (ref 35–45)
PEEP/CPAP: 5 CMH2O
PFO2: 2.84 MMHG/%
PH BLOOD GAS: 7.44 (ref 7.35–7.45)
PHOSPHATE SERPL-MCNC: 2.6 MG/DL (ref 2.5–4.5)
PLATELET # BLD AUTO: 293 K/UL (ref 130–450)
PMV BLD AUTO: 10.7 FL (ref 7–12)
PO2: 113.5 MMHG (ref 75–100)
POTASSIUM SERPL-SCNC: 3.4 MMOL/L (ref 3.5–5)
PROT SERPL-MCNC: 5.5 G/DL (ref 6.4–8.3)
RBC # BLD AUTO: 2.88 M/UL (ref 3.5–5.5)
RI(T): 1.09
RR MECHANICAL: 12 B/MIN
SERVICE CMNT-IMP: NORMAL
SERVICE CMNT-IMP: NORMAL
SODIUM SERPL-SCNC: 139 MMOL/L (ref 132–146)
SOURCE, BLOOD GAS: ABNORMAL
SPECIMEN DESCRIPTION: NORMAL
SPECIMEN DESCRIPTION: NORMAL
THB: 8.7 G/DL (ref 11.5–16.5)
TIME ANALYZED: 446
VT MECHANICAL: 350 ML
WBC OTHER # BLD: 13 K/UL (ref 4.5–11.5)

## 2024-06-18 PROCEDURE — 6370000000 HC RX 637 (ALT 250 FOR IP)

## 2024-06-18 PROCEDURE — 82962 GLUCOSE BLOOD TEST: CPT

## 2024-06-18 PROCEDURE — 80053 COMPREHEN METABOLIC PANEL: CPT

## 2024-06-18 PROCEDURE — 6370000000 HC RX 637 (ALT 250 FOR IP): Performed by: CLINICAL NURSE SPECIALIST

## 2024-06-18 PROCEDURE — 6360000002 HC RX W HCPCS

## 2024-06-18 PROCEDURE — 99232 SBSQ HOSP IP/OBS MODERATE 35: CPT | Performed by: STUDENT IN AN ORGANIZED HEALTH CARE EDUCATION/TRAINING PROGRAM

## 2024-06-18 PROCEDURE — 85027 COMPLETE CBC AUTOMATED: CPT

## 2024-06-18 PROCEDURE — 2580000003 HC RX 258

## 2024-06-18 PROCEDURE — 83735 ASSAY OF MAGNESIUM: CPT

## 2024-06-18 PROCEDURE — 6370000000 HC RX 637 (ALT 250 FOR IP): Performed by: INTERNAL MEDICINE

## 2024-06-18 PROCEDURE — 2500000003 HC RX 250 WO HCPCS

## 2024-06-18 PROCEDURE — 82805 BLOOD GASES W/O2 SATURATION: CPT

## 2024-06-18 PROCEDURE — 94003 VENT MGMT INPAT SUBQ DAY: CPT

## 2024-06-18 PROCEDURE — 2000000000 HC ICU R&B

## 2024-06-18 PROCEDURE — 84100 ASSAY OF PHOSPHORUS: CPT

## 2024-06-18 PROCEDURE — 94640 AIRWAY INHALATION TREATMENT: CPT

## 2024-06-18 PROCEDURE — 99291 CRITICAL CARE FIRST HOUR: CPT | Performed by: INTERNAL MEDICINE

## 2024-06-18 PROCEDURE — 71045 X-RAY EXAM CHEST 1 VIEW: CPT

## 2024-06-18 RX ORDER — IPRATROPIUM BROMIDE AND ALBUTEROL SULFATE 2.5; .5 MG/3ML; MG/3ML
1 SOLUTION RESPIRATORY (INHALATION) 3 TIMES DAILY
Status: DISCONTINUED | OUTPATIENT
Start: 2024-06-18 | End: 2024-06-21

## 2024-06-18 RX ORDER — FENTANYL CITRATE 50 UG/ML
50 INJECTION, SOLUTION INTRAMUSCULAR; INTRAVENOUS
Status: DISCONTINUED | OUTPATIENT
Start: 2024-06-18 | End: 2024-07-03 | Stop reason: HOSPADM

## 2024-06-18 RX ADMIN — CHLORHEXIDINE GLUCONATE, 0.12% ORAL RINSE 15 ML: 1.2 SOLUTION DENTAL at 08:19

## 2024-06-18 RX ADMIN — POLYVINYL ALCOHOL, POVIDONE 1 DROP: 14; 6 SOLUTION/ DROPS OPHTHALMIC at 08:18

## 2024-06-18 RX ADMIN — SODIUM CHLORIDE, PRESERVATIVE FREE 20 MG: 5 INJECTION INTRAVENOUS at 08:18

## 2024-06-18 RX ADMIN — SENNOSIDES 5 ML: 8.8 LIQUID ORAL at 20:03

## 2024-06-18 RX ADMIN — IPRATROPIUM BROMIDE AND ALBUTEROL SULFATE 1 DOSE: 2.5; .5 SOLUTION RESPIRATORY (INHALATION) at 13:12

## 2024-06-18 RX ADMIN — ACETYLCYSTEINE 600 MG: 200 SOLUTION ORAL; RESPIRATORY (INHALATION) at 20:31

## 2024-06-18 RX ADMIN — POLYVINYL ALCOHOL, POVIDONE 1 DROP: 14; 6 SOLUTION/ DROPS OPHTHALMIC at 16:39

## 2024-06-18 RX ADMIN — POTASSIUM CHLORIDE 10 MEQ: 7.46 INJECTION, SOLUTION INTRAVENOUS at 06:40

## 2024-06-18 RX ADMIN — Medication 100 MCG/HR: at 00:12

## 2024-06-18 RX ADMIN — DORZOLAMIDE HYDROCHLORIDE 1 DROP: 20 SOLUTION/ DROPS OPHTHALMIC at 08:20

## 2024-06-18 RX ADMIN — MULTIVIT AND MINERALS-FERROUS GLUCONATE 9 MG IRON/15 ML ORAL LIQUID 15 ML: at 08:19

## 2024-06-18 RX ADMIN — ASPIRIN 81 MG 81 MG: 81 TABLET ORAL at 08:19

## 2024-06-18 RX ADMIN — POLYVINYL ALCOHOL, POVIDONE 1 DROP: 14; 6 SOLUTION/ DROPS OPHTHALMIC at 23:59

## 2024-06-18 RX ADMIN — PIPERACILLIN AND TAZOBACTAM 4500 MG: 4; .5 INJECTION, POWDER, LYOPHILIZED, FOR SOLUTION INTRAVENOUS at 08:12

## 2024-06-18 RX ADMIN — PIPERACILLIN AND TAZOBACTAM 4500 MG: 4; .5 INJECTION, POWDER, LYOPHILIZED, FOR SOLUTION INTRAVENOUS at 00:10

## 2024-06-18 RX ADMIN — POLYVINYL ALCOHOL, POVIDONE 1 DROP: 14; 6 SOLUTION/ DROPS OPHTHALMIC at 04:58

## 2024-06-18 RX ADMIN — IPRATROPIUM BROMIDE AND ALBUTEROL SULFATE 1 DOSE: 2.5; .5 SOLUTION RESPIRATORY (INHALATION) at 08:52

## 2024-06-18 RX ADMIN — ACETYLCYSTEINE 600 MG: 200 SOLUTION ORAL; RESPIRATORY (INHALATION) at 08:51

## 2024-06-18 RX ADMIN — PIPERACILLIN AND TAZOBACTAM 4500 MG: 4; .5 INJECTION, POWDER, LYOPHILIZED, FOR SOLUTION INTRAVENOUS at 23:59

## 2024-06-18 RX ADMIN — ATORVASTATIN CALCIUM 40 MG: 40 TABLET, FILM COATED ORAL at 20:05

## 2024-06-18 RX ADMIN — SODIUM CHLORIDE, PRESERVATIVE FREE 20 MG: 5 INJECTION INTRAVENOUS at 20:04

## 2024-06-18 RX ADMIN — POLYVINYL ALCOHOL, POVIDONE 1 DROP: 14; 6 SOLUTION/ DROPS OPHTHALMIC at 19:56

## 2024-06-18 RX ADMIN — Medication 100 MCG/HR: at 10:50

## 2024-06-18 RX ADMIN — POTASSIUM CHLORIDE 10 MEQ: 7.46 INJECTION, SOLUTION INTRAVENOUS at 08:18

## 2024-06-18 RX ADMIN — DOCUSATE SODIUM 100 MG: 50 LIQUID ORAL at 08:19

## 2024-06-18 RX ADMIN — CHLORHEXIDINE GLUCONATE, 0.12% ORAL RINSE 15 ML: 1.2 SOLUTION DENTAL at 20:04

## 2024-06-18 RX ADMIN — Medication 1000 UNITS: at 08:19

## 2024-06-18 RX ADMIN — SODIUM CHLORIDE, PRESERVATIVE FREE 10 ML: 5 INJECTION INTRAVENOUS at 20:04

## 2024-06-18 RX ADMIN — POLYVINYL ALCOHOL, POVIDONE 1 DROP: 14; 6 SOLUTION/ DROPS OPHTHALMIC at 00:13

## 2024-06-18 RX ADMIN — POTASSIUM CHLORIDE 10 MEQ: 7.46 INJECTION, SOLUTION INTRAVENOUS at 10:40

## 2024-06-18 RX ADMIN — POTASSIUM CHLORIDE 10 MEQ: 7.46 INJECTION, SOLUTION INTRAVENOUS at 05:42

## 2024-06-18 RX ADMIN — BRIMONIDINE TARTRATE 1 DROP: 2 SOLUTION OPHTHALMIC at 08:20

## 2024-06-18 RX ADMIN — PSYLLIUM HUSK 1 PACKET: 3.4 POWDER ORAL at 08:19

## 2024-06-18 RX ADMIN — ENOXAPARIN SODIUM 40 MG: 100 INJECTION SUBCUTANEOUS at 08:18

## 2024-06-18 RX ADMIN — DORZOLAMIDE HYDROCHLORIDE 1 DROP: 20 SOLUTION/ DROPS OPHTHALMIC at 19:57

## 2024-06-18 RX ADMIN — TIMOLOL MALEATE 1 DROP: 5 SOLUTION OPHTHALMIC at 19:57

## 2024-06-18 RX ADMIN — POLYETHYLENE GLYCOL 3350 17 GRAM ORAL POWDER PACKET 17 G: at 20:03

## 2024-06-18 RX ADMIN — LATANOPROST 1 DROP: 50 SOLUTION OPHTHALMIC at 20:15

## 2024-06-18 RX ADMIN — SENNOSIDES 5 ML: 8.8 LIQUID ORAL at 08:19

## 2024-06-18 RX ADMIN — PIPERACILLIN AND TAZOBACTAM 4500 MG: 4; .5 INJECTION, POWDER, LYOPHILIZED, FOR SOLUTION INTRAVENOUS at 16:39

## 2024-06-18 RX ADMIN — POLYETHYLENE GLYCOL 3350 17 GRAM ORAL POWDER PACKET 17 G: at 08:19

## 2024-06-18 RX ADMIN — IPRATROPIUM BROMIDE AND ALBUTEROL SULFATE 1 DOSE: 2.5; .5 SOLUTION RESPIRATORY (INHALATION) at 20:31

## 2024-06-18 RX ADMIN — TIMOLOL MALEATE 1 DROP: 5 SOLUTION OPHTHALMIC at 08:20

## 2024-06-18 RX ADMIN — BRIMONIDINE TARTRATE 1 DROP: 2 SOLUTION OPHTHALMIC at 19:57

## 2024-06-18 ASSESSMENT — PAIN SCALES - GENERAL
PAINLEVEL_OUTOF10: 0

## 2024-06-18 NOTE — CARE COORDINATION
Care Coordination:  LOS  18 Day. Met with visitors in room, Spoke to Wendy Shone who is RN and long time friend of pt, families spent years together. She would like to be guardian to pt.  Call to Don Colindres at [facility], placed on speaker phone and directed her to go on line, fill out application and bring to [place]. She verbalized understanding.    Electronically signed by Teressa Mueller RN on 6/18/2024 at 3:35 PM

## 2024-06-19 ENCOUNTER — APPOINTMENT (OUTPATIENT)
Dept: GENERAL RADIOLOGY | Age: 84
DRG: 003 | End: 2024-06-19
Payer: MEDICARE

## 2024-06-19 LAB
AADO2: 142.3 MMHG
ALBUMIN SERPL-MCNC: 2.3 G/DL (ref 3.5–5.2)
ALP SERPL-CCNC: 64 U/L (ref 35–104)
ALT SERPL-CCNC: 26 U/L (ref 0–32)
ANION GAP SERPL CALCULATED.3IONS-SCNC: 5 MMOL/L (ref 7–16)
AST SERPL-CCNC: 30 U/L (ref 0–31)
B.E.: 5.2 MMOL/L (ref -3–3)
BILIRUB SERPL-MCNC: 0.5 MG/DL (ref 0–1.2)
BUN SERPL-MCNC: 18 MG/DL (ref 6–23)
CALCIUM SERPL-MCNC: 8 MG/DL (ref 8.6–10.2)
CHLORIDE SERPL-SCNC: 99 MMOL/L (ref 98–107)
CO2 SERPL-SCNC: 29 MMOL/L (ref 22–29)
COHB: 0.3 % (ref 0–1.5)
CREAT SERPL-MCNC: 0.5 MG/DL (ref 0.5–1)
CRITICAL: ABNORMAL
DATE ANALYZED: ABNORMAL
DATE OF COLLECTION: ABNORMAL
ERYTHROCYTE [DISTWIDTH] IN BLOOD BY AUTOMATED COUNT: 14.9 % (ref 11.5–15)
FIO2: 40 %
GFR, ESTIMATED: >90 ML/MIN/1.73M2
GLUCOSE BLD-MCNC: 109 MG/DL (ref 74–99)
GLUCOSE BLD-MCNC: 125 MG/DL (ref 74–99)
GLUCOSE SERPL-MCNC: 116 MG/DL (ref 74–99)
HCO3: 28.9 MMOL/L (ref 22–26)
HCT VFR BLD AUTO: 26.2 % (ref 34–48)
HGB BLD-MCNC: 8.3 G/DL (ref 11.5–15.5)
HHB: 2.6 % (ref 0–5)
LAB: ABNORMAL
Lab: 405
MAGNESIUM SERPL-MCNC: 2.2 MG/DL (ref 1.6–2.6)
MCH RBC QN AUTO: 28.1 PG (ref 26–35)
MCHC RBC AUTO-ENTMCNC: 31.7 G/DL (ref 32–34.5)
MCV RBC AUTO: 88.8 FL (ref 80–99.9)
METHB: 0.5 % (ref 0–1.5)
MODE: AC
O2 SATURATION: 97.4 % (ref 92–98.5)
O2HB: 96.6 % (ref 94–97)
OPERATOR ID: 7221
PATIENT TEMP: 37 C
PCO2: 38.9 MMHG (ref 35–45)
PEEP/CPAP: 5 CMH2O
PFO2: 2.2 MMHG/%
PH BLOOD GAS: 7.49 (ref 7.35–7.45)
PHOSPHATE SERPL-MCNC: 2.2 MG/DL (ref 2.5–4.5)
PLATELET # BLD AUTO: 297 K/UL (ref 130–450)
PMV BLD AUTO: 11 FL (ref 7–12)
PO2: 88.2 MMHG (ref 75–100)
POTASSIUM SERPL-SCNC: 4.2 MMOL/L (ref 3.5–5)
PROCALCITONIN SERPL-MCNC: 0.1 NG/ML (ref 0–0.08)
PROT SERPL-MCNC: 5.5 G/DL (ref 6.4–8.3)
RBC # BLD AUTO: 2.95 M/UL (ref 3.5–5.5)
RI(T): 1.61
RR MECHANICAL: 12 B/MIN
SODIUM SERPL-SCNC: 133 MMOL/L (ref 132–146)
SOURCE, BLOOD GAS: ABNORMAL
THB: 9.7 G/DL (ref 11.5–16.5)
TIME ANALYZED: 411
VT MECHANICAL: 350 ML
WBC OTHER # BLD: 15.3 K/UL (ref 4.5–11.5)

## 2024-06-19 PROCEDURE — 87070 CULTURE OTHR SPECIMN AEROBIC: CPT

## 2024-06-19 PROCEDURE — 51702 INSERT TEMP BLADDER CATH: CPT

## 2024-06-19 PROCEDURE — 6370000000 HC RX 637 (ALT 250 FOR IP)

## 2024-06-19 PROCEDURE — 84100 ASSAY OF PHOSPHORUS: CPT

## 2024-06-19 PROCEDURE — 99232 SBSQ HOSP IP/OBS MODERATE 35: CPT | Performed by: STUDENT IN AN ORGANIZED HEALTH CARE EDUCATION/TRAINING PROGRAM

## 2024-06-19 PROCEDURE — 2580000003 HC RX 258

## 2024-06-19 PROCEDURE — 84145 PROCALCITONIN (PCT): CPT

## 2024-06-19 PROCEDURE — 94640 AIRWAY INHALATION TREATMENT: CPT

## 2024-06-19 PROCEDURE — 6370000000 HC RX 637 (ALT 250 FOR IP): Performed by: CLINICAL NURSE SPECIALIST

## 2024-06-19 PROCEDURE — 2500000003 HC RX 250 WO HCPCS

## 2024-06-19 PROCEDURE — 6360000002 HC RX W HCPCS

## 2024-06-19 PROCEDURE — 82805 BLOOD GASES W/O2 SATURATION: CPT

## 2024-06-19 PROCEDURE — 83735 ASSAY OF MAGNESIUM: CPT

## 2024-06-19 PROCEDURE — 36415 COLL VENOUS BLD VENIPUNCTURE: CPT

## 2024-06-19 PROCEDURE — 85027 COMPLETE CBC AUTOMATED: CPT

## 2024-06-19 PROCEDURE — 80053 COMPREHEN METABOLIC PANEL: CPT

## 2024-06-19 PROCEDURE — 71045 X-RAY EXAM CHEST 1 VIEW: CPT

## 2024-06-19 PROCEDURE — 87040 BLOOD CULTURE FOR BACTERIA: CPT

## 2024-06-19 PROCEDURE — 6370000000 HC RX 637 (ALT 250 FOR IP): Performed by: INTERNAL MEDICINE

## 2024-06-19 PROCEDURE — 82962 GLUCOSE BLOOD TEST: CPT

## 2024-06-19 PROCEDURE — 94003 VENT MGMT INPAT SUBQ DAY: CPT

## 2024-06-19 PROCEDURE — 99291 CRITICAL CARE FIRST HOUR: CPT | Performed by: INTERNAL MEDICINE

## 2024-06-19 PROCEDURE — 87077 CULTURE AEROBIC IDENTIFY: CPT

## 2024-06-19 PROCEDURE — 87205 SMEAR GRAM STAIN: CPT

## 2024-06-19 PROCEDURE — 2000000000 HC ICU R&B

## 2024-06-19 PROCEDURE — 87086 URINE CULTURE/COLONY COUNT: CPT

## 2024-06-19 RX ORDER — MIDAZOLAM HYDROCHLORIDE 2 MG/2ML
2 INJECTION, SOLUTION INTRAMUSCULAR; INTRAVENOUS ONCE
Status: COMPLETED | OUTPATIENT
Start: 2024-06-20 | End: 2024-06-20

## 2024-06-19 RX ORDER — FENTANYL CITRATE 50 UG/ML
200 INJECTION, SOLUTION INTRAMUSCULAR; INTRAVENOUS ONCE
Status: COMPLETED | OUTPATIENT
Start: 2024-06-20 | End: 2024-06-20

## 2024-06-19 RX ORDER — VECURONIUM BROMIDE 1 MG/ML
10 INJECTION, POWDER, LYOPHILIZED, FOR SOLUTION INTRAVENOUS ONCE
Status: COMPLETED | OUTPATIENT
Start: 2024-06-20 | End: 2024-06-20

## 2024-06-19 RX ORDER — 0.9 % SODIUM CHLORIDE 0.9 %
500 INTRAVENOUS SOLUTION INTRAVENOUS ONCE
Status: COMPLETED | OUTPATIENT
Start: 2024-06-19 | End: 2024-06-19

## 2024-06-19 RX ORDER — BISACODYL 10 MG
10 SUPPOSITORY, RECTAL RECTAL DAILY PRN
Status: DISCONTINUED | OUTPATIENT
Start: 2024-06-19 | End: 2024-06-19

## 2024-06-19 RX ADMIN — PIPERACILLIN AND TAZOBACTAM 4500 MG: 4; .5 INJECTION, POWDER, LYOPHILIZED, FOR SOLUTION INTRAVENOUS at 08:16

## 2024-06-19 RX ADMIN — BRIMONIDINE TARTRATE 1 DROP: 2 SOLUTION OPHTHALMIC at 20:25

## 2024-06-19 RX ADMIN — SODIUM CHLORIDE 500 ML: 9 INJECTION, SOLUTION INTRAVENOUS at 06:43

## 2024-06-19 RX ADMIN — POLYVINYL ALCOHOL, POVIDONE 1 DROP: 14; 6 SOLUTION/ DROPS OPHTHALMIC at 16:32

## 2024-06-19 RX ADMIN — POLYVINYL ALCOHOL, POVIDONE 1 DROP: 14; 6 SOLUTION/ DROPS OPHTHALMIC at 13:00

## 2024-06-19 RX ADMIN — BRIMONIDINE TARTRATE 1 DROP: 2 SOLUTION OPHTHALMIC at 08:14

## 2024-06-19 RX ADMIN — PIPERACILLIN AND TAZOBACTAM 4500 MG: 4; .5 INJECTION, POWDER, LYOPHILIZED, FOR SOLUTION INTRAVENOUS at 16:32

## 2024-06-19 RX ADMIN — LATANOPROST 1 DROP: 50 SOLUTION OPHTHALMIC at 21:13

## 2024-06-19 RX ADMIN — IPRATROPIUM BROMIDE AND ALBUTEROL SULFATE 1 DOSE: 2.5; .5 SOLUTION RESPIRATORY (INHALATION) at 08:34

## 2024-06-19 RX ADMIN — DORZOLAMIDE HYDROCHLORIDE 1 DROP: 20 SOLUTION/ DROPS OPHTHALMIC at 08:10

## 2024-06-19 RX ADMIN — DORZOLAMIDE HYDROCHLORIDE 1 DROP: 20 SOLUTION/ DROPS OPHTHALMIC at 20:25

## 2024-06-19 RX ADMIN — IPRATROPIUM BROMIDE AND ALBUTEROL SULFATE 1 DOSE: 2.5; .5 SOLUTION RESPIRATORY (INHALATION) at 20:42

## 2024-06-19 RX ADMIN — ACETYLCYSTEINE 600 MG: 200 SOLUTION ORAL; RESPIRATORY (INHALATION) at 08:34

## 2024-06-19 RX ADMIN — POLYVINYL ALCOHOL, POVIDONE 1 DROP: 14; 6 SOLUTION/ DROPS OPHTHALMIC at 04:48

## 2024-06-19 RX ADMIN — SODIUM CHLORIDE, PRESERVATIVE FREE 20 MG: 5 INJECTION INTRAVENOUS at 08:09

## 2024-06-19 RX ADMIN — SODIUM CHLORIDE, PRESERVATIVE FREE 20 MG: 5 INJECTION INTRAVENOUS at 20:23

## 2024-06-19 RX ADMIN — POTASSIUM PHOSPHATE, MONOBASIC AND POTASSIUM PHOSPHATE, DIBASIC 15 MMOL: 224; 236 INJECTION, SOLUTION, CONCENTRATE INTRAVENOUS at 08:22

## 2024-06-19 RX ADMIN — CHLORHEXIDINE GLUCONATE, 0.12% ORAL RINSE 15 ML: 1.2 SOLUTION DENTAL at 08:09

## 2024-06-19 RX ADMIN — SODIUM CHLORIDE, PRESERVATIVE FREE 10 ML: 5 INJECTION INTRAVENOUS at 20:24

## 2024-06-19 RX ADMIN — TIMOLOL MALEATE 1 DROP: 5 SOLUTION OPHTHALMIC at 20:25

## 2024-06-19 RX ADMIN — ACETYLCYSTEINE 600 MG: 200 SOLUTION ORAL; RESPIRATORY (INHALATION) at 20:42

## 2024-06-19 RX ADMIN — ATORVASTATIN CALCIUM 40 MG: 40 TABLET, FILM COATED ORAL at 20:24

## 2024-06-19 RX ADMIN — CHLORHEXIDINE GLUCONATE, 0.12% ORAL RINSE 15 ML: 1.2 SOLUTION DENTAL at 20:23

## 2024-06-19 RX ADMIN — SODIUM CHLORIDE, PRESERVATIVE FREE 10 ML: 5 INJECTION INTRAVENOUS at 08:09

## 2024-06-19 RX ADMIN — POLYVINYL ALCOHOL, POVIDONE 1 DROP: 14; 6 SOLUTION/ DROPS OPHTHALMIC at 20:24

## 2024-06-19 RX ADMIN — TIMOLOL MALEATE 1 DROP: 5 SOLUTION OPHTHALMIC at 08:14

## 2024-06-19 ASSESSMENT — PAIN SCALES - GENERAL
PAINLEVEL_OUTOF10: 0

## 2024-06-19 NOTE — FLOWSHEET NOTE
Patient reaches for ETT when unrestrained, unable to follow verbal redirection. Restraint continued for patient safety.

## 2024-06-19 NOTE — FLOWSHEET NOTE
Patient reaches for ETT when unrestrained, unable to follow verbal redirection. Restraint continued.

## 2024-06-19 NOTE — FLOWSHEET NOTE
Inpatient Wound Care(initial evaluation)  4428    Admit Date: 5/30/2024 12:53 PM    Reason for consult:  buttocks, coccyx    Significant history:  per H & P  Chief Complaint: Strokelike symptoms  Presented to [facility] with strokelike symptoms with left-sided weakness lasted for an hour or started around 11 AM today.  Hypertension presented to ER NIHSS was 0.  CT head obtained negative for any acute findings.  She was found to have elevated blood pressure of 202/81 on presentation.  Of note patient recently had a stress test about a month ago which was negative for ischemia.  Patient also reported she has had some intermittent dizziness.      Findings:     06/19/24 1430   Skin Integumentary    Skin Integrity Ecchymosis;Redness   Location BUE   Skin Integrity Site 2   Skin Integrity Location 2 Ecchymosis   Location 2 abdomen, S/P area, thighs     Oral intubation with vent support  garnica    Plan:  Will need continued preventative care    Brittany Rothman RN 6/19/2024 2:51 PM

## 2024-06-20 ENCOUNTER — APPOINTMENT (OUTPATIENT)
Dept: GENERAL RADIOLOGY | Age: 84
DRG: 003 | End: 2024-06-20
Payer: MEDICARE

## 2024-06-20 LAB
AADO2: 142.9 MMHG
ALBUMIN SERPL-MCNC: 2.4 G/DL (ref 3.5–5.2)
ALP SERPL-CCNC: 54 U/L (ref 35–104)
ALT SERPL-CCNC: 26 U/L (ref 0–32)
ANION GAP SERPL CALCULATED.3IONS-SCNC: 9 MMOL/L (ref 7–16)
AST SERPL-CCNC: 31 U/L (ref 0–31)
B.E.: 2.4 MMOL/L (ref -3–3)
BILIRUB SERPL-MCNC: 0.7 MG/DL (ref 0–1.2)
BUN SERPL-MCNC: 18 MG/DL (ref 6–23)
CALCIUM SERPL-MCNC: 8.6 MG/DL (ref 8.6–10.2)
CHLORIDE SERPL-SCNC: 103 MMOL/L (ref 98–107)
CO2 SERPL-SCNC: 25 MMOL/L (ref 22–29)
COHB: 0.3 % (ref 0–1.5)
CREAT SERPL-MCNC: 0.5 MG/DL (ref 0.5–1)
CRITICAL: ABNORMAL
DATE ANALYZED: ABNORMAL
DATE OF COLLECTION: ABNORMAL
ERYTHROCYTE [DISTWIDTH] IN BLOOD BY AUTOMATED COUNT: 15.3 % (ref 11.5–15)
FIO2: 40 %
GFR, ESTIMATED: >90 ML/MIN/1.73M2
GLUCOSE SERPL-MCNC: 97 MG/DL (ref 74–99)
HCO3: 26 MMOL/L (ref 22–26)
HCT VFR BLD AUTO: 28.2 % (ref 34–48)
HGB BLD-MCNC: 8.7 G/DL (ref 11.5–15.5)
HHB: 2.4 % (ref 0–5)
LAB: ABNORMAL
Lab: 440
MAGNESIUM SERPL-MCNC: 2.4 MG/DL (ref 1.6–2.6)
MCH RBC QN AUTO: 27.9 PG (ref 26–35)
MCHC RBC AUTO-ENTMCNC: 30.9 G/DL (ref 32–34.5)
MCV RBC AUTO: 90.4 FL (ref 80–99.9)
METHB: 0.2 % (ref 0–1.5)
MICROORGANISM SPEC CULT: NO GROWTH
MODE: AC
O2 SATURATION: 98 % (ref 92–98.5)
O2HB: 97.1 % (ref 94–97)
OPERATOR ID: 2593
PATIENT TEMP: 37 C
PCO2: 36.2 MMHG (ref 35–45)
PEEP/CPAP: 5 CMH2O
PFO2: 2.52 MMHG/%
PH BLOOD GAS: 7.47 (ref 7.35–7.45)
PHOSPHATE SERPL-MCNC: 3.3 MG/DL (ref 2.5–4.5)
PLATELET # BLD AUTO: 314 K/UL (ref 130–450)
PMV BLD AUTO: 10.6 FL (ref 7–12)
PO2: 100.7 MMHG (ref 75–100)
POTASSIUM SERPL-SCNC: 4.2 MMOL/L (ref 3.5–5)
PROT SERPL-MCNC: 5.8 G/DL (ref 6.4–8.3)
RBC # BLD AUTO: 3.12 M/UL (ref 3.5–5.5)
RI(T): 1.42
RR MECHANICAL: 12 B/MIN
SERVICE CMNT-IMP: NORMAL
SODIUM SERPL-SCNC: 137 MMOL/L (ref 132–146)
SOURCE, BLOOD GAS: ABNORMAL
SPECIMEN DESCRIPTION: NORMAL
THB: 9.6 G/DL (ref 11.5–16.5)
TIME ANALYZED: 444
VT MECHANICAL: 350 ML
WBC OTHER # BLD: 14.4 K/UL (ref 4.5–11.5)

## 2024-06-20 PROCEDURE — 43246 EGD PLACE GASTROSTOMY TUBE: CPT | Performed by: SURGERY

## 2024-06-20 PROCEDURE — 3E0G76Z INTRODUCTION OF NUTRITIONAL SUBSTANCE INTO UPPER GI, VIA NATURAL OR ARTIFICIAL OPENING: ICD-10-PCS | Performed by: SURGERY

## 2024-06-20 PROCEDURE — 0DH63UZ INSERTION OF FEEDING DEVICE INTO STOMACH, PERCUTANEOUS APPROACH: ICD-10-PCS | Performed by: SURGERY

## 2024-06-20 PROCEDURE — 83735 ASSAY OF MAGNESIUM: CPT

## 2024-06-20 PROCEDURE — 2500000003 HC RX 250 WO HCPCS: Performed by: SURGERY

## 2024-06-20 PROCEDURE — 2580000003 HC RX 258

## 2024-06-20 PROCEDURE — 99291 CRITICAL CARE FIRST HOUR: CPT | Performed by: INTERNAL MEDICINE

## 2024-06-20 PROCEDURE — 85027 COMPLETE CBC AUTOMATED: CPT

## 2024-06-20 PROCEDURE — 0B113F4 BYPASS TRACHEA TO CUTANEOUS WITH TRACHEOSTOMY DEVICE, PERCUTANEOUS APPROACH: ICD-10-PCS | Performed by: STUDENT IN AN ORGANIZED HEALTH CARE EDUCATION/TRAINING PROGRAM

## 2024-06-20 PROCEDURE — 31600 PLANNED TRACHEOSTOMY: CPT | Performed by: SURGERY

## 2024-06-20 PROCEDURE — 71045 X-RAY EXAM CHEST 1 VIEW: CPT

## 2024-06-20 PROCEDURE — 2000000000 HC ICU R&B

## 2024-06-20 PROCEDURE — 6370000000 HC RX 637 (ALT 250 FOR IP)

## 2024-06-20 PROCEDURE — 94640 AIRWAY INHALATION TREATMENT: CPT

## 2024-06-20 PROCEDURE — 6360000002 HC RX W HCPCS

## 2024-06-20 PROCEDURE — 0BJ08ZZ INSPECTION OF TRACHEOBRONCHIAL TREE, VIA NATURAL OR ARTIFICIAL OPENING ENDOSCOPIC: ICD-10-PCS | Performed by: STUDENT IN AN ORGANIZED HEALTH CARE EDUCATION/TRAINING PROGRAM

## 2024-06-20 PROCEDURE — 43762 RPLC GTUBE NO REVJ TRC: CPT

## 2024-06-20 PROCEDURE — 82805 BLOOD GASES W/O2 SATURATION: CPT

## 2024-06-20 PROCEDURE — 99153 MOD SED SAME PHYS/QHP EA: CPT | Performed by: SURGERY

## 2024-06-20 PROCEDURE — 99232 SBSQ HOSP IP/OBS MODERATE 35: CPT | Performed by: STUDENT IN AN ORGANIZED HEALTH CARE EDUCATION/TRAINING PROGRAM

## 2024-06-20 PROCEDURE — 2500000003 HC RX 250 WO HCPCS

## 2024-06-20 PROCEDURE — 99152 MOD SED SAME PHYS/QHP 5/>YRS: CPT | Performed by: SURGERY

## 2024-06-20 PROCEDURE — 6360000002 HC RX W HCPCS: Performed by: SURGERY

## 2024-06-20 PROCEDURE — 84100 ASSAY OF PHOSPHORUS: CPT

## 2024-06-20 PROCEDURE — 2709999900 HC NON-CHARGEABLE SUPPLY: Performed by: SURGERY

## 2024-06-20 PROCEDURE — 6370000000 HC RX 637 (ALT 250 FOR IP): Performed by: CLINICAL NURSE SPECIALIST

## 2024-06-20 PROCEDURE — 6370000000 HC RX 637 (ALT 250 FOR IP): Performed by: INTERNAL MEDICINE

## 2024-06-20 PROCEDURE — 80053 COMPREHEN METABOLIC PANEL: CPT

## 2024-06-20 PROCEDURE — 3609013300 HC EGD TUBE PLACEMENT: Performed by: SURGERY

## 2024-06-20 PROCEDURE — 94003 VENT MGMT INPAT SUBQ DAY: CPT

## 2024-06-20 RX ORDER — WATER 10 ML/10ML
INJECTION INTRAMUSCULAR; INTRAVENOUS; SUBCUTANEOUS
Status: COMPLETED
Start: 2024-06-20 | End: 2024-06-20

## 2024-06-20 RX ORDER — LIDOCAINE HYDROCHLORIDE 10 MG/ML
INJECTION, SOLUTION INFILTRATION; PERINEURAL PRN
Status: DISCONTINUED | OUTPATIENT
Start: 2024-06-20 | End: 2024-06-20 | Stop reason: ALTCHOICE

## 2024-06-20 RX ORDER — MIDAZOLAM HYDROCHLORIDE 2 MG/2ML
2 INJECTION, SOLUTION INTRAMUSCULAR; INTRAVENOUS ONCE
Status: COMPLETED | OUTPATIENT
Start: 2024-06-20 | End: 2024-06-20

## 2024-06-20 RX ADMIN — PSYLLIUM HUSK 1 PACKET: 3.4 POWDER ORAL at 20:21

## 2024-06-20 RX ADMIN — ACETYLCYSTEINE 600 MG: 200 SOLUTION ORAL; RESPIRATORY (INHALATION) at 21:06

## 2024-06-20 RX ADMIN — IPRATROPIUM BROMIDE AND ALBUTEROL SULFATE 1 DOSE: 2.5; .5 SOLUTION RESPIRATORY (INHALATION) at 08:28

## 2024-06-20 RX ADMIN — MIDAZOLAM 2 MG: 1 INJECTION INTRAMUSCULAR; INTRAVENOUS at 14:28

## 2024-06-20 RX ADMIN — TIMOLOL MALEATE 1 DROP: 5 SOLUTION OPHTHALMIC at 20:22

## 2024-06-20 RX ADMIN — TIMOLOL MALEATE 1 DROP: 5 SOLUTION OPHTHALMIC at 08:07

## 2024-06-20 RX ADMIN — BRIMONIDINE TARTRATE 1 DROP: 2 SOLUTION OPHTHALMIC at 08:05

## 2024-06-20 RX ADMIN — WATER 10 ML: 1 INJECTION INTRAMUSCULAR; INTRAVENOUS; SUBCUTANEOUS at 14:28

## 2024-06-20 RX ADMIN — SODIUM CHLORIDE, PRESERVATIVE FREE 10 ML: 5 INJECTION INTRAVENOUS at 20:22

## 2024-06-20 RX ADMIN — SODIUM CHLORIDE, PRESERVATIVE FREE 20 MG: 5 INJECTION INTRAVENOUS at 20:21

## 2024-06-20 RX ADMIN — FENTANYL CITRATE 200 MCG: 50 INJECTION INTRAMUSCULAR; INTRAVENOUS at 14:28

## 2024-06-20 RX ADMIN — IPRATROPIUM BROMIDE AND ALBUTEROL SULFATE 1 DOSE: 2.5; .5 SOLUTION RESPIRATORY (INHALATION) at 21:06

## 2024-06-20 RX ADMIN — VECURONIUM BROMIDE 10 MG: 1 INJECTION, POWDER, LYOPHILIZED, FOR SOLUTION INTRAVENOUS at 14:28

## 2024-06-20 RX ADMIN — POLYVINYL ALCOHOL, POVIDONE 1 DROP: 14; 6 SOLUTION/ DROPS OPHTHALMIC at 20:22

## 2024-06-20 RX ADMIN — CHLORHEXIDINE GLUCONATE, 0.12% ORAL RINSE 15 ML: 1.2 SOLUTION DENTAL at 08:07

## 2024-06-20 RX ADMIN — LATANOPROST 1 DROP: 50 SOLUTION OPHTHALMIC at 20:22

## 2024-06-20 RX ADMIN — SODIUM CHLORIDE, PRESERVATIVE FREE 10 ML: 5 INJECTION INTRAVENOUS at 08:08

## 2024-06-20 RX ADMIN — SENNOSIDES 5 ML: 8.8 LIQUID ORAL at 20:21

## 2024-06-20 RX ADMIN — POLYVINYL ALCOHOL, POVIDONE 1 DROP: 14; 6 SOLUTION/ DROPS OPHTHALMIC at 16:47

## 2024-06-20 RX ADMIN — POLYVINYL ALCOHOL, POVIDONE 1 DROP: 14; 6 SOLUTION/ DROPS OPHTHALMIC at 08:07

## 2024-06-20 RX ADMIN — IPRATROPIUM BROMIDE AND ALBUTEROL SULFATE 1 DOSE: 2.5; .5 SOLUTION RESPIRATORY (INHALATION) at 12:46

## 2024-06-20 RX ADMIN — ACETAMINOPHEN 650 MG: 325 TABLET ORAL at 20:21

## 2024-06-20 RX ADMIN — PIPERACILLIN AND TAZOBACTAM 4500 MG: 4; .5 INJECTION, POWDER, LYOPHILIZED, FOR SOLUTION INTRAVENOUS at 08:27

## 2024-06-20 RX ADMIN — ATORVASTATIN CALCIUM 40 MG: 40 TABLET, FILM COATED ORAL at 20:21

## 2024-06-20 RX ADMIN — POLYVINYL ALCOHOL, POVIDONE 1 DROP: 14; 6 SOLUTION/ DROPS OPHTHALMIC at 00:22

## 2024-06-20 RX ADMIN — BRIMONIDINE TARTRATE 1 DROP: 2 SOLUTION OPHTHALMIC at 20:22

## 2024-06-20 RX ADMIN — CHLORHEXIDINE GLUCONATE, 0.12% ORAL RINSE 15 ML: 1.2 SOLUTION DENTAL at 20:21

## 2024-06-20 RX ADMIN — POLYVINYL ALCOHOL, POVIDONE 1 DROP: 14; 6 SOLUTION/ DROPS OPHTHALMIC at 03:56

## 2024-06-20 RX ADMIN — DORZOLAMIDE HYDROCHLORIDE 1 DROP: 20 SOLUTION/ DROPS OPHTHALMIC at 20:22

## 2024-06-20 RX ADMIN — ACETYLCYSTEINE 600 MG: 200 SOLUTION ORAL; RESPIRATORY (INHALATION) at 08:28

## 2024-06-20 RX ADMIN — POLYVINYL ALCOHOL, POVIDONE 1 DROP: 14; 6 SOLUTION/ DROPS OPHTHALMIC at 12:30

## 2024-06-20 RX ADMIN — DORZOLAMIDE HYDROCHLORIDE 1 DROP: 20 SOLUTION/ DROPS OPHTHALMIC at 08:07

## 2024-06-20 RX ADMIN — SODIUM CHLORIDE, PRESERVATIVE FREE 20 MG: 5 INJECTION INTRAVENOUS at 08:07

## 2024-06-20 RX ADMIN — PIPERACILLIN AND TAZOBACTAM 4500 MG: 4; .5 INJECTION, POWDER, LYOPHILIZED, FOR SOLUTION INTRAVENOUS at 00:22

## 2024-06-20 ASSESSMENT — PAIN SCALES - GENERAL
PAINLEVEL_OUTOF10: 0

## 2024-06-20 NOTE — PROCEDURES
OPERATIVE NOTE    Malissa Chen  1940      Pre-operative Diagnosis: Respiratory failure    Post-operative Diagnosis: Same    Procedure:   1. Bronchoscopy   2. Percutaneous tracheostomy with #8 Shiley   3. Upper endoscopy   4. Percutaneous endoscopic gastrostomy tube    Anesthesia: See MAR    Surgeon: Dr. Layton    Assistant: Keon Cabrera DO    Estimated Blood Loss: Minimal    Complications: none    Specimens: were not obtained    Details of Procedure:    Patient seen at bedside in MICU. Patient was given  4 mg Versed, 200 mcg fentanyl and 10 mg vecuronium were given for sedation. Vitals and BIS were monitored throughout the procedure.    The anterior neck and chest was prepped and draped in the usual sterile fashion. A flexible bronchoscope was inserted into the endotracheal tube. Local anesthetic, 5ml 1% lidocaine, was infiltrated into the skin. A 3 cm transverse skin incision was made with a #15 scalpel, about 2 cm above the sternal notch. A hemostat was used to dissect bluntly down to pretracheal fascia. Next, the bronchoscope and ET tube were positioned so that the ET tube was just distal to the vocal cords. The access needle was inserted into the trachea at above the 3rd tracheal ring. The guidewire was inserted into the needle with Seldinger technique and the needle was withdrawn. The tract was dilated sequentially with the small dilator and then the large dilator over the wire. The #8 Shiley was inserted over the 28 Azerbaijani dilator, and then the dilator and wire were removed. This was performed under direct visualization with the bronchoscope. The inner cannula was placed into the trach. Appropriate trach position was confirmed by end tidal CO2 monitoring and by bronchoscopy through the trach.    Attention was then turned to the abdomen. A bite block was placed in the mouth. A lubricated gastroscope was inserted into the oropharynx and advanced under direct vision to the esophagus and then the

## 2024-06-20 NOTE — CARE COORDINATION
Care Coordination: LOS 20 day. Trach/peg scheduled for today. [facility] unable to accept, [facility] unable to accept, Referral to Adeola at [facility] to review. Guardianship in process and can take at least 30 days- Referral sent to [facility] probate court via certified mail as requested by Don Colindres.. Intubated, Zosyn.  Will follow.    Electronically signed by Teressa Mueller RN on 6/20/2024 at 11:55 AM     ADDENDUM: [facility] can accept, Private vent room, with full time RT.  Will follow    Electronically signed by Teressa Mueller RN on 6/20/2024 at 1:29 PM

## 2024-06-20 NOTE — CARE COORDINATION
Care Coordination: LOS 20 day. Spoke to friend Wendy Shone a few times today.  This afternoon, she contacted Megan at Probate court .  She is in process of filling out papers.for Guardianship, she hopes to expedite in a 2 week period.  Her first choice is [facility] but she understands they cannot accept. She understands pt has been accepted by [facility] and she will follow there once she is discharged and continue to seek guardianship. For now she understands the plan is to proceed with trach and peg today    Electronically signed by Teressa Mueller RN on 6/20/2024 at 3:22 PM

## 2024-06-20 NOTE — FLOWSHEET NOTE
Patient reaches for trach and IV lines when unrestrained, unable to follow verbal redirection. Restraint continued.

## 2024-06-21 ENCOUNTER — APPOINTMENT (OUTPATIENT)
Dept: GENERAL RADIOLOGY | Age: 84
DRG: 003 | End: 2024-06-21
Payer: MEDICARE

## 2024-06-21 ENCOUNTER — APPOINTMENT (OUTPATIENT)
Dept: ULTRASOUND IMAGING | Age: 84
DRG: 003 | End: 2024-06-21
Payer: MEDICARE

## 2024-06-21 LAB
AADO2: 157 MMHG
ALBUMIN SERPL-MCNC: 2.6 G/DL (ref 3.5–5.2)
ALP SERPL-CCNC: 53 U/L (ref 35–104)
ALT SERPL-CCNC: 24 U/L (ref 0–32)
ANION GAP SERPL CALCULATED.3IONS-SCNC: 9 MMOL/L (ref 7–16)
AST SERPL-CCNC: 31 U/L (ref 0–31)
B.E.: 2.7 MMOL/L (ref -3–3)
BASOPHILS # BLD: 0.04 K/UL (ref 0–0.2)
BASOPHILS NFR BLD: 0 % (ref 0–2)
BILIRUB SERPL-MCNC: 0.6 MG/DL (ref 0–1.2)
BUN SERPL-MCNC: 23 MG/DL (ref 6–23)
CALCIUM SERPL-MCNC: 8.6 MG/DL (ref 8.6–10.2)
CHLORIDE SERPL-SCNC: 105 MMOL/L (ref 98–107)
CO2 SERPL-SCNC: 26 MMOL/L (ref 22–29)
COHB: 0.3 % (ref 0–1.5)
CREAT SERPL-MCNC: 0.5 MG/DL (ref 0.5–1)
CRITICAL: ABNORMAL
DATE ANALYZED: ABNORMAL
DATE OF COLLECTION: ABNORMAL
EOSINOPHIL # BLD: 0.21 K/UL (ref 0.05–0.5)
EOSINOPHILS RELATIVE PERCENT: 2 % (ref 0–6)
ERYTHROCYTE [DISTWIDTH] IN BLOOD BY AUTOMATED COUNT: 15 % (ref 11.5–15)
FIO2: 40 %
GFR, ESTIMATED: >90 ML/MIN/1.73M2
GLUCOSE SERPL-MCNC: 114 MG/DL (ref 74–99)
HCO3: 26.1 MMOL/L (ref 22–26)
HCT VFR BLD AUTO: 25.8 % (ref 34–48)
HCT VFR BLD AUTO: 27 % (ref 34–48)
HGB BLD-MCNC: 8.1 G/DL (ref 11.5–15.5)
HGB BLD-MCNC: 8.5 G/DL (ref 11.5–15.5)
HHB: 3.8 % (ref 0–5)
IMM GRANULOCYTES # BLD AUTO: 0.15 K/UL (ref 0–0.58)
IMM GRANULOCYTES NFR BLD: 1 % (ref 0–5)
LAB: ABNORMAL
LYMPHOCYTES NFR BLD: 1.48 K/UL (ref 1.5–4)
LYMPHOCYTES RELATIVE PERCENT: 11 % (ref 20–42)
Lab: 445
MAGNESIUM SERPL-MCNC: 2.4 MG/DL (ref 1.6–2.6)
MCH RBC QN AUTO: 27.8 PG (ref 26–35)
MCHC RBC AUTO-ENTMCNC: 31.5 G/DL (ref 32–34.5)
MCV RBC AUTO: 88.2 FL (ref 80–99.9)
METHB: 0.4 % (ref 0–1.5)
MICROORGANISM SPEC CULT: ABNORMAL
MICROORGANISM SPEC CULT: ABNORMAL
MICROORGANISM/AGENT SPEC: ABNORMAL
MODE: AC
MONOCYTES NFR BLD: 1.43 K/UL (ref 0.1–0.95)
MONOCYTES NFR BLD: 10 % (ref 2–12)
NEUTROPHILS NFR BLD: 76 % (ref 43–80)
NEUTS SEG NFR BLD: 10.77 K/UL (ref 1.8–7.3)
O2 SATURATION: 97.3 % (ref 92–98.5)
O2HB: 95.5 % (ref 94–97)
OPERATOR ID: ABNORMAL
PATIENT TEMP: 37 C
PCO2: 35.3 MMHG (ref 35–45)
PEEP/CPAP: 5 CMH2O
PFO2: 2.19 MMHG/%
PH BLOOD GAS: 7.49 (ref 7.35–7.45)
PHOSPHATE SERPL-MCNC: 3 MG/DL (ref 2.5–4.5)
PLATELET # BLD AUTO: 318 K/UL (ref 130–450)
PMV BLD AUTO: 11 FL (ref 7–12)
PO2: 87.6 MMHG (ref 75–100)
POTASSIUM SERPL-SCNC: 3.9 MMOL/L (ref 3.5–5)
PROT SERPL-MCNC: 5.8 G/DL (ref 6.4–8.3)
RBC # BLD AUTO: 3.06 M/UL (ref 3.5–5.5)
RI(T): 1.79
RR MECHANICAL: 12 B/MIN
SODIUM SERPL-SCNC: 140 MMOL/L (ref 132–146)
SOURCE, BLOOD GAS: ABNORMAL
SPECIMEN DESCRIPTION: ABNORMAL
THB: 9.9 G/DL (ref 11.5–16.5)
TIME ANALYZED: 447
VT MECHANICAL: 350 ML
WBC OTHER # BLD: 14.1 K/UL (ref 4.5–11.5)

## 2024-06-21 PROCEDURE — 83735 ASSAY OF MAGNESIUM: CPT

## 2024-06-21 PROCEDURE — 94003 VENT MGMT INPAT SUBQ DAY: CPT

## 2024-06-21 PROCEDURE — 6370000000 HC RX 637 (ALT 250 FOR IP)

## 2024-06-21 PROCEDURE — 6370000000 HC RX 637 (ALT 250 FOR IP): Performed by: CLINICAL NURSE SPECIALIST

## 2024-06-21 PROCEDURE — 6360000002 HC RX W HCPCS

## 2024-06-21 PROCEDURE — 2500000003 HC RX 250 WO HCPCS

## 2024-06-21 PROCEDURE — 85025 COMPLETE CBC W/AUTO DIFF WBC: CPT

## 2024-06-21 PROCEDURE — 2580000003 HC RX 258

## 2024-06-21 PROCEDURE — 85014 HEMATOCRIT: CPT

## 2024-06-21 PROCEDURE — 97535 SELF CARE MNGMENT TRAINING: CPT

## 2024-06-21 PROCEDURE — 85018 HEMOGLOBIN: CPT

## 2024-06-21 PROCEDURE — 93970 EXTREMITY STUDY: CPT

## 2024-06-21 PROCEDURE — 97530 THERAPEUTIC ACTIVITIES: CPT

## 2024-06-21 PROCEDURE — 99291 CRITICAL CARE FIRST HOUR: CPT | Performed by: INTERNAL MEDICINE

## 2024-06-21 PROCEDURE — 97164 PT RE-EVAL EST PLAN CARE: CPT

## 2024-06-21 PROCEDURE — 84100 ASSAY OF PHOSPHORUS: CPT

## 2024-06-21 PROCEDURE — 6370000000 HC RX 637 (ALT 250 FOR IP): Performed by: INTERNAL MEDICINE

## 2024-06-21 PROCEDURE — 2000000000 HC ICU R&B

## 2024-06-21 PROCEDURE — 80053 COMPREHEN METABOLIC PANEL: CPT

## 2024-06-21 PROCEDURE — 82805 BLOOD GASES W/O2 SATURATION: CPT

## 2024-06-21 PROCEDURE — 94640 AIRWAY INHALATION TREATMENT: CPT

## 2024-06-21 PROCEDURE — 71045 X-RAY EXAM CHEST 1 VIEW: CPT

## 2024-06-21 RX ORDER — IPRATROPIUM BROMIDE AND ALBUTEROL SULFATE 2.5; .5 MG/3ML; MG/3ML
1 SOLUTION RESPIRATORY (INHALATION) 4 TIMES DAILY
Status: DISCONTINUED | OUTPATIENT
Start: 2024-06-21 | End: 2024-06-29

## 2024-06-21 RX ADMIN — TIMOLOL MALEATE 1 DROP: 5 SOLUTION OPHTHALMIC at 20:46

## 2024-06-21 RX ADMIN — DORZOLAMIDE HYDROCHLORIDE 1 DROP: 20 SOLUTION/ DROPS OPHTHALMIC at 20:46

## 2024-06-21 RX ADMIN — ASPIRIN 81 MG 81 MG: 81 TABLET ORAL at 09:07

## 2024-06-21 RX ADMIN — POLYVINYL ALCOHOL, POVIDONE 1 DROP: 14; 6 SOLUTION/ DROPS OPHTHALMIC at 09:04

## 2024-06-21 RX ADMIN — IPRATROPIUM BROMIDE AND ALBUTEROL SULFATE 1 DOSE: .5; 2.5 SOLUTION RESPIRATORY (INHALATION) at 18:48

## 2024-06-21 RX ADMIN — TIMOLOL MALEATE 1 DROP: 5 SOLUTION OPHTHALMIC at 09:04

## 2024-06-21 RX ADMIN — Medication 1000 UNITS: at 09:02

## 2024-06-21 RX ADMIN — BRIMONIDINE TARTRATE 1 DROP: 2 SOLUTION OPHTHALMIC at 20:46

## 2024-06-21 RX ADMIN — POLYVINYL ALCOHOL, POVIDONE 1 DROP: 14; 6 SOLUTION/ DROPS OPHTHALMIC at 11:56

## 2024-06-21 RX ADMIN — ACETYLCYSTEINE 600 MG: 200 SOLUTION ORAL; RESPIRATORY (INHALATION) at 18:48

## 2024-06-21 RX ADMIN — MULTIVIT AND MINERALS-FERROUS GLUCONATE 9 MG IRON/15 ML ORAL LIQUID 15 ML: at 09:07

## 2024-06-21 RX ADMIN — IPRATROPIUM BROMIDE AND ALBUTEROL SULFATE 1 DOSE: .5; 2.5 SOLUTION RESPIRATORY (INHALATION) at 16:29

## 2024-06-21 RX ADMIN — POLYVINYL ALCOHOL, POVIDONE 1 DROP: 14; 6 SOLUTION/ DROPS OPHTHALMIC at 04:16

## 2024-06-21 RX ADMIN — ENOXAPARIN SODIUM 40 MG: 100 INJECTION SUBCUTANEOUS at 09:02

## 2024-06-21 RX ADMIN — DOCUSATE SODIUM 100 MG: 50 LIQUID ORAL at 09:02

## 2024-06-21 RX ADMIN — POLYVINYL ALCOHOL, POVIDONE 1 DROP: 14; 6 SOLUTION/ DROPS OPHTHALMIC at 00:28

## 2024-06-21 RX ADMIN — TICAGRELOR 90 MG: 90 TABLET ORAL at 20:45

## 2024-06-21 RX ADMIN — PSYLLIUM HUSK 1 PACKET: 3.4 POWDER ORAL at 20:45

## 2024-06-21 RX ADMIN — SODIUM CHLORIDE, PRESERVATIVE FREE 20 MG: 5 INJECTION INTRAVENOUS at 09:02

## 2024-06-21 RX ADMIN — ATORVASTATIN CALCIUM 40 MG: 40 TABLET, FILM COATED ORAL at 20:45

## 2024-06-21 RX ADMIN — PSYLLIUM HUSK 1 PACKET: 3.4 POWDER ORAL at 09:02

## 2024-06-21 RX ADMIN — DORZOLAMIDE HYDROCHLORIDE 1 DROP: 20 SOLUTION/ DROPS OPHTHALMIC at 09:04

## 2024-06-21 RX ADMIN — ACETYLCYSTEINE 600 MG: 200 SOLUTION ORAL; RESPIRATORY (INHALATION) at 08:43

## 2024-06-21 RX ADMIN — SODIUM CHLORIDE, PRESERVATIVE FREE 10 ML: 5 INJECTION INTRAVENOUS at 20:46

## 2024-06-21 RX ADMIN — POLYVINYL ALCOHOL, POVIDONE 1 DROP: 14; 6 SOLUTION/ DROPS OPHTHALMIC at 20:46

## 2024-06-21 RX ADMIN — BRIMONIDINE TARTRATE 1 DROP: 2 SOLUTION OPHTHALMIC at 09:04

## 2024-06-21 RX ADMIN — CHLORHEXIDINE GLUCONATE, 0.12% ORAL RINSE 15 ML: 1.2 SOLUTION DENTAL at 09:02

## 2024-06-21 RX ADMIN — IPRATROPIUM BROMIDE AND ALBUTEROL SULFATE 1 DOSE: .5; 2.5 SOLUTION RESPIRATORY (INHALATION) at 12:16

## 2024-06-21 RX ADMIN — IPRATROPIUM BROMIDE AND ALBUTEROL SULFATE 1 DOSE: 2.5; .5 SOLUTION RESPIRATORY (INHALATION) at 08:43

## 2024-06-21 RX ADMIN — POLYVINYL ALCOHOL, POVIDONE 1 DROP: 14; 6 SOLUTION/ DROPS OPHTHALMIC at 16:39

## 2024-06-21 RX ADMIN — ACETAMINOPHEN 650 MG: 325 TABLET ORAL at 04:44

## 2024-06-21 RX ADMIN — POLYETHYLENE GLYCOL 3350 17 GRAM ORAL POWDER PACKET 17 G: at 09:02

## 2024-06-21 RX ADMIN — SODIUM CHLORIDE, PRESERVATIVE FREE 20 MG: 5 INJECTION INTRAVENOUS at 20:45

## 2024-06-21 RX ADMIN — POLYETHYLENE GLYCOL 3350 17 GRAM ORAL POWDER PACKET 17 G: at 20:45

## 2024-06-21 RX ADMIN — CHLORHEXIDINE GLUCONATE, 0.12% ORAL RINSE 15 ML: 1.2 SOLUTION DENTAL at 20:46

## 2024-06-21 RX ADMIN — SENNOSIDES 5 ML: 8.8 LIQUID ORAL at 20:45

## 2024-06-21 RX ADMIN — SODIUM CHLORIDE, PRESERVATIVE FREE 10 ML: 5 INJECTION INTRAVENOUS at 09:03

## 2024-06-21 RX ADMIN — SENNOSIDES 5 ML: 8.8 LIQUID ORAL at 09:02

## 2024-06-21 RX ADMIN — LATANOPROST 1 DROP: 50 SOLUTION OPHTHALMIC at 20:46

## 2024-06-21 ASSESSMENT — PAIN SCALES - GENERAL
PAINLEVEL_OUTOF10: 0

## 2024-06-21 NOTE — CARE COORDINATION
Care Coordination: LOS 21 day.  S/P Trach/peg. Plan is [facility] at discharge. Per ICU team, Okay to start precert,- Spoke to Adeola at [facility] and  will need therapy evals to initiate. I will text her when completed.  Pasrr, transport, mann completed  Electronically signed by Teressa Mueller RN on 6/21/2024 at 9:22 AM     ADDENDUM: Adeola notified evals are complete to start precert      Electronically signed by Teressa Mueller RN on 6/21/2024 at 10:36 AM

## 2024-06-22 ENCOUNTER — APPOINTMENT (OUTPATIENT)
Dept: CT IMAGING | Age: 84
DRG: 003 | End: 2024-06-22
Payer: MEDICARE

## 2024-06-22 ENCOUNTER — APPOINTMENT (OUTPATIENT)
Dept: GENERAL RADIOLOGY | Age: 84
DRG: 003 | End: 2024-06-22
Payer: MEDICARE

## 2024-06-22 LAB
ALBUMIN SERPL-MCNC: 2.4 G/DL (ref 3.5–5.2)
ALP SERPL-CCNC: 59 U/L (ref 35–104)
ALT SERPL-CCNC: 22 U/L (ref 0–32)
ANION GAP SERPL CALCULATED.3IONS-SCNC: 8 MMOL/L (ref 7–16)
AST SERPL-CCNC: 28 U/L (ref 0–31)
B.E.: 3.8 MMOL/L (ref -3–3)
BILIRUB SERPL-MCNC: 0.4 MG/DL (ref 0–1.2)
BNP SERPL-MCNC: 317 PG/ML (ref 0–450)
BUN SERPL-MCNC: 19 MG/DL (ref 6–23)
CALCIUM SERPL-MCNC: 8.2 MG/DL (ref 8.6–10.2)
CHLORIDE SERPL-SCNC: 104 MMOL/L (ref 98–107)
CO2 SERPL-SCNC: 26 MMOL/L (ref 22–29)
COHB: 0.2 % (ref 0–1.5)
CREAT SERPL-MCNC: 0.5 MG/DL (ref 0.5–1)
CRITICAL: ABNORMAL
CRP SERPL HS-MCNC: 55 MG/L (ref 0–5)
DATE ANALYZED: ABNORMAL
DATE OF COLLECTION: ABNORMAL
ERYTHROCYTE [DISTWIDTH] IN BLOOD BY AUTOMATED COUNT: 15.1 % (ref 11.5–15)
GFR, ESTIMATED: >90 ML/MIN/1.73M2
GLUCOSE BLD-MCNC: 134 MG/DL (ref 74–99)
GLUCOSE BLD-MCNC: 187 MG/DL (ref 74–99)
GLUCOSE BLD-MCNC: 201 MG/DL (ref 74–99)
GLUCOSE BLD-MCNC: 209 MG/DL (ref 74–99)
GLUCOSE SERPL-MCNC: 196 MG/DL (ref 74–99)
HCO3: 26.8 MMOL/L (ref 22–26)
HCT VFR BLD AUTO: 25.1 % (ref 34–48)
HGB BLD-MCNC: 7.8 G/DL (ref 11.5–15.5)
HHB: 3.8 % (ref 0–5)
LAB: ABNORMAL
Lab: 515
MAGNESIUM SERPL-MCNC: 2.4 MG/DL (ref 1.6–2.6)
MCH RBC QN AUTO: 28.2 PG (ref 26–35)
MCHC RBC AUTO-ENTMCNC: 31.1 G/DL (ref 32–34.5)
MCV RBC AUTO: 90.6 FL (ref 80–99.9)
METHB: 0.4 % (ref 0–1.5)
MODE: AC
O2 SATURATION: 97.1 % (ref 92–98.5)
O2HB: 95.6 % (ref 94–97)
OPERATOR ID: 2593
PATIENT TEMP: 37 C
PCO2: 34.1 MMHG (ref 35–45)
PEEP/CPAP: 5 CMH2O
PH BLOOD GAS: 7.51 (ref 7.35–7.45)
PHOSPHATE SERPL-MCNC: 2.4 MG/DL (ref 2.5–4.5)
PLATELET # BLD AUTO: 270 K/UL (ref 130–450)
PMV BLD AUTO: 11 FL (ref 7–12)
PO2: 83.3 MMHG (ref 75–100)
POTASSIUM SERPL-SCNC: 3.8 MMOL/L (ref 3.5–5)
PROCALCITONIN SERPL-MCNC: 0.09 NG/ML (ref 0–0.08)
PROT SERPL-MCNC: 5.5 G/DL (ref 6.4–8.3)
RBC # BLD AUTO: 2.77 M/UL (ref 3.5–5.5)
RR MECHANICAL: 12 B/MIN
SODIUM SERPL-SCNC: 138 MMOL/L (ref 132–146)
SOURCE, BLOOD GAS: ABNORMAL
THB: 8.8 G/DL (ref 11.5–16.5)
TIME ANALYZED: 518
VT MECHANICAL: 350 ML
WBC OTHER # BLD: 13.2 K/UL (ref 4.5–11.5)

## 2024-06-22 PROCEDURE — 94640 AIRWAY INHALATION TREATMENT: CPT

## 2024-06-22 PROCEDURE — 6360000002 HC RX W HCPCS

## 2024-06-22 PROCEDURE — 2580000003 HC RX 258

## 2024-06-22 PROCEDURE — 82805 BLOOD GASES W/O2 SATURATION: CPT

## 2024-06-22 PROCEDURE — 6370000000 HC RX 637 (ALT 250 FOR IP): Performed by: CLINICAL NURSE SPECIALIST

## 2024-06-22 PROCEDURE — 6370000000 HC RX 637 (ALT 250 FOR IP)

## 2024-06-22 PROCEDURE — 80053 COMPREHEN METABOLIC PANEL: CPT

## 2024-06-22 PROCEDURE — 6370000000 HC RX 637 (ALT 250 FOR IP): Performed by: INTERNAL MEDICINE

## 2024-06-22 PROCEDURE — 83880 ASSAY OF NATRIURETIC PEPTIDE: CPT

## 2024-06-22 PROCEDURE — 71045 X-RAY EXAM CHEST 1 VIEW: CPT

## 2024-06-22 PROCEDURE — 86140 C-REACTIVE PROTEIN: CPT

## 2024-06-22 PROCEDURE — 85027 COMPLETE CBC AUTOMATED: CPT

## 2024-06-22 PROCEDURE — 99232 SBSQ HOSP IP/OBS MODERATE 35: CPT | Performed by: STUDENT IN AN ORGANIZED HEALTH CARE EDUCATION/TRAINING PROGRAM

## 2024-06-22 PROCEDURE — 2500000003 HC RX 250 WO HCPCS

## 2024-06-22 PROCEDURE — 2580000003 HC RX 258: Performed by: INTERNAL MEDICINE

## 2024-06-22 PROCEDURE — 84145 PROCALCITONIN (PCT): CPT

## 2024-06-22 PROCEDURE — 84100 ASSAY OF PHOSPHORUS: CPT

## 2024-06-22 PROCEDURE — 6360000002 HC RX W HCPCS: Performed by: INTERNAL MEDICINE

## 2024-06-22 PROCEDURE — 94003 VENT MGMT INPAT SUBQ DAY: CPT

## 2024-06-22 PROCEDURE — 73706 CT ANGIO LWR EXTR W/O&W/DYE: CPT

## 2024-06-22 PROCEDURE — 99291 CRITICAL CARE FIRST HOUR: CPT | Performed by: INTERNAL MEDICINE

## 2024-06-22 PROCEDURE — 74018 RADEX ABDOMEN 1 VIEW: CPT

## 2024-06-22 PROCEDURE — 51798 US URINE CAPACITY MEASURE: CPT

## 2024-06-22 PROCEDURE — 2000000000 HC ICU R&B

## 2024-06-22 PROCEDURE — 6360000004 HC RX CONTRAST MEDICATION: Performed by: RADIOLOGY

## 2024-06-22 PROCEDURE — 83735 ASSAY OF MAGNESIUM: CPT

## 2024-06-22 PROCEDURE — 82962 GLUCOSE BLOOD TEST: CPT

## 2024-06-22 PROCEDURE — 99223 1ST HOSP IP/OBS HIGH 75: CPT | Performed by: SURGERY

## 2024-06-22 RX ORDER — POTASSIUM CHLORIDE 7.45 MG/ML
10 INJECTION INTRAVENOUS
Status: DISCONTINUED | OUTPATIENT
Start: 2024-06-22 | End: 2024-06-22

## 2024-06-22 RX ORDER — BISACODYL 10 MG
10 SUPPOSITORY, RECTAL RECTAL DAILY PRN
Status: DISCONTINUED | OUTPATIENT
Start: 2024-06-22 | End: 2024-07-03 | Stop reason: HOSPADM

## 2024-06-22 RX ORDER — SODIUM CHLORIDE FOR INHALATION 3 %
4 VIAL, NEBULIZER (ML) INHALATION ONCE
Status: COMPLETED | OUTPATIENT
Start: 2024-06-22 | End: 2024-06-22

## 2024-06-22 RX ADMIN — IPRATROPIUM BROMIDE AND ALBUTEROL SULFATE 1 DOSE: .5; 2.5 SOLUTION RESPIRATORY (INHALATION) at 16:28

## 2024-06-22 RX ADMIN — POLYVINYL ALCOHOL, POVIDONE 1 DROP: 14; 6 SOLUTION/ DROPS OPHTHALMIC at 10:40

## 2024-06-22 RX ADMIN — IPRATROPIUM BROMIDE AND ALBUTEROL SULFATE 1 DOSE: .5; 2.5 SOLUTION RESPIRATORY (INHALATION) at 08:49

## 2024-06-22 RX ADMIN — POLYVINYL ALCOHOL, POVIDONE 1 DROP: 14; 6 SOLUTION/ DROPS OPHTHALMIC at 20:11

## 2024-06-22 RX ADMIN — DORZOLAMIDE HYDROCHLORIDE 1 DROP: 20 SOLUTION/ DROPS OPHTHALMIC at 08:00

## 2024-06-22 RX ADMIN — BRIMONIDINE TARTRATE 1 DROP: 2 SOLUTION OPHTHALMIC at 08:00

## 2024-06-22 RX ADMIN — POTASSIUM CHLORIDE 10 MEQ: 7.46 INJECTION, SOLUTION INTRAVENOUS at 06:42

## 2024-06-22 RX ADMIN — SODIUM CHLORIDE, PRESERVATIVE FREE 20 MG: 5 INJECTION INTRAVENOUS at 20:11

## 2024-06-22 RX ADMIN — POLYVINYL ALCOHOL, POVIDONE 1 DROP: 14; 6 SOLUTION/ DROPS OPHTHALMIC at 08:01

## 2024-06-22 RX ADMIN — CHLORHEXIDINE GLUCONATE, 0.12% ORAL RINSE 15 ML: 1.2 SOLUTION DENTAL at 20:11

## 2024-06-22 RX ADMIN — DORZOLAMIDE HYDROCHLORIDE 1 DROP: 20 SOLUTION/ DROPS OPHTHALMIC at 20:13

## 2024-06-22 RX ADMIN — Medication 1000 UNITS: at 07:59

## 2024-06-22 RX ADMIN — POLYVINYL ALCOHOL, POVIDONE 1 DROP: 14; 6 SOLUTION/ DROPS OPHTHALMIC at 01:45

## 2024-06-22 RX ADMIN — LATANOPROST 1 DROP: 50 SOLUTION OPHTHALMIC at 20:12

## 2024-06-22 RX ADMIN — SODIUM CHLORIDE, PRESERVATIVE FREE 10 ML: 5 INJECTION INTRAVENOUS at 20:11

## 2024-06-22 RX ADMIN — TICAGRELOR 90 MG: 90 TABLET ORAL at 20:11

## 2024-06-22 RX ADMIN — TIMOLOL MALEATE 1 DROP: 5 SOLUTION OPHTHALMIC at 20:13

## 2024-06-22 RX ADMIN — POLYVINYL ALCOHOL, POVIDONE 1 DROP: 14; 6 SOLUTION/ DROPS OPHTHALMIC at 05:10

## 2024-06-22 RX ADMIN — TIMOLOL MALEATE 1 DROP: 5 SOLUTION OPHTHALMIC at 08:00

## 2024-06-22 RX ADMIN — ASPIRIN 81 MG 81 MG: 81 TABLET ORAL at 07:59

## 2024-06-22 RX ADMIN — IPRATROPIUM BROMIDE AND ALBUTEROL SULFATE 1 DOSE: .5; 2.5 SOLUTION RESPIRATORY (INHALATION) at 12:05

## 2024-06-22 RX ADMIN — SENNOSIDES 5 ML: 8.8 LIQUID ORAL at 08:09

## 2024-06-22 RX ADMIN — SODIUM CHLORIDE 1000 MG: 9 INJECTION INTRAMUSCULAR; INTRAVENOUS; SUBCUTANEOUS at 12:54

## 2024-06-22 RX ADMIN — DOCUSATE SODIUM 100 MG: 50 LIQUID ORAL at 07:59

## 2024-06-22 RX ADMIN — SODIUM CHLORIDE SOLN NEBU 3% 4 ML: 3 NEBU SOLN at 08:49

## 2024-06-22 RX ADMIN — PSYLLIUM HUSK 1 PACKET: 3.4 POWDER ORAL at 08:00

## 2024-06-22 RX ADMIN — ACETYLCYSTEINE 600 MG: 200 SOLUTION ORAL; RESPIRATORY (INHALATION) at 16:28

## 2024-06-22 RX ADMIN — IPRATROPIUM BROMIDE AND ALBUTEROL SULFATE 1 DOSE: .5; 2.5 SOLUTION RESPIRATORY (INHALATION) at 20:34

## 2024-06-22 RX ADMIN — ACETYLCYSTEINE 600 MG: 200 SOLUTION ORAL; RESPIRATORY (INHALATION) at 08:48

## 2024-06-22 RX ADMIN — POLYVINYL ALCOHOL, POVIDONE 1 DROP: 14; 6 SOLUTION/ DROPS OPHTHALMIC at 15:24

## 2024-06-22 RX ADMIN — BRIMONIDINE TARTRATE 1 DROP: 2 SOLUTION OPHTHALMIC at 20:12

## 2024-06-22 RX ADMIN — TICAGRELOR 90 MG: 90 TABLET ORAL at 07:59

## 2024-06-22 RX ADMIN — ATORVASTATIN CALCIUM 40 MG: 40 TABLET, FILM COATED ORAL at 20:11

## 2024-06-22 RX ADMIN — POTASSIUM PHOSPHATE, MONOBASIC AND POTASSIUM PHOSPHATE, DIBASIC 10 MMOL: 224; 236 INJECTION, SOLUTION, CONCENTRATE INTRAVENOUS at 08:56

## 2024-06-22 RX ADMIN — POLYETHYLENE GLYCOL 3350 17 GRAM ORAL POWDER PACKET 17 G: at 08:00

## 2024-06-22 RX ADMIN — SODIUM CHLORIDE, PRESERVATIVE FREE 20 MG: 5 INJECTION INTRAVENOUS at 07:59

## 2024-06-22 RX ADMIN — MULTIVIT AND MINERALS-FERROUS GLUCONATE 9 MG IRON/15 ML ORAL LIQUID 15 ML: at 07:59

## 2024-06-22 RX ADMIN — IOPAMIDOL 75 ML: 755 INJECTION, SOLUTION INTRAVENOUS at 17:04

## 2024-06-22 RX ADMIN — CHLORHEXIDINE GLUCONATE, 0.12% ORAL RINSE 15 ML: 1.2 SOLUTION DENTAL at 08:02

## 2024-06-22 ASSESSMENT — PAIN SCALES - GENERAL
PAINLEVEL_OUTOF10: 0

## 2024-06-22 NOTE — CARE COORDINATION
6/22. Received call from Candis CONTE. [facility] was following the patient. Dr Martin follows the ventilated pts at [facility]. She would like for the pt to go there for continuity of care. A referral was made to [facility], the pt was accepted and the insurance precert was initiated. Spoke to the potential guardian. She wasn't aware of the switch to [facility] and wants the pt to go to [facility]. Per [facility] liaison, they can accept the pt guardianship pending. Ericka Garcia RN

## 2024-06-23 PROBLEM — I77.77: Status: ACTIVE | Noted: 2024-06-23

## 2024-06-23 LAB
ALBUMIN SERPL-MCNC: 2.4 G/DL (ref 3.5–5.2)
ALP SERPL-CCNC: 66 U/L (ref 35–104)
ALT SERPL-CCNC: 28 U/L (ref 0–32)
ANION GAP SERPL CALCULATED.3IONS-SCNC: 9 MMOL/L (ref 7–16)
AST SERPL-CCNC: 31 U/L (ref 0–31)
BILIRUB SERPL-MCNC: 0.4 MG/DL (ref 0–1.2)
BUN SERPL-MCNC: 18 MG/DL (ref 6–23)
CALCIUM SERPL-MCNC: 8.6 MG/DL (ref 8.6–10.2)
CHLORIDE SERPL-SCNC: 105 MMOL/L (ref 98–107)
CO2 SERPL-SCNC: 25 MMOL/L (ref 22–29)
CREAT SERPL-MCNC: 0.5 MG/DL (ref 0.5–1)
ERYTHROCYTE [DISTWIDTH] IN BLOOD BY AUTOMATED COUNT: 15 % (ref 11.5–15)
ERYTHROCYTE [DISTWIDTH] IN BLOOD BY AUTOMATED COUNT: 15.2 % (ref 11.5–15)
GFR, ESTIMATED: >90 ML/MIN/1.73M2
GLUCOSE BLD-MCNC: 187 MG/DL (ref 74–99)
GLUCOSE SERPL-MCNC: 191 MG/DL (ref 74–99)
HCT VFR BLD AUTO: 25.4 % (ref 34–48)
HCT VFR BLD AUTO: 26.4 % (ref 34–48)
HGB BLD-MCNC: 8 G/DL (ref 11.5–15.5)
HGB BLD-MCNC: 8.1 G/DL (ref 11.5–15.5)
MAGNESIUM SERPL-MCNC: 2.4 MG/DL (ref 1.6–2.6)
MCH RBC QN AUTO: 28.2 PG (ref 26–35)
MCH RBC QN AUTO: 28.5 PG (ref 26–35)
MCHC RBC AUTO-ENTMCNC: 30.7 G/DL (ref 32–34.5)
MCHC RBC AUTO-ENTMCNC: 31.5 G/DL (ref 32–34.5)
MCV RBC AUTO: 90.4 FL (ref 80–99.9)
MCV RBC AUTO: 92 FL (ref 80–99.9)
PARTIAL THROMBOPLASTIN TIME: 193.1 SEC (ref 24.5–35.1)
PARTIAL THROMBOPLASTIN TIME: 33.7 SEC (ref 24.5–35.1)
PHOSPHATE SERPL-MCNC: 2.4 MG/DL (ref 2.5–4.5)
PLATELET # BLD AUTO: 250 K/UL (ref 130–450)
PLATELET # BLD AUTO: 264 K/UL (ref 130–450)
PMV BLD AUTO: 10.8 FL (ref 7–12)
PMV BLD AUTO: 10.8 FL (ref 7–12)
POTASSIUM SERPL-SCNC: 4.2 MMOL/L (ref 3.5–5)
PROT SERPL-MCNC: 5.7 G/DL (ref 6.4–8.3)
RBC # BLD AUTO: 2.81 M/UL (ref 3.5–5.5)
RBC # BLD AUTO: 2.87 M/UL (ref 3.5–5.5)
SODIUM SERPL-SCNC: 139 MMOL/L (ref 132–146)
WBC OTHER # BLD: 17.5 K/UL (ref 4.5–11.5)
WBC OTHER # BLD: 20.2 K/UL (ref 4.5–11.5)

## 2024-06-23 PROCEDURE — 6370000000 HC RX 637 (ALT 250 FOR IP): Performed by: INTERNAL MEDICINE

## 2024-06-23 PROCEDURE — 85730 THROMBOPLASTIN TIME PARTIAL: CPT

## 2024-06-23 PROCEDURE — 2580000003 HC RX 258: Performed by: INTERNAL MEDICINE

## 2024-06-23 PROCEDURE — 6360000002 HC RX W HCPCS

## 2024-06-23 PROCEDURE — 99291 CRITICAL CARE FIRST HOUR: CPT | Performed by: INTERNAL MEDICINE

## 2024-06-23 PROCEDURE — 83735 ASSAY OF MAGNESIUM: CPT

## 2024-06-23 PROCEDURE — 94003 VENT MGMT INPAT SUBQ DAY: CPT

## 2024-06-23 PROCEDURE — 2500000003 HC RX 250 WO HCPCS

## 2024-06-23 PROCEDURE — 82962 GLUCOSE BLOOD TEST: CPT

## 2024-06-23 PROCEDURE — 99232 SBSQ HOSP IP/OBS MODERATE 35: CPT | Performed by: SURGERY

## 2024-06-23 PROCEDURE — 6370000000 HC RX 637 (ALT 250 FOR IP)

## 2024-06-23 PROCEDURE — 85027 COMPLETE CBC AUTOMATED: CPT

## 2024-06-23 PROCEDURE — 84100 ASSAY OF PHOSPHORUS: CPT

## 2024-06-23 PROCEDURE — 6370000000 HC RX 637 (ALT 250 FOR IP): Performed by: CLINICAL NURSE SPECIALIST

## 2024-06-23 PROCEDURE — 6360000002 HC RX W HCPCS: Performed by: INTERNAL MEDICINE

## 2024-06-23 PROCEDURE — 99232 SBSQ HOSP IP/OBS MODERATE 35: CPT | Performed by: STUDENT IN AN ORGANIZED HEALTH CARE EDUCATION/TRAINING PROGRAM

## 2024-06-23 PROCEDURE — 80053 COMPREHEN METABOLIC PANEL: CPT

## 2024-06-23 PROCEDURE — 2000000000 HC ICU R&B

## 2024-06-23 PROCEDURE — 2580000003 HC RX 258

## 2024-06-23 PROCEDURE — 94640 AIRWAY INHALATION TREATMENT: CPT

## 2024-06-23 RX ORDER — HEPARIN SODIUM 1000 [USP'U]/ML
80 INJECTION, SOLUTION INTRAVENOUS; SUBCUTANEOUS PRN
Status: DISCONTINUED | OUTPATIENT
Start: 2024-06-23 | End: 2024-06-25 | Stop reason: ALTCHOICE

## 2024-06-23 RX ORDER — HEPARIN SODIUM 1000 [USP'U]/ML
80 INJECTION, SOLUTION INTRAVENOUS; SUBCUTANEOUS ONCE
Status: COMPLETED | OUTPATIENT
Start: 2024-06-23 | End: 2024-06-23

## 2024-06-23 RX ORDER — HEPARIN SODIUM 1000 [USP'U]/ML
40 INJECTION, SOLUTION INTRAVENOUS; SUBCUTANEOUS PRN
Status: DISCONTINUED | OUTPATIENT
Start: 2024-06-23 | End: 2024-06-25 | Stop reason: ALTCHOICE

## 2024-06-23 RX ORDER — HEPARIN SODIUM 10000 [USP'U]/100ML
5-30 INJECTION, SOLUTION INTRAVENOUS CONTINUOUS
Status: DISCONTINUED | OUTPATIENT
Start: 2024-06-23 | End: 2024-06-25

## 2024-06-23 RX ADMIN — SODIUM CHLORIDE, PRESERVATIVE FREE 10 ML: 5 INJECTION INTRAVENOUS at 19:58

## 2024-06-23 RX ADMIN — POLYVINYL ALCOHOL, POVIDONE 1 DROP: 14; 6 SOLUTION/ DROPS OPHTHALMIC at 11:38

## 2024-06-23 RX ADMIN — PSYLLIUM HUSK 1 PACKET: 3.4 POWDER ORAL at 19:58

## 2024-06-23 RX ADMIN — ACETYLCYSTEINE 600 MG: 200 SOLUTION ORAL; RESPIRATORY (INHALATION) at 08:21

## 2024-06-23 RX ADMIN — SODIUM CHLORIDE, PRESERVATIVE FREE 20 MG: 5 INJECTION INTRAVENOUS at 07:41

## 2024-06-23 RX ADMIN — SENNOSIDES 5 ML: 8.8 LIQUID ORAL at 19:58

## 2024-06-23 RX ADMIN — IPRATROPIUM BROMIDE AND ALBUTEROL SULFATE 1 DOSE: .5; 2.5 SOLUTION RESPIRATORY (INHALATION) at 19:54

## 2024-06-23 RX ADMIN — MULTIVIT AND MINERALS-FERROUS GLUCONATE 9 MG IRON/15 ML ORAL LIQUID 15 ML: at 07:55

## 2024-06-23 RX ADMIN — POLYVINYL ALCOHOL, POVIDONE 1 DROP: 14; 6 SOLUTION/ DROPS OPHTHALMIC at 00:02

## 2024-06-23 RX ADMIN — POLYVINYL ALCOHOL, POVIDONE 1 DROP: 14; 6 SOLUTION/ DROPS OPHTHALMIC at 07:43

## 2024-06-23 RX ADMIN — ACETYLCYSTEINE 600 MG: 200 SOLUTION ORAL; RESPIRATORY (INHALATION) at 16:08

## 2024-06-23 RX ADMIN — LATANOPROST 1 DROP: 50 SOLUTION OPHTHALMIC at 19:57

## 2024-06-23 RX ADMIN — TICAGRELOR 90 MG: 90 TABLET ORAL at 19:57

## 2024-06-23 RX ADMIN — POLYETHYLENE GLYCOL 3350 17 GRAM ORAL POWDER PACKET 17 G: at 19:58

## 2024-06-23 RX ADMIN — DORZOLAMIDE HYDROCHLORIDE 1 DROP: 20 SOLUTION/ DROPS OPHTHALMIC at 07:42

## 2024-06-23 RX ADMIN — ASPIRIN 81 MG 81 MG: 81 TABLET ORAL at 07:41

## 2024-06-23 RX ADMIN — POLYVINYL ALCOHOL, POVIDONE 1 DROP: 14; 6 SOLUTION/ DROPS OPHTHALMIC at 04:07

## 2024-06-23 RX ADMIN — PSYLLIUM HUSK 1 PACKET: 3.4 POWDER ORAL at 07:52

## 2024-06-23 RX ADMIN — SENNOSIDES 5 ML: 8.8 LIQUID ORAL at 07:41

## 2024-06-23 RX ADMIN — BRIMONIDINE TARTRATE 1 DROP: 2 SOLUTION OPHTHALMIC at 19:57

## 2024-06-23 RX ADMIN — HEPARIN SODIUM 18 UNITS/KG/HR: 10000 INJECTION, SOLUTION INTRAVENOUS at 13:21

## 2024-06-23 RX ADMIN — IPRATROPIUM BROMIDE AND ALBUTEROL SULFATE 1 DOSE: .5; 2.5 SOLUTION RESPIRATORY (INHALATION) at 08:21

## 2024-06-23 RX ADMIN — SODIUM CHLORIDE, PRESERVATIVE FREE 20 MG: 5 INJECTION INTRAVENOUS at 19:57

## 2024-06-23 RX ADMIN — HEPARIN SODIUM 6100 UNITS: 1000 INJECTION INTRAVENOUS; SUBCUTANEOUS at 13:19

## 2024-06-23 RX ADMIN — ATORVASTATIN CALCIUM 40 MG: 40 TABLET, FILM COATED ORAL at 19:57

## 2024-06-23 RX ADMIN — POLYETHYLENE GLYCOL 3350 17 GRAM ORAL POWDER PACKET 17 G: at 07:52

## 2024-06-23 RX ADMIN — BRIMONIDINE TARTRATE 1 DROP: 2 SOLUTION OPHTHALMIC at 07:41

## 2024-06-23 RX ADMIN — ENOXAPARIN SODIUM 40 MG: 100 INJECTION SUBCUTANEOUS at 07:41

## 2024-06-23 RX ADMIN — SODIUM PHOSPHATE, MONOBASIC, MONOHYDRATE AND SODIUM PHOSPHATE, DIBASIC, ANHYDROUS 15 MMOL: 142; 276 INJECTION, SOLUTION INTRAVENOUS at 08:11

## 2024-06-23 RX ADMIN — DOCUSATE SODIUM 100 MG: 50 LIQUID ORAL at 07:41

## 2024-06-23 RX ADMIN — TIMOLOL MALEATE 1 DROP: 5 SOLUTION OPHTHALMIC at 19:57

## 2024-06-23 RX ADMIN — IPRATROPIUM BROMIDE AND ALBUTEROL SULFATE 1 DOSE: .5; 2.5 SOLUTION RESPIRATORY (INHALATION) at 16:08

## 2024-06-23 RX ADMIN — POLYVINYL ALCOHOL, POVIDONE 1 DROP: 14; 6 SOLUTION/ DROPS OPHTHALMIC at 19:57

## 2024-06-23 RX ADMIN — POLYVINYL ALCOHOL, POVIDONE 1 DROP: 14; 6 SOLUTION/ DROPS OPHTHALMIC at 23:59

## 2024-06-23 RX ADMIN — CHLORHEXIDINE GLUCONATE, 0.12% ORAL RINSE 15 ML: 1.2 SOLUTION DENTAL at 07:56

## 2024-06-23 RX ADMIN — CHLORHEXIDINE GLUCONATE, 0.12% ORAL RINSE 15 ML: 1.2 SOLUTION DENTAL at 19:57

## 2024-06-23 RX ADMIN — IPRATROPIUM BROMIDE AND ALBUTEROL SULFATE 1 DOSE: .5; 2.5 SOLUTION RESPIRATORY (INHALATION) at 12:12

## 2024-06-23 RX ADMIN — SODIUM CHLORIDE 1000 MG: 9 INJECTION INTRAMUSCULAR; INTRAVENOUS; SUBCUTANEOUS at 11:37

## 2024-06-23 RX ADMIN — Medication 1000 UNITS: at 07:41

## 2024-06-23 RX ADMIN — TICAGRELOR 90 MG: 90 TABLET ORAL at 07:41

## 2024-06-23 RX ADMIN — TIMOLOL MALEATE 1 DROP: 5 SOLUTION OPHTHALMIC at 07:41

## 2024-06-23 RX ADMIN — DORZOLAMIDE HYDROCHLORIDE 1 DROP: 20 SOLUTION/ DROPS OPHTHALMIC at 19:57

## 2024-06-23 ASSESSMENT — PAIN SCALES - GENERAL
PAINLEVEL_OUTOF10: 0

## 2024-06-24 LAB
ALBUMIN SERPL-MCNC: 2.3 G/DL (ref 3.5–5.2)
ALP SERPL-CCNC: 73 U/L (ref 35–104)
ALT SERPL-CCNC: 29 U/L (ref 0–32)
ANION GAP SERPL CALCULATED.3IONS-SCNC: 8 MMOL/L (ref 7–16)
AST SERPL-CCNC: 31 U/L (ref 0–31)
BILIRUB SERPL-MCNC: 0.4 MG/DL (ref 0–1.2)
BUN SERPL-MCNC: 20 MG/DL (ref 6–23)
CALCIUM SERPL-MCNC: 8.4 MG/DL (ref 8.6–10.2)
CHLORIDE SERPL-SCNC: 108 MMOL/L (ref 98–107)
CO2 SERPL-SCNC: 28 MMOL/L (ref 22–29)
CREAT SERPL-MCNC: 0.5 MG/DL (ref 0.5–1)
ERYTHROCYTE [DISTWIDTH] IN BLOOD BY AUTOMATED COUNT: 15.3 % (ref 11.5–15)
GFR, ESTIMATED: >90 ML/MIN/1.73M2
GLUCOSE BLD-MCNC: 165 MG/DL (ref 74–99)
GLUCOSE SERPL-MCNC: 161 MG/DL (ref 74–99)
HCT VFR BLD AUTO: 24.2 % (ref 34–48)
HGB BLD-MCNC: 7.4 G/DL (ref 11.5–15.5)
MAGNESIUM SERPL-MCNC: 2.4 MG/DL (ref 1.6–2.6)
MCH RBC QN AUTO: 28.2 PG (ref 26–35)
MCHC RBC AUTO-ENTMCNC: 30.6 G/DL (ref 32–34.5)
MCV RBC AUTO: 92.4 FL (ref 80–99.9)
MICROORGANISM SPEC CULT: NORMAL
MICROORGANISM SPEC CULT: NORMAL
PARTIAL THROMBOPLASTIN TIME: 53.7 SEC (ref 24.5–35.1)
PARTIAL THROMBOPLASTIN TIME: 71.7 SEC (ref 24.5–35.1)
PARTIAL THROMBOPLASTIN TIME: 96.6 SEC (ref 24.5–35.1)
PHOSPHATE SERPL-MCNC: 2.7 MG/DL (ref 2.5–4.5)
PLATELET # BLD AUTO: 241 K/UL (ref 130–450)
PMV BLD AUTO: 11.3 FL (ref 7–12)
POTASSIUM SERPL-SCNC: 4.4 MMOL/L (ref 3.5–5)
PREALB SERPL-MCNC: 11 MG/DL (ref 20–40)
PROT SERPL-MCNC: 5.4 G/DL (ref 6.4–8.3)
RBC # BLD AUTO: 2.62 M/UL (ref 3.5–5.5)
SERVICE CMNT-IMP: NORMAL
SERVICE CMNT-IMP: NORMAL
SODIUM SERPL-SCNC: 144 MMOL/L (ref 132–146)
SPECIMEN DESCRIPTION: NORMAL
SPECIMEN DESCRIPTION: NORMAL
WBC OTHER # BLD: 13.9 K/UL (ref 4.5–11.5)

## 2024-06-24 PROCEDURE — 82962 GLUCOSE BLOOD TEST: CPT

## 2024-06-24 PROCEDURE — 2580000003 HC RX 258

## 2024-06-24 PROCEDURE — 99232 SBSQ HOSP IP/OBS MODERATE 35: CPT | Performed by: STUDENT IN AN ORGANIZED HEALTH CARE EDUCATION/TRAINING PROGRAM

## 2024-06-24 PROCEDURE — 6370000000 HC RX 637 (ALT 250 FOR IP): Performed by: CLINICAL NURSE SPECIALIST

## 2024-06-24 PROCEDURE — 84134 ASSAY OF PREALBUMIN: CPT

## 2024-06-24 PROCEDURE — 83735 ASSAY OF MAGNESIUM: CPT

## 2024-06-24 PROCEDURE — 6370000000 HC RX 637 (ALT 250 FOR IP)

## 2024-06-24 PROCEDURE — 85027 COMPLETE CBC AUTOMATED: CPT

## 2024-06-24 PROCEDURE — 6360000002 HC RX W HCPCS

## 2024-06-24 PROCEDURE — 94003 VENT MGMT INPAT SUBQ DAY: CPT

## 2024-06-24 PROCEDURE — 84100 ASSAY OF PHOSPHORUS: CPT

## 2024-06-24 PROCEDURE — 85730 THROMBOPLASTIN TIME PARTIAL: CPT

## 2024-06-24 PROCEDURE — 6370000000 HC RX 637 (ALT 250 FOR IP): Performed by: INTERNAL MEDICINE

## 2024-06-24 PROCEDURE — 80053 COMPREHEN METABOLIC PANEL: CPT

## 2024-06-24 PROCEDURE — 2580000003 HC RX 258: Performed by: INTERNAL MEDICINE

## 2024-06-24 PROCEDURE — 99291 CRITICAL CARE FIRST HOUR: CPT | Performed by: INTERNAL MEDICINE

## 2024-06-24 PROCEDURE — 6360000002 HC RX W HCPCS: Performed by: INTERNAL MEDICINE

## 2024-06-24 PROCEDURE — 2500000003 HC RX 250 WO HCPCS

## 2024-06-24 PROCEDURE — 94640 AIRWAY INHALATION TREATMENT: CPT

## 2024-06-24 PROCEDURE — 2000000000 HC ICU R&B

## 2024-06-24 RX ADMIN — SODIUM CHLORIDE, PRESERVATIVE FREE 10 ML: 5 INJECTION INTRAVENOUS at 20:33

## 2024-06-24 RX ADMIN — IPRATROPIUM BROMIDE AND ALBUTEROL SULFATE 1 DOSE: .5; 2.5 SOLUTION RESPIRATORY (INHALATION) at 19:26

## 2024-06-24 RX ADMIN — ASPIRIN 81 MG 81 MG: 81 TABLET ORAL at 09:06

## 2024-06-24 RX ADMIN — DOCUSATE SODIUM 100 MG: 50 LIQUID ORAL at 09:08

## 2024-06-24 RX ADMIN — POLYETHYLENE GLYCOL 3350 17 GRAM ORAL POWDER PACKET 17 G: at 09:10

## 2024-06-24 RX ADMIN — SENNOSIDES 5 ML: 8.8 LIQUID ORAL at 09:13

## 2024-06-24 RX ADMIN — HEPARIN SODIUM 13 UNITS/KG/HR: 10000 INJECTION, SOLUTION INTRAVENOUS at 20:29

## 2024-06-24 RX ADMIN — IPRATROPIUM BROMIDE AND ALBUTEROL SULFATE 1 DOSE: .5; 2.5 SOLUTION RESPIRATORY (INHALATION) at 13:14

## 2024-06-24 RX ADMIN — TICAGRELOR 90 MG: 90 TABLET ORAL at 20:31

## 2024-06-24 RX ADMIN — IPRATROPIUM BROMIDE AND ALBUTEROL SULFATE 1 DOSE: .5; 2.5 SOLUTION RESPIRATORY (INHALATION) at 09:28

## 2024-06-24 RX ADMIN — SODIUM CHLORIDE 1000 MG: 9 INJECTION INTRAMUSCULAR; INTRAVENOUS; SUBCUTANEOUS at 12:50

## 2024-06-24 RX ADMIN — SODIUM CHLORIDE, PRESERVATIVE FREE 20 MG: 5 INJECTION INTRAVENOUS at 20:31

## 2024-06-24 RX ADMIN — SODIUM CHLORIDE, PRESERVATIVE FREE 20 MG: 5 INJECTION INTRAVENOUS at 09:09

## 2024-06-24 RX ADMIN — TIMOLOL MALEATE 1 DROP: 5 SOLUTION OPHTHALMIC at 09:07

## 2024-06-24 RX ADMIN — DORZOLAMIDE HYDROCHLORIDE 1 DROP: 20 SOLUTION/ DROPS OPHTHALMIC at 20:30

## 2024-06-24 RX ADMIN — BRIMONIDINE TARTRATE 1 DROP: 2 SOLUTION OPHTHALMIC at 09:07

## 2024-06-24 RX ADMIN — PSYLLIUM HUSK 1 PACKET: 3.4 POWDER ORAL at 09:13

## 2024-06-24 RX ADMIN — POLYVINYL ALCOHOL, POVIDONE 1 DROP: 14; 6 SOLUTION/ DROPS OPHTHALMIC at 04:25

## 2024-06-24 RX ADMIN — MULTIVIT AND MINERALS-FERROUS GLUCONATE 9 MG IRON/15 ML ORAL LIQUID 15 ML: at 09:09

## 2024-06-24 RX ADMIN — SODIUM CHLORIDE, PRESERVATIVE FREE 10 ML: 5 INJECTION INTRAVENOUS at 09:14

## 2024-06-24 RX ADMIN — CHLORHEXIDINE GLUCONATE, 0.12% ORAL RINSE 15 ML: 1.2 SOLUTION DENTAL at 09:07

## 2024-06-24 RX ADMIN — LATANOPROST 1 DROP: 50 SOLUTION OPHTHALMIC at 20:32

## 2024-06-24 RX ADMIN — POLYVINYL ALCOHOL, POVIDONE 1 DROP: 14; 6 SOLUTION/ DROPS OPHTHALMIC at 09:10

## 2024-06-24 RX ADMIN — IPRATROPIUM BROMIDE AND ALBUTEROL SULFATE 1 DOSE: .5; 2.5 SOLUTION RESPIRATORY (INHALATION) at 16:18

## 2024-06-24 RX ADMIN — DORZOLAMIDE HYDROCHLORIDE 1 DROP: 20 SOLUTION/ DROPS OPHTHALMIC at 09:08

## 2024-06-24 RX ADMIN — Medication 1000 UNITS: at 09:13

## 2024-06-24 RX ADMIN — POLYVINYL ALCOHOL, POVIDONE 1 DROP: 14; 6 SOLUTION/ DROPS OPHTHALMIC at 12:50

## 2024-06-24 RX ADMIN — ATORVASTATIN CALCIUM 40 MG: 40 TABLET, FILM COATED ORAL at 20:31

## 2024-06-24 RX ADMIN — BRIMONIDINE TARTRATE 1 DROP: 2 SOLUTION OPHTHALMIC at 20:30

## 2024-06-24 RX ADMIN — TIMOLOL MALEATE 1 DROP: 5 SOLUTION OPHTHALMIC at 20:30

## 2024-06-24 RX ADMIN — TICAGRELOR 90 MG: 90 TABLET ORAL at 09:13

## 2024-06-24 RX ADMIN — CHLORHEXIDINE GLUCONATE, 0.12% ORAL RINSE 15 ML: 1.2 SOLUTION DENTAL at 20:31

## 2024-06-24 ASSESSMENT — PAIN SCALES - GENERAL
PAINLEVEL_OUTOF10: 0

## 2024-06-24 NOTE — CARE COORDINATION
Care Coordination: LOS 24 day: Precert pending for [facility], received message form [facility] at 1045, that P2P is being offered before determination. spoke to resident, he tried to get through but message asked to call back later, Called the attending and she will attempt as well.       Electronically signed by Teressa Mueller RN on 6/24/2024 at 11:18 AM

## 2024-06-25 ENCOUNTER — APPOINTMENT (OUTPATIENT)
Dept: ULTRASOUND IMAGING | Age: 84
DRG: 003 | End: 2024-06-25
Payer: MEDICARE

## 2024-06-25 LAB
AADO2: 110.8 MMHG
ANION GAP SERPL CALCULATED.3IONS-SCNC: 9 MMOL/L (ref 7–16)
ANION GAP SERPL CALCULATED.3IONS-SCNC: 9 MMOL/L (ref 7–16)
B.E.: 4.7 MMOL/L (ref -3–3)
BUN SERPL-MCNC: 17 MG/DL (ref 6–23)
BUN SERPL-MCNC: 19 MG/DL (ref 6–23)
CALCIUM SERPL-MCNC: 8.4 MG/DL (ref 8.6–10.2)
CALCIUM SERPL-MCNC: 8.6 MG/DL (ref 8.6–10.2)
CHLORIDE SERPL-SCNC: 105 MMOL/L (ref 98–107)
CHLORIDE SERPL-SCNC: 106 MMOL/L (ref 98–107)
CO2 SERPL-SCNC: 24 MMOL/L (ref 22–29)
CO2 SERPL-SCNC: 26 MMOL/L (ref 22–29)
COHB: 0.6 % (ref 0–1.5)
CREAT SERPL-MCNC: 0.4 MG/DL (ref 0.5–1)
CREAT SERPL-MCNC: 0.5 MG/DL (ref 0.5–1)
CRITICAL: ABNORMAL
DATE ANALYZED: ABNORMAL
DATE OF COLLECTION: ABNORMAL
ERYTHROCYTE [DISTWIDTH] IN BLOOD BY AUTOMATED COUNT: 15.2 % (ref 11.5–15)
FIO2: 40 %
GFR, ESTIMATED: >90 ML/MIN/1.73M2
GFR, ESTIMATED: >90 ML/MIN/1.73M2
GLUCOSE BLD-MCNC: 157 MG/DL (ref 74–99)
GLUCOSE SERPL-MCNC: 136 MG/DL (ref 74–99)
GLUCOSE SERPL-MCNC: 147 MG/DL (ref 74–99)
HCO3: 28.6 MMOL/L (ref 22–26)
HCT VFR BLD AUTO: 24.4 % (ref 34–48)
HGB BLD-MCNC: 7.2 G/DL (ref 11.5–15.5)
HHB: 1.3 % (ref 0–5)
INR PPP: 1.2
LAB: ABNORMAL
Lab: 500
MAGNESIUM SERPL-MCNC: 2.5 MG/DL (ref 1.6–2.6)
MCH RBC QN AUTO: 27.1 PG (ref 26–35)
MCHC RBC AUTO-ENTMCNC: 29.5 G/DL (ref 32–34.5)
MCV RBC AUTO: 91.7 FL (ref 80–99.9)
METHB: 0.4 % (ref 0–1.5)
MODE: ABNORMAL
O2 SATURATION: 98.7 % (ref 92–98.5)
O2HB: 97.7 % (ref 94–97)
OPERATOR ID: 2593
PARTIAL THROMBOPLASTIN TIME: 28 SEC (ref 24.5–35.1)
PATIENT TEMP: 37 C
PCO2: 39.7 MMHG (ref 35–45)
PEEP/CPAP: 8 CMH2O
PFO2: 3.22 MMHG/%
PH BLOOD GAS: 7.48 (ref 7.35–7.45)
PHOSPHATE SERPL-MCNC: 2.8 MG/DL (ref 2.5–4.5)
PLATELET # BLD AUTO: 256 K/UL (ref 130–450)
PMV BLD AUTO: 11.1 FL (ref 7–12)
PO2: 128.7 MMHG (ref 75–100)
POTASSIUM SERPL-SCNC: 4.4 MMOL/L (ref 3.5–5)
POTASSIUM SERPL-SCNC: 4.6 MMOL/L (ref 3.5–5)
PROTHROMBIN TIME: 12.8 SEC (ref 9.3–12.4)
PS: 8 CMH20
RBC # BLD AUTO: 2.66 M/UL (ref 3.5–5.5)
RI(T): 0.86
RR MECHANICAL: 8 B/MIN
SODIUM SERPL-SCNC: 139 MMOL/L (ref 132–146)
SODIUM SERPL-SCNC: 140 MMOL/L (ref 132–146)
SOURCE, BLOOD GAS: ABNORMAL
THB: 8.3 G/DL (ref 11.5–16.5)
TIME ANALYZED: 502
TROPONIN I SERPL HS-MCNC: 26 NG/L (ref 0–9)
VT MECHANICAL: 320 ML
WBC OTHER # BLD: 12.9 K/UL (ref 4.5–11.5)

## 2024-06-25 PROCEDURE — 6360000002 HC RX W HCPCS: Performed by: INTERNAL MEDICINE

## 2024-06-25 PROCEDURE — 99231 SBSQ HOSP IP/OBS SF/LOW 25: CPT | Performed by: STUDENT IN AN ORGANIZED HEALTH CARE EDUCATION/TRAINING PROGRAM

## 2024-06-25 PROCEDURE — 84484 ASSAY OF TROPONIN QUANT: CPT

## 2024-06-25 PROCEDURE — 97110 THERAPEUTIC EXERCISES: CPT

## 2024-06-25 PROCEDURE — 80048 BASIC METABOLIC PNL TOTAL CA: CPT

## 2024-06-25 PROCEDURE — 82805 BLOOD GASES W/O2 SATURATION: CPT

## 2024-06-25 PROCEDURE — 85730 THROMBOPLASTIN TIME PARTIAL: CPT

## 2024-06-25 PROCEDURE — 6370000000 HC RX 637 (ALT 250 FOR IP)

## 2024-06-25 PROCEDURE — 94003 VENT MGMT INPAT SUBQ DAY: CPT

## 2024-06-25 PROCEDURE — 6370000000 HC RX 637 (ALT 250 FOR IP): Performed by: CLINICAL NURSE SPECIALIST

## 2024-06-25 PROCEDURE — 94640 AIRWAY INHALATION TREATMENT: CPT

## 2024-06-25 PROCEDURE — 6370000000 HC RX 637 (ALT 250 FOR IP): Performed by: INTERNAL MEDICINE

## 2024-06-25 PROCEDURE — 99291 CRITICAL CARE FIRST HOUR: CPT | Performed by: INTERNAL MEDICINE

## 2024-06-25 PROCEDURE — 85610 PROTHROMBIN TIME: CPT

## 2024-06-25 PROCEDURE — 97530 THERAPEUTIC ACTIVITIES: CPT

## 2024-06-25 PROCEDURE — 2580000003 HC RX 258

## 2024-06-25 PROCEDURE — 84100 ASSAY OF PHOSPHORUS: CPT

## 2024-06-25 PROCEDURE — 83735 ASSAY OF MAGNESIUM: CPT

## 2024-06-25 PROCEDURE — 2500000003 HC RX 250 WO HCPCS

## 2024-06-25 PROCEDURE — 99232 SBSQ HOSP IP/OBS MODERATE 35: CPT | Performed by: INTERNAL MEDICINE

## 2024-06-25 PROCEDURE — 82962 GLUCOSE BLOOD TEST: CPT

## 2024-06-25 PROCEDURE — 2580000003 HC RX 258: Performed by: INTERNAL MEDICINE

## 2024-06-25 PROCEDURE — 93926 LOWER EXTREMITY STUDY: CPT

## 2024-06-25 PROCEDURE — 85027 COMPLETE CBC AUTOMATED: CPT

## 2024-06-25 PROCEDURE — 2060000000 HC ICU INTERMEDIATE R&B

## 2024-06-25 RX ORDER — ENOXAPARIN SODIUM 100 MG/ML
40 INJECTION SUBCUTANEOUS DAILY
Status: DISCONTINUED | OUTPATIENT
Start: 2024-06-26 | End: 2024-06-28

## 2024-06-25 RX ADMIN — BRIMONIDINE TARTRATE 1 DROP: 2 SOLUTION OPHTHALMIC at 08:53

## 2024-06-25 RX ADMIN — CHLORHEXIDINE GLUCONATE, 0.12% ORAL RINSE 15 ML: 1.2 SOLUTION DENTAL at 20:47

## 2024-06-25 RX ADMIN — ASPIRIN 81 MG 81 MG: 81 TABLET ORAL at 08:52

## 2024-06-25 RX ADMIN — POLYVINYL ALCOHOL, POVIDONE 1 DROP: 14; 6 SOLUTION/ DROPS OPHTHALMIC at 17:12

## 2024-06-25 RX ADMIN — SODIUM CHLORIDE, PRESERVATIVE FREE 20 MG: 5 INJECTION INTRAVENOUS at 20:47

## 2024-06-25 RX ADMIN — IPRATROPIUM BROMIDE AND ALBUTEROL SULFATE 1 DOSE: .5; 2.5 SOLUTION RESPIRATORY (INHALATION) at 16:08

## 2024-06-25 RX ADMIN — MULTIVIT AND MINERALS-FERROUS GLUCONATE 9 MG IRON/15 ML ORAL LIQUID 15 ML: at 08:57

## 2024-06-25 RX ADMIN — DORZOLAMIDE HYDROCHLORIDE 1 DROP: 20 SOLUTION/ DROPS OPHTHALMIC at 08:55

## 2024-06-25 RX ADMIN — POLYVINYL ALCOHOL, POVIDONE 1 DROP: 14; 6 SOLUTION/ DROPS OPHTHALMIC at 08:58

## 2024-06-25 RX ADMIN — TIMOLOL MALEATE 1 DROP: 5 SOLUTION OPHTHALMIC at 08:53

## 2024-06-25 RX ADMIN — POLYVINYL ALCOHOL, POVIDONE 1 DROP: 14; 6 SOLUTION/ DROPS OPHTHALMIC at 19:59

## 2024-06-25 RX ADMIN — TICAGRELOR 90 MG: 90 TABLET ORAL at 20:48

## 2024-06-25 RX ADMIN — DOCUSATE SODIUM 100 MG: 50 LIQUID ORAL at 08:55

## 2024-06-25 RX ADMIN — CHLORHEXIDINE GLUCONATE, 0.12% ORAL RINSE 15 ML: 1.2 SOLUTION DENTAL at 08:55

## 2024-06-25 RX ADMIN — POLYETHYLENE GLYCOL 3350 17 GRAM ORAL POWDER PACKET 17 G: at 20:47

## 2024-06-25 RX ADMIN — SODIUM CHLORIDE 1000 MG: 9 INJECTION INTRAMUSCULAR; INTRAVENOUS; SUBCUTANEOUS at 11:57

## 2024-06-25 RX ADMIN — TICAGRELOR 90 MG: 90 TABLET ORAL at 08:58

## 2024-06-25 RX ADMIN — SODIUM CHLORIDE, PRESERVATIVE FREE 10 ML: 5 INJECTION INTRAVENOUS at 08:59

## 2024-06-25 RX ADMIN — POLYVINYL ALCOHOL, POVIDONE 1 DROP: 14; 6 SOLUTION/ DROPS OPHTHALMIC at 06:32

## 2024-06-25 RX ADMIN — SENNOSIDES 5 ML: 8.8 LIQUID ORAL at 09:00

## 2024-06-25 RX ADMIN — IPRATROPIUM BROMIDE AND ALBUTEROL SULFATE 1 DOSE: .5; 2.5 SOLUTION RESPIRATORY (INHALATION) at 08:07

## 2024-06-25 RX ADMIN — BRIMONIDINE TARTRATE 1 DROP: 2 SOLUTION OPHTHALMIC at 20:55

## 2024-06-25 RX ADMIN — SODIUM CHLORIDE, PRESERVATIVE FREE 10 ML: 5 INJECTION INTRAVENOUS at 21:02

## 2024-06-25 RX ADMIN — POLYVINYL ALCOHOL, POVIDONE 1 DROP: 14; 6 SOLUTION/ DROPS OPHTHALMIC at 11:57

## 2024-06-25 RX ADMIN — ATORVASTATIN CALCIUM 40 MG: 40 TABLET, FILM COATED ORAL at 20:48

## 2024-06-25 RX ADMIN — PSYLLIUM HUSK 1 PACKET: 3.4 POWDER ORAL at 09:00

## 2024-06-25 RX ADMIN — IPRATROPIUM BROMIDE AND ALBUTEROL SULFATE 1 DOSE: .5; 2.5 SOLUTION RESPIRATORY (INHALATION) at 11:40

## 2024-06-25 RX ADMIN — POLYETHYLENE GLYCOL 3350 17 GRAM ORAL POWDER PACKET 17 G: at 08:57

## 2024-06-25 RX ADMIN — DORZOLAMIDE HYDROCHLORIDE 1 DROP: 20 SOLUTION/ DROPS OPHTHALMIC at 20:48

## 2024-06-25 RX ADMIN — SENNOSIDES 5 ML: 8.8 LIQUID ORAL at 20:47

## 2024-06-25 RX ADMIN — Medication 1000 UNITS: at 08:58

## 2024-06-25 RX ADMIN — LATANOPROST 1 DROP: 50 SOLUTION OPHTHALMIC at 20:50

## 2024-06-25 RX ADMIN — POLYVINYL ALCOHOL, POVIDONE 1 DROP: 14; 6 SOLUTION/ DROPS OPHTHALMIC at 00:28

## 2024-06-25 RX ADMIN — SODIUM CHLORIDE, PRESERVATIVE FREE 20 MG: 5 INJECTION INTRAVENOUS at 08:56

## 2024-06-25 RX ADMIN — TIMOLOL MALEATE 1 DROP: 5 SOLUTION OPHTHALMIC at 21:01

## 2024-06-25 RX ADMIN — IPRATROPIUM BROMIDE AND ALBUTEROL SULFATE 1 DOSE: .5; 2.5 SOLUTION RESPIRATORY (INHALATION) at 20:23

## 2024-06-25 RX ADMIN — PSYLLIUM HUSK 1 PACKET: 3.4 POWDER ORAL at 20:47

## 2024-06-25 ASSESSMENT — PAIN SCALES - GENERAL
PAINLEVEL_OUTOF10: 0
PAINLEVEL_OUTOF10: 0

## 2024-06-26 ENCOUNTER — APPOINTMENT (OUTPATIENT)
Dept: ULTRASOUND IMAGING | Age: 84
DRG: 003 | End: 2024-06-26
Payer: MEDICARE

## 2024-06-26 LAB
ERYTHROCYTE [DISTWIDTH] IN BLOOD BY AUTOMATED COUNT: 14.9 % (ref 11.5–15)
GLUCOSE BLD-MCNC: 119 MG/DL (ref 74–99)
GLUCOSE BLD-MCNC: 132 MG/DL (ref 74–99)
HCT VFR BLD AUTO: 24.6 % (ref 34–48)
HCT VFR BLD AUTO: 25.9 % (ref 34–48)
HGB BLD-MCNC: 7.8 G/DL (ref 11.5–15.5)
HGB BLD-MCNC: 7.8 G/DL (ref 11.5–15.5)
MAGNESIUM SERPL-MCNC: 2.3 MG/DL (ref 1.6–2.6)
MCH RBC QN AUTO: 27.1 PG (ref 26–35)
MCHC RBC AUTO-ENTMCNC: 30.1 G/DL (ref 32–34.5)
MCV RBC AUTO: 89.9 FL (ref 80–99.9)
PHOSPHATE SERPL-MCNC: 3.3 MG/DL (ref 2.5–4.5)
PLATELET # BLD AUTO: 279 K/UL (ref 130–450)
PMV BLD AUTO: 11.1 FL (ref 7–12)
RBC # BLD AUTO: 2.88 M/UL (ref 3.5–5.5)
TROPONIN I SERPL HS-MCNC: 27 NG/L (ref 0–9)
WBC OTHER # BLD: 12.8 K/UL (ref 4.5–11.5)

## 2024-06-26 PROCEDURE — 6360000002 HC RX W HCPCS: Performed by: INTERNAL MEDICINE

## 2024-06-26 PROCEDURE — 99232 SBSQ HOSP IP/OBS MODERATE 35: CPT | Performed by: INTERNAL MEDICINE

## 2024-06-26 PROCEDURE — 6360000002 HC RX W HCPCS

## 2024-06-26 PROCEDURE — C9113 INJ PANTOPRAZOLE SODIUM, VIA: HCPCS

## 2024-06-26 PROCEDURE — 85018 HEMOGLOBIN: CPT

## 2024-06-26 PROCEDURE — 36002 PSEUDOANEURYSM INJECTION TRT: CPT

## 2024-06-26 PROCEDURE — 6370000000 HC RX 637 (ALT 250 FOR IP): Performed by: CLINICAL NURSE SPECIALIST

## 2024-06-26 PROCEDURE — 2580000003 HC RX 258: Performed by: INTERNAL MEDICINE

## 2024-06-26 PROCEDURE — 2580000003 HC RX 258

## 2024-06-26 PROCEDURE — 85014 HEMATOCRIT: CPT

## 2024-06-26 PROCEDURE — 85027 COMPLETE CBC AUTOMATED: CPT

## 2024-06-26 PROCEDURE — 84100 ASSAY OF PHOSPHORUS: CPT

## 2024-06-26 PROCEDURE — 6370000000 HC RX 637 (ALT 250 FOR IP)

## 2024-06-26 PROCEDURE — 82962 GLUCOSE BLOOD TEST: CPT

## 2024-06-26 PROCEDURE — 2060000000 HC ICU INTERMEDIATE R&B

## 2024-06-26 PROCEDURE — 6370000000 HC RX 637 (ALT 250 FOR IP): Performed by: INTERNAL MEDICINE

## 2024-06-26 PROCEDURE — 94003 VENT MGMT INPAT SUBQ DAY: CPT

## 2024-06-26 PROCEDURE — 94640 AIRWAY INHALATION TREATMENT: CPT

## 2024-06-26 PROCEDURE — 83735 ASSAY OF MAGNESIUM: CPT

## 2024-06-26 PROCEDURE — 84484 ASSAY OF TROPONIN QUANT: CPT

## 2024-06-26 PROCEDURE — 2500000003 HC RX 250 WO HCPCS

## 2024-06-26 PROCEDURE — 2500000003 HC RX 250 WO HCPCS: Performed by: RADIOLOGY

## 2024-06-26 PROCEDURE — 99291 CRITICAL CARE FIRST HOUR: CPT | Performed by: INTERNAL MEDICINE

## 2024-06-26 RX ADMIN — SODIUM CHLORIDE 1000 MG: 9 INJECTION INTRAMUSCULAR; INTRAVENOUS; SUBCUTANEOUS at 11:59

## 2024-06-26 RX ADMIN — SODIUM CHLORIDE, PRESERVATIVE FREE 10 ML: 5 INJECTION INTRAVENOUS at 22:04

## 2024-06-26 RX ADMIN — IPRATROPIUM BROMIDE AND ALBUTEROL SULFATE 1 DOSE: .5; 2.5 SOLUTION RESPIRATORY (INHALATION) at 16:32

## 2024-06-26 RX ADMIN — IPRATROPIUM BROMIDE AND ALBUTEROL SULFATE 1 DOSE: .5; 2.5 SOLUTION RESPIRATORY (INHALATION) at 11:42

## 2024-06-26 RX ADMIN — SENNOSIDES 5 ML: 8.8 LIQUID ORAL at 22:06

## 2024-06-26 RX ADMIN — POLYVINYL ALCOHOL, POVIDONE 1 DROP: 14; 6 SOLUTION/ DROPS OPHTHALMIC at 20:37

## 2024-06-26 RX ADMIN — DORZOLAMIDE HYDROCHLORIDE 1 DROP: 20 SOLUTION/ DROPS OPHTHALMIC at 09:02

## 2024-06-26 RX ADMIN — THROMBIN, TOPICAL (BOVINE) 5000 UNITS: KIT at 10:56

## 2024-06-26 RX ADMIN — SODIUM CHLORIDE, PRESERVATIVE FREE 20 MG: 5 INJECTION INTRAVENOUS at 09:03

## 2024-06-26 RX ADMIN — POLYETHYLENE GLYCOL 3350 17 GRAM ORAL POWDER PACKET 17 G: at 21:56

## 2024-06-26 RX ADMIN — POLYVINYL ALCOHOL, POVIDONE 1 DROP: 14; 6 SOLUTION/ DROPS OPHTHALMIC at 16:46

## 2024-06-26 RX ADMIN — PSYLLIUM HUSK 1 PACKET: 3.4 POWDER ORAL at 08:59

## 2024-06-26 RX ADMIN — TIMOLOL MALEATE 1 DROP: 5 SOLUTION OPHTHALMIC at 22:31

## 2024-06-26 RX ADMIN — DORZOLAMIDE HYDROCHLORIDE 1 DROP: 20 SOLUTION/ DROPS OPHTHALMIC at 22:04

## 2024-06-26 RX ADMIN — TICAGRELOR 90 MG: 90 TABLET ORAL at 22:07

## 2024-06-26 RX ADMIN — CHLORHEXIDINE GLUCONATE, 0.12% ORAL RINSE 15 ML: 1.2 SOLUTION DENTAL at 09:00

## 2024-06-26 RX ADMIN — ENOXAPARIN SODIUM 40 MG: 100 INJECTION SUBCUTANEOUS at 09:02

## 2024-06-26 RX ADMIN — IPRATROPIUM BROMIDE AND ALBUTEROL SULFATE 1 DOSE: .5; 2.5 SOLUTION RESPIRATORY (INHALATION) at 08:42

## 2024-06-26 RX ADMIN — CHLORHEXIDINE GLUCONATE, 0.12% ORAL RINSE 15 ML: 1.2 SOLUTION DENTAL at 22:15

## 2024-06-26 RX ADMIN — POLYVINYL ALCOHOL, POVIDONE 1 DROP: 14; 6 SOLUTION/ DROPS OPHTHALMIC at 11:58

## 2024-06-26 RX ADMIN — IPRATROPIUM BROMIDE AND ALBUTEROL SULFATE 1 DOSE: .5; 2.5 SOLUTION RESPIRATORY (INHALATION) at 20:47

## 2024-06-26 RX ADMIN — BRIMONIDINE TARTRATE 1 DROP: 2 SOLUTION OPHTHALMIC at 08:59

## 2024-06-26 RX ADMIN — TIMOLOL MALEATE 1 DROP: 5 SOLUTION OPHTHALMIC at 09:00

## 2024-06-26 RX ADMIN — ASPIRIN 81 MG 81 MG: 81 TABLET ORAL at 08:58

## 2024-06-26 RX ADMIN — PSYLLIUM HUSK 1 PACKET: 3.4 POWDER ORAL at 21:58

## 2024-06-26 RX ADMIN — MULTIVIT AND MINERALS-FERROUS GLUCONATE 9 MG IRON/15 ML ORAL LIQUID 15 ML: at 09:02

## 2024-06-26 RX ADMIN — Medication 1000 UNITS: at 09:07

## 2024-06-26 RX ADMIN — LATANOPROST 1 DROP: 50 SOLUTION OPHTHALMIC at 21:54

## 2024-06-26 RX ADMIN — TICAGRELOR 90 MG: 90 TABLET ORAL at 09:07

## 2024-06-26 RX ADMIN — DOCUSATE SODIUM 100 MG: 50 LIQUID ORAL at 09:01

## 2024-06-26 RX ADMIN — PANTOPRAZOLE SODIUM 40 MG: 40 INJECTION, POWDER, FOR SOLUTION INTRAVENOUS at 22:26

## 2024-06-26 RX ADMIN — POLYVINYL ALCOHOL, POVIDONE 1 DROP: 14; 6 SOLUTION/ DROPS OPHTHALMIC at 09:07

## 2024-06-26 RX ADMIN — POLYETHYLENE GLYCOL 3350 17 GRAM ORAL POWDER PACKET 17 G: at 09:03

## 2024-06-26 RX ADMIN — POLYVINYL ALCOHOL, POVIDONE 1 DROP: 14; 6 SOLUTION/ DROPS OPHTHALMIC at 04:18

## 2024-06-26 RX ADMIN — BRIMONIDINE TARTRATE 1 DROP: 2 SOLUTION OPHTHALMIC at 22:15

## 2024-06-26 RX ADMIN — ATORVASTATIN CALCIUM 40 MG: 40 TABLET, FILM COATED ORAL at 22:08

## 2024-06-26 RX ADMIN — POLYVINYL ALCOHOL, POVIDONE 1 DROP: 14; 6 SOLUTION/ DROPS OPHTHALMIC at 00:15

## 2024-06-26 RX ADMIN — SODIUM CHLORIDE, PRESERVATIVE FREE 10 ML: 5 INJECTION INTRAVENOUS at 09:06

## 2024-06-26 RX ADMIN — SENNOSIDES 5 ML: 8.8 LIQUID ORAL at 09:07

## 2024-06-26 ASSESSMENT — PAIN SCALES - GENERAL
PAINLEVEL_OUTOF10: 0

## 2024-06-26 NOTE — CARE COORDINATION
Care Coordination:LOS 26 day. [employer] denied, expedited appeal initiated. Trach-vent, Peg/TF. 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 Plan is [facility] at discharge, pasrr, mann and transport completed    Electronically signed by Teressa Mueller RN on 6/26/2024 at 3:55 PM

## 2024-06-27 ENCOUNTER — APPOINTMENT (OUTPATIENT)
Dept: ULTRASOUND IMAGING | Age: 84
DRG: 003 | End: 2024-06-27
Payer: MEDICARE

## 2024-06-27 LAB
ANION GAP SERPL CALCULATED.3IONS-SCNC: 9 MMOL/L (ref 7–16)
BUN SERPL-MCNC: 16 MG/DL (ref 6–23)
CALCIUM SERPL-MCNC: 8.2 MG/DL (ref 8.6–10.2)
CHLORIDE SERPL-SCNC: 105 MMOL/L (ref 98–107)
CO2 SERPL-SCNC: 25 MMOL/L (ref 22–29)
CREAT SERPL-MCNC: 0.5 MG/DL (ref 0.5–1)
ERYTHROCYTE [DISTWIDTH] IN BLOOD BY AUTOMATED COUNT: 15.1 % (ref 11.5–15)
GFR, ESTIMATED: >90 ML/MIN/1.73M2
GLUCOSE BLD-MCNC: 148 MG/DL (ref 74–99)
GLUCOSE SERPL-MCNC: 139 MG/DL (ref 74–99)
HCT VFR BLD AUTO: 24 % (ref 34–48)
HGB BLD-MCNC: 7.4 G/DL (ref 11.5–15.5)
MAGNESIUM SERPL-MCNC: 2.3 MG/DL (ref 1.6–2.6)
MCH RBC QN AUTO: 27.4 PG (ref 26–35)
MCHC RBC AUTO-ENTMCNC: 30.8 G/DL (ref 32–34.5)
MCV RBC AUTO: 88.9 FL (ref 80–99.9)
PHOSPHATE SERPL-MCNC: 3.7 MG/DL (ref 2.5–4.5)
PLATELET # BLD AUTO: 232 K/UL (ref 130–450)
PMV BLD AUTO: 11.2 FL (ref 7–12)
POTASSIUM SERPL-SCNC: 4.1 MMOL/L (ref 3.5–5)
RBC # BLD AUTO: 2.7 M/UL (ref 3.5–5.5)
SODIUM SERPL-SCNC: 139 MMOL/L (ref 132–146)
WBC OTHER # BLD: 10.6 K/UL (ref 4.5–11.5)

## 2024-06-27 PROCEDURE — 6370000000 HC RX 637 (ALT 250 FOR IP): Performed by: CLINICAL NURSE SPECIALIST

## 2024-06-27 PROCEDURE — 83735 ASSAY OF MAGNESIUM: CPT

## 2024-06-27 PROCEDURE — 6370000000 HC RX 637 (ALT 250 FOR IP)

## 2024-06-27 PROCEDURE — 6360000002 HC RX W HCPCS

## 2024-06-27 PROCEDURE — 2580000003 HC RX 258: Performed by: INTERNAL MEDICINE

## 2024-06-27 PROCEDURE — 2580000003 HC RX 258

## 2024-06-27 PROCEDURE — 6370000000 HC RX 637 (ALT 250 FOR IP): Performed by: INTERNAL MEDICINE

## 2024-06-27 PROCEDURE — 80048 BASIC METABOLIC PNL TOTAL CA: CPT

## 2024-06-27 PROCEDURE — 84100 ASSAY OF PHOSPHORUS: CPT

## 2024-06-27 PROCEDURE — 85027 COMPLETE CBC AUTOMATED: CPT

## 2024-06-27 PROCEDURE — 94003 VENT MGMT INPAT SUBQ DAY: CPT

## 2024-06-27 PROCEDURE — C9113 INJ PANTOPRAZOLE SODIUM, VIA: HCPCS

## 2024-06-27 PROCEDURE — 2060000000 HC ICU INTERMEDIATE R&B

## 2024-06-27 PROCEDURE — 94640 AIRWAY INHALATION TREATMENT: CPT

## 2024-06-27 PROCEDURE — 82962 GLUCOSE BLOOD TEST: CPT

## 2024-06-27 PROCEDURE — 99232 SBSQ HOSP IP/OBS MODERATE 35: CPT | Performed by: INTERNAL MEDICINE

## 2024-06-27 PROCEDURE — 93926 LOWER EXTREMITY STUDY: CPT

## 2024-06-27 PROCEDURE — 6360000002 HC RX W HCPCS: Performed by: INTERNAL MEDICINE

## 2024-06-27 PROCEDURE — 99291 CRITICAL CARE FIRST HOUR: CPT | Performed by: INTERNAL MEDICINE

## 2024-06-27 RX ADMIN — ATORVASTATIN CALCIUM 40 MG: 40 TABLET, FILM COATED ORAL at 20:13

## 2024-06-27 RX ADMIN — CHLORHEXIDINE GLUCONATE, 0.12% ORAL RINSE 15 ML: 1.2 SOLUTION DENTAL at 21:06

## 2024-06-27 RX ADMIN — SODIUM CHLORIDE, PRESERVATIVE FREE 10 ML: 5 INJECTION INTRAVENOUS at 08:28

## 2024-06-27 RX ADMIN — IPRATROPIUM BROMIDE AND ALBUTEROL SULFATE 1 DOSE: .5; 2.5 SOLUTION RESPIRATORY (INHALATION) at 15:53

## 2024-06-27 RX ADMIN — MULTIVIT AND MINERALS-FERROUS GLUCONATE 9 MG IRON/15 ML ORAL LIQUID 15 ML: at 08:27

## 2024-06-27 RX ADMIN — PSYLLIUM HUSK 1 PACKET: 3.4 POWDER ORAL at 08:26

## 2024-06-27 RX ADMIN — Medication 1000 UNITS: at 08:26

## 2024-06-27 RX ADMIN — DORZOLAMIDE HYDROCHLORIDE 1 DROP: 20 SOLUTION/ DROPS OPHTHALMIC at 20:14

## 2024-06-27 RX ADMIN — BRIMONIDINE TARTRATE 1 DROP: 2 SOLUTION OPHTHALMIC at 08:28

## 2024-06-27 RX ADMIN — POLYVINYL ALCOHOL, POVIDONE 1 DROP: 14; 6 SOLUTION/ DROPS OPHTHALMIC at 00:45

## 2024-06-27 RX ADMIN — POLYVINYL ALCOHOL, POVIDONE 1 DROP: 14; 6 SOLUTION/ DROPS OPHTHALMIC at 14:26

## 2024-06-27 RX ADMIN — IPRATROPIUM BROMIDE AND ALBUTEROL SULFATE 1 DOSE: .5; 2.5 SOLUTION RESPIRATORY (INHALATION) at 12:40

## 2024-06-27 RX ADMIN — TIMOLOL MALEATE 1 DROP: 5 SOLUTION OPHTHALMIC at 20:14

## 2024-06-27 RX ADMIN — CHLORHEXIDINE GLUCONATE, 0.12% ORAL RINSE 15 ML: 1.2 SOLUTION DENTAL at 08:26

## 2024-06-27 RX ADMIN — TICAGRELOR 90 MG: 90 TABLET ORAL at 20:13

## 2024-06-27 RX ADMIN — SODIUM CHLORIDE, PRESERVATIVE FREE 10 ML: 5 INJECTION INTRAVENOUS at 21:06

## 2024-06-27 RX ADMIN — DORZOLAMIDE HYDROCHLORIDE 1 DROP: 20 SOLUTION/ DROPS OPHTHALMIC at 08:28

## 2024-06-27 RX ADMIN — ENOXAPARIN SODIUM 40 MG: 100 INJECTION SUBCUTANEOUS at 08:42

## 2024-06-27 RX ADMIN — PANTOPRAZOLE SODIUM 40 MG: 40 INJECTION, POWDER, FOR SOLUTION INTRAVENOUS at 08:26

## 2024-06-27 RX ADMIN — DOCUSATE SODIUM 100 MG: 50 LIQUID ORAL at 08:26

## 2024-06-27 RX ADMIN — POLYVINYL ALCOHOL, POVIDONE 1 DROP: 14; 6 SOLUTION/ DROPS OPHTHALMIC at 04:30

## 2024-06-27 RX ADMIN — BRIMONIDINE TARTRATE 1 DROP: 2 SOLUTION OPHTHALMIC at 20:14

## 2024-06-27 RX ADMIN — ASPIRIN 81 MG 81 MG: 81 TABLET ORAL at 08:26

## 2024-06-27 RX ADMIN — POLYVINYL ALCOHOL, POVIDONE 1 DROP: 14; 6 SOLUTION/ DROPS OPHTHALMIC at 20:15

## 2024-06-27 RX ADMIN — IPRATROPIUM BROMIDE AND ALBUTEROL SULFATE 1 DOSE: .5; 2.5 SOLUTION RESPIRATORY (INHALATION) at 09:21

## 2024-06-27 RX ADMIN — LATANOPROST 1 DROP: 50 SOLUTION OPHTHALMIC at 20:14

## 2024-06-27 RX ADMIN — TICAGRELOR 90 MG: 90 TABLET ORAL at 08:27

## 2024-06-27 RX ADMIN — POLYETHYLENE GLYCOL 3350 17 GRAM ORAL POWDER PACKET 17 G: at 08:26

## 2024-06-27 RX ADMIN — POLYVINYL ALCOHOL, POVIDONE 1 DROP: 14; 6 SOLUTION/ DROPS OPHTHALMIC at 08:27

## 2024-06-27 RX ADMIN — IPRATROPIUM BROMIDE AND ALBUTEROL SULFATE 1 DOSE: .5; 2.5 SOLUTION RESPIRATORY (INHALATION) at 20:00

## 2024-06-27 RX ADMIN — TIMOLOL MALEATE 1 DROP: 5 SOLUTION OPHTHALMIC at 08:28

## 2024-06-27 RX ADMIN — SODIUM CHLORIDE 1000 MG: 9 INJECTION INTRAMUSCULAR; INTRAVENOUS; SUBCUTANEOUS at 14:26

## 2024-06-27 RX ADMIN — PANTOPRAZOLE SODIUM 40 MG: 40 INJECTION, POWDER, FOR SOLUTION INTRAVENOUS at 20:13

## 2024-06-27 RX ADMIN — SENNOSIDES 5 ML: 8.8 LIQUID ORAL at 08:26

## 2024-06-27 ASSESSMENT — PAIN SCALES - GENERAL
PAINLEVEL_OUTOF10: 0
PAINLEVEL_OUTOF10: 0

## 2024-06-27 NOTE — CARE COORDINATION
Care Coordination: LOS 27 days. Transfer held yesterday- gown saturated with blood along abd binder, Peg site bleeding, surgery notified- resolved with applied pressure, surgical to are of bleeding to assist with hemostasis. - ok for TF. Trach/vent, peg/tf. Precert in process for [facility]. Appeal submitted 6/26/24. Transport, PASRR, [place] completed. Guardianship submitted via certified mail.    Electronically signed by Teressa Mueller RN on 6/27/2024 at 1:36 PM

## 2024-06-28 LAB
ERYTHROCYTE [DISTWIDTH] IN BLOOD BY AUTOMATED COUNT: 15.3 % (ref 11.5–15)
GLUCOSE BLD-MCNC: 146 MG/DL (ref 74–99)
GLUCOSE BLD-MCNC: 151 MG/DL (ref 74–99)
HCT VFR BLD AUTO: 24.6 % (ref 34–48)
HGB BLD-MCNC: 7.4 G/DL (ref 11.5–15.5)
MAGNESIUM SERPL-MCNC: 2.2 MG/DL (ref 1.6–2.6)
MCH RBC QN AUTO: 27.2 PG (ref 26–35)
MCHC RBC AUTO-ENTMCNC: 30.1 G/DL (ref 32–34.5)
MCV RBC AUTO: 90.4 FL (ref 80–99.9)
PHOSPHATE SERPL-MCNC: 2.9 MG/DL (ref 2.5–4.5)
PLATELET # BLD AUTO: 257 K/UL (ref 130–450)
PMV BLD AUTO: 11.5 FL (ref 7–12)
RBC # BLD AUTO: 2.72 M/UL (ref 3.5–5.5)
WBC OTHER # BLD: 11.8 K/UL (ref 4.5–11.5)

## 2024-06-28 PROCEDURE — 82962 GLUCOSE BLOOD TEST: CPT

## 2024-06-28 PROCEDURE — 6370000000 HC RX 637 (ALT 250 FOR IP)

## 2024-06-28 PROCEDURE — 6360000002 HC RX W HCPCS

## 2024-06-28 PROCEDURE — 2060000000 HC ICU INTERMEDIATE R&B

## 2024-06-28 PROCEDURE — 85027 COMPLETE CBC AUTOMATED: CPT

## 2024-06-28 PROCEDURE — 36415 COLL VENOUS BLD VENIPUNCTURE: CPT

## 2024-06-28 PROCEDURE — 6370000000 HC RX 637 (ALT 250 FOR IP): Performed by: INTERNAL MEDICINE

## 2024-06-28 PROCEDURE — 6370000000 HC RX 637 (ALT 250 FOR IP): Performed by: CLINICAL NURSE SPECIALIST

## 2024-06-28 PROCEDURE — 99232 SBSQ HOSP IP/OBS MODERATE 35: CPT | Performed by: INTERNAL MEDICINE

## 2024-06-28 PROCEDURE — 2580000003 HC RX 258

## 2024-06-28 PROCEDURE — C9113 INJ PANTOPRAZOLE SODIUM, VIA: HCPCS

## 2024-06-28 PROCEDURE — 94640 AIRWAY INHALATION TREATMENT: CPT

## 2024-06-28 PROCEDURE — 84100 ASSAY OF PHOSPHORUS: CPT

## 2024-06-28 PROCEDURE — 83735 ASSAY OF MAGNESIUM: CPT

## 2024-06-28 PROCEDURE — 94003 VENT MGMT INPAT SUBQ DAY: CPT

## 2024-06-28 RX ADMIN — CHLORHEXIDINE GLUCONATE, 0.12% ORAL RINSE 15 ML: 1.2 SOLUTION DENTAL at 20:35

## 2024-06-28 RX ADMIN — SODIUM CHLORIDE, PRESERVATIVE FREE 10 ML: 5 INJECTION INTRAVENOUS at 20:44

## 2024-06-28 RX ADMIN — ASPIRIN 81 MG 81 MG: 81 TABLET ORAL at 09:38

## 2024-06-28 RX ADMIN — BRIMONIDINE TARTRATE 1 DROP: 2 SOLUTION OPHTHALMIC at 09:14

## 2024-06-28 RX ADMIN — ATORVASTATIN CALCIUM 40 MG: 40 TABLET, FILM COATED ORAL at 20:35

## 2024-06-28 RX ADMIN — POLYVINYL ALCOHOL, POVIDONE 1 DROP: 14; 6 SOLUTION/ DROPS OPHTHALMIC at 20:36

## 2024-06-28 RX ADMIN — TICAGRELOR 90 MG: 90 TABLET ORAL at 20:35

## 2024-06-28 RX ADMIN — PANTOPRAZOLE SODIUM 40 MG: 40 INJECTION, POWDER, FOR SOLUTION INTRAVENOUS at 20:35

## 2024-06-28 RX ADMIN — DOCUSATE SODIUM 100 MG: 50 LIQUID ORAL at 09:33

## 2024-06-28 RX ADMIN — IPRATROPIUM BROMIDE AND ALBUTEROL SULFATE 1 DOSE: .5; 2.5 SOLUTION RESPIRATORY (INHALATION) at 15:39

## 2024-06-28 RX ADMIN — TICAGRELOR 90 MG: 90 TABLET ORAL at 09:34

## 2024-06-28 RX ADMIN — IPRATROPIUM BROMIDE AND ALBUTEROL SULFATE 1 DOSE: .5; 2.5 SOLUTION RESPIRATORY (INHALATION) at 20:04

## 2024-06-28 RX ADMIN — DORZOLAMIDE HYDROCHLORIDE 1 DROP: 20 SOLUTION/ DROPS OPHTHALMIC at 09:14

## 2024-06-28 RX ADMIN — TIMOLOL MALEATE 1 DROP: 5 SOLUTION OPHTHALMIC at 20:36

## 2024-06-28 RX ADMIN — DORZOLAMIDE HYDROCHLORIDE 1 DROP: 20 SOLUTION/ DROPS OPHTHALMIC at 20:36

## 2024-06-28 RX ADMIN — Medication 1000 UNITS: at 09:34

## 2024-06-28 RX ADMIN — ENOXAPARIN SODIUM 40 MG: 100 INJECTION SUBCUTANEOUS at 09:33

## 2024-06-28 RX ADMIN — SENNOSIDES 5 ML: 8.8 LIQUID ORAL at 09:33

## 2024-06-28 RX ADMIN — APIXABAN 10 MG: 5 TABLET, FILM COATED ORAL at 13:06

## 2024-06-28 RX ADMIN — APIXABAN 10 MG: 5 TABLET, FILM COATED ORAL at 22:44

## 2024-06-28 RX ADMIN — POLYVINYL ALCOHOL, POVIDONE 1 DROP: 14; 6 SOLUTION/ DROPS OPHTHALMIC at 09:33

## 2024-06-28 RX ADMIN — IPRATROPIUM BROMIDE AND ALBUTEROL SULFATE 1 DOSE: .5; 2.5 SOLUTION RESPIRATORY (INHALATION) at 12:17

## 2024-06-28 RX ADMIN — SODIUM CHLORIDE, PRESERVATIVE FREE 10 ML: 5 INJECTION INTRAVENOUS at 09:38

## 2024-06-28 RX ADMIN — MULTIVIT AND MINERALS-FERROUS GLUCONATE 9 MG IRON/15 ML ORAL LIQUID 15 ML: at 13:11

## 2024-06-28 RX ADMIN — PANTOPRAZOLE SODIUM 40 MG: 40 INJECTION, POWDER, FOR SOLUTION INTRAVENOUS at 09:33

## 2024-06-28 RX ADMIN — CHLORHEXIDINE GLUCONATE, 0.12% ORAL RINSE 15 ML: 1.2 SOLUTION DENTAL at 09:33

## 2024-06-28 RX ADMIN — TIMOLOL MALEATE 1 DROP: 5 SOLUTION OPHTHALMIC at 09:15

## 2024-06-28 RX ADMIN — POLYVINYL ALCOHOL, POVIDONE 1 DROP: 14; 6 SOLUTION/ DROPS OPHTHALMIC at 16:10

## 2024-06-28 RX ADMIN — BRIMONIDINE TARTRATE 1 DROP: 2 SOLUTION OPHTHALMIC at 20:36

## 2024-06-28 RX ADMIN — IPRATROPIUM BROMIDE AND ALBUTEROL SULFATE 1 DOSE: .5; 2.5 SOLUTION RESPIRATORY (INHALATION) at 08:02

## 2024-06-28 RX ADMIN — LATANOPROST 1 DROP: 50 SOLUTION OPHTHALMIC at 20:36

## 2024-06-28 RX ADMIN — PSYLLIUM HUSK 1 PACKET: 3.4 POWDER ORAL at 11:30

## 2024-06-28 RX ADMIN — POLYETHYLENE GLYCOL 3350 17 GRAM ORAL POWDER PACKET 17 G: at 09:34

## 2024-06-28 NOTE — CARE COORDINATION
CM Update: Plan at discharge is [facility]. Precert pending, appeal submitted 6/26/24. ATIYA/destination completed. PASRR and transport envelope on soft chart. Guardianship paperwork submitted by previous CM. Pt transferred from MICU. Trach/Peg 6/20. 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 Gen Surg consulted for bleeding around peg site. IV Invanz continues. CM will follow (TF)      NERISSA MarieN,RN  Case Management  [phone]

## 2024-06-29 ENCOUNTER — APPOINTMENT (OUTPATIENT)
Dept: GENERAL RADIOLOGY | Age: 84
DRG: 003 | End: 2024-06-29
Payer: MEDICARE

## 2024-06-29 LAB
ERYTHROCYTE [DISTWIDTH] IN BLOOD BY AUTOMATED COUNT: 15.1 % (ref 11.5–15)
GLUCOSE BLD-MCNC: 140 MG/DL (ref 74–99)
GLUCOSE BLD-MCNC: 179 MG/DL (ref 74–99)
GLUCOSE BLD-MCNC: 179 MG/DL (ref 74–99)
HCT VFR BLD AUTO: 22.8 % (ref 34–48)
HGB BLD-MCNC: 7.1 G/DL (ref 11.5–15.5)
MAGNESIUM SERPL-MCNC: 2.2 MG/DL (ref 1.6–2.6)
MCH RBC QN AUTO: 28.4 PG (ref 26–35)
MCHC RBC AUTO-ENTMCNC: 31.1 G/DL (ref 32–34.5)
MCV RBC AUTO: 91.2 FL (ref 80–99.9)
PHOSPHATE SERPL-MCNC: 2.9 MG/DL (ref 2.5–4.5)
PLATELET # BLD AUTO: 232 K/UL (ref 130–450)
PMV BLD AUTO: 11.8 FL (ref 7–12)
RBC # BLD AUTO: 2.5 M/UL (ref 3.5–5.5)
WBC OTHER # BLD: 10.5 K/UL (ref 4.5–11.5)

## 2024-06-29 PROCEDURE — 99232 SBSQ HOSP IP/OBS MODERATE 35: CPT | Performed by: INTERNAL MEDICINE

## 2024-06-29 PROCEDURE — C9113 INJ PANTOPRAZOLE SODIUM, VIA: HCPCS

## 2024-06-29 PROCEDURE — 82962 GLUCOSE BLOOD TEST: CPT

## 2024-06-29 PROCEDURE — 6370000000 HC RX 637 (ALT 250 FOR IP): Performed by: CLINICAL NURSE SPECIALIST

## 2024-06-29 PROCEDURE — 36415 COLL VENOUS BLD VENIPUNCTURE: CPT

## 2024-06-29 PROCEDURE — 6370000000 HC RX 637 (ALT 250 FOR IP): Performed by: INTERNAL MEDICINE

## 2024-06-29 PROCEDURE — 85027 COMPLETE CBC AUTOMATED: CPT

## 2024-06-29 PROCEDURE — 83735 ASSAY OF MAGNESIUM: CPT

## 2024-06-29 PROCEDURE — 94640 AIRWAY INHALATION TREATMENT: CPT

## 2024-06-29 PROCEDURE — 2060000000 HC ICU INTERMEDIATE R&B

## 2024-06-29 PROCEDURE — 94003 VENT MGMT INPAT SUBQ DAY: CPT

## 2024-06-29 PROCEDURE — 6360000002 HC RX W HCPCS

## 2024-06-29 PROCEDURE — 2580000003 HC RX 258

## 2024-06-29 PROCEDURE — 83550 IRON BINDING TEST: CPT

## 2024-06-29 PROCEDURE — 71045 X-RAY EXAM CHEST 1 VIEW: CPT

## 2024-06-29 PROCEDURE — 6370000000 HC RX 637 (ALT 250 FOR IP)

## 2024-06-29 PROCEDURE — 84100 ASSAY OF PHOSPHORUS: CPT

## 2024-06-29 PROCEDURE — 82728 ASSAY OF FERRITIN: CPT

## 2024-06-29 PROCEDURE — 83540 ASSAY OF IRON: CPT

## 2024-06-29 RX ORDER — IPRATROPIUM BROMIDE AND ALBUTEROL SULFATE 2.5; .5 MG/3ML; MG/3ML
1 SOLUTION RESPIRATORY (INHALATION) EVERY 4 HOURS PRN
Status: DISCONTINUED | OUTPATIENT
Start: 2024-06-29 | End: 2024-07-03 | Stop reason: HOSPADM

## 2024-06-29 RX ADMIN — DORZOLAMIDE HYDROCHLORIDE 1 DROP: 20 SOLUTION/ DROPS OPHTHALMIC at 20:19

## 2024-06-29 RX ADMIN — IPRATROPIUM BROMIDE AND ALBUTEROL SULFATE 1 DOSE: .5; 2.5 SOLUTION RESPIRATORY (INHALATION) at 07:57

## 2024-06-29 RX ADMIN — IPRATROPIUM BROMIDE AND ALBUTEROL SULFATE 1 DOSE: .5; 2.5 SOLUTION RESPIRATORY (INHALATION) at 16:17

## 2024-06-29 RX ADMIN — POLYVINYL ALCOHOL, POVIDONE 1 DROP: 14; 6 SOLUTION/ DROPS OPHTHALMIC at 20:33

## 2024-06-29 RX ADMIN — PSYLLIUM HUSK 1 PACKET: 3.4 POWDER ORAL at 20:44

## 2024-06-29 RX ADMIN — TICAGRELOR 90 MG: 90 TABLET ORAL at 09:32

## 2024-06-29 RX ADMIN — PANTOPRAZOLE SODIUM 40 MG: 40 INJECTION, POWDER, FOR SOLUTION INTRAVENOUS at 20:33

## 2024-06-29 RX ADMIN — LATANOPROST 1 DROP: 50 SOLUTION OPHTHALMIC at 20:19

## 2024-06-29 RX ADMIN — CHLORHEXIDINE GLUCONATE, 0.12% ORAL RINSE 15 ML: 1.2 SOLUTION DENTAL at 20:33

## 2024-06-29 RX ADMIN — CHLORHEXIDINE GLUCONATE, 0.12% ORAL RINSE 15 ML: 1.2 SOLUTION DENTAL at 09:31

## 2024-06-29 RX ADMIN — PANTOPRAZOLE SODIUM 40 MG: 40 INJECTION, POWDER, FOR SOLUTION INTRAVENOUS at 09:32

## 2024-06-29 RX ADMIN — BRIMONIDINE TARTRATE 1 DROP: 2 SOLUTION OPHTHALMIC at 09:33

## 2024-06-29 RX ADMIN — ASPIRIN 81 MG 81 MG: 81 TABLET ORAL at 09:32

## 2024-06-29 RX ADMIN — POLYVINYL ALCOHOL, POVIDONE 1 DROP: 14; 6 SOLUTION/ DROPS OPHTHALMIC at 14:55

## 2024-06-29 RX ADMIN — TIMOLOL MALEATE 1 DROP: 5 SOLUTION OPHTHALMIC at 20:19

## 2024-06-29 RX ADMIN — SODIUM CHLORIDE, PRESERVATIVE FREE 10 ML: 5 INJECTION INTRAVENOUS at 09:35

## 2024-06-29 RX ADMIN — BRIMONIDINE TARTRATE 1 DROP: 2 SOLUTION OPHTHALMIC at 20:19

## 2024-06-29 RX ADMIN — TIMOLOL MALEATE 1 DROP: 5 SOLUTION OPHTHALMIC at 09:33

## 2024-06-29 RX ADMIN — APIXABAN 10 MG: 5 TABLET, FILM COATED ORAL at 11:30

## 2024-06-29 RX ADMIN — POLYVINYL ALCOHOL, POVIDONE 1 DROP: 14; 6 SOLUTION/ DROPS OPHTHALMIC at 09:35

## 2024-06-29 RX ADMIN — MULTIVIT AND MINERALS-FERROUS GLUCONATE 9 MG IRON/15 ML ORAL LIQUID 15 ML: at 09:32

## 2024-06-29 RX ADMIN — SODIUM CHLORIDE, PRESERVATIVE FREE 10 ML: 5 INJECTION INTRAVENOUS at 20:33

## 2024-06-29 RX ADMIN — ATORVASTATIN CALCIUM 40 MG: 40 TABLET, FILM COATED ORAL at 20:33

## 2024-06-29 RX ADMIN — TICAGRELOR 90 MG: 90 TABLET ORAL at 20:44

## 2024-06-29 RX ADMIN — DORZOLAMIDE HYDROCHLORIDE 1 DROP: 20 SOLUTION/ DROPS OPHTHALMIC at 09:33

## 2024-06-29 RX ADMIN — IPRATROPIUM BROMIDE AND ALBUTEROL SULFATE 1 DOSE: .5; 2.5 SOLUTION RESPIRATORY (INHALATION) at 11:52

## 2024-06-29 RX ADMIN — Medication 1000 UNITS: at 09:32

## 2024-06-29 ASSESSMENT — PULMONARY FUNCTION TESTS
PIF_VALUE: 17
PIF_VALUE: 16
PIF_VALUE: 17
PIF_VALUE: 15
PIF_VALUE: 16
PIF_VALUE: 16

## 2024-06-29 ASSESSMENT — PAIN SCALES - GENERAL
PAINLEVEL_OUTOF10: 0

## 2024-06-29 ASSESSMENT — PAIN SCALES - WONG BAKER: WONGBAKER_NUMERICALRESPONSE: NO HURT

## 2024-06-29 NOTE — CARE COORDINATION
CM Update: Received a call from Marielle with  [employer], appeal overturned and approved 6/29/24-7/11/24. Notified Adeola with [facility]. She is going to watch for official approval and auth#. She can not accept until Mon due to trach/vent. Will f/u Mon morning(TF)        NERISSA MarieN,RN  Case Management  [phone]

## 2024-06-29 NOTE — RT PROTOCOL NOTE
RT Inhaler-Nebulizer Bronchodilator Protocol Note    There is a bronchodilator order in the chart from a provider indicating to follow the RT Bronchodilator Protocol and there is an “Initiate RT Inhaler-Nebulizer Bronchodilator Protocol” order as well (see protocol at bottom of note).    CXR Findings:  XR CHEST PORTABLE    Result Date: 6/29/2024  Density in the right infrahilar region could reflect infiltrate but other etiologies not excluded. Follow-up to resolution is recommended.       The findings from the last RT Protocol Assessment were as follows:   History Pulmonary Disease: None or smoker <15 pack years  Respiratory Pattern: Regular pattern and RR 12-20 bpm  Breath Sounds: Clear breath sounds  Cough: Strong, productive  Indication for Bronchodilator Therapy:    Bronchodilator Assessment Score: 1    Aerosolized bronchodilator medication orders have been revised according to the RT Inhaler-Nebulizer Bronchodilator Protocol below.    Respiratory Therapist to perform RT Therapy Protocol Assessment initially then follow the protocol.  Repeat RT Therapy Protocol Assessment PRN for score 0-3 or on second treatment, BID, and PRN for scores above 3.    No Indications - adjust the frequency to every 6 hours PRN wheezing or bronchospasm, if no treatments needed after 48 hours then discontinue using Per Protocol order mode.     If indication present, adjust the RT bronchodilator orders based on the Bronchodilator Assessment Score as indicated below.  Use Inhaler orders unless patient has one or more of the following: on home nebulizer, not able to hold breath for 10 seconds, is not alert and oriented, cannot activate and use MDI correctly, or respiratory rate 25 breaths per minute or more, then use the equivalent nebulizer order(s) with same Frequency and PRN reasons based on the score.  If a patient is on this medication at home then do not decrease Frequency below that used at home.    0-3 - enter or revise RT

## 2024-06-30 LAB
ERYTHROCYTE [DISTWIDTH] IN BLOOD BY AUTOMATED COUNT: 15.1 % (ref 11.5–15)
FERRITIN SERPL-MCNC: 358 NG/ML
HCT VFR BLD AUTO: 24.2 % (ref 34–48)
HGB BLD-MCNC: 7.3 G/DL (ref 11.5–15.5)
IRON SATN MFR SERPL: 10 % (ref 15–50)
IRON SERPL-MCNC: 21 UG/DL (ref 37–145)
MAGNESIUM SERPL-MCNC: 2.2 MG/DL (ref 1.6–2.6)
MCH RBC QN AUTO: 27.1 PG (ref 26–35)
MCHC RBC AUTO-ENTMCNC: 30.2 G/DL (ref 32–34.5)
MCV RBC AUTO: 90 FL (ref 80–99.9)
PHOSPHATE SERPL-MCNC: 3.1 MG/DL (ref 2.5–4.5)
PLATELET # BLD AUTO: 244 K/UL (ref 130–450)
PMV BLD AUTO: 11.7 FL (ref 7–12)
RBC # BLD AUTO: 2.69 M/UL (ref 3.5–5.5)
TIBC SERPL-MCNC: 203 UG/DL (ref 250–450)
WBC OTHER # BLD: 11.6 K/UL (ref 4.5–11.5)

## 2024-06-30 PROCEDURE — 6370000000 HC RX 637 (ALT 250 FOR IP): Performed by: CLINICAL NURSE SPECIALIST

## 2024-06-30 PROCEDURE — 6360000002 HC RX W HCPCS: Performed by: INTERNAL MEDICINE

## 2024-06-30 PROCEDURE — 6370000000 HC RX 637 (ALT 250 FOR IP): Performed by: INTERNAL MEDICINE

## 2024-06-30 PROCEDURE — 36415 COLL VENOUS BLD VENIPUNCTURE: CPT

## 2024-06-30 PROCEDURE — 99232 SBSQ HOSP IP/OBS MODERATE 35: CPT | Performed by: INTERNAL MEDICINE

## 2024-06-30 PROCEDURE — 2060000000 HC ICU INTERMEDIATE R&B

## 2024-06-30 PROCEDURE — C9113 INJ PANTOPRAZOLE SODIUM, VIA: HCPCS

## 2024-06-30 PROCEDURE — 83735 ASSAY OF MAGNESIUM: CPT

## 2024-06-30 PROCEDURE — 2580000003 HC RX 258

## 2024-06-30 PROCEDURE — 84100 ASSAY OF PHOSPHORUS: CPT

## 2024-06-30 PROCEDURE — 85027 COMPLETE CBC AUTOMATED: CPT

## 2024-06-30 PROCEDURE — 6360000002 HC RX W HCPCS

## 2024-06-30 PROCEDURE — 6370000000 HC RX 637 (ALT 250 FOR IP)

## 2024-06-30 PROCEDURE — 94003 VENT MGMT INPAT SUBQ DAY: CPT

## 2024-06-30 PROCEDURE — 2580000003 HC RX 258: Performed by: INTERNAL MEDICINE

## 2024-06-30 RX ADMIN — SODIUM CHLORIDE, PRESERVATIVE FREE 10 ML: 5 INJECTION INTRAVENOUS at 11:03

## 2024-06-30 RX ADMIN — APIXABAN 10 MG: 5 TABLET, FILM COATED ORAL at 00:33

## 2024-06-30 RX ADMIN — LATANOPROST 1 DROP: 50 SOLUTION OPHTHALMIC at 22:25

## 2024-06-30 RX ADMIN — BRIMONIDINE TARTRATE 1 DROP: 2 SOLUTION OPHTHALMIC at 22:25

## 2024-06-30 RX ADMIN — POLYVINYL ALCOHOL, POVIDONE 1 DROP: 14; 6 SOLUTION/ DROPS OPHTHALMIC at 10:40

## 2024-06-30 RX ADMIN — BRIMONIDINE TARTRATE 1 DROP: 2 SOLUTION OPHTHALMIC at 10:41

## 2024-06-30 RX ADMIN — POLYETHYLENE GLYCOL 3350 17 GRAM ORAL POWDER PACKET 17 G: at 10:39

## 2024-06-30 RX ADMIN — PANTOPRAZOLE SODIUM 40 MG: 40 INJECTION, POWDER, FOR SOLUTION INTRAVENOUS at 22:26

## 2024-06-30 RX ADMIN — APIXABAN 10 MG: 5 TABLET, FILM COATED ORAL at 10:40

## 2024-06-30 RX ADMIN — SODIUM CHLORIDE 125 MG: 9 INJECTION, SOLUTION INTRAVENOUS at 11:00

## 2024-06-30 RX ADMIN — DORZOLAMIDE HYDROCHLORIDE 1 DROP: 20 SOLUTION/ DROPS OPHTHALMIC at 10:41

## 2024-06-30 RX ADMIN — APIXABAN 10 MG: 5 TABLET, FILM COATED ORAL at 22:26

## 2024-06-30 RX ADMIN — PSYLLIUM HUSK 1 PACKET: 3.4 POWDER ORAL at 22:25

## 2024-06-30 RX ADMIN — TIMOLOL MALEATE 1 DROP: 5 SOLUTION OPHTHALMIC at 22:25

## 2024-06-30 RX ADMIN — SODIUM CHLORIDE, PRESERVATIVE FREE 10 ML: 5 INJECTION INTRAVENOUS at 22:26

## 2024-06-30 RX ADMIN — TIMOLOL MALEATE 1 DROP: 5 SOLUTION OPHTHALMIC at 10:42

## 2024-06-30 RX ADMIN — DOCUSATE SODIUM 100 MG: 50 LIQUID ORAL at 10:40

## 2024-06-30 RX ADMIN — POLYVINYL ALCOHOL, POVIDONE 1 DROP: 14; 6 SOLUTION/ DROPS OPHTHALMIC at 22:26

## 2024-06-30 RX ADMIN — TICAGRELOR 90 MG: 90 TABLET ORAL at 10:40

## 2024-06-30 RX ADMIN — POLYVINYL ALCOHOL, POVIDONE 1 DROP: 14; 6 SOLUTION/ DROPS OPHTHALMIC at 17:58

## 2024-06-30 RX ADMIN — SENNOSIDES 5 ML: 8.8 LIQUID ORAL at 10:40

## 2024-06-30 RX ADMIN — TICAGRELOR 90 MG: 90 TABLET ORAL at 22:26

## 2024-06-30 RX ADMIN — PANTOPRAZOLE SODIUM 40 MG: 40 INJECTION, POWDER, FOR SOLUTION INTRAVENOUS at 10:40

## 2024-06-30 RX ADMIN — ATORVASTATIN CALCIUM 40 MG: 40 TABLET, FILM COATED ORAL at 22:26

## 2024-06-30 RX ADMIN — MULTIVIT AND MINERALS-FERROUS GLUCONATE 9 MG IRON/15 ML ORAL LIQUID 15 ML: at 10:40

## 2024-06-30 RX ADMIN — Medication 1000 UNITS: at 10:40

## 2024-06-30 RX ADMIN — DORZOLAMIDE HYDROCHLORIDE 1 DROP: 20 SOLUTION/ DROPS OPHTHALMIC at 22:25

## 2024-06-30 RX ADMIN — CHLORHEXIDINE GLUCONATE, 0.12% ORAL RINSE 15 ML: 1.2 SOLUTION DENTAL at 10:40

## 2024-06-30 RX ADMIN — CHLORHEXIDINE GLUCONATE, 0.12% ORAL RINSE 15 ML: 1.2 SOLUTION DENTAL at 22:29

## 2024-06-30 RX ADMIN — ASPIRIN 81 MG 81 MG: 81 TABLET ORAL at 10:40

## 2024-06-30 ASSESSMENT — PULMONARY FUNCTION TESTS
PIF_VALUE: 18
PIF_VALUE: 16
PIF_VALUE: 19
PIF_VALUE: 16

## 2024-06-30 ASSESSMENT — PAIN SCALES - GENERAL
PAINLEVEL_OUTOF10: 0

## 2024-07-01 LAB
ERYTHROCYTE [DISTWIDTH] IN BLOOD BY AUTOMATED COUNT: 15.2 % (ref 11.5–15)
GLUCOSE BLD-MCNC: 186 MG/DL (ref 74–99)
HCT VFR BLD AUTO: 23.5 % (ref 34–48)
HGB BLD-MCNC: 7.1 G/DL (ref 11.5–15.5)
MAGNESIUM SERPL-MCNC: 2.2 MG/DL (ref 1.6–2.6)
MCH RBC QN AUTO: 27.2 PG (ref 26–35)
MCHC RBC AUTO-ENTMCNC: 30.2 G/DL (ref 32–34.5)
MCV RBC AUTO: 90 FL (ref 80–99.9)
PHOSPHATE SERPL-MCNC: 3.1 MG/DL (ref 2.5–4.5)
PLATELET # BLD AUTO: 252 K/UL (ref 130–450)
PMV BLD AUTO: 11.9 FL (ref 7–12)
RBC # BLD AUTO: 2.61 M/UL (ref 3.5–5.5)
WBC OTHER # BLD: 9.3 K/UL (ref 4.5–11.5)

## 2024-07-01 PROCEDURE — 82962 GLUCOSE BLOOD TEST: CPT

## 2024-07-01 PROCEDURE — 83735 ASSAY OF MAGNESIUM: CPT

## 2024-07-01 PROCEDURE — 6370000000 HC RX 637 (ALT 250 FOR IP): Performed by: CLINICAL NURSE SPECIALIST

## 2024-07-01 PROCEDURE — 6370000000 HC RX 637 (ALT 250 FOR IP): Performed by: INTERNAL MEDICINE

## 2024-07-01 PROCEDURE — 6370000000 HC RX 637 (ALT 250 FOR IP)

## 2024-07-01 PROCEDURE — 85027 COMPLETE CBC AUTOMATED: CPT

## 2024-07-01 PROCEDURE — 2580000003 HC RX 258

## 2024-07-01 PROCEDURE — 2580000003 HC RX 258: Performed by: INTERNAL MEDICINE

## 2024-07-01 PROCEDURE — 94003 VENT MGMT INPAT SUBQ DAY: CPT

## 2024-07-01 PROCEDURE — 84100 ASSAY OF PHOSPHORUS: CPT

## 2024-07-01 PROCEDURE — 6360000002 HC RX W HCPCS

## 2024-07-01 PROCEDURE — 2060000000 HC ICU INTERMEDIATE R&B

## 2024-07-01 PROCEDURE — 99232 SBSQ HOSP IP/OBS MODERATE 35: CPT | Performed by: INTERNAL MEDICINE

## 2024-07-01 PROCEDURE — 6360000002 HC RX W HCPCS: Performed by: INTERNAL MEDICINE

## 2024-07-01 PROCEDURE — 36415 COLL VENOUS BLD VENIPUNCTURE: CPT

## 2024-07-01 RX ADMIN — ASPIRIN 81 MG 81 MG: 81 TABLET ORAL at 08:14

## 2024-07-01 RX ADMIN — DORZOLAMIDE HYDROCHLORIDE 1 DROP: 20 SOLUTION/ DROPS OPHTHALMIC at 08:12

## 2024-07-01 RX ADMIN — BRIMONIDINE TARTRATE 1 DROP: 2 SOLUTION OPHTHALMIC at 08:12

## 2024-07-01 RX ADMIN — CHLORHEXIDINE GLUCONATE, 0.12% ORAL RINSE 15 ML: 1.2 SOLUTION DENTAL at 08:10

## 2024-07-01 RX ADMIN — TIMOLOL MALEATE 1 DROP: 5 SOLUTION OPHTHALMIC at 08:12

## 2024-07-01 RX ADMIN — POLYVINYL ALCOHOL, POVIDONE 1 DROP: 14; 6 SOLUTION/ DROPS OPHTHALMIC at 21:44

## 2024-07-01 RX ADMIN — BRIMONIDINE TARTRATE 1 DROP: 2 SOLUTION OPHTHALMIC at 21:46

## 2024-07-01 RX ADMIN — ONDANSETRON 4 MG: 2 INJECTION INTRAMUSCULAR; INTRAVENOUS at 11:59

## 2024-07-01 RX ADMIN — SODIUM CHLORIDE 125 MG: 9 INJECTION, SOLUTION INTRAVENOUS at 13:30

## 2024-07-01 RX ADMIN — POLYVINYL ALCOHOL, POVIDONE 1 DROP: 14; 6 SOLUTION/ DROPS OPHTHALMIC at 08:11

## 2024-07-01 RX ADMIN — SODIUM CHLORIDE, PRESERVATIVE FREE 10 ML: 5 INJECTION INTRAVENOUS at 21:46

## 2024-07-01 RX ADMIN — DOCUSATE SODIUM 100 MG: 50 LIQUID ORAL at 08:10

## 2024-07-01 RX ADMIN — POLYETHYLENE GLYCOL 3350 17 GRAM ORAL POWDER PACKET 17 G: at 08:11

## 2024-07-01 RX ADMIN — TIMOLOL MALEATE 1 DROP: 5 SOLUTION OPHTHALMIC at 21:46

## 2024-07-01 RX ADMIN — PANTOPRAZOLE SODIUM 40 MG: 40 INJECTION, POWDER, FOR SOLUTION INTRAVENOUS at 21:45

## 2024-07-01 RX ADMIN — SODIUM CHLORIDE, PRESERVATIVE FREE 10 ML: 5 INJECTION INTRAVENOUS at 08:14

## 2024-07-01 RX ADMIN — POLYVINYL ALCOHOL, POVIDONE 1 DROP: 14; 6 SOLUTION/ DROPS OPHTHALMIC at 15:03

## 2024-07-01 RX ADMIN — SENNOSIDES 5 ML: 8.8 LIQUID ORAL at 08:11

## 2024-07-01 RX ADMIN — TICAGRELOR 90 MG: 90 TABLET ORAL at 08:10

## 2024-07-01 RX ADMIN — ATORVASTATIN CALCIUM 40 MG: 40 TABLET, FILM COATED ORAL at 21:46

## 2024-07-01 RX ADMIN — Medication 1000 UNITS: at 08:10

## 2024-07-01 RX ADMIN — APIXABAN 10 MG: 5 TABLET, FILM COATED ORAL at 11:39

## 2024-07-01 RX ADMIN — LATANOPROST 1 DROP: 50 SOLUTION OPHTHALMIC at 21:45

## 2024-07-01 RX ADMIN — SENNOSIDES 5 ML: 8.8 LIQUID ORAL at 21:44

## 2024-07-01 RX ADMIN — PSYLLIUM HUSK 1 PACKET: 3.4 POWDER ORAL at 08:52

## 2024-07-01 RX ADMIN — POLYETHYLENE GLYCOL 3350 17 GRAM ORAL POWDER PACKET 17 G: at 21:44

## 2024-07-01 RX ADMIN — MULTIVIT AND MINERALS-FERROUS GLUCONATE 9 MG IRON/15 ML ORAL LIQUID 15 ML: at 08:12

## 2024-07-01 RX ADMIN — PANTOPRAZOLE SODIUM 40 MG: 40 INJECTION, POWDER, FOR SOLUTION INTRAVENOUS at 08:10

## 2024-07-01 RX ADMIN — TICAGRELOR 90 MG: 90 TABLET ORAL at 21:45

## 2024-07-01 RX ADMIN — DORZOLAMIDE HYDROCHLORIDE 1 DROP: 20 SOLUTION/ DROPS OPHTHALMIC at 21:45

## 2024-07-01 RX ADMIN — CHLORHEXIDINE GLUCONATE, 0.12% ORAL RINSE 15 ML: 1.2 SOLUTION DENTAL at 21:44

## 2024-07-01 ASSESSMENT — PULMONARY FUNCTION TESTS
PIF_VALUE: 17
PIF_VALUE: 24
PIF_VALUE: 16

## 2024-07-01 ASSESSMENT — PAIN SCALES - GENERAL
PAINLEVEL_OUTOF10: 0
PAINLEVEL_OUTOF10: 0

## 2024-07-01 ASSESSMENT — PAIN SCALES - WONG BAKER: WONGBAKER_NUMERICALRESPONSE: NO HURT

## 2024-07-01 NOTE — CARE COORDINATION
CM Update: Perfect serve to attending for dc. [facility] can accept today after 1pm. [facility]/destination updated. Transport envelope on soft chart (TF)        NERISSA MarieN,RN  Case Management  [phone]

## 2024-07-02 ENCOUNTER — APPOINTMENT (OUTPATIENT)
Dept: GENERAL RADIOLOGY | Age: 84
DRG: 003 | End: 2024-07-02
Payer: MEDICARE

## 2024-07-02 LAB
ANION GAP SERPL CALCULATED.3IONS-SCNC: 10 MMOL/L (ref 7–16)
BUN SERPL-MCNC: 15 MG/DL (ref 6–23)
CALCIUM SERPL-MCNC: 8.3 MG/DL (ref 8.6–10.2)
CHLORIDE SERPL-SCNC: 103 MMOL/L (ref 98–107)
CO2 SERPL-SCNC: 26 MMOL/L (ref 22–29)
CREAT SERPL-MCNC: 0.5 MG/DL (ref 0.5–1)
ERYTHROCYTE [DISTWIDTH] IN BLOOD BY AUTOMATED COUNT: 15.2 % (ref 11.5–15)
GFR, ESTIMATED: >90 ML/MIN/1.73M2
GLUCOSE BLD-MCNC: 159 MG/DL (ref 74–99)
GLUCOSE BLD-MCNC: 187 MG/DL (ref 74–99)
GLUCOSE SERPL-MCNC: 138 MG/DL (ref 74–99)
HCT VFR BLD AUTO: 24.6 % (ref 34–48)
HGB BLD-MCNC: 7.4 G/DL (ref 11.5–15.5)
MAGNESIUM SERPL-MCNC: 2.5 MG/DL (ref 1.6–2.6)
MCH RBC QN AUTO: 27.4 PG (ref 26–35)
MCHC RBC AUTO-ENTMCNC: 30.1 G/DL (ref 32–34.5)
MCV RBC AUTO: 91.1 FL (ref 80–99.9)
PHOSPHATE SERPL-MCNC: 3.3 MG/DL (ref 2.5–4.5)
PLATELET # BLD AUTO: 257 K/UL (ref 130–450)
PMV BLD AUTO: 11.7 FL (ref 7–12)
POTASSIUM SERPL-SCNC: 4.6 MMOL/L (ref 3.5–5)
RBC # BLD AUTO: 2.7 M/UL (ref 3.5–5.5)
SODIUM SERPL-SCNC: 139 MMOL/L (ref 132–146)
WBC OTHER # BLD: 12.1 K/UL (ref 4.5–11.5)

## 2024-07-02 PROCEDURE — 80048 BASIC METABOLIC PNL TOTAL CA: CPT

## 2024-07-02 PROCEDURE — 71045 X-RAY EXAM CHEST 1 VIEW: CPT

## 2024-07-02 PROCEDURE — 6370000000 HC RX 637 (ALT 250 FOR IP)

## 2024-07-02 PROCEDURE — 84100 ASSAY OF PHOSPHORUS: CPT

## 2024-07-02 PROCEDURE — 6370000000 HC RX 637 (ALT 250 FOR IP): Performed by: CLINICAL NURSE SPECIALIST

## 2024-07-02 PROCEDURE — 83735 ASSAY OF MAGNESIUM: CPT

## 2024-07-02 PROCEDURE — 94003 VENT MGMT INPAT SUBQ DAY: CPT

## 2024-07-02 PROCEDURE — 6370000000 HC RX 637 (ALT 250 FOR IP): Performed by: INTERNAL MEDICINE

## 2024-07-02 PROCEDURE — 36415 COLL VENOUS BLD VENIPUNCTURE: CPT

## 2024-07-02 PROCEDURE — 6360000002 HC RX W HCPCS

## 2024-07-02 PROCEDURE — 85027 COMPLETE CBC AUTOMATED: CPT

## 2024-07-02 PROCEDURE — 99232 SBSQ HOSP IP/OBS MODERATE 35: CPT | Performed by: STUDENT IN AN ORGANIZED HEALTH CARE EDUCATION/TRAINING PROGRAM

## 2024-07-02 PROCEDURE — 82962 GLUCOSE BLOOD TEST: CPT

## 2024-07-02 PROCEDURE — 2060000000 HC ICU INTERMEDIATE R&B

## 2024-07-02 PROCEDURE — 2580000003 HC RX 258

## 2024-07-02 RX ORDER — HYDROCODONE BITARTRATE AND ACETAMINOPHEN 7.5; 325 MG/1; MG/1
1 TABLET ORAL EVERY 8 HOURS PRN
Qty: 10 TABLET | Refills: 0 | Status: SHIPPED | DISCHARGE
Start: 2024-07-02 | End: 2024-07-02

## 2024-07-02 RX ORDER — RALOXIFENE HYDROCHLORIDE 60 MG/1
60 TABLET, FILM COATED ORAL EVERY EVENING
Qty: 30 TABLET | Refills: 3 | DISCHARGE
Start: 2024-07-02

## 2024-07-02 RX ORDER — POLYETHYLENE GLYCOL 3350 17 G/17G
17 POWDER, FOR SOLUTION ORAL 2 TIMES DAILY
Qty: 527 G | Refills: 1 | DISCHARGE
Start: 2024-07-02 | End: 2024-08-01

## 2024-07-02 RX ORDER — CHLORHEXIDINE GLUCONATE ORAL RINSE 1.2 MG/ML
15 SOLUTION DENTAL 2 TIMES DAILY
Qty: 420 ML | Refills: 0 | DISCHARGE
Start: 2024-07-02 | End: 2024-07-16

## 2024-07-02 RX ORDER — DOCUSATE SODIUM 50 MG/5ML
100 LIQUID ORAL DAILY PRN
Qty: 300 ML | Refills: 0 | DISCHARGE
Start: 2024-07-02

## 2024-07-02 RX ORDER — ASPIRIN 81 MG/1
81 TABLET, CHEWABLE ORAL DAILY
Qty: 30 TABLET | Refills: 3 | DISCHARGE
Start: 2024-07-03

## 2024-07-02 RX ORDER — ATORVASTATIN CALCIUM 40 MG/1
40 TABLET, FILM COATED ORAL NIGHTLY
Qty: 30 TABLET | Refills: 3 | DISCHARGE
Start: 2024-07-02

## 2024-07-02 RX ORDER — DIMETHICONE, OXYBENZONE, AND PADIMATE O 2; 2.5; 6.6 G/100G; G/100G; G/100G
STICK TOPICAL PRN
Refills: 0 | DISCHARGE
Start: 2024-07-02

## 2024-07-02 RX ORDER — BISACODYL 10 MG
10 SUPPOSITORY, RECTAL RECTAL DAILY PRN
DISCHARGE
Start: 2024-07-02 | End: 2024-08-01

## 2024-07-02 RX ORDER — IPRATROPIUM BROMIDE AND ALBUTEROL SULFATE 2.5; .5 MG/3ML; MG/3ML
3 SOLUTION RESPIRATORY (INHALATION) EVERY 4 HOURS PRN
Qty: 360 ML | DISCHARGE
Start: 2024-07-02

## 2024-07-02 RX ORDER — HYDROCODONE BITARTRATE AND ACETAMINOPHEN 7.5; 325 MG/1; MG/1
1 TABLET ORAL EVERY 8 HOURS PRN
Qty: 10 TABLET | Refills: 0 | Status: SHIPPED | OUTPATIENT
Start: 2024-07-02 | End: 2024-07-07

## 2024-07-02 RX ADMIN — SODIUM CHLORIDE, PRESERVATIVE FREE 10 ML: 5 INJECTION INTRAVENOUS at 09:05

## 2024-07-02 RX ADMIN — SENNOSIDES 5 ML: 8.8 LIQUID ORAL at 09:03

## 2024-07-02 RX ADMIN — POLYVINYL ALCOHOL, POVIDONE 1 DROP: 14; 6 SOLUTION/ DROPS OPHTHALMIC at 09:04

## 2024-07-02 RX ADMIN — POLYVINYL ALCOHOL, POVIDONE 1 DROP: 14; 6 SOLUTION/ DROPS OPHTHALMIC at 20:52

## 2024-07-02 RX ADMIN — CHLORHEXIDINE GLUCONATE, 0.12% ORAL RINSE 15 ML: 1.2 SOLUTION DENTAL at 20:51

## 2024-07-02 RX ADMIN — LATANOPROST 1 DROP: 50 SOLUTION OPHTHALMIC at 20:52

## 2024-07-02 RX ADMIN — PANTOPRAZOLE SODIUM 40 MG: 40 INJECTION, POWDER, FOR SOLUTION INTRAVENOUS at 09:04

## 2024-07-02 RX ADMIN — MULTIVIT AND MINERALS-FERROUS GLUCONATE 9 MG IRON/15 ML ORAL LIQUID 15 ML: at 09:05

## 2024-07-02 RX ADMIN — BRIMONIDINE TARTRATE 1 DROP: 2 SOLUTION OPHTHALMIC at 09:06

## 2024-07-02 RX ADMIN — POLYVINYL ALCOHOL, POVIDONE 1 DROP: 14; 6 SOLUTION/ DROPS OPHTHALMIC at 14:40

## 2024-07-02 RX ADMIN — ASPIRIN 81 MG 81 MG: 81 TABLET ORAL at 09:04

## 2024-07-02 RX ADMIN — POLYETHYLENE GLYCOL 3350 17 GRAM ORAL POWDER PACKET 17 G: at 20:51

## 2024-07-02 RX ADMIN — TIMOLOL MALEATE 1 DROP: 5 SOLUTION OPHTHALMIC at 20:52

## 2024-07-02 RX ADMIN — SODIUM CHLORIDE, PRESERVATIVE FREE 10 ML: 5 INJECTION INTRAVENOUS at 20:53

## 2024-07-02 RX ADMIN — TICAGRELOR 90 MG: 90 TABLET ORAL at 09:04

## 2024-07-02 RX ADMIN — TICAGRELOR 90 MG: 90 TABLET ORAL at 20:52

## 2024-07-02 RX ADMIN — TIMOLOL MALEATE 1 DROP: 5 SOLUTION OPHTHALMIC at 09:06

## 2024-07-02 RX ADMIN — PANTOPRAZOLE SODIUM 40 MG: 40 INJECTION, POWDER, FOR SOLUTION INTRAVENOUS at 20:52

## 2024-07-02 RX ADMIN — BRIMONIDINE TARTRATE 1 DROP: 2 SOLUTION OPHTHALMIC at 20:52

## 2024-07-02 RX ADMIN — DORZOLAMIDE HYDROCHLORIDE 1 DROP: 20 SOLUTION/ DROPS OPHTHALMIC at 20:52

## 2024-07-02 RX ADMIN — DOCUSATE SODIUM 100 MG: 50 LIQUID ORAL at 09:04

## 2024-07-02 RX ADMIN — POLYETHYLENE GLYCOL 3350 17 GRAM ORAL POWDER PACKET 17 G: at 09:03

## 2024-07-02 RX ADMIN — PSYLLIUM HUSK 1 PACKET: 3.4 POWDER ORAL at 09:04

## 2024-07-02 RX ADMIN — CHLORHEXIDINE GLUCONATE, 0.12% ORAL RINSE 15 ML: 1.2 SOLUTION DENTAL at 09:04

## 2024-07-02 RX ADMIN — SENNOSIDES 5 ML: 8.8 LIQUID ORAL at 20:51

## 2024-07-02 RX ADMIN — DORZOLAMIDE HYDROCHLORIDE 1 DROP: 20 SOLUTION/ DROPS OPHTHALMIC at 09:06

## 2024-07-02 RX ADMIN — ATORVASTATIN CALCIUM 40 MG: 40 TABLET, FILM COATED ORAL at 20:53

## 2024-07-02 RX ADMIN — Medication 1000 UNITS: at 09:04

## 2024-07-02 ASSESSMENT — PAIN SCALES - GENERAL: PAINLEVEL_OUTOF10: 0

## 2024-07-02 ASSESSMENT — PULMONARY FUNCTION TESTS
PIF_VALUE: 19
PIF_VALUE: 20
PIF_VALUE: 16
PIF_VALUE: 20

## 2024-07-02 NOTE — PLAN OF CARE
Problem: Skin/Tissue Integrity  Goal: Absence of new skin breakdown  Description: 1.  Monitor for areas of redness and/or skin breakdown  2.  Assess vascular access sites hourly  3.  Every 4-6 hours minimum:  Change oxygen saturation probe site  4.  Every 4-6 hours:  If on nasal continuous positive airway pressure, respiratory therapy assess nares and determine need for appliance change or resting period.  Outcome: Progressing     Problem: Safety - Adult  Goal: Free from fall injury  Outcome: Progressing     Problem: Discharge Planning  Goal: Discharge to home or other facility with appropriate resources  Outcome: Progressing  Flowsheets (Taken 6/18/2024 0800 by Janneth Yates, RN)  Discharge to home or other facility with appropriate resources: Identify barriers to discharge with patient and caregiver     
  Problem: Discharge Planning  Goal: Discharge to home or other facility with appropriate resources  Outcome: Not Progressing  Flowsheets (Taken 6/20/2024 0800)  Discharge to home or other facility with appropriate resources: Identify barriers to discharge with patient and caregiver     Problem: Skin/Tissue Integrity  Goal: Absence of new skin breakdown  Description: 1.  Monitor for areas of redness and/or skin breakdown  2.  Assess vascular access sites hourly  3.  Every 4-6 hours minimum:  Change oxygen saturation probe site  4.  Every 4-6 hours:  If on nasal continuous positive airway pressure, respiratory therapy assess nares and determine need for appliance change or resting period.  Outcome: Progressing     Problem: ABCDS Injury Assessment  Goal: Absence of physical injury  Outcome: Progressing  Flowsheets (Taken 6/20/2024 0859)  Absence of Physical Injury: Implement safety measures based on patient assessment     Problem: Safety - Adult  Goal: Free from fall injury  Outcome: Progressing     Problem: Pain  Goal: Verbalizes/displays adequate comfort level or baseline comfort level  Outcome: Progressing  Flowsheets (Taken 6/20/2024 0800)  Verbalizes/displays adequate comfort level or baseline comfort level:   Assess pain using appropriate pain scale   Administer analgesics based on type and severity of pain and evaluate response   Implement non-pharmacological measures as appropriate and evaluate response     Problem: Neurosensory - Adult  Goal: Achieves stable or improved neurological status  Outcome: Progressing  Flowsheets (Taken 6/20/2024 0800)  Achieves stable or improved neurological status:   Assess for and report changes in neurological status   Initiate measures to prevent increased intracranial pressure   Monitor temperature, glucose, and sodium. Initiate appropriate interventions as ordered   Maintain blood pressure and fluid volume within ordered parameters to optimize cerebral perfusion and minimize 
  Problem: Respiratory - Adult  Goal: Achieves optimal ventilation and oxygenation  6/17/2024 1114 by Shameka Preston, RCP  Outcome: Progressing      Pt has been tolerating SBT awaiting Dr. Villigran's input.  
  Problem: Respiratory - Adult  Goal: Achieves optimal ventilation and oxygenation  6/18/2024 0034 by Christina Wakefield, P  Outcome: Progressing  Flowsheets (Taken 6/18/2024 0034)  Achieves optimal ventilation and oxygenation:   Assess for changes in respiratory status   Position to facilitate oxygenation and minimize respiratory effort   Assess the need for suctioning and aspirate as needed   Respiratory therapy support as indicated   Oxygen supplementation based on oxygen saturation or arterial blood gases     
  Problem: Respiratory - Adult  Goal: Achieves optimal ventilation and oxygenation  6/6/2024 0808 by Bijan Camilo, RN  Outcome: Not Progressing     Problem: Skin/Tissue Integrity  Goal: Absence of new skin breakdown  Description: 1.  Monitor for areas of redness and/or skin breakdown  2.  Assess vascular access sites hourly  3.  Every 4-6 hours minimum:  Change oxygen saturation probe site  4.  Every 4-6 hours:  If on nasal continuous positive airway pressure, respiratory therapy assess nares and determine need for appliance change or resting period.  Outcome: Progressing     Problem: Neurosensory - Adult  Goal: Achieves stable or improved neurological status  Outcome: Progressing     Problem: Neurosensory - Adult  Goal: Absence of seizures  Outcome: Progressing     Problem: Respiratory - Adult  Goal: Achieves optimal ventilation and oxygenation  6/6/2024 0808 by Bijan Camilo, RN  Outcome: Not Progressing     
  Problem: Respiratory - Adult  Goal: Achieves optimal ventilation and oxygenation  7/2/2024 0804 by Chel Lemons RCP  Outcome: Progressing     
  Problem: Respiratory - Adult  Goal: Achieves optimal ventilation and oxygenation  Outcome: Not Progressing     
  Problem: Respiratory - Adult  Goal: Achieves optimal ventilation and oxygenation  Outcome: Progressing     Problem: Pain  Goal: Verbalizes/displays adequate comfort level or baseline comfort level  Outcome: Progressing     Problem: ABCDS Injury Assessment  Goal: Absence of physical injury  Outcome: Progressing     Problem: Skin/Tissue Integrity  Goal: Absence of new skin breakdown  Description: 1.  Monitor for areas of redness and/or skin breakdown  2.  Assess vascular access sites hourly  3.  Every 4-6 hours minimum:  Change oxygen saturation probe site  4.  Every 4-6 hours:  If on nasal continuous positive airway pressure, respiratory therapy assess nares and determine need for appliance change or resting period.  Outcome: Progressing     
  Problem: Safety - Medical Restraint  Goal: Remains free of injury from restraints (Restraint for Interference with Medical Device)  Description: INTERVENTIONS:  1. Determine that other, less restrictive measures have been tried or would not be effective before applying the restraint  2. Evaluate the patient's condition at the time of restraint application  3. Inform patient/family regarding the reason for restraint  4. Q2H: Monitor safety, psychosocial status, comfort, nutrition and hydration  6/18/2024 1106 by Janneth Yates RN  Outcome: Not Progressing     Problem: Safety - Medical Restraint  Goal: Remains free of injury from restraints (Restraint for Interference with Medical Device)  Description: INTERVENTIONS:  1. Determine that other, less restrictive measures have been tried or would not be effective before applying the restraint  2. Evaluate the patient's condition at the time of restraint application  3. Inform patient/family regarding the reason for restraint  4. Q2H: Monitor safety, psychosocial status, comfort, nutrition and hydration  6/18/2024 1106 by Janneth Yates RN  Outcome: Not Progressing  6/17/2024 2157 by Juan Elizondo RN  Outcome: Progressing  Flowsheets  Taken 6/17/2024 1800 by Mabel Young RN  Remains free of injury from restraints (restraint for interference with medical device):   Determine that other, less restrictive measures have been tried or would not be effective before applying the restraint   Evaluate the patient's condition at the time of restraint application   Inform patient/family regarding the reason for restraint   Every 2 hours: Monitor safety, psychosocial status, comfort, nutrition and hydration  Taken 6/17/2024 1600 by Mabel Young RN  Remains free of injury from restraints (restraint for interference with medical device):   Determine that other, less restrictive measures have been tried or would not be effective before applying the restraint   
  Problem: Skin/Tissue Integrity  Goal: Absence of new skin breakdown  Description: 1.  Monitor for areas of redness and/or skin breakdown  2.  Assess vascular access sites hourly  3.  Every 4-6 hours minimum:  Change oxygen saturation probe site  4.  Every 4-6 hours:  If on nasal continuous positive airway pressure, respiratory therapy assess nares and determine need for appliance change or resting period.  6/1/2024 0006 by Mindy Benjamin RN  Outcome: Progressing  5/31/2024 1153 by Pam Olivas RN  Outcome: Progressing     Problem: ABCDS Injury Assessment  Goal: Absence of physical injury  6/1/2024 0006 by Mindy Benjamin RN  Outcome: Progressing  5/31/2024 1153 by Pam Olivas RN  Outcome: Progressing     Problem: Safety - Adult  Goal: Free from fall injury  6/1/2024 0006 by Mindy Benjamin RN  Outcome: Progressing  5/31/2024 1153 by Pam Olivas RN  Outcome: Progressing     Problem: Discharge Planning  Goal: Discharge to home or other facility with appropriate resources  6/1/2024 0006 by Mindy Benjamin RN  Outcome: Progressing  5/31/2024 1153 by Pam Olivas RN  Outcome: Progressing     Problem: Pain  Goal: Verbalizes/displays adequate comfort level or baseline comfort level  Outcome: Progressing     
  Problem: Skin/Tissue Integrity  Goal: Absence of new skin breakdown  Description: 1.  Monitor for areas of redness and/or skin breakdown  2.  Assess vascular access sites hourly  3.  Every 4-6 hours minimum:  Change oxygen saturation probe site  4.  Every 4-6 hours:  If on nasal continuous positive airway pressure, respiratory therapy assess nares and determine need for appliance change or resting period.  6/10/2024 0935 by Lo Hernandez, RN  Outcome: Progressing  6/9/2024 2205 by Héctor Purdy RN  Outcome: Progressing     Problem: ABCDS Injury Assessment  Goal: Absence of physical injury  Outcome: Progressing  Flowsheets (Taken 6/7/2024 2036 by Laz Calvert, RN)  Absence of Physical Injury: Implement safety measures based on patient assessment     Problem: Safety - Adult  Goal: Free from fall injury  Outcome: Progressing  Flowsheets (Taken 6/7/2024 2036 by Laz Calvert, RN)  Free From Fall Injury: Instruct family/caregiver on patient safety     Problem: Discharge Planning  Goal: Discharge to home or other facility with appropriate resources  Outcome: Progressing  Flowsheets (Taken 6/8/2024 0800 by Zahra Crowley, RN)  Discharge to home or other facility with appropriate resources: Identify barriers to discharge with patient and caregiver     Problem: Pain  Goal: Verbalizes/displays adequate comfort level or baseline comfort level  Outcome: Progressing  Flowsheets (Taken 6/9/2024 1600 by Zahra Crowley, RN)  Verbalizes/displays adequate comfort level or baseline comfort level: Encourage patient to monitor pain and request assistance     Problem: Neurosensory - Adult  Goal: Achieves stable or improved neurological status  Outcome: Progressing  Flowsheets (Taken 6/9/2024 0800 by Zahra Crowley, RN)  Achieves stable or improved neurological status: Assess for and report changes in neurological status  Goal: Absence of seizures  Outcome: Progressing  Flowsheets (Taken 6/10/2024 0935)  Absence of 
  Problem: Skin/Tissue Integrity  Goal: Absence of new skin breakdown  Description: 1.  Monitor for areas of redness and/or skin breakdown  2.  Assess vascular access sites hourly  3.  Every 4-6 hours minimum:  Change oxygen saturation probe site  4.  Every 4-6 hours:  If on nasal continuous positive airway pressure, respiratory therapy assess nares and determine need for appliance change or resting period.  6/15/2024 2039 by Kelin Warner RN  Outcome: Progressing  6/15/2024 1338 by Mabel Young RN  Outcome: Progressing     Problem: ABCDS Injury Assessment  Goal: Absence of physical injury  6/15/2024 2039 by Kelin Warner RN  Outcome: Progressing  Flowsheets (Taken 6/15/2024 2000)  Absence of Physical Injury: Implement safety measures based on patient assessment  6/15/2024 1338 by Mabel Young RN  Outcome: Progressing     Problem: Safety - Adult  Goal: Free from fall injury  6/15/2024 2039 by Kelin Warner RN  Outcome: Progressing  Flowsheets (Taken 6/15/2024 2000)  Free From Fall Injury:   Instruct family/caregiver on patient safety   Based on caregiver fall risk screen, instruct family/caregiver to ask for assistance with transferring infant if caregiver noted to have fall risk factors  6/15/2024 1338 by Mabel Young RN  Outcome: Progressing     Problem: Pain  Goal: Verbalizes/displays adequate comfort level or baseline comfort level  6/15/2024 2039 by Kelin Warner RN  Outcome: Progressing  Flowsheets (Taken 6/15/2024 2000)  Verbalizes/displays adequate comfort level or baseline comfort level:   Assess pain using appropriate pain scale   Administer analgesics based on type and severity of pain and evaluate response  6/15/2024 1338 by Mabel Young RN  Outcome: Progressing     Problem: Neurosensory - Adult  Goal: Achieves stable or improved neurological status  6/15/2024 2039 by Kelin Warner RN  Outcome: Progressing  Flowsheets (Taken 6/15/2024 2000)  Achieves stable or improved 
  Problem: Skin/Tissue Integrity  Goal: Absence of new skin breakdown  Description: 1.  Monitor for areas of redness and/or skin breakdown  2.  Assess vascular access sites hourly  3.  Every 4-6 hours minimum:  Change oxygen saturation probe site  4.  Every 4-6 hours:  If on nasal continuous positive airway pressure, respiratory therapy assess nares and determine need for appliance change or resting period.  6/16/2024 0940 by Mabel Young RN  Outcome: Progressing  6/15/2024 2039 by Kelin Warner RN  Outcome: Progressing     Problem: ABCDS Injury Assessment  Goal: Absence of physical injury  6/16/2024 0940 by Mabel Young RN  Outcome: Progressing  6/15/2024 2039 by Kelin Warner RN  Outcome: Progressing  Flowsheets (Taken 6/15/2024 2000)  Absence of Physical Injury: Implement safety measures based on patient assessment     Problem: Safety - Adult  Goal: Free from fall injury  6/16/2024 0940 by Mabel Young RN  Outcome: Progressing  6/15/2024 2039 by Kelin Warner RN  Outcome: Progressing  Flowsheets (Taken 6/15/2024 2000)  Free From Fall Injury:   Instruct family/caregiver on patient safety   Based on caregiver fall risk screen, instruct family/caregiver to ask for assistance with transferring infant if caregiver noted to have fall risk factors     Problem: Discharge Planning  Goal: Discharge to home or other facility with appropriate resources  6/16/2024 0940 by Mabel Young RN  Outcome: Progressing  6/15/2024 2039 by Kelin Warner RN  Outcome: Not Progressing  Flowsheets (Taken 6/15/2024 2000)  Discharge to home or other facility with appropriate resources:   Identify barriers to discharge with patient and caregiver   Arrange for needed discharge resources and transportation as appropriate   Identify discharge learning needs (meds, wound care, etc)   Arrange for interpreters to assist at discharge as needed   Refer to discharge planning if patient needs post-hospital services based 
  Problem: Skin/Tissue Integrity  Goal: Absence of new skin breakdown  Description: 1.  Monitor for areas of redness and/or skin breakdown  2.  Assess vascular access sites hourly  3.  Every 4-6 hours minimum:  Change oxygen saturation probe site  4.  Every 4-6 hours:  If on nasal continuous positive airway pressure, respiratory therapy assess nares and determine need for appliance change or resting period.  6/17/2024 0948 by Mabel Young RN  Outcome: Progressing  6/17/2024 0310 by Disha Hebert RN  Outcome: Progressing     Problem: ABCDS Injury Assessment  Goal: Absence of physical injury  6/17/2024 0948 by Mabel Young RN  Outcome: Progressing  6/17/2024 0310 by Disha Hebert RN  Outcome: Progressing     Problem: Safety - Adult  Goal: Free from fall injury  6/17/2024 0948 by Mabel Young RN  Outcome: Progressing  6/17/2024 0310 by Disha Hebert RN  Outcome: Progressing     Problem: Pain  Goal: Verbalizes/displays adequate comfort level or baseline comfort level  6/17/2024 0948 by Mabel Young RN  Outcome: Progressing  6/17/2024 0310 by Disha Hebert RN  Outcome: Progressing     Problem: Neurosensory - Adult  Goal: Achieves stable or improved neurological status  6/17/2024 0948 by Mabel Young RN  Outcome: Progressing  6/17/2024 0310 by Disha Hebert RN  Outcome: Progressing  Goal: Absence of seizures  Outcome: Progressing  Goal: Remains free of injury related to seizures activity  Outcome: Progressing  Goal: Achieves maximal functionality and self care  Outcome: Progressing     Problem: Metabolic/Fluid and Electrolytes - Adult  Goal: Electrolytes maintained within normal limits  6/17/2024 0948 by Mabel Young RN  Outcome: Progressing  6/17/2024 0310 by Disha Hebert RN  Outcome: Progressing  Goal: Hemodynamic stability and optimal renal function maintained  Outcome: Progressing  Goal: Glucose maintained within prescribed range  Outcome: Progressing     Problem: 
  Problem: Skin/Tissue Integrity  Goal: Absence of new skin breakdown  Description: 1.  Monitor for areas of redness and/or skin breakdown  2.  Assess vascular access sites hourly  3.  Every 4-6 hours minimum:  Change oxygen saturation probe site  4.  Every 4-6 hours:  If on nasal continuous positive airway pressure, respiratory therapy assess nares and determine need for appliance change or resting period.  6/21/2024 0134 by Jose De León RN  Outcome: Progressing  6/20/2024 1533 by Angela Renee  Outcome: Progressing     Problem: ABCDS Injury Assessment  Goal: Absence of physical injury  6/21/2024 0134 by Jose De León RN  Outcome: Progressing  6/20/2024 1533 by Angela Renee  Outcome: Progressing  Flowsheets (Taken 6/20/2024 0859)  Absence of Physical Injury: Implement safety measures based on patient assessment     Problem: Discharge Planning  Goal: Discharge to home or other facility with appropriate resources  6/21/2024 0134 by Jose De León RN  Outcome: Not Progressing  Flowsheets (Taken 6/20/2024 2000)  Discharge to home or other facility with appropriate resources: Identify barriers to discharge with patient and caregiver  6/20/2024 1533 by Angela Renee  Outcome: Not Progressing  Flowsheets (Taken 6/20/2024 0800)  Discharge to home or other facility with appropriate resources: Identify barriers to discharge with patient and caregiver     
  Problem: Skin/Tissue Integrity  Goal: Absence of new skin breakdown  Description: 1.  Monitor for areas of redness and/or skin breakdown  2.  Assess vascular access sites hourly  3.  Every 4-6 hours minimum:  Change oxygen saturation probe site  4.  Every 4-6 hours:  If on nasal continuous positive airway pressure, respiratory therapy assess nares and determine need for appliance change or resting period.  6/21/2024 0741 by Nuzhat London RN  Outcome: Progressing  6/21/2024 0134 by Jose De León RN  Outcome: Progressing     Problem: ABCDS Injury Assessment  Goal: Absence of physical injury  6/21/2024 0741 by Nuzhat Londno RN  Outcome: Progressing  6/21/2024 0134 by Jose De León RN  Outcome: Progressing     Problem: Safety - Adult  Goal: Free from fall injury  6/21/2024 0741 by Nuzhat London RN  Outcome: Progressing  6/21/2024 0134 by Jose De León RN  Outcome: Progressing     Problem: Pain  Goal: Verbalizes/displays adequate comfort level or baseline comfort level  6/21/2024 0134 by Jose De León RN  Outcome: Progressing     Problem: Neurosensory - Adult  Goal: Achieves stable or improved neurological status  6/21/2024 0134 by Jose De León RN  Outcome: Progressing  Flowsheets (Taken 6/20/2024 2000)  Achieves stable or improved neurological status: Assess for and report changes in neurological status     Problem: Discharge Planning  Goal: Discharge to home or other facility with appropriate resources  6/21/2024 0741 by Nuzhat London RN  Outcome: Not Progressing  6/21/2024 0134 by Jose De León RN  Outcome: Not Progressing  Flowsheets (Taken 6/20/2024 2000)  Discharge to home or other facility with appropriate resources: Identify barriers to discharge with patient and caregiver     
  Problem: Skin/Tissue Integrity  Goal: Absence of new skin breakdown  Description: 1.  Monitor for areas of redness and/or skin breakdown  2.  Assess vascular access sites hourly  3.  Every 4-6 hours minimum:  Change oxygen saturation probe site  4.  Every 4-6 hours:  If on nasal continuous positive airway pressure, respiratory therapy assess nares and determine need for appliance change or resting period.  6/23/2024 0005 by Apoorva Jamison RN  Outcome: Progressing     Problem: ABCDS Injury Assessment  Goal: Absence of physical injury  6/23/2024 0005 by Apoorva Jamison RN  Outcome: Progressing     Problem: Safety - Adult  Goal: Free from fall injury  6/23/2024 0005 by Apoorva Jamison RN  Outcome: Progressing     Problem: Discharge Planning  Goal: Discharge to home or other facility with appropriate resources  6/23/2024 0005 by Apoorva Jamison RN  Outcome: Progressing       Problem: Neurosensory - Adult  Goal: Achieves stable or improved neurological status  6/23/2024 0005 by Apoorva Jamison RN  Outcome: Progressing     Problem: Nutrition Deficit:  Goal: Optimize nutritional status  6/23/2024 0005 by Apoorva Jamison RN  Outcome: Progressing     Problem: Respiratory - Adult  Goal: Achieves optimal ventilation and oxygenation  6/23/2024 0005 by Apoorva Jamison RN  Outcome: Progressing     
  Problem: Skin/Tissue Integrity  Goal: Absence of new skin breakdown  Description: 1.  Monitor for areas of redness and/or skin breakdown  2.  Assess vascular access sites hourly  3.  Every 4-6 hours minimum:  Change oxygen saturation probe site  4.  Every 4-6 hours:  If on nasal continuous positive airway pressure, respiratory therapy assess nares and determine need for appliance change or resting period.  6/23/2024 0820 by Penny Mueller  Outcome: Progressing     Problem: ABCDS Injury Assessment  Goal: Absence of physical injury  6/23/2024 0820 by Penny Mueller  Outcome: Progressing     Problem: Safety - Adult  Goal: Free from fall injury  6/23/2024 0820 by Penny Mueller  Outcome: Progressing     Problem: Discharge Planning  Goal: Discharge to home or other facility with appropriate resources  6/23/2024 0820 by Penny Mueller  Outcome: Progressing     Problem: Pain  Goal: Verbalizes/displays adequate comfort level or baseline comfort level  Outcome: Progressing     Problem: Neurosensory - Adult  Goal: Achieves stable or improved neurological status  6/23/2024 0820 by Penny Mueller  Outcome: Progressing     Problem: Neurosensory - Adult  Goal: Absence of seizures  Outcome: Progressing     Problem: Metabolic/Fluid and Electrolytes - Adult  Goal: Electrolytes maintained within normal limits  Outcome: Progressing     Problem: Metabolic/Fluid and Electrolytes - Adult  Goal: Hemodynamic stability and optimal renal function maintained  Outcome: Progressing     Problem: Nutrition Deficit:  Goal: Optimize nutritional status  6/23/2024 0820 by Penny Mueller  Outcome: Progressing     Problem: Chronic Conditions and Co-morbidities  Goal: Patient's chronic conditions and co-morbidity symptoms are monitored and maintained or improved  Outcome: Progressing     Problem: Respiratory - Adult  Goal: Achieves optimal ventilation and oxygenation  6/23/2024 0820 by Penny Mueller  Outcome: Progressing     
  Problem: Skin/Tissue Integrity  Goal: Absence of new skin breakdown  Description: 1.  Monitor for areas of redness and/or skin breakdown  2.  Assess vascular access sites hourly  3.  Every 4-6 hours minimum:  Change oxygen saturation probe site  4.  Every 4-6 hours:  If on nasal continuous positive airway pressure, respiratory therapy assess nares and determine need for appliance change or resting period.  6/24/2024 0843 by Elvin Herrera RN  Outcome: Progressing     Problem: ABCDS Injury Assessment  Goal: Absence of physical injury  6/24/2024 0843 by Elvin Herrera RN  Outcome: Progressing     Problem: Safety - Adult  Goal: Free from fall injury  6/24/2024 0843 by Elvin Herrera RN  Outcome: Progressing     Problem: Pain  Goal: Verbalizes/displays adequate comfort level or baseline comfort level  6/24/2024 0843 by Elvin Herrera RN  Outcome: Progressing     Problem: Neurosensory - Adult  Goal: Achieves stable or improved neurological status  6/24/2024 0843 by Elvin Herrera RN  Outcome: Progressing     Problem: Neurosensory - Adult  Goal: Absence of seizures  6/24/2024 0843 by Elvin Herrera RN  Outcome: Progressing     Problem: Nutrition Deficit:  Goal: Optimize nutritional status  6/24/2024 0843 by Elvin Herrera RN  Outcome: Progressing     Problem: Respiratory - Adult  Goal: Achieves optimal ventilation and oxygenation  6/24/2024 0843 by Elvin Herrera RN  Outcome: Progressing       
  Problem: Skin/Tissue Integrity  Goal: Absence of new skin breakdown  Description: 1.  Monitor for areas of redness and/or skin breakdown  2.  Assess vascular access sites hourly  3.  Every 4-6 hours minimum:  Change oxygen saturation probe site  4.  Every 4-6 hours:  If on nasal continuous positive airway pressure, respiratory therapy assess nares and determine need for appliance change or resting period.  6/25/2024 0158 by Radha Singleton RN  Outcome: Progressing     Problem: ABCDS Injury Assessment  Goal: Absence of physical injury  6/25/2024 0158 by Radha Singleton RN  Outcome: Progressing     Problem: Safety - Adult  Goal: Free from fall injury  6/25/2024 0158 by Radha Singleton RN  Outcome: Progressing     Problem: Discharge Planning  Goal: Discharge to home or other facility with appropriate resources  6/25/2024 0158 by Radha Singleton RN  Outcome: Progressing     Problem: Discharge Planning  Goal: Discharge to home or other facility with appropriate resources  6/25/2024 0158 by Radha Singleton RN  Outcome: Progressing  6/24/2024 1634 by Shayy Dickinson RN  Outcome: Not Progressing  Flowsheets (Taken 6/24/2024 1634)  Discharge to home or other facility with appropriate resources: Identify barriers to discharge with patient and caregiver     Problem: Neurosensory - Adult  Goal: Achieves stable or improved neurological status  6/24/2024 1634 by Shayy Dickinson RN  Outcome: Not Progressing  Flowsheets (Taken 6/24/2024 1634)  Achieves stable or improved neurological status:   Assess for and report changes in neurological status   Initiate measures to prevent increased intracranial pressure   Monitor temperature, glucose, and sodium. Initiate appropriate interventions as ordered  Goal: Achieves maximal functionality and self care  6/24/2024 1634 by Shayy Dickinson, RN  Outcome: Not Progressing  Flowsheets (Taken 6/24/2024 1634)  Achieves maximal functionality and self care:   Encourage visually impaired, hearing 
  Problem: Skin/Tissue Integrity  Goal: Absence of new skin breakdown  Description: 1.  Monitor for areas of redness and/or skin breakdown  2.  Assess vascular access sites hourly  3.  Every 4-6 hours minimum:  Change oxygen saturation probe site  4.  Every 4-6 hours:  If on nasal continuous positive airway pressure, respiratory therapy assess nares and determine need for appliance change or resting period.  6/25/2024 0812 by Elvin Herrera RN  Outcome: Progressing     Problem: ABCDS Injury Assessment  Goal: Absence of physical injury  6/25/2024 0812 by Elvin Herrera RN  Outcome: Progressing     Problem: Safety - Adult  Goal: Free from fall injury  6/25/2024 0812 by Elvin Herrera RN  Outcome: Progressing     Problem: Pain  Goal: Verbalizes/displays adequate comfort level or baseline comfort level  Outcome: Progressing     Problem: Neurosensory - Adult  Goal: Achieves stable or improved neurological status  Outcome: Progressing     Problem: Neurosensory - Adult  Goal: Absence of seizures  Outcome: Progressing     Problem: Nutrition Deficit:  Goal: Optimize nutritional status  Outcome: Progressing     Problem: Respiratory - Adult  Goal: Achieves optimal ventilation and oxygenation  Outcome: Progressing     Problem: Respiratory - Adult  Goal: Achieves optimal ventilation and oxygenation  Outcome: Progressing       
  Problem: Skin/Tissue Integrity  Goal: Absence of new skin breakdown  Description: 1.  Monitor for areas of redness and/or skin breakdown  2.  Assess vascular access sites hourly  3.  Every 4-6 hours minimum:  Change oxygen saturation probe site  4.  Every 4-6 hours:  If on nasal continuous positive airway pressure, respiratory therapy assess nares and determine need for appliance change or resting period.  6/5/2024 0952 by Bijan Camilo RN  Outcome: Progressing  6/4/2024 2155 by Tyrese Odell RN  Outcome: Progressing     Problem: ABCDS Injury Assessment  Goal: Absence of physical injury  6/5/2024 0952 by Bijan Camilo RN  Outcome: Progressing  6/4/2024 2155 by Tyrese Odell RN  Outcome: Progressing     Problem: Safety - Adult  Goal: Free from fall injury  6/5/2024 0952 by Bijan Camilo RN  Outcome: Progressing  6/4/2024 2155 by Tyrese Odell RN  Outcome: Progressing     Problem: Discharge Planning  Goal: Discharge to home or other facility with appropriate resources  6/5/2024 0952 by Bijan Camilo RN  Outcome: Progressing  6/4/2024 2155 by Tyrese Odell RN  Outcome: Progressing     Problem: Pain  Goal: Verbalizes/displays adequate comfort level or baseline comfort level  6/5/2024 0952 by Bijan Camilo RN  Outcome: Progressing  6/4/2024 2155 by Tyrese Odell RN  Outcome: Progressing     Problem: Neurosensory - Adult  Goal: Achieves stable or improved neurological status  6/5/2024 0952 by Bijan Camilo RN  Outcome: Progressing  6/4/2024 2155 by Tyrese Odell RN  Outcome: Progressing  Goal: Absence of seizures  6/5/2024 0952 by Bijan Camilo RN  Outcome: Progressing  6/4/2024 2155 by Tyrese Odell RN  Outcome: Progressing  Goal: Remains free of injury related to seizures activity  6/5/2024 0952 by Bijan Camilo RN  Outcome: Progressing  6/4/2024 2155 by Tyrese Odell RN  Outcome: Progressing  Goal: Achieves maximal functionality and self care  6/5/2024 0952 by 
  Problem: Skin/Tissue Integrity  Goal: Absence of new skin breakdown  Description: 1.  Monitor for areas of redness and/or skin breakdown  2.  Assess vascular access sites hourly  3.  Every 4-6 hours minimum:  Change oxygen saturation probe site  4.  Every 4-6 hours:  If on nasal continuous positive airway pressure, respiratory therapy assess nares and determine need for appliance change or resting period.  6/7/2024 0809 by Bijan Camilo RN  Outcome: Progressing  6/6/2024 2014 by Héctor Purdy RN  Outcome: Progressing     Problem: ABCDS Injury Assessment  Goal: Absence of physical injury  Outcome: Progressing     Problem: Safety - Adult  Goal: Free from fall injury  Outcome: Progressing     Problem: Discharge Planning  Goal: Discharge to home or other facility with appropriate resources  Outcome: Progressing     Problem: Pain  Goal: Verbalizes/displays adequate comfort level or baseline comfort level  Outcome: Progressing     Problem: Neurosensory - Adult  Goal: Achieves stable or improved neurological status  6/7/2024 0809 by Bijan Camilo RN  Outcome: Progressing  6/6/2024 2014 by Héctor Purdy RN  Outcome: Progressing  Goal: Absence of seizures  6/7/2024 0809 by Bijan Camilo RN  Outcome: Progressing  6/6/2024 2014 by Héctor Purdy RN  Outcome: Progressing  Goal: Remains free of injury related to seizures activity  Outcome: Progressing  Goal: Achieves maximal functionality and self care  Outcome: Progressing     Problem: Metabolic/Fluid and Electrolytes - Adult  Goal: Electrolytes maintained within normal limits  Outcome: Progressing  Goal: Hemodynamic stability and optimal renal function maintained  Outcome: Progressing  Goal: Glucose maintained within prescribed range  Outcome: Progressing     Problem: Nutrition Deficit:  Goal: Optimize nutritional status  Outcome: Progressing     Problem: Chronic Conditions and Co-morbidities  Goal: Patient's chronic conditions and co-morbidity symptoms are monitored 
  Problem: Skin/Tissue Integrity  Goal: Absence of new skin breakdown  Description: 1.  Monitor for areas of redness and/or skin breakdown  2.  Assess vascular access sites hourly  3.  Every 4-6 hours minimum:  Change oxygen saturation probe site  4.  Every 4-6 hours:  If on nasal continuous positive airway pressure, respiratory therapy assess nares and determine need for appliance change or resting period.  Outcome: Progressing     Problem: ABCDS Injury Assessment  Goal: Absence of physical injury  Outcome: Progressing     Problem: Safety - Adult  Goal: Free from fall injury  Outcome: Progressing     Problem: Discharge Planning  Goal: Discharge to home or other facility with appropriate resources  Outcome: Progressing     Problem: Pain  Goal: Verbalizes/displays adequate comfort level or baseline comfort level  Outcome: Progressing     
  Problem: Skin/Tissue Integrity  Goal: Absence of new skin breakdown  Description: 1.  Monitor for areas of redness and/or skin breakdown  2.  Assess vascular access sites hourly  3.  Every 4-6 hours minimum:  Change oxygen saturation probe site  4.  Every 4-6 hours:  If on nasal continuous positive airway pressure, respiratory therapy assess nares and determine need for appliance change or resting period.  Outcome: Progressing     Problem: ABCDS Injury Assessment  Goal: Absence of physical injury  Outcome: Progressing     Problem: Safety - Adult  Goal: Free from fall injury  Outcome: Progressing     Problem: Discharge Planning  Goal: Discharge to home or other facility with appropriate resources  Outcome: Progressing     Problem: Pain  Goal: Verbalizes/displays adequate comfort level or baseline comfort level  Outcome: Progressing     
  Problem: Skin/Tissue Integrity  Goal: Absence of new skin breakdown  Description: 1.  Monitor for areas of redness and/or skin breakdown  2.  Assess vascular access sites hourly  3.  Every 4-6 hours minimum:  Change oxygen saturation probe site  4.  Every 4-6 hours:  If on nasal continuous positive airway pressure, respiratory therapy assess nares and determine need for appliance change or resting period.  Outcome: Progressing     Problem: ABCDS Injury Assessment  Goal: Absence of physical injury  Outcome: Progressing     Problem: Safety - Adult  Goal: Free from fall injury  Outcome: Progressing     Problem: Discharge Planning  Goal: Discharge to home or other facility with appropriate resources  Outcome: Progressing     Problem: Pain  Goal: Verbalizes/displays adequate comfort level or baseline comfort level  Outcome: Progressing     Problem: Neurosensory - Adult  Goal: Achieves stable or improved neurological status  Outcome: Progressing     Problem: Neurosensory - Adult  Goal: Absence of seizures  Outcome: Progressing     Problem: Metabolic/Fluid and Electrolytes - Adult  Goal: Electrolytes maintained within normal limits  Outcome: Progressing     Problem: Nutrition Deficit:  Goal: Optimize nutritional status  Outcome: Progressing     Problem: Chronic Conditions and Co-morbidities  Goal: Patient's chronic conditions and co-morbidity symptoms are monitored and maintained or improved  Outcome: Progressing     Problem: Respiratory - Adult  Goal: Achieves optimal ventilation and oxygenation  Outcome: Progressing     Problem: Safety - Medical Restraint  Goal: Remains free of injury from restraints (Restraint for Interference with Medical Device)  Description: INTERVENTIONS:  1. Determine that other, less restrictive measures have been tried or would not be effective before applying the restraint  2. Evaluate the patient's condition at the time of restraint application  3. Inform patient/family regarding the reason 
  Problem: Skin/Tissue Integrity  Goal: Absence of new skin breakdown  Description: 1.  Monitor for areas of redness and/or skin breakdown  2.  Assess vascular access sites hourly  3.  Every 4-6 hours minimum:  Change oxygen saturation probe site  4.  Every 4-6 hours:  If on nasal continuous positive airway pressure, respiratory therapy assess nares and determine need for appliance change or resting period.  Outcome: Progressing     Problem: ABCDS Injury Assessment  Goal: Absence of physical injury  Outcome: Progressing     Problem: Safety - Adult  Goal: Free from fall injury  Outcome: Progressing     Problem: Discharge Planning  Goal: Discharge to home or other facility with appropriate resources  Outcome: Progressing     Problem: Pain  Goal: Verbalizes/displays adequate comfort level or baseline comfort level  Outcome: Progressing     Problem: Neurosensory - Adult  Goal: Achieves stable or improved neurological status  Outcome: Progressing  Goal: Absence of seizures  Outcome: Progressing  Goal: Remains free of injury related to seizures activity  Outcome: Progressing  Goal: Achieves maximal functionality and self care  Outcome: Progressing     Problem: Metabolic/Fluid and Electrolytes - Adult  Goal: Electrolytes maintained within normal limits  Outcome: Progressing  Goal: Hemodynamic stability and optimal renal function maintained  Outcome: Progressing  Goal: Glucose maintained within prescribed range  Outcome: Progressing     Problem: Nutrition Deficit:  Goal: Optimize nutritional status  Outcome: Progressing     Problem: Chronic Conditions and Co-morbidities  Goal: Patient's chronic conditions and co-morbidity symptoms are monitored and maintained or improved  Outcome: Progressing     Problem: Respiratory - Adult  Goal: Achieves optimal ventilation and oxygenation  Outcome: Progressing     
  Problem: Skin/Tissue Integrity  Goal: Absence of new skin breakdown  Description: 1.  Monitor for areas of redness and/or skin breakdown  2.  Assess vascular access sites hourly  3.  Every 4-6 hours minimum:  Change oxygen saturation probe site  4.  Every 4-6 hours:  If on nasal continuous positive airway pressure, respiratory therapy assess nares and determine need for appliance change or resting period.  Outcome: Progressing     Problem: ABCDS Injury Assessment  Goal: Absence of physical injury  Outcome: Progressing     Problem: Safety - Adult  Goal: Free from fall injury  Outcome: Progressing     Problem: Discharge Planning  Goal: Discharge to home or other facility with appropriate resources  Outcome: Progressing     Problem: Pain  Goal: Verbalizes/displays adequate comfort level or baseline comfort level  Outcome: Progressing     Problem: Neurosensory - Adult  Goal: Achieves stable or improved neurological status  Outcome: Progressing  Goal: Absence of seizures  Outcome: Progressing  Goal: Remains free of injury related to seizures activity  Outcome: Progressing  Goal: Achieves maximal functionality and self care  Outcome: Progressing     Problem: Metabolic/Fluid and Electrolytes - Adult  Goal: Electrolytes maintained within normal limits  Outcome: Progressing  Goal: Hemodynamic stability and optimal renal function maintained  Outcome: Progressing  Goal: Glucose maintained within prescribed range  Outcome: Progressing     Problem: Respiratory - Adult  Goal: Achieves optimal ventilation and oxygenation  Outcome: Progressing     
  Problem: Skin/Tissue Integrity  Goal: Absence of new skin breakdown  Description: 1.  Monitor for areas of redness and/or skin breakdown  2.  Assess vascular access sites hourly  3.  Every 4-6 hours minimum:  Change oxygen saturation probe site  4.  Every 4-6 hours:  If on nasal continuous positive airway pressure, respiratory therapy assess nares and determine need for appliance change or resting period.  Outcome: Progressing     Problem: ABCDS Injury Assessment  Goal: Absence of physical injury  Outcome: Progressing     Problem: Safety - Adult  Goal: Free from fall injury  Outcome: Progressing     Problem: Discharge Planning  Goal: Discharge to home or other facility with appropriate resources  Outcome: Progressing     Problem: Pain  Goal: Verbalizes/displays adequate comfort level or baseline comfort level  Outcome: Progressing     Problem: Neurosensory - Adult  Goal: Achieves stable or improved neurological status  Outcome: Progressing  Goal: Absence of seizures  Outcome: Progressing  Goal: Remains free of injury related to seizures activity  Outcome: Progressing  Goal: Achieves maximal functionality and self care  Outcome: Progressing     Problem: Metabolic/Fluid and Electrolytes - Adult  Goal: Electrolytes maintained within normal limits  Outcome: Progressing  Goal: Hemodynamic stability and optimal renal function maintained  Outcome: Progressing  Goal: Glucose maintained within prescribed range  Outcome: Progressing     Problem: Respiratory - Adult  Goal: Achieves optimal ventilation and oxygenation  Outcome: Progressing     Problem: Nutrition Deficit:  Goal: Optimize nutritional status  Outcome: Progressing     
  Problem: Skin/Tissue Integrity  Goal: Absence of new skin breakdown  Description: 1.  Monitor for areas of redness and/or skin breakdown  2.  Assess vascular access sites hourly  3.  Every 4-6 hours minimum:  Change oxygen saturation probe site  4.  Every 4-6 hours:  If on nasal continuous positive airway pressure, respiratory therapy assess nares and determine need for appliance change or resting period.  Outcome: Progressing     Problem: ABCDS Injury Assessment  Goal: Absence of physical injury  Outcome: Progressing     Problem: Safety - Adult  Goal: Free from fall injury  Outcome: Progressing     Problem: Discharge Planning  Goal: Discharge to home or other facility with appropriate resources  Outcome: Progressing     Problem: Respiratory - Adult  Goal: Achieves optimal ventilation and oxygenation  7/2/2024 0804 by Chel Lemons RCP  Outcome: Progressing  7/2/2024 0804 by Chel Lemons RCP  Outcome: Progressing     
  Problem: Skin/Tissue Integrity  Goal: Absence of new skin breakdown  Description: 1.  Monitor for areas of redness and/or skin breakdown  2.  Assess vascular access sites hourly  3.  Every 4-6 hours minimum:  Change oxygen saturation probe site  4.  Every 4-6 hours:  If on nasal continuous positive airway pressure, respiratory therapy assess nares and determine need for appliance change or resting period.  Outcome: Progressing     Problem: ABCDS Injury Assessment  Goal: Absence of physical injury  Outcome: Progressing     Problem: Safety - Adult  Goal: Free from fall injury  Outcome: Progressing     Problem: Neurosensory - Adult  Goal: Achieves stable or improved neurological status  Outcome: Progressing     Problem: Metabolic/Fluid and Electrolytes - Adult  Goal: Electrolytes maintained within normal limits  Outcome: Progressing     
  Problem: Skin/Tissue Integrity  Goal: Absence of new skin breakdown  Description: 1.  Monitor for areas of redness and/or skin breakdown  2.  Assess vascular access sites hourly  3.  Every 4-6 hours minimum:  Change oxygen saturation probe site  4.  Every 4-6 hours:  If on nasal continuous positive airway pressure, respiratory therapy assess nares and determine need for appliance change or resting period.  Outcome: Progressing     Problem: ABCDS Injury Assessment  Goal: Absence of physical injury  Outcome: Progressing     Problem: Safety - Adult  Goal: Free from fall injury  Outcome: Progressing     Problem: Pain  Goal: Verbalizes/displays adequate comfort level or baseline comfort level  Outcome: Progressing     Problem: Neurosensory - Adult  Goal: Absence of seizures  Outcome: Progressing     Problem: Nutrition Deficit:  Goal: Optimize nutritional status  Outcome: Progressing     Problem: Respiratory - Adult  Goal: Achieves optimal ventilation and oxygenation  Outcome: Progressing       
  Problem: Skin/Tissue Integrity  Goal: Absence of new skin breakdown  Description: 1.  Monitor for areas of redness and/or skin breakdown  2.  Assess vascular access sites hourly  3.  Every 4-6 hours minimum:  Change oxygen saturation probe site  4.  Every 4-6 hours:  If on nasal continuous positive airway pressure, respiratory therapy assess nares and determine need for appliance change or resting period.  Outcome: Progressing     Problem: ABCDS Injury Assessment  Goal: Absence of physical injury  Outcome: Progressing     Problem: Safety - Adult  Goal: Free from fall injury  Outcome: Progressing     Problem: Pain  Goal: Verbalizes/displays adequate comfort level or baseline comfort level  Outcome: Progressing     Problem: Neurosensory - Adult  Goal: Achieves stable or improved neurological status  Outcome: Progressing     Problem: Metabolic/Fluid and Electrolytes - Adult  Goal: Electrolytes maintained within normal limits  Outcome: Progressing    Problem: Nutrition Deficit:  Goal: Optimize nutritional status  Outcome: Progressing    Problem: Safety - Medical Restraint  Goal: Remains free of injury from restraints (Restraint for Interference with Medical Device)  Description: INTERVENTIONS:  1. Determine that other, less restrictive measures have been tried or would not be effective before applying the restraint  2. Evaluate the patient's condition at the time of restraint application  3. Inform patient/family regarding the reason for restraint  4. Q2H: Monitor safety, psychosocial status, comfort, nutrition and hydration  Outcome: Progressing  Flowsheets  Taken 6/17/2024 0200 by Disha Hebert RN  Remains free of injury from restraints (restraint for interference with medical device): Every 2 hours: Monitor safety, psychosocial status, comfort, nutrition and hydration  Taken 6/17/2024 0000 by Disha Hebert RN  Remains free of injury from restraints (restraint for interference with medical device): Every 2 hours: 
  Problem: Skin/Tissue Integrity  Goal: Absence of new skin breakdown  Description: 1.  Monitor for areas of redness and/or skin breakdown  2.  Assess vascular access sites hourly  3.  Every 4-6 hours minimum:  Change oxygen saturation probe site  4.  Every 4-6 hours:  If on nasal continuous positive airway pressure, respiratory therapy assess nares and determine need for appliance change or resting period.  Outcome: Progressing     Problem: ABCDS Injury Assessment  Goal: Absence of physical injury  Outcome: Progressing     Problem: Safety - Adult  Goal: Free from fall injury  Outcome: Progressing     Problem: Pain  Goal: Verbalizes/displays adequate comfort level or baseline comfort level  Outcome: Progressing     Problem: Neurosensory - Adult  Goal: Achieves stable or improved neurological status  Outcome: Progressing     Problem: Nutrition Deficit:  Goal: Optimize nutritional status  Outcome: Progressing     Problem: Chronic Conditions and Co-morbidities  Goal: Patient's chronic conditions and co-morbidity symptoms are monitored and maintained or improved  Outcome: Progressing     Problem: Respiratory - Adult  Goal: Achieves optimal ventilation and oxygenation  Outcome: Progressing     
  Problem: Skin/Tissue Integrity  Goal: Absence of new skin breakdown  Description: 1.  Monitor for areas of redness and/or skin breakdown  2.  Assess vascular access sites hourly  3.  Every 4-6 hours minimum:  Change oxygen saturation probe site  4.  Every 4-6 hours:  If on nasal continuous positive airway pressure, respiratory therapy assess nares and determine need for appliance change or resting period.  Outcome: Progressing     Problem: ABCDS Injury Assessment  Goal: Absence of physical injury  Outcome: Progressing  Flowsheets (Taken 6/13/2024 2000)  Absence of Physical Injury: Implement safety measures based on patient assessment     Problem: Safety - Adult  Goal: Free from fall injury  Outcome: Progressing  Flowsheets (Taken 6/13/2024 2000)  Free From Fall Injury:   Instruct family/caregiver on patient safety   Based on caregiver fall risk screen, instruct family/caregiver to ask for assistance with transferring infant if caregiver noted to have fall risk factors     Problem: Discharge Planning  Goal: Discharge to home or other facility with appropriate resources  Outcome: Progressing  Flowsheets (Taken 6/13/2024 2000)  Discharge to home or other facility with appropriate resources:   Identify barriers to discharge with patient and caregiver   Arrange for needed discharge resources and transportation as appropriate   Identify discharge learning needs (meds, wound care, etc)   Arrange for interpreters to assist at discharge as needed   Refer to discharge planning if patient needs post-hospital services based on physician order or complex needs related to functional status, cognitive ability or social support system     Problem: Pain  Goal: Verbalizes/displays adequate comfort level or baseline comfort level  Outcome: Progressing     Problem: Neurosensory - Adult  Goal: Achieves stable or improved neurological status  Outcome: Progressing  Flowsheets (Taken 6/13/2024 2000)  Achieves stable or improved 
  Problem: Skin/Tissue Integrity  Goal: Absence of new skin breakdown  Description: 1.  Monitor for areas of redness and/or skin breakdown  2.  Assess vascular access sites hourly  3.  Every 4-6 hours minimum:  Change oxygen saturation probe site  4.  Every 4-6 hours:  If on nasal continuous positive airway pressure, respiratory therapy assess nares and determine need for appliance change or resting period.  Outcome: Progressing     Problem: ABCDS Injury Assessment  Goal: Absence of physical injury  Outcome: Progressing  Flowsheets (Taken 6/14/2024 2000)  Absence of Physical Injury: Implement safety measures based on patient assessment     Problem: Safety - Adult  Goal: Free from fall injury  Outcome: Progressing  Flowsheets (Taken 6/14/2024 2000)  Free From Fall Injury:   Instruct family/caregiver on patient safety   Based on caregiver fall risk screen, instruct family/caregiver to ask for assistance with transferring infant if caregiver noted to have fall risk factors     Problem: Pain  Goal: Verbalizes/displays adequate comfort level or baseline comfort level  Outcome: Progressing  Flowsheets (Taken 6/14/2024 2000)  Verbalizes/displays adequate comfort level or baseline comfort level:   Assess pain using appropriate pain scale   Administer analgesics based on type and severity of pain and evaluate response     Problem: Neurosensory - Adult  Goal: Absence of seizures  Outcome: Progressing  Flowsheets (Taken 6/14/2024 2000)  Absence of seizures:   Monitor for seizure activity.  If seizure occurs, document type and location of movements and any associated apnea   If seizure occurs, turn head to side and suction secretions as needed   Administer anticonvulsants as ordered   Support airway/breathing, administer oxygen as needed   Diagnostic studies as ordered     Problem: Neurosensory - Adult  Goal: Remains free of injury related to seizures activity  Outcome: Progressing  Flowsheets (Taken 6/14/2024 2000)  Remains 
  Problem: Skin/Tissue Integrity  Goal: Absence of new skin breakdown  Description: 1.  Monitor for areas of redness and/or skin breakdown  2.  Assess vascular access sites hourly  3.  Every 4-6 hours minimum:  Change oxygen saturation probe site  4.  Every 4-6 hours:  If on nasal continuous positive airway pressure, respiratory therapy assess nares and determine need for appliance change or resting period.  Outcome: Progressing     Problem: Chronic Conditions and Co-morbidities  Goal: Patient's chronic conditions and co-morbidity symptoms are monitored and maintained or improved  Outcome: Progressing     
Called patient's friend Roma Crocker and updated her about the patient's overall condition.  Roma mentions she has the healthcare POA papers and will bring the papers next visit.  The POAs mentioned in the paperwork are all diseased according to the friend.  Roma believe the patient will not want any cardiac resuscitation but patient has a living will that is in patient's folder.  Updated Roma about possible trach and PEG next week.    Yenni Rivera MD   
Contacted by General surgery resident. They were asked to reevaluate patient for trache and PEG, patient was lethargic and unresponsive on their evaluation. They recommended MICU transfer and will be available for trache and PEG when more stable. Reevaluated patient she is opening eyes but not as responsive. ABGs and stat CT head ordered. Dr. Mcadams  contacted, accepted patient to transfer to MICU 1.   
I have evaluated the patient today.  She was working with physical therapy and Occupational Therapy.  She still had some truncal instability and was swaying towards the right side.    Continue aggressive medical management with aspirin 81 mg, Plavix 75 mg and Lipitor 40 mg daily.  Target systolic blood pressure less than 160 mmHg.    I will make arrangements to obtain a diagnostic catheter angiogram study as an outpatient.  I am tentatively planning for the week of Lori 10.  This was discussed with the patient again.    Timbo Garza M.D.  Vascular Neurology & Neurointerventional Surgery    
Patient reaches towards trach and peg when left hand is unrestrained. Left soft  wrist restraint to remain in place for patient safety.     Problem: Skin/Tissue Integrity  Goal: Absence of new skin breakdown  Description: 1.  Monitor for areas of redness and/or skin breakdown  2.  Assess vascular access sites hourly  3.  Every 4-6 hours minimum:  Change oxygen saturation probe site  4.  Every 4-6 hours:  If on nasal continuous positive airway pressure, respiratory therapy assess nares and determine need for appliance change or resting period.  Outcome: Progressing     Problem: Safety - Medical Restraint  Goal: Remains free of injury from restraints (Restraint for Interference with Medical Device)  Description: INTERVENTIONS:  1. Determine that other, less restrictive measures have been tried or would not be effective before applying the restraint  2. Evaluate the patient's condition at the time of restraint application  3. Inform patient/family regarding the reason for restraint  4. Q2H: Monitor safety, psychosocial status, comfort, nutrition and hydration  Outcome: Progressing  Flowsheets  Taken 6/22/2024 0000  Remains free of injury from restraints (restraint for interference with medical device):   Determine that other, less restrictive measures have been tried or would not be effective before applying the restraint   Every 2 hours: Monitor safety, psychosocial status, comfort, nutrition and hydration  Taken 6/21/2024 2200  Remains free of injury from restraints (restraint for interference with medical device):   Determine that other, less restrictive measures have been tried or would not be effective before applying the restraint   Every 2 hours: Monitor safety, psychosocial status, comfort, nutrition and hydration  Taken 6/21/2024 2000  Remains free of injury from restraints (restraint for interference with medical device):   Determine that other, less restrictive measures have been tried or would not be 
Spoke with Dr. Garza outside of patient's room. He was going to see her today.    He is planning for an angiogram as outpatient. Medical management is recommended at this time. She is stable.     Attempting to get more information regarding her eye shunt from [facility] tomorrow when they open if able.     Neurology will follow up tomorrow with patient       
facility with appropriate resources: Identify barriers to discharge with patient and caregiver     Problem: Neurosensory - Adult  Goal: Achieves maximal functionality and self care  6/19/2024 2320 by Jose De León, RN  Outcome: Not Progressing  6/19/2024 1651 by Angela Renee  Outcome: Not Progressing  Flowsheets (Taken 6/19/2024 0800)  Achieves maximal functionality and self care: Encourage and assist patient to increase activity and self care with guidance from physical therapy/occupational therapy     
report changes in neurological status   Maintain blood pressure and fluid volume within ordered parameters to optimize cerebral perfusion and minimize risk of hemorrhage   Monitor temperature, glucose, and sodium. Initiate appropriate interventions as ordered     Problem: Metabolic/Fluid and Electrolytes - Adult  Goal: Hemodynamic stability and optimal renal function maintained  6/7/2024 2036 by Laz Calvert, RN  Outcome: Progressing  Flowsheets (Taken 6/7/2024 2036)  Hemodynamic stability and optimal renal function maintained:   Monitor labs and assess for signs and symptoms of volume excess or deficit   Monitor intake, output and patient weight   Monitor response to interventions for patient's volume status, including labs, urine output, blood pressure (other measures as available)     Problem: Nutrition Deficit:  Goal: Optimize nutritional status  6/7/2024 2036 by Laz Calvert, RN  Outcome: Progressing  Flowsheets (Taken 6/7/2024 2036)  Nutrient intake appropriate for improving, restoring, or maintaining nutritional needs:   Assess nutritional status and recommend course of action   Monitor oral intake, labs, and treatment plans   Recommend, monitor, and adjust tube feedings and TPN/PPN based on assessed needs     Problem: Chronic Conditions and Co-morbidities  Goal: Patient's chronic conditions and co-morbidity symptoms are monitored and maintained or improved  6/7/2024 2036 by Laz Calvert, RN  Outcome: Progressing  Flowsheets (Taken 6/7/2024 2036)  Care Plan - Patient's Chronic Conditions and Co-Morbidity Symptoms are Monitored and Maintained or Improved:   Monitor and assess patient's chronic conditions and comorbid symptoms for stability, deterioration, or improvement   Collaborate with multidisciplinary team to address chronic and comorbid conditions and prevent exacerbation or deterioration     Problem: Respiratory - Adult  Goal: Achieves optimal ventilation and oxygenation  6/7/2024 2036 
restrictive measures have been tried or would not be effective before applying the restraint  2. Evaluate the patient's condition at the time of restraint application  3. Inform patient/family regarding the reason for restraint  4. Q2H: Monitor safety, psychosocial status, comfort, nutrition and hydration  Outcome: Progressing  Flowsheets  Taken 6/15/2024 1000  Remains free of injury from restraints (restraint for interference with medical device):   Determine that other, less restrictive measures have been tried or would not be effective before applying the restraint   Evaluate the patient's condition at the time of restraint application   Inform patient/family regarding the reason for restraint   Every 2 hours: Monitor safety, psychosocial status, comfort, nutrition and hydration  Taken 6/15/2024 0733  Remains free of injury from restraints (restraint for interference with medical device):   Determine that other, less restrictive measures have been tried or would not be effective before applying the restraint   Evaluate the patient's condition at the time of restraint application   Every 2 hours: Monitor safety, psychosocial status, comfort, nutrition and hydration   Inform patient/family regarding the reason for restraint     
  Problem: Respiratory - Adult  Goal: Achieves optimal ventilation and oxygenation  6/10/2024 2034 by Laz Calvert RN  Outcome: Progressing     Problem: Chronic Conditions and Co-morbidities  Goal: Patient's chronic conditions and co-morbidity symptoms are monitored and maintained or improved  Outcome: Progressing     
Encourage and assist patient to increase activity and self care with guidance from physical therapy/occupational therapy     Problem: Musculoskeletal - Adult  Goal: Return mobility to safest level of function  Outcome: Not Progressing  Flowsheets (Taken 6/24/2024 1634)  Return Mobility to Safest Level of Function:   Assess patient stability and activity tolerance for standing, transferring and ambulating with or without assistive devices   Instruct patient/family in ordered activity level  Goal: Return ADL status to a safe level of function  Outcome: Not Progressing  Flowsheets (Taken 6/24/2024 1634)  Return ADL Status to a Safe Level of Function:   Administer medication as ordered   Assist and instruct patient to increase activity and self care as tolerated     
condition at the time of restraint application   Every 2 hours: Monitor safety, psychosocial status, comfort, nutrition and hydration   Inform patient/family regarding the reason for restraint  Taken 6/17/2024 0733 by Mabel Young RN  Remains free of injury from restraints (restraint for interference with medical device):   Determine that other, less restrictive measures have been tried or would not be effective before applying the restraint   Inform patient/family regarding the reason for restraint   Evaluate the patient's condition at the time of restraint application   Every 2 hours: Monitor safety, psychosocial status, comfort, nutrition and hydration  Taken 6/17/2024 0600 by Disha Hebert RN  Remains free of injury from restraints (restraint for interference with medical device): Every 2 hours: Monitor safety, psychosocial status, comfort, nutrition and hydration  Taken 6/17/2024 0400 by Disha Hebert RN  Remains free of injury from restraints (restraint for interference with medical device): Every 2 hours: Monitor safety, psychosocial status, comfort, nutrition and hydration     
Determine that other, less restrictive measures have been tried or would not be effective before applying the restraint   Evaluate the patient's condition at the time of restraint application   Inform patient/family regarding the reason for restraint   Every 2 hours: Monitor safety, psychosocial status, comfort, nutrition and hydration     Problem: Discharge Planning  Goal: Discharge to home or other facility with appropriate resources  Outcome: Not Progressing  Flowsheets (Taken 6/19/2024 0800)  Discharge to home or other facility with appropriate resources: Identify barriers to discharge with patient and caregiver     Problem: Neurosensory - Adult  Goal: Achieves maximal functionality and self care  Outcome: Not Progressing  Flowsheets (Taken 6/19/2024 0800)  Achieves maximal functionality and self care: Encourage and assist patient to increase activity and self care with guidance from physical therapy/occupational therapy     
RN  Outcome: Progressing  Flowsheets (Taken 6/8/2024 0800 by Zahra Crowley, RN)  Care Plan - Patient's Chronic Conditions and Co-Morbidity Symptoms are Monitored and Maintained or Improved: Monitor and assess patient's chronic conditions and comorbid symptoms for stability, deterioration, or improvement  6/10/2024 2034 by Laz Calvert, RN  Outcome: Progressing     Problem: Respiratory - Adult  Goal: Achieves optimal ventilation and oxygenation  6/10/2024 2034 by Laz Calvetr, RN  Outcome: Progressing

## 2024-07-02 NOTE — CARE COORDINATION
CM Update: Plan at discharge is [facility]. P2P overturned. Bed available today. ATIYA/destination completed. PASRR and ambulance form on soft chart. Attending consulted surgery to check bleeding around trach. If stable pt will dc to facility. Will await order and arrange transport (TF)        NERISSA MarieN,RN  Case Management  [phone]

## 2024-07-02 NOTE — CARE COORDINATION
Attempted to set up transport to [place] this evening, soonest time was 8pm. Facility asked for discharge tomorrow when their RT is on site for safety. Transport arranged for tomorrow at 10am with [facility]. Facility updated on time. Updated attending on delay until tomorrow for safety.    For questions I can be reached at [phone]. DARVIN Estevez

## 2024-07-02 NOTE — CONSULTS
[facility]              [address] [place] [address]                              CONSULTATION      PATIENT NAME: KEVIN GALVEZ               : 1940  MED REC NO: [id number]                        ROOM: [id number]  ACCOUNT NO: [id number]                       ADMIT DATE: 2024  PROVIDER: Sandro Concepcion MD    REHAB CONSULT      CHIEF COMPLAINT:  CVA.    HISTORY OF PRESENT ILLNESS:  The patient was admitted to [facility] with accelerated hypertension and unsteadiness of gait.  She was found to have bilateral vertebral artery occlusions with possible dissection.  Neurology discussed this with Vascular, and at least at this point, there is no plan for more aggressive intervention at this time.  There will be an angiogram as an outpatient after she is discharged and stabilized, and that will be addressed further.  She has been seen by Therapy.  For PT, she was moderate assist for transfers, maximal assist to ambulate a few feet.  She was also maximal assist for her ADLs, and I was asked to see her for planning further rehab.    PAST MEDICAL HISTORY:    1. Hypertension.  2. Glaucoma.    ALLERGIES:  DOXYCYCLINE, CIPRO, AND SULFA.      CURRENT MEDICATIONS:  Norvasc, aspirin, Lipitor, Plavix, Cozaar, Toprol-XL, Evista, and multiple eyedrops.    SOCIAL HISTORY:  She lives alone in a 2-floor home.  She does have a cousin in the area, but limited support.    REVIEW OF SYSTEMS:  HEENT:  Positive for glaucoma.  CARDIAC:  Positive for PVCs in the past.  PULMONARY:  Negative.  GI:  Negative.  :  Negative.  ORTHOPEDIC:  Negative.  NEUROLOGIC:  Positive for her current deficits.    PHYSICAL EXAMINATION:  GENERAL:  Well-nourished, well-developed white female, in no acute distress.  HEAD:  Normocephalic, atraumatic.   EYES:  Pupils equal, round, reactive to light.  PHARYNX:  Normal.  LUNGS:  Clear to P and A.  HEART:  Regular rate and rhythm.  S1, S2 normal.  No 
  Palliative Care Department  [phone]  Palliative Care Initial Consult  Deepali Barrett APRN-CNS    Malissa Chen  [id number]  Hospital Day: 14    Date of Initial Consult: 6/11/24  Referring Provider: Candis DASILVA-CNP    Palliative Medicine was consulted for assistance with: goals of care        HPI:   Malissa Chen is a 83 y.o. with a past medical history of hypertension, hyperlipidemia intolerant to statins due to myalgia, glaucoma, ocular migraines, diverticulitis and LBBB with a first-degree AV block on EKG  who was admitted on 5/30/2024 from home with a CHIEF COMPLAINT of dizziness and left sided weakness. The patient stated that she has been having dizziness since April and was recently admitted to the hospital in April for this. She denied chest pain; shortness of breath and symptoms resolved. /81. 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  Both vertebral artery images were concerning for dissection.   There is reconstitution of the distal V4 segment of the right vertebral artery.  Interventional neurology was consulted recommended outpatient diagnostic catheter angiogram. On 6/3, pt had worsening stroke symptoms. Re-consulted neuro interventionalist.  Admitted to Neuro ICU following cerebral angiogram with bilateral vertebral artery thrombectomy & left vertebral artery stent assisted  repair. On 6/4 she developed sinus bradycardia and hypotension;  EP consulted. 6/7 pt developed aspiration pneumonia with Pulmonology consult. Palliative medicine consulted for goals of care.      ASSESSMENT/PLAN:     Pertinent Hospital Diagnoses   Acute hypoxic respiratory failure  Aspiration pneumonia  Bilateral vertebral artery dissections  R PCA stroke  Right sided paralysis  Expressive aphasia  Acute brain stem infarct       Palliative Care 
Consult was sent and has been read  
Department of Internal Medicine  Infectious Diseases   Consult Note      Reason for Consult:  ESBL  E coli pneumonia       Requesting Physician:  Dr Chicas         HISTORY OF PRESENT ILLNESS:           This is an 83 yrs old female with hx of HTN, PVCs , Diverticulitis who was admitted to [facility] on 5/30 with stroke like symptoms ( left sided weakness ) - further eval showed right occipital lobe infarction -she underwent cerebral angiogram and bilateral vertebral artery thrombectomy .  Pt developed aspiration pneumonia, she became more lethargic ./ unresponsive . She was transferred to MICU   He was treated with IV zosyn - cx grew ESBL E coli   Pt continued to have low grade temp, WBC 19 K - 14 K .   Chest x ray - no infiltrates, sputum cx again growing E coli       Past Medical History:      Past Medical History:   Diagnosis Date    Diverticulitis     Glaucoma associated with ocular disorder     Hyperlipidemia     Hypertension     Osteopenia     Palpitations     PVC (premature ventricular contraction)          Past Surgical History:      Past Surgical History:   Procedure Laterality Date    ANKLE SURGERY Right     orif    EYE SURGERY      shunt right eye    IR MECHANICAL ART THROMBECTOMY INTRACRANIAL  6/3/2024    IR MECHANICAL ART THROMBECTOMY INTRACRANIAL 6/3/2024 Puma Bone MD [facility]    IR TRANSCATH PLACE STENT UPPER W PTA INIT ARTERY  6/3/2024    IR TRANSCATH PLACE STENT UPPER W PTA INIT ARTERY 6/3/2024 Puma Bone MD [facility]       Current Medications:      Current Facility-Administered Medications   Medication Dose Route Frequency Provider Last Rate Last Admin    sterile water injection             midazolam PF (VERSED) injection 2 mg  2 mg IntraVENous Once Marlo Layton MD        vecuronium (NORCURON) injection 10 mg  10 mg IntraVENous Once Marlo Layton MD        fentaNYL (SUBLIMAZE) injection 200 mcg  200 mcg IntraVENous Once Marlo Layton MD     
GENERAL SURGERY  CONSULT NOTE    Patient's Name/Date of Birth: Malissa Chen / 1940    Date: June 12, 2024     PCP: Ed Collins Jr., MD     Chief Complaint:   Chief Complaint   Patient presents with    Dizziness     Patient states she was having weakness and \"felt floppy\" [facility] 11:00       Physician Consulted: Dr. Raymundo   Reason for Consult: Trach/PEG Evaluation       HPI  Malissa Chen is a 83 y.o. female who initially presented to the ED for an Acute ischemic right medullary stroke and occipital stroke, patient underwent thrombectomy and bilateral vertebral artery dissection repair.  As result of the stroke the patient develop dysphagia and has been evaluated by speech who recommends an n.p.o. diet.  Patient currently has an NG tube which she is getting tube feeds through.  BMI 26.39, Patient is on Brilinta/ASA. No prior abdominal surgical hx. General surgery was consulted for PEG tube evaluation.       Past Medical History:   Diagnosis Date    Diverticulitis     Glaucoma associated with ocular disorder     Hyperlipidemia     Hypertension     Osteopenia     Palpitations     PVC (premature ventricular contraction)        Past Surgical History:   Procedure Laterality Date    ANKLE SURGERY Right     orif    EYE SURGERY      shunt right eye    IR MECHANICAL ART THROMBECTOMY INTRACRANIAL  6/3/2024    IR MECHANICAL ART THROMBECTOMY INTRACRANIAL 6/3/2024 Puma Bone MD [facility]    IR TRANSCATH PLACE STENT UPPER W PTA INIT ARTERY  6/3/2024    IR TRANSCATH PLACE STENT UPPER W PTA INIT ARTERY 6/3/2024 Puma Bone MD [facility]       Medications Prior to Admission:    Prior to Admission medications    Medication Sig Start Date End Date Taking? Authorizing Provider   olmesartan (BENICAR) 20 MG tablet Take 1 tablet by mouth daily 5/15/24  Yes Provider, MD Ezequiel   estradiol (ESTRACE VAGINAL) 0.1 MG/GM vaginal cream Place 0.5 g vaginally Twice a Week 3/10/22   
[facility]  Neurology Consult    Date:  5/31/2024  Patient Name:  Malissa Chen  YOB: 1940  MRN: [id number]     PCP:  Ed Collins Jr., MD   Referring:  No ref. provider found      Chief Complaint: Stroke    History obtained from: Patient    Assessment  Malissa Chen is a 83 y.o. female admitted with dizziness and vision changes since April 2024, now with right-sided weakness and found to have a right occipital stroke on CT head with possible bilateral vertebral artery dissections noted on CT angiogram of the head and neck.  Given her constellation of symptoms there is concern for possible further ischemia in the posterior circulation    She also has evidence on exam of a large fiber neuropathy.    Plan  MRI brain and orbits with and without contrast  Check labs: B12, folate, TSH, DAGMAR, SPEP, ESR, CRP  On ASA and Plavix  Statin therapy, goal LDL<70  Maintain SBP <140, avoid hypotension        History of Present Illness:  Malissa Chen is a 83 y.o. right handed female presenting for evaluation of stroke.  The patient states that prior to admission she suddenly had a bad headache and noticed that her left arm and leg were weak. Now today, her right arm and leg are weak.  She is also noted some trouble swallowing over the last day.  She reports no prior history of stroke.  Upon presentation she is noted to have a hypodensity in the right occipital lobe concerning for acute ischemia.  CTA of the head and neck also showed occlusion in the bilateral vertebrals concerning for dissection.  She was admitted with a BP of 202/81    She describes dizzy spells and headaches since April 2024.  Since then she has also been noticing intermittent blurring in her left eye as well as brown spots in her vision.  Some aching in her jaws since April with chewing is also reported.        Medical History:   Past Medical History:   Diagnosis Date    Diverticulitis     Glaucoma associated with 
Neurointerventinonal Consult Note    6/3/2024 2:29 PM  Pt Name: Malissa Chen  MRN: [id number]  YOB: 1940  Date of evaluation: 6/3/2024  Primary Care Physician: Ed Collins Jr., MD          Stroke referral received from Brock Galindo MD based upon patient's presenting stroke like symptomology.     Malissa Chen is a 83 y.o. female who presents with bilateral multifocal dissections and occlusions.      Worsening of right sided weakness this am NIHSS was 6   Medical management was advised    Now exhibiting signs of quadriparesis, slow and progressive     Prior Functional Status(Modified Coleridge Scale):  1=No significant disability despite symptoms; able to carry out all usual duties and activities    Allergies   Allergen Reactions    Doxycycline Hallucinations    Ciprofloxacin Diarrhea and Nausea And Vomiting    Sulfa Antibiotics Rash       Medications  Prior to Admission medications    Medication Sig Start Date End Date Taking? Authorizing Provider   olmesartan (BENICAR) 20 MG tablet Take 1 tablet by mouth daily 5/15/24  Yes Ezequiel Lincoln MD   estradiol (ESTRACE VAGINAL) 0.1 MG/GM vaginal cream Place 0.5 g vaginally Twice a Week 3/10/22   Ancelmo Norton MD   Multiple Vitamins-Minerals (THERAPEUTIC MULTIVITAMIN-MINERALS) tablet Take 1 tablet by mouth daily    Ezequiel Lincoln MD   metoprolol succinate (TOPROL XL) 25 MG extended release tablet Take 1 tablet by mouth daily    Ezequiel Lincoln MD   dorzolamide (TRUSOPT) 2 % ophthalmic solution Place 1 drop into the left eye 2 times daily    Ezequiel Lincoln MD   vitamin D (CHOLECALCIFEROL) 1000 UNIT TABS tablet Take 1 tablet by mouth daily    Ezequiel Lincoln MD   lisinopril (PRINIVIL;ZESTRIL) 20 MG tablet Take 1 tablet by mouth every evening  Patient not taking: Reported on 5/30/2024    Ezequiel Lincoln MD   raloxifene (EVISTA) 60 MG tablet Take 1 tablet by mouth every evening    Ezequiel Lincoln MD 
[facility]  Internal Medicine Residency Program  Consult Note  MICU    Patient:  Malissa Chen 83 y.o. female MRN: [id number]     Date of Service: 6/13/2024    Hospital Day: 15      Chief complaint: Lethargy and worsening respiratory distress    History of Present Illness     The patient is a 83 y.o. female  with PMHx of hypertension, hyperlipidemia and PVCs who was admitted 5/30/24 on account of strokelike symptoms with left-sided weakness and NIHSS of 0 with an elevated blood pressure 202/81 at presentation.  Further evaluation with brain imaging was pertinent for 4 cm acute infarct in the right occipital lobe with complete occlusion of the distal P1 segment of the left vertebral artery and complete occlusion of the V3 segment of the right vertebral artery concerning for vertebral artery dissection. Patient initially admitted to the stroke floor and later transferred to neuro ICU for worsening stroke symptoms on 6/3.  She subsequently had cerebral angiogram with bilateral vertebral artery thrombectomy and left vertebral artery stent assisted repair.  She has been on Brilinta and aspirin and later developed aspiration pneumonia for which she was started on ceftriaxone and pulmonology on board.  Patient was transferred to stroke floor and is agreeable to trach and PEG by general surgery.  Patient has no family or POA.    Today patient was noted to be increasingly lethargic and becoming unresponsive upon further evaluation.  She was also noted to have worsening respiratory distress with easily appreciable gurgling and concerns for inability to protect airway.  She was deemed necessary to escalate care to ICU hence transferred to the unit.  Stat CT was unremarkable for acute intracranial abnormality and patient was intubated in the unit for airway protection.    Past Medical History:      Diagnosis Date    Diverticulitis     Glaucoma associated with ocular disorder     Hyperlipidemia     
Subjective:      Malissaerick Chen post op follow up bilsunita eden thrombectomy and stenting, left subclavian stent    Patient is clinically stable     Review of Systems  Review of systems not obtained due to patient factors.      Objective:     Vitals:    06/04/24 1100   BP: (!) 117/48   Pulse: 57   Resp: 12   Temp:    SpO2: 95%     NIH Stroke Scale/Score at time of initial evaluation:     1A: Level of Consciousness 0 - alert; keenly responsive   1B: Ask Month and Age 0 - answers both questions correctly   1C: Tell Patient To Open and Close Eyes, then Hand  Squeeze 0 - performs both tasks correctly   2: Test Horizontal Extraocular Movements 0 - normal   3: Test Visual Fields 2 - complete hemianopia   4: Test Facial Palsy 2 - partial paralysis (total or near total paralysis of the lower face)   5A: Test Left Arm Motor Drift 1 - drift, limb holds 90 (or 45) degrees but drifts down before full 10 seconds: does not hit bed   5B: Test Right Arm Motor Drift 4 - no movement   6A: Test Left Leg Motor Drift 1 - drift; leg falls by the end of the 5 second period but does not hit bed   6B: Test Right Leg Motor Drift 4 - no movement   7: Test Limb Ataxia (FNF/Heel-Shin) 0 - absent   8: Test Sensation 2 - severe to total sensory loss; patient is not aware of being touched in face, arm, leg   9: Test Language/Aphasia 0 - no aphasia, normal   10: Test Dysarthria 1 - mild to moderate, patient slurs at least some words and at worst, can be understood with some difficulty   11: Test Extinction/Inattention 0 - no abnormality   Total 17       Lab Review  Lab Results   Component Value Date/Time    CHOL 159 05/31/2024 11:43 AM    TRIG 131 05/31/2024 11:43 AM    HDL 48 05/31/2024 11:43 AM         Assessment:     Posterior circulation stroke with progression of infarction due to bilateral vertebral artery occlusion , progression over the last 5 days due to poor perfusion     S/p Thrombectomy and vertebral artery repair with multiple 
[facility] [place] [facility]  Consultation Report  PATIENT: aMlissa Chen  MEDICAL RECORD NUMBER: [id number]  DATE OF SERVICE:  6/4/2024  ATTENDING ELECTROPHYSIOLOGIST: Saida Ulloa MD   PRIMARY ELECTROPHYSIOLOGIST: Saida Ulloa Md   REFERRING PHYSICIAN: Ed Collins Jr., MD  CHIEF COMPLAINT: Stroke.     HPI: This is a 83 y.o. female with a history of hypertension, hyperlipidemia intolerant to statins due to myalgia, glaucoma, ocular migraines, diverticulitis and LBBB with a first-degree AV block on EKG dating back to at least 2017.  She is known to Dr. Williamson and is managed on Toprol, Benicar, Evista.     She was seen by Dr. Quintero on 04/08/2024 presenting to the ER with complaints of headache, palpitations, visual hallucinations and upper respiratory symptoms. Dr. Quintero was consulted for PVCs and Elevated troponin 63>61. She had no complains  of chest pain. A stress test was performed at that time with normal perfusion as well as an Echocardiogram which revealed LVEF of 60-65% with mild mitral stenosis.     On 05/30/2024 she presented to the ER , [facility], with left sided weakness that resolved spontaneously after an hour.  She also complained of a worsening right-sided headache and difficulty with vision and her left eye.  CT of the head and neck was noted to show an acute infarct in the medial aspect of the right occipital lobe.  She was seen by Dr. Andujar as well as Dr. Garza who noted an abrupt occlusion of the proximal left vertebral artery then there is distal reconstitution from the thyroid and costocervical trunk which then supply antegrade flow to the remainder of the left vertebral artery, left PICA, the VB junction and even the distal V4 segment of the right vertebral artery up to the origin of PICA.The right vertebral artery had good flow until the mid V3 segment and thereafter there is abrupt occlusion. The basilar artery was widely patent.      On 
Vascular Surgery Consultation Note    Reason for Consult:  R femoral artery dissection    HPI :    This is a 83 y.o. female who is admitted to the hospital for treatment of bilateral vertebral artery occlusion with bilateral thrombectomy.   Vascular surgery is consulted for evaluation and treatment of possible R femoral artery dissection. Patient is critically ill and is mechanically ventilated. History is obtained via chart review and medical ICU staff. Patient underwent DVT US which radiology read \"dilation of right common femoral artery. Dissection cannot be excluded\".     Patient's interventional procedures were via her right radial and left femoral arteries.    ROS : Negative if blank [], Positive if [x]  General Vascular   [] Fevers [] Claudication (Blocks)   [] Chills [] Rest Pain   [] Weight Loss [] Tissue Loss   [] Chest Pain [] Clotting Disorder    [] SOB at rest [] Leg Swelling   [] SOB with exertion [] DVT/PE      [] Nausea    [] Vomitting [x] Stroke/TIA   [] Abdominal Pain [] Focal weakness   [] Melena [] Slurred Speech   [] Hematochezia [] Vision Changes   [] Hematuria    [] Dysuria [x] Hx of Central Catheters   [] Wears Glasses/Contacts  [] Dialysis and If so date initiated   [] Blindness     [] Hand Dominant   [] Difficulty swallowing        Past Medical History:   Diagnosis Date    Diverticulitis     Glaucoma associated with ocular disorder     Hyperlipidemia     Hypertension     Osteopenia     Palpitations     PVC (premature ventricular contraction)         Past Surgical History:   Procedure Laterality Date    ANKLE SURGERY Right     orif    EYE SURGERY      shunt right eye    IR MECHANICAL ART THROMBECTOMY INTRACRANIAL  6/3/2024    IR MECHANICAL ART THROMBECTOMY INTRACRANIAL 6/3/2024 Puma Bone MD [facility]    IR TRANSCATH PLACE STENT UPPER W PTA INIT ARTERY  6/3/2024    IR TRANSCATH PLACE STENT UPPER W PTA INIT ARTERY 6/3/2024 Puma Bone MD [facility] 
75 mg, Daily  aspirin EC tablet 81 mg, Daily  metoprolol succinate (TOPROL XL) extended release tablet 25 mg, Daily  brimonidine (ALPHAGAN) 0.2 % ophthalmic solution 1 drop, BID   And  timolol (TIMOPTIC) 0.5 % ophthalmic solution 1 drop, BID      Physical Exam  Vitals:    06/02/24 2001 06/02/24 2345 06/03/24 0715 06/03/24 0945   BP: (!) 146/54 (!) 132/50 (!) 130/53 133/60   Pulse: 74 62 55 73   Resp: 16 16 16 18   Temp: 98.1 °F (36.7 °C) 97.8 °F (36.6 °C) 98 °F (36.7 °C)    TempSrc: Oral Temporal Temporal    SpO2: 96% 96% 96% 94%   Weight:       Height:           I/O this shift:  In: 100 [P.O.:100]  Out: -     Constitutional: Well developed, well nourished and in no acute distress.   Respiratory: Clear to auscultation bilaterally with no use of accessory muscles during respiration.   Cardiovascular:  No murmurs auscultated. Carotid arteries without bruits. Pedal pulses and radial pulses 2+ bilaterally. No edema in all four extremities.     Neurological:    Awake, alert, following commands appropriately.  Full range of extraocular movements.  Bilateral pupils 4 mm, reactive to light.  Very restricted vision in the left eye (intact central vision and some vision in the right superior inferior quadrant).  Nasal field cut with the vision out of the right eye.  Face symmetric.  No aphasia.  No dysarthria.  Left upper extremity and lower extremity 4+/5.  Right upper extremity and lower extremity strength is 4/5.  Impaired finger-to-nose coordination testing on both sides.  Truncal instability with some swaying to the right side.    NIH Stroke Scale   NIH Stroke Scale/Score at time of initial evaluation:    1A: Level of Consciousness 0 - alert; keenly responsive   1B: Ask Month and Age 0 - answers both questions correctly   1C: Tell Patient To Open and Close Eyes, then Hand  Squeeze 0 - performs both tasks correctly   2: Test Horizontal Extraocular Movements 0 - normal   3: Test Visual Fields 1 - partial hemianopia 
succinate (TOPROL XL) extended release tablet 25 mg  25 mg Oral Daily Kervin Domingo MD   25 mg at 06/04/24 0756    brimonidine (ALPHAGAN) 0.2 % ophthalmic solution 1 drop  1 drop Both Eyes BID Kervin Domingo MD   1 drop at 06/07/24 0709    And    timolol (TIMOPTIC) 0.5 % ophthalmic solution 1 drop  1 drop Both Eyes BID Kervin Domingo MD   1 drop at 06/07/24 0707       Allergies   Allergen Reactions    Doxycycline Hallucinations    Ciprofloxacin Diarrhea and Nausea And Vomiting    Sulfa Antibiotics Rash       Review of Systems:  Unable to obtain      Physical Exam:  BP (!) 126/48   Pulse 65   Temp 98.5 °F (36.9 °C) (Temporal)   Resp 21   Ht 1.676 m (5' 6\")   Wt 80.7 kg (178 lb)   SpO2 96%   BMI 28.73 kg/m²    General: Lying in bed comfortably, no distress, breathing is not labored, cough is weak  HEENT: PERRL, EOMI, MMM, no oral lesions +R facial droop  Neck: supple, no adenopathy  CV: RRR without murmur  Lungs: few rhonchi bilaterally  Abd: soft, NT, ND, bowel sounds normal  Ext: warm, no edema, no clubbing  Skin: no rashes  Neuro: awake, alert, facial droop and expressive aphasia present.  Flaccid on the R arm and leg.    Oximetry: 96% on 2L    Imaging personally reviewed:  CXR  FINDINGS:  Portable chest reveals heart to be borderline enlarged.  Nasogastric tube tip  below the level diaphragm.  Patchy airspace disease in the lower lung fields  bilaterally with some improvement in the right airspace disease and no change  in the left airspace disease or left pleural effusion.  Degenerative changes  seen within the spine.  Upper lung fields are clear.     IMPRESSION:  Interval improvement seen in the region of the right lung base.  The  remainder of the chest is stable with no new focal parenchymal opacification  present.    Echocardiogram:      Left Ventricle: Normal left ventricular systolic function with a visually estimated EF of 60 - 65%. Left ventricle size is normal. Mild septal 
issues.   Monitor BS.    MSK: Right sided weakness.  Deconditioned. Hx of osteopenia.    ROM.   Turn & reposition.   PT/OT  When able.    Monitor for skin breakdown.    Heme: No acute issues.   Monitor CBC.      Bowel regime: Bisacodyl  and Glycolax  Pain control/Sedation: Tylenol  DVT prophylaxis: SCD and No Lovenox/Heparin at this time due to thrombectomy  GI prophylaxis: Protonix  Hx glaucoma-on home eye drops.   Ancillary consults:   Medicine. Neuro-interventionist. Critical care.    Patient/Family update:  Questions answered.  Support given.  .    Code status:   Full code    Disposition:  Admitted to Neuro ICU    Total time for caring for this patient, including direct patient contact, review of data including imaging & laboratory studies, discussions with other team members & physicians at least 30 minutes so far today.  Please feel free to call with questions or [email or web address]      Electronically signed by Gerri Wolfe RN MSN APRN-NP BC NP  CCNS CCRN 6/4/2024 7:33 AM

## 2024-07-03 VITALS
OXYGEN SATURATION: 99 % | DIASTOLIC BLOOD PRESSURE: 45 MMHG | TEMPERATURE: 97.6 F | HEIGHT: 66 IN | WEIGHT: 177.1 LBS | RESPIRATION RATE: 21 BRPM | SYSTOLIC BLOOD PRESSURE: 116 MMHG | HEART RATE: 64 BPM | BODY MASS INDEX: 28.46 KG/M2

## 2024-07-03 LAB
ERYTHROCYTE [DISTWIDTH] IN BLOOD BY AUTOMATED COUNT: 15.7 % (ref 11.5–15)
HCT VFR BLD AUTO: 24 % (ref 34–48)
HGB BLD-MCNC: 7.2 G/DL (ref 11.5–15.5)
MAGNESIUM SERPL-MCNC: 2.4 MG/DL (ref 1.6–2.6)
MCH RBC QN AUTO: 27.3 PG (ref 26–35)
MCHC RBC AUTO-ENTMCNC: 30 G/DL (ref 32–34.5)
MCV RBC AUTO: 90.9 FL (ref 80–99.9)
PHOSPHATE SERPL-MCNC: 3.3 MG/DL (ref 2.5–4.5)
PLATELET # BLD AUTO: 257 K/UL (ref 130–450)
PMV BLD AUTO: 11.7 FL (ref 7–12)
RBC # BLD AUTO: 2.64 M/UL (ref 3.5–5.5)
WBC OTHER # BLD: 10.5 K/UL (ref 4.5–11.5)

## 2024-07-03 PROCEDURE — 6360000002 HC RX W HCPCS

## 2024-07-03 PROCEDURE — 84100 ASSAY OF PHOSPHORUS: CPT

## 2024-07-03 PROCEDURE — 83735 ASSAY OF MAGNESIUM: CPT

## 2024-07-03 PROCEDURE — 36415 COLL VENOUS BLD VENIPUNCTURE: CPT

## 2024-07-03 PROCEDURE — 6370000000 HC RX 637 (ALT 250 FOR IP)

## 2024-07-03 PROCEDURE — 85027 COMPLETE CBC AUTOMATED: CPT

## 2024-07-03 PROCEDURE — 6370000000 HC RX 637 (ALT 250 FOR IP): Performed by: CLINICAL NURSE SPECIALIST

## 2024-07-03 PROCEDURE — 6370000000 HC RX 637 (ALT 250 FOR IP): Performed by: INTERNAL MEDICINE

## 2024-07-03 PROCEDURE — 2580000003 HC RX 258

## 2024-07-03 PROCEDURE — 94003 VENT MGMT INPAT SUBQ DAY: CPT

## 2024-07-03 PROCEDURE — 99239 HOSP IP/OBS DSCHRG MGMT >30: CPT | Performed by: STUDENT IN AN ORGANIZED HEALTH CARE EDUCATION/TRAINING PROGRAM

## 2024-07-03 RX ADMIN — TICAGRELOR 90 MG: 90 TABLET ORAL at 08:47

## 2024-07-03 RX ADMIN — MULTIVIT AND MINERALS-FERROUS GLUCONATE 9 MG IRON/15 ML ORAL LIQUID 15 ML: at 08:47

## 2024-07-03 RX ADMIN — POLYETHYLENE GLYCOL 3350 17 GRAM ORAL POWDER PACKET 17 G: at 08:46

## 2024-07-03 RX ADMIN — CHLORHEXIDINE GLUCONATE, 0.12% ORAL RINSE 15 ML: 1.2 SOLUTION DENTAL at 08:46

## 2024-07-03 RX ADMIN — DORZOLAMIDE HYDROCHLORIDE 1 DROP: 20 SOLUTION/ DROPS OPHTHALMIC at 08:48

## 2024-07-03 RX ADMIN — SENNOSIDES 5 ML: 8.8 LIQUID ORAL at 08:46

## 2024-07-03 RX ADMIN — POLYVINYL ALCOHOL, POVIDONE 1 DROP: 14; 6 SOLUTION/ DROPS OPHTHALMIC at 08:47

## 2024-07-03 RX ADMIN — Medication 1000 UNITS: at 08:47

## 2024-07-03 RX ADMIN — SODIUM CHLORIDE, PRESERVATIVE FREE 10 ML: 5 INJECTION INTRAVENOUS at 08:46

## 2024-07-03 RX ADMIN — BRIMONIDINE TARTRATE 1 DROP: 2 SOLUTION OPHTHALMIC at 08:47

## 2024-07-03 RX ADMIN — ASPIRIN 81 MG 81 MG: 81 TABLET ORAL at 08:46

## 2024-07-03 RX ADMIN — TIMOLOL MALEATE 1 DROP: 5 SOLUTION OPHTHALMIC at 08:48

## 2024-07-03 RX ADMIN — PANTOPRAZOLE SODIUM 40 MG: 40 INJECTION, POWDER, FOR SOLUTION INTRAVENOUS at 08:46

## 2024-07-03 RX ADMIN — DOCUSATE SODIUM 100 MG: 50 LIQUID ORAL at 08:46

## 2024-07-03 ASSESSMENT — PULMONARY FUNCTION TESTS: PIF_VALUE: 17

## 2024-07-03 NOTE — PROGRESS NOTES
Balaji Grimaldo M.D.,[facility]  Martinez Borges D.O., F.ARABELLA.SEE.OTosinI., [facility]  Favian Be M.D.  Mikki Martin M.D.   DON FlorianO.      Daily Pulmonary Progress Note    Patient:  Malissa Chen 83 y.o. female MRN: [id number]            Synopsis     We are following patient for aspiration pneumonia    \"CC\" strokelike symptoms    Code status: Full code      Subjective      Patient was seen and examined in bed on PSV 8/8.  She's tolerating  PSV 8/8 attempted to go overnight however she had apnea alarms and placed back on IMV. Back to PSV 8/8 this am and doing well   No distress noted     Review of Systems:  Unable to obtain    24-hour events:  Transfer out of ICU    Objective   OBJECTIVE:   BP (!) 116/40   Pulse 57   Temp 97.7 °F (36.5 °C) (Temporal)   Resp 19   Ht 1.676 m (5' 6\")   Wt 80.3 kg (177 lb 1.6 oz)   SpO2 99%   BMI 28.58 kg/m²   SpO2 Readings from Last 1 Encounters:   06/29/24 99%        I/O:    Intake/Output Summary (Last 24 hours) at 6/29/2024 1156  Last data filed at 6/29/2024 0809  Gross per 24 hour   Intake 853 ml   Output 550 ml   Net 303 ml     Vent Information  Ventilator Day(s): 7  Ventilator ID: [id number]  Equipment Changed: Suction catheter, Vent Circuit, Other (comment), Nebulizer equipment, Humidification (new [facility] vent in room 4505,)  Ventilator Initiate: Yes  Vent Mode: SIMV/VC       IPAP: 14 cmH20  CPAP/EPAP: 8 cmH2O     CURRENT MEDS :  Scheduled Meds:   apixaban  10 mg Oral BID    Followed by    [START ON 7/5/2024] apixaban  5 mg Oral BID    Polyvinyl Alcohol-Povidone PF  1 drop Both Eyes TID    Or    Polyvinyl Alcohol-Povidone PF  1 drop Both Eyes TID    pantoprazole (PROTONIX) 40 mg in sodium chloride (PF) 0.9 % 10 mL injection  40 mg IntraVENous BID    ipratropium 0.5 mg-albuterol 2.5 mg  1 Dose Inhalation 4x daily    sennosides  5 mL Oral BID    polyethylene glycol  17 g Oral BID    docusate sodium  100 mg Oral Daily    sodium chloride flush  5-40 mL IntraVENous 2 times 
           Balaji Grimaldo M.D.,[facility]  Martinez Borges D.O., F.ARABELLA.SEE.OJOHN., [facility]  Favian Be M.D.  Mikki Martin M.D.   DON FlorianO.        Daily Pulmonary Progress Note    Patient:  Malissa Chen 83 y.o. female MRN: [id number]            Synopsis     We are following patient for aspiration pneumonia    \"CC\" strokelike symptoms    Code status: Full code      Subjective      Patient was seen and examined in ICU. She has been on PSV 8/8 since 0930 this AM. 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 PEG site has some bleeding earlier which has resolved    Review of Systems:  Unable to obtain    24-hour events:  No new events    Objective   OBJECTIVE:   BP (!) 110/44   Pulse 72   Temp 99.2 °F (37.3 °C) (Axillary)   Resp 22   Ht 1.676 m (5' 6\")   Wt 80.3 kg (177 lb 1.6 oz)   SpO2 99%   BMI 28.58 kg/m²   SpO2 Readings from Last 1 Encounters:   06/27/24 99%        I/O:    Intake/Output Summary (Last 24 hours) at 6/27/2024 1204  Last data filed at 6/27/2024 0600  Gross per 24 hour   Intake 1343.3 ml   Output 1450 ml   Net -106.7 ml       Vent Information  Ventilator Day(s): 7  Ventilator ID: [id number]  Equipment Changed: (S) Vent Circuit  Ventilator Initiate: Yes  Vent Mode: (S) CPAP/PS       IPAP: 14 cmH20  CPAP/EPAP: 8 cmH2O     CURRENT MEDS :  Scheduled Meds:   Polyvinyl Alcohol-Povidone PF  1 drop Both Eyes TID    Or    Polyvinyl Alcohol-Povidone PF  1 drop Both Eyes TID    pantoprazole (PROTONIX) 40 mg in sodium chloride (PF) 0.9 % 10 mL injection  40 mg IntraVENous BID    enoxaparin  40 mg SubCUTAneous Daily    ertapenem (INVanz) IV  1,000 mg IntraVENous Q24H    ipratropium 0.5 mg-albuterol 2.5 mg  1 Dose Inhalation 4x daily    sennosides  5 mL Oral BID    polyethylene glycol  17 g Oral BID    docusate sodium  100 mg Oral Daily    sodium chloride flush  5-40 mL IntraVENous 2 times per day    chlorhexidine  15 mL Mouth/Throat BID    multivitamin with iron-minerals  15 mL Oral Daily    [Held 
           Balaji Grimaldo M.D.,[facility]  Martinez Borges D.O., F.ARABELLA.SONIAOJOHN., [facility]  Favian Be M.D.  Mikki Martin M.D.   GUILHERME Florian.O.        Daily Pulmonary Progress Note    Patient:  Malissa Chen 83 y.o. female MRN: [id number]            Synopsis     We are following patient for aspiration pneumonia    \"CC\" strokelike symptoms    Code status: Full code      Subjective      Patient was seen and examined in bed on PSV 8. She tolerated PSV 8/8 yesterday for 12 hours and placed back on IMV for rest at HS. She is doing well with vent weaning.  Would like to see PSV x 24 hours.    Review of Systems:  Unable to obtain    24-hour events:  Transfer out of ICU    Objective   OBJECTIVE:   BP (!) 117/45   Pulse 65   Temp 100.1 °F (37.8 °C) (Axillary)   Resp 20   Ht 1.676 m (5' 6\")   Wt 80.3 kg (177 lb 1.6 oz)   SpO2 98%   BMI 28.58 kg/m²   SpO2 Readings from Last 1 Encounters:   06/28/24 98%        I/O:  No intake or output data in the 24 hours ending 06/28/24 1258    Vent Information  Ventilator Day(s): 7  Ventilator ID: [id number]  Equipment Changed: Suction catheter, Vent Circuit, Other (comment), Nebulizer equipment, Humidification (new mech vent in room 4505,)  Ventilator Initiate: Yes  Vent Mode: CPAP/PS       IPAP: 14 cmH20  CPAP/EPAP: 8 cmH2O     CURRENT MEDS :  Scheduled Meds:   apixaban  10 mg Oral BID    Followed by    [START ON 7/5/2024] apixaban  5 mg Oral BID    Polyvinyl Alcohol-Povidone PF  1 drop Both Eyes TID    Or    Polyvinyl Alcohol-Povidone PF  1 drop Both Eyes TID    pantoprazole (PROTONIX) 40 mg in sodium chloride (PF) 0.9 % 10 mL injection  40 mg IntraVENous BID    ipratropium 0.5 mg-albuterol 2.5 mg  1 Dose Inhalation 4x daily    sennosides  5 mL Oral BID    polyethylene glycol  17 g Oral BID    docusate sodium  100 mg Oral Daily    sodium chloride flush  5-40 mL IntraVENous 2 times per day    chlorhexidine  15 mL Mouth/Throat BID    multivitamin with iron-minerals  15 mL Oral Daily 
           Balaji Grimaldo M.D.,[facility]  Martinez Borges D.O., F.PRATIMA., [facility]  Favian Be M.D.  Mikki Martin M.D.   DON FlorianO.        Daily Pulmonary Progress Note    Patient:  Malissa Chen 83 y.o. female MRN: [id number]            Synopsis     We are following patient for aspiration pneumonia    \"CC\" strokelike symptoms    Code status: Full code      Subjective      Patient was seen and examined lying in bed, in ICU.  She has been downgraded and is waiting for a bed out of the ICU.  She has been on room air and has been doing well.  She has significant rhonchi.  Nursing staff is still having to aggressively deep suction to remove secretions.    Review of Systems:  Unable to obtain    24-hour events:  No new events    Objective   OBJECTIVE:   BP (!) 117/42   Pulse 68   Temp 98.2 °F (36.8 °C)   Resp 20   Ht 1.676 m (5' 6\")   Wt 74.2 kg (163 lb 8 oz)   SpO2 93%   BMI 26.39 kg/m²   SpO2 Readings from Last 1 Encounters:   06/11/24 93%        I/O:    Intake/Output Summary (Last 24 hours) at 6/11/2024 1239  Last data filed at 6/11/2024 1000  Gross per 24 hour   Intake 2313 ml   Output 1725 ml   Net 588 ml               IPAP: 14 cmH20  CPAP/EPAP: 8 cmH2O     CURRENT MEDS :  Scheduled Meds:   acetylcysteine  600 mg Inhalation BID RT    amLODIPine  5 mg Per NG tube Daily    atorvastatin  40 mg Per NG tube Nightly    lansoprazole  30 mg Per NG tube QAM AC    psyllium husk-aspartame  1 packet Per NG tube BID    therapeutic multivitamin-minerals  1 tablet Per NG tube Daily    Vitamin D  1,000 Units Per NG tube Daily    ticagrelor  90 mg Per NG tube BID    aspirin  81 mg Per NG tube Daily    albuterol  2.5 mg Nebulization 4x Daily RT    enoxaparin  40 mg SubCUTAneous Daily    [Held by provider] losartan  50 mg Oral Daily    dorzolamide  1 drop Left Eye BID    latanoprost  1 drop Both Eyes Nightly    sodium chloride flush  5-40 mL IntraVENous 2 times per day    [Held by provider] metoprolol succinate  25 
           Balaji Grimaldo M.D.,[facility]  Martinez Borges D.O., MONICA., [facility]  Favian Be M.D.  Mikki Martin M.D.   DON FlorianO.        Daily Pulmonary Progress Note    Patient:  Malissa Chen 83 y.o. female MRN: [id number]            Synopsis     We are following patient for aspiration pneumonia    \"CC\" strokelike symptoms    Code status: Full code      Subjective      Patient was seen and examined in ICU. She just returned from testing. She was on IMV since yesterday. Had some VT last night. RT present, will place on PSV if patient tolerates.    Review of Systems:  Unable to obtain    24-hour events:  No new events    Objective   OBJECTIVE:   BP (!) 119/49   Pulse 64   Temp 98 °F (36.7 °C) (Temporal)   Resp 23   Ht 1.676 m (5' 6\")   Wt 80.3 kg (177 lb 1.6 oz)   SpO2 100%   BMI 28.58 kg/m²   SpO2 Readings from Last 1 Encounters:   06/26/24 100%        I/O:    Intake/Output Summary (Last 24 hours) at 6/26/2024 1415  Last data filed at 6/26/2024 1311  Gross per 24 hour   Intake 1487.3 ml   Output 1100 ml   Net 387.3 ml       Vent Information  Ventilator Day(s): 7  Ventilator ID: [id number]  Equipment Changed: (S) Vent Circuit  Ventilator Initiate: Yes  Vent Mode: SIMV/VC       IPAP: 14 cmH20  CPAP/EPAP: 8 cmH2O     CURRENT MEDS :  Scheduled Meds:   enoxaparin  40 mg SubCUTAneous Daily    ertapenem (INVanz) IV  1,000 mg IntraVENous Q24H    ipratropium 0.5 mg-albuterol 2.5 mg  1 Dose Inhalation 4x daily    sennosides  5 mL Oral BID    polyethylene glycol  17 g Oral BID    Polyvinyl Alcohol-Povidone PF  1 drop Both Eyes Q4H    Or    Polyvinyl Alcohol-Povidone PF  1 drop Both Eyes Q4H    docusate sodium  100 mg Oral Daily    sodium chloride flush  5-40 mL IntraVENous 2 times per day    chlorhexidine  15 mL Mouth/Throat BID    famotidine (PEPCID) injection  20 mg IntraVENous BID    multivitamin with iron-minerals  15 mL Oral Daily    [Held by provider] amLODIPine  5 mg Per NG tube Daily    atorvastatin  
           Balaji Grimaldo M.D.,[facility]  Martinez Borges D.O., MONICA., [facility]  Favian Be M.D.  Mikki Martin M.D.   Dr Shane Mcadams, DONO.        Daily Pulmonary Progress Note    Patient:  Malissa Chen 83 y.o. female MRN: [id number]            Synopsis     We are following patient for aspiration pneumonia    \"CC\" strokelike symptoms    Code status: Full code      Subjective      Patient was seen and examined lying in bed, still in ICU.  Saturation is well on 2 L nasal cannula, currently at 97%.  Patient is alert and communicating as best as she can.  No BiPAP was placed last night due to increase in secretions.  Lungs are clear but secretions are are being suctioned frequently.  Respiratory culture is still pending, patient continues on Rocephin.      Review of Systems:  Unable to obtain    24-hour events:  No new events    Objective   OBJECTIVE:   BP (!) 114/59   Pulse 62   Temp 97.7 °F (36.5 °C) (Temporal)   Resp 24   Ht 1.676 m (5' 6\")   Wt 80.5 kg (177 lb 6.4 oz)   SpO2 98%   BMI 28.63 kg/m²   SpO2 Readings from Last 1 Encounters:   06/08/24 98%        I/O:    Intake/Output Summary (Last 24 hours) at 6/8/2024 1053  Last data filed at 6/8/2024 0604  Gross per 24 hour   Intake 1485 ml   Output 1100 ml   Net 385 ml             IPAP: 14 cmH20  CPAP/EPAP: 8 cmH2O     CURRENT MEDS :  Scheduled Meds:   psyllium husk-aspartame  1 packet Oral BID    acetylcysteine  600 mg Inhalation BID RT    potassium & sodium phosphates  2 packet Per NG tube 4x Daily    cefTRIAXone (ROCEPHIN) IV  2,000 mg IntraVENous Q24H    lansoprazole  30 mg Oral QAM AC    aspirin  81 mg Per NG tube Daily    albuterol  2.5 mg Nebulization 4x Daily RT    enoxaparin  40 mg SubCUTAneous Daily    ticagrelor  90 mg Oral BID    atorvastatin  40 mg Oral Nightly    amLODIPine  5 mg Oral Daily    [Held by provider] losartan  50 mg Oral Daily    dorzolamide  1 drop Left Eye BID    therapeutic multivitamin-minerals  1 tablet Oral Daily    
           Balaji Grimaldo M.D.,[facility]  Martinez Borges D.O., FDAKOTAOJOHN., [facility]  Favian Be M.D.  Mikki Martin M.D.   Dr Shane Mcadams, GUILHERME.O.      Daily Pulmonary Progress Note    Patient:  Malissa Chen 83 y.o. female MRN: [id number]            Synopsis     We are following patient for aspiration pneumonia    \"CC\" strokelike symptoms    Code status: Full code      Subjective      Patient was seen and examined in bed.  Bloody secretions suctioned from tracheostomy.  Improvement in bleeding around trach site.  Eliquis currently on hold.  Vascular input noted with no plan for IVC filter at this time.  Hemoglobin 7.4 today.  Discussed with RT team placed on PS 8 weaning trial today.  SpO2 100% on FiO2 35%.  Appears comfortable, no accessory muscle use.  No fevers documented.  Tolerating tube feeds.    Review of Systems:  Unable to obtain    24-hour events:  none    Objective   OBJECTIVE:   BP (!) 132/41   Pulse 61   Temp 98.2 °F (36.8 °C) (Temporal)   Resp 15   Ht 1.676 m (5' 6\")   Wt 80.3 kg (177 lb 1.6 oz)   SpO2 100%   BMI 28.58 kg/m²   SpO2 Readings from Last 1 Encounters:   07/02/24 100%        I/O:    Intake/Output Summary (Last 24 hours) at 7/2/2024 1027  Last data filed at 7/2/2024 0741  Gross per 24 hour   Intake 1382 ml   Output --   Net 1382 ml       Vent Information  Ventilator Day(s): 10  Ventilator ID: [id number]  Equipment Changed: (S) Vent Circuit, Suction catheter  Ventilator Initiate: Yes  Vent Mode: (S) CPAP/PS       IPAP: 14 cmH20  CPAP/EPAP: 8 cmH2O     CURRENT MEDS :  Scheduled Meds:   Polyvinyl Alcohol-Povidone PF  1 drop Both Eyes TID    Or    Polyvinyl Alcohol-Povidone PF  1 drop Both Eyes TID    pantoprazole (PROTONIX) 40 mg in sodium chloride (PF) 0.9 % 10 mL injection  40 mg IntraVENous BID    sennosides  5 mL Oral BID    polyethylene glycol  17 g Oral BID    docusate sodium  100 mg Oral Daily    sodium chloride flush  5-40 mL IntraVENous 2 times per day    chlorhexidine  15 mL 
           Balaji Grimaldo M.D.,[facility]  Martinez Borges D.O., F.ARABELLA.SEE.OJOHN., [facility]  Favian Be M.D.  Mikki Martin M.D.   DON FlorianO.        Daily Pulmonary Progress Note    Patient:  Malissa Chen 83 y.o. female MRN: [id number]            Synopsis     We are following patient for aspiration pneumonia    \"CC\" strokelike symptoms    Code status: Full code      Subjective      Patient was seen and examined in ICU intubated.   She is still on AC at 12, tidal volume 350, 40% FiO2, PEEP of 5.  She is s/p trache and PEG 6/20/24.    Review of Systems:  Unable to obtain    24-hour events:  S/p trache and PEG    Objective   OBJECTIVE:   BP (!) 117/43   Pulse (!) 45   Temp 99.9 °F (37.7 °C) (Bladder)   Resp 12   Ht 1.676 m (5' 6\")   Wt 82.1 kg (181 lb)   SpO2 97%   BMI 29.21 kg/m²   SpO2 Readings from Last 1 Encounters:   06/21/24 97%        I/O:    Intake/Output Summary (Last 24 hours) at 6/21/2024 1139  Last data filed at 6/21/2024 1000  Gross per 24 hour   Intake 220 ml   Output 860 ml   Net -640 ml     Vent Information  Ventilator Day(s): 7  Ventilator ID: [id number]  Equipment Changed: (S) Expiratory Filter  Ventilator Initiate: Yes  Vent Mode: AC/VC       IPAP: 14 cmH20  CPAP/EPAP: 8 cmH2O     CURRENT MEDS :  Scheduled Meds:   ipratropium 0.5 mg-albuterol 2.5 mg  1 Dose Inhalation 4x daily    sennosides  5 mL Oral BID    polyethylene glycol  17 g Oral BID    Polyvinyl Alcohol-Povidone PF  1 drop Both Eyes Q4H    Or    Polyvinyl Alcohol-Povidone PF  1 drop Both Eyes Q4H    docusate sodium  100 mg Oral Daily    sodium chloride flush  5-40 mL IntraVENous 2 times per day    enoxaparin  40 mg SubCUTAneous Daily    chlorhexidine  15 mL Mouth/Throat BID    famotidine (PEPCID) injection  20 mg IntraVENous BID    multivitamin with iron-minerals  15 mL Oral Daily    acetylcysteine  600 mg Inhalation BID RT    [Held by provider] amLODIPine  5 mg Per NG tube Daily    atorvastatin  40 mg Per NG tube Nightly    psyllium 
           Balaji Grimaldo M.D.,[facility]  Martinez Borges D.O., F.PRATIMA., [facility]  Favian Be M.D.  Mikki Martin M.D.   GUILHERME Florian.O.        Daily Pulmonary Progress Note    Patient:  Malissa Chen 83 y.o. female MRN: [id number]            Synopsis     We are following patient for aspiration pneumonia    \"CC\" strokelike symptoms    Code status: Full code      Subjective      Patient was seen and examined lying in bed.  She was transferred out of the ICU yesterday and is on the general floor.  She is still on room air.  She is very alert right now and speech is a lot more clearer and less garbled today.  She also had a pretty strong cough in my presence.  I spoke with the bedside nurse who stated since she has transferred out of the ICU the only suctioning required has been deep oral with a yankaur.     Review of Systems:  Unable to obtain    24-hour events:  Transfer out of ICU    Objective   OBJECTIVE:   BP (!) 110/50   Pulse 75   Temp 99.1 °F (37.3 °C) (Temporal)   Resp 16   Ht 1.676 m (5' 6\")   Wt 74.2 kg (163 lb 8 oz)   SpO2 92%   BMI 26.39 kg/m²   SpO2 Readings from Last 1 Encounters:   06/12/24 92%        I/O:    Intake/Output Summary (Last 24 hours) at 6/12/2024 1317  Last data filed at 6/12/2024 0632  Gross per 24 hour   Intake 0 ml   Output 1400 ml   Net -1400 ml               IPAP: 14 cmH20  CPAP/EPAP: 8 cmH2O     CURRENT MEDS :  Scheduled Meds:   multivitamin with iron-minerals  15 mL Oral Daily    acetylcysteine  600 mg Inhalation BID RT    amLODIPine  5 mg Per NG tube Daily    atorvastatin  40 mg Per NG tube Nightly    lansoprazole  30 mg Per NG tube QAM AC    psyllium husk-aspartame  1 packet Per NG tube BID    Vitamin D  1,000 Units Per NG tube Daily    ticagrelor  90 mg Per NG tube BID    aspirin  81 mg Per NG tube Daily    albuterol  2.5 mg Nebulization 4x Daily RT    enoxaparin  40 mg SubCUTAneous Daily    [Held by provider] losartan  50 mg Oral Daily    dorzolamide  1 drop Left 
        Hospitalist Progress Note      Chief Complaint:  dizziness    Admission Date: 2024     SYNOPSIS: Patient admitted on 2024 for Stroke-like symptoms  83 y.o. female who presented to [facility] with strokelike symptoms with left-sided weakness lasted for an hour or started around 11 AM .NIHSS was 0. CT head obtained negative for any acute findings. She was found to have elevated blood pressure of 202/81 on presentation. 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  There is reconstitution of the distal  left vertebral artery. 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  There is reconstitution of the distal V4 segment of the right vertebral artery.  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 Interventional neurology was consulted with initial plan for outpatient diagnostic angiogram.  Patient repeat the stroke on 6/3.  SIMEON was consulted again and recommended investigational experimental protocol thrombectomy and dissection repair.  Then patient underwent bilateral vertebral artery thrombectomy with stent placement and repair of dissection.  Patient was transferred from the procedure area to NICU.  Continue on Brilinta and aspirin    SUBJECTIVE:    Patient is more awake and her speech seems improved   Records reviewed.     Stable overnight. No overnight issues reported     Temp (24hrs), Av.2 °F (36.8 °C), Min:97.6 °F (36.4 °C), Max:99.4 °F (37.4 °C)    DIET: Diet NPO  ADULT TUBE FEEDING; Nasogastric; Immune Enhancing; Continuous; 10; Yes; 10; Q 4 hours; 35; 30; Q 4 
        Hospitalist Progress Note      Chief Complaint:  dizziness    Admission Date: 2024     SYNOPSIS: Patient admitted on 2024 for Stroke-like symptoms  83 y.o. female who presented to [facility] with strokelike symptoms with left-sided weakness lasted for an hour or started around 11 AM .NIHSS was 0. CT head obtained negative for any acute findings. She was found to have elevated blood pressure of 202/81 on presentation. 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  There is reconstitution of the distal  left vertebral artery. 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  There is reconstitution of the distal V4 segment of the right vertebral artery.  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 Interventional neurology was consulted with initial plan for outpatient diagnostic angiogram.  Patient repeat the stroke on 6/3.  SIMEON was consulted again and recommended investigational experimental protocol thrombectomy and dissection repair.  Then patient underwent bilateral vertebral artery thrombectomy with stent placement and repair of dissection.  Patient was transferred from the procedure area to NICU.  Continue on Brilinta and aspirin    SUBJECTIVE:    Patient seems comfortable, no complaints  Records reviewed.     Stable overnight. No overnight issues reported     Temp (24hrs), Av.8 °F (36.6 °C), Min:97 °F (36.1 °C), Max:98.8 °F (37.1 °C)    DIET: Diet NPO  ADULT TUBE FEEDING; Nasogastric; Immune Enhancing; Continuous; 10; Yes; 10; Q 4 hours; 35; 200; Q 4 hours  CODE: Full 
        Hospitalist Progress Note      Chief Complaint:  dizziness    Admission Date: 2024     SYNOPSIS: Patient admitted on 2024 for Stroke-like symptoms  83 y.o. female who presented to [facility] with strokelike symptoms with left-sided weakness lasted for an hour or started around 11 AM .NIHSS was 0. CT head obtained negative for any acute findings. She was found to have elevated blood pressure of 202/81 on presentation. 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  There is reconstitution of the distal  left vertebral artery. 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  There is reconstitution of the distal V4 segment of the right vertebral artery.  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 Interventional neurology was consulted with initial plan for outpatient diagnostic angiogram.  Patient repeat the stroke on 6/3.  SIMEON was consulted again and recommended investigational experimental protocol thrombectomy and dissection repair.  Then patient underwent bilateral vertebral artery thrombectomy with stent placement and repair of dissection.  Patient was transferred from the procedure area to NICU.  Continue on Brilinta and aspirin    SUBJECTIVE:    Patient was seen in ICU this morning.  She has slurred speech but she comprehends and is able to answer questions.  Denies headaches, chest pain, shortness of breath.  Endorses no new concerns  Spoke to bedside nurse  Records reviewed.     Stable overnight. No overnight issues reported     Temp (24hrs), Av.9 °F (36.1 °C), 
        Hospitalist Progress Note      Chief Complaint:  dizziness    Admission Date: 2024     SYNOPSIS: Patient admitted on 2024 for Stroke-like symptoms  83 y.o. female who presented to [facility] with strokelike symptoms with left-sided weakness lasted for an hour or started around 11 AM .NIHSS was 0. CT head obtained negative for any acute findings. She was found to have elevated blood pressure of 202/81 on presentation. 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  There is reconstitution of the distal  left vertebral artery. 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  There is reconstitution of the distal V4 segment of the right vertebral artery.  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 Interventional neurology was consulted with initial plan for outpatient diagnostic angiogram.  Patient repeat the stroke on 6/3.  SIMEON was consulted again and recommended investigational experimental protocol thrombectomy and dissection repair.  Then patient underwent bilateral vertebral artery thrombectomy with stent placement and repair of dissection.  Patient was transferred from the procedure area to NICU.  Continue on Brilinta and aspirin    SUBJECTIVE:    Patient is more lethargic today. Having lots of secretion suctioned with some blood on it   Records reviewed.     Stable overnight. No overnight issues reported     Temp (24hrs), Av.5 °F (36.9 °C), Min:97.5 °F (36.4 °C), Max:99.9 °F (37.7 °C)    DIET: Diet NPO  ADULT TUBE FEEDING; Nasogastric; Immune Enhancing; Continuous; 10; 
        Hospitalist Progress Note      Chief Complaint:  dizziness    Admission Date: 2024     SYNOPSIS: Patient admitted on 2024 for Stroke-like symptoms  83 y.o. female who presented to [facility] with strokelike symptoms with left-sided weakness lasted for an hour or started around 11 AM .NIHSS was 0. CT head obtained negative for any acute findings. She was found to have elevated blood pressure of 202/81 on presentation. 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  There is reconstitution of the distal  left vertebral artery. 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  There is reconstitution of the distal V4 segment of the right vertebral artery.  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 Interventional neurology was consulted with initial plan for outpatient diagnostic angiogram.  Patient repeat the stroke on 6/3.  SIMEON was consulted again and recommended investigational experimental protocol thrombectomy and dissection repair.  Then patient underwent bilateral vertebral artery thrombectomy with stent placement and repair of dissection.  Patient was transferred from the procedure area to NICU.  Continue on Brilinta and aspirin    SUBJECTIVE:    Patient is sleepy, snoring, arousal   No distress  Records reviewed.     Stable overnight. No overnight issues reported     Temp (24hrs), Av.2 °F (36.8 °C), Min:97.6 °F (36.4 °C), Max:99.4 °F (37.4 °C)    DIET: Diet NPO  ADULT TUBE FEEDING; Nasogastric; Immune Enhancing; Continuous; 10; Yes; 10; Q 4 hours; 35; 30; Q 4 
        Hospitalist Progress Note      Chief Complaint:  dizziness    Admission Date: 5/30/2024     SYNOPSIS: Patient admitted on 5/30/2024 for Stroke-like symptoms  83 y.o. female who presented to [facility] with strokelike symptoms with left-sided weakness lasted for an hour or started around 11 AM 05/30.NIHSS was 0. CT head obtained negative for any acute findings. She was found to have elevated blood pressure of 202/81 on presentation. 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  There is reconstitution of the distal  left vertebral artery. 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  There is reconstitution of the distal V4 segment of the right vertebral artery.  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 Interventional neurology was consulted with initial plan for outpatient diagnostic angiogram.  Patient repeat the stroke on 6/3.  SIMEON was consulted again and recommended investigational experimental protocol thrombectomy and dissection repair.  Then patient underwent bilateral vertebral artery thrombectomy with stent placement and repair of dissection.  Patient was transferred from the procedure area to NICU.  Continue on Brilinta and aspirin    SUBJECTIVE:    Patient was seen in ICU this morning.  She has slurred speech but she comprehends and is able to answer questions.   Denies headaches, chest pain, shortness of breath.  Endorses no new concerns  Spoke to bedside nurse. Gargling sound.  . IVF held  Records reviewed.     Stable overnight. No overnight issues reported     Temp 
       [facility] Hospitalist Progress Note    Admitting Date and Time: 5/30/2024 12:53 PM  Admit Dx: Stroke-like symptoms [R29.90]  Stroke-like symptom [R29.90]    Synopsis: Patient admitted on 5/30/2024 for Stroke-like symptoms  83 y.o. female who presented to [facility] with strokelike symptoms with left-sided weakness lasted for an hour or started around 11 AM 05/30. NIHSS was 0. CT head obtained negative for any acute findings. She was found to have elevated blood pressure of 202/81 on presentation. 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 Complete occlusion of the distal V1 segment of the left vertebral artery concerning for an acute dissection.  There is reconstitution of the distal left vertebral artery. Started on aspirin and plavix, ordered MRI brain/orbit w/without contrast. 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 Interventional neurology was consulted with initial plan for outpatient diagnostic angiogram.  Patient repeat the stroke on 6/3.  SIMEON was consulted again and recommended investigational experimental protocol thrombectomy and dissection repair.  Then patient underwent bilateral vertebral artery thrombectomy with stent placement and repair of dissection.  Patient was transferred from the procedure area to NICU.  Continue on Brilinta and aspirin. Patient developed aspiration pneumonia and was started on ceftriaxone. Pulmonology is following. 6/13 patient had lethargy and deteriorating mentation, was transferred to the ICU and subsequently reintubated.     Subjective:    Intubated sedated, but follows commands off sedation     ROS: denies fever, chills, cp, sob, n/v, HA unless stated above.      ipratropium 0.5 mg-albuterol 2.5 mg  1 Dose Inhalation TID    sennosides  5 mL Oral BID    polyethylene glycol  17 g Oral BID    Polyvinyl Alcohol-Povidone PF  1 drop Both Eyes 
       [facility] Hospitalist Progress Note    Admitting Date and Time: 5/30/2024 12:53 PM  Admit Dx: Stroke-like symptoms [R29.90]  Stroke-like symptom [R29.90]    Synopsis: Patient admitted on 5/30/2024 for Stroke-like symptoms  83 y.o. female who presented to [facility] with strokelike symptoms with left-sided weakness lasted for an hour or started around 11 AM 05/30. NIHSS was 0. CT head obtained negative for any acute findings. She was found to have elevated blood pressure of 202/81 on presentation. 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 Complete occlusion of the distal V1 segment of the left vertebral artery concerning for an acute dissection.  There is reconstitution of the distal left vertebral artery. Started on aspirin and plavix, ordered MRI brain/orbit w/without contrast. 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 Interventional neurology was consulted with initial plan for outpatient diagnostic angiogram.  Patient repeat the stroke on 6/3.  SIMEON was consulted again and recommended investigational experimental protocol thrombectomy and dissection repair.  Then patient underwent bilateral vertebral artery thrombectomy with stent placement and repair of dissection.  Patient was transferred from the procedure area to NICU.  Continue on Brilinta and aspirin. Patient developed aspiration pneumonia and was started on ceftriaxone. Pulmonology is following. 6/13 patient had lethargy and deteriorating mentation, was transferred to the ICU and subsequently reintubated.     Planned for trach and peg today 6/20  Subjective:    Intubated sedated, but follows commands off sedation     ROS: denies fever, chills, cp, sob, n/v, HA unless stated above.      ertapenem (INVanz) IV  1,000 mg IntraVENous Q24H    ipratropium 0.5 mg-albuterol 2.5 mg  1 Dose Inhalation 4x daily    sennosides  5 mL Oral 
       [facility] Hospitalist Progress Note    Admitting Date and Time: 5/30/2024 12:53 PM  Admit Dx: Stroke-like symptoms [R29.90]  Stroke-like symptom [R29.90]    Synopsis: Patient admitted on 5/30/2024 for Stroke-like symptoms  83 y.o. female who presented to [facility] with strokelike symptoms with left-sided weakness lasted for an hour or started around 11 AM 05/30. NIHSS was 0. CT head obtained negative for any acute findings. She was found to have elevated blood pressure of 202/81 on presentation. 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 Complete occlusion of the distal V1 segment of the left vertebral artery concerning for an acute dissection.  There is reconstitution of the distal left vertebral artery. Started on aspirin and plavix, ordered MRI brain/orbit w/without contrast. 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 Interventional neurology was consulted with initial plan for outpatient diagnostic angiogram.  Patient repeat the stroke on 6/3.  SIMEON was consulted again and recommended investigational experimental protocol thrombectomy and dissection repair.  Then patient underwent bilateral vertebral artery thrombectomy with stent placement and repair of dissection.  Patient was transferred from the procedure area to NICU.  Continue on Brilinta and aspirin. Patient developed aspiration pneumonia and was started on ceftriaxone. Pulmonology is following. 6/13 patient had lethargy and deteriorating mentation, was transferred to the ICU and subsequently reintubated.   S/p trach and peg  6/20     Subjective:    Alert following commands         ertapenem (INVanz) IV  1,000 mg IntraVENous Q24H    ipratropium 0.5 mg-albuterol 2.5 mg  1 Dose Inhalation 4x daily    sennosides  5 mL Oral BID    polyethylene glycol  17 g Oral BID    Polyvinyl Alcohol-Povidone PF  1 drop Both Eyes Q4H    Or    
       [facility] Hospitalist Progress Note    Admitting Date and Time: 5/30/2024 12:53 PM  Admit Dx: Stroke-like symptoms [R29.90]  Stroke-like symptom [R29.90]    Synopsis:  Patient admitted on 5/30/2024 for Stroke-like symptoms  83 y.o. female who presented to [facility] with strokelike symptoms with left-sided weakness lasted for an hour or started around 11 AM 05/30. NIHSS was 0. CT head obtained negative for any acute findings. She was found to have elevated blood pressure of 202/81 on presentation. 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 Complete occlusion of the distal V1 segment of the left vertebral artery concerning for an acute dissection.  There is reconstitution of the distal left vertebral artery. Started on aspirin and plavix, ordered MRI brain/orbit w/without contrast. 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 Interventional neurology was consulted with initial plan for outpatient diagnostic angiogram.  Patient repeat the stroke on 6/3.  SIMEON was consulted again and recommended investigational experimental protocol thrombectomy and dissection repair.  Then patient underwent bilateral vertebral artery thrombectomy with stent placement and repair of dissection.  Patient was transferred from the procedure area to NICU.  Patient developed aspiration pneumonia and was started on ceftriaxone. Pulmonology is following. 6/13 patient had lethargy and deteriorating mentation, was transferred to the ICU and subsequently reintubated.  Patient underwent trach and PEG 6/20.  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 Transferred out of ICU 6/27. Continues to have borderline anemia.     Subjective:  Patient seen and examined at bedside  Resting comfortably.   Per nursing staff has some minimal blood in 
       [facility] Hospitalist Progress Note    Admitting Date and Time: 5/30/2024 12:53 PM  Admit Dx: Stroke-like symptoms [R29.90]  Stroke-like symptom [R29.90]    Synopsis: Patient admitted on 5/30/2024 for Stroke-like symptoms  83 y.o. female who presented to [facility] with strokelike symptoms with left-sided weakness lasted for an hour or started around 11 AM 05/30. NIHSS was 0. CT head obtained negative for any acute findings. She was found to have elevated blood pressure of 202/81 on presentation. 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 Complete occlusion of the distal V1 segment of the left vertebral artery concerning for an acute dissection.  There is reconstitution of the distal left vertebral artery. Started on aspirin and plavix, ordered MRI brain/orbit w/without contrast. 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 Interventional neurology was consulted with initial plan for outpatient diagnostic angiogram.  Patient repeat the stroke on 6/3.  SIMEON was consulted again and recommended investigational experimental protocol thrombectomy and dissection repair.  Then patient underwent bilateral vertebral artery thrombectomy with stent placement and repair of dissection.  Patient was transferred from the procedure area to NICU.  Continue on Brilinta and aspirin. Patient developed aspiration pneumonia and was started on ceftriaxone. Pulmonology is following. 6/13 patient had lethargy and deteriorating mentation, was transferred to the ICU and subsequently reintubated.     Planned for trach and peg today 6/20  Subjective:    Intubated sedated, but follows commands off sedation     ROS: denies fever, chills, cp, sob, n/v, HA unless stated above.      ipratropium 0.5 mg-albuterol 2.5 mg  1 Dose Inhalation 4x daily    sennosides  5 mL Oral BID    polyethylene glycol  17 g Oral BID    
       [facility] Hospitalist Progress Note    Admitting Date and Time: 5/30/2024 12:53 PM  Admit Dx: Stroke-like symptoms [R29.90]  Stroke-like symptom [R29.90]    Synopsis: Patient admitted on 5/30/2024 for Stroke-like symptoms  83 y.o. female who presented to [facility] with strokelike symptoms with left-sided weakness lasted for an hour or started around 11 AM 05/30. NIHSS was 0. CT head obtained negative for any acute findings. She was found to have elevated blood pressure of 202/81 on presentation. 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 Complete occlusion of the distal V1 segment of the left vertebral artery concerning for an acute dissection.  There is reconstitution of the distal left vertebral artery. Started on aspirin and plavix, ordered MRI brain/orbit w/without contrast. 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 Interventional neurology was consulted with initial plan for outpatient diagnostic angiogram.  Patient repeat the stroke on 6/3.  SIMEON was consulted again and recommended investigational experimental protocol thrombectomy and dissection repair.  Then patient underwent bilateral vertebral artery thrombectomy with stent placement and repair of dissection.  Patient was transferred from the procedure area to NICU.  Continue on Brilinta and aspirin. Patient developed aspiration pneumonia and was started on ceftriaxone. Pulmonology is following. 6/13 patient had lethargy and deteriorating mentation, was transferred to the ICU and subsequently reintubated.     Subjective:    Intubated sedated, but follows commands off sedation   Respiratory cultures grown ESBL E coli    ROS: denies fever, chills, cp, sob, n/v, HA unless stated above.      sennosides  5 mL Oral BID    phosphorus  500 mg Oral BID    polyethylene glycol  17 g Oral BID    docusate sodium  100 mg Oral Daily    
       [facility] Hospitalist Progress Note    Admitting Date and Time: 5/30/2024 12:53 PM  Admit Dx: Stroke-like symptoms [R29.90]  Stroke-like symptom [R29.90]    Synopsis: Patient admitted on 5/30/2024 for Stroke-like symptoms  83 y.o. female who presented to [facility] with strokelike symptoms with left-sided weakness lasted for an hour or started around 11 AM 05/30. NIHSS was 0. CT head obtained negative for any acute findings. She was found to have elevated blood pressure of 202/81 on presentation. 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 Complete occlusion of the distal V1 segment of the left vertebral artery concerning for an acute dissection.  There is reconstitution of the distal left vertebral artery. Started on aspirin and plavix, ordered MRI brain/orbit w/without contrast. 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 Interventional neurology was consulted with initial plan for outpatient diagnostic angiogram.  Patient repeat the stroke on 6/3.  SIMEON was consulted again and recommended investigational experimental protocol thrombectomy and dissection repair.  Then patient underwent bilateral vertebral artery thrombectomy with stent placement and repair of dissection.  Patient was transferred from the procedure area to NICU.  Continue on Brilinta and aspirin. Patient developed aspiration pneumonia and was started on ceftriaxone. Pulmonology is following. 6/13 patient had lethargy and deteriorating mentation, was transferred to the ICU and subsequently reintubated.     Subjective:    Intubated sedated, but follows commands off sedation     ROS: denies fever, chills, cp, sob, n/v, HA unless stated above.      sodium chloride flush  5-40 mL IntraVENous 2 times per day    enoxaparin  40 mg SubCUTAneous Daily    ipratropium 0.5 mg-albuterol 2.5 mg  1 Dose Inhalation Q4H WA RT    
    Hospitalist Progress Note      SYNOPSIS: Patient admitted on 2024 for Stroke-like symptoms  83 y.o. female who presented to [facility] with strokelike symptoms with left-sided weakness lasted for an hour or started around 11 AM .NIHSS was 0. CT head obtained negative for any acute findings. She was found to have elevated blood pressure of 202/81 on presentation.   CTA head and neck was done which revealed  Acute 4 cm infarct within the medial aspect of the right occipital lobe.  2. 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  There is reconstitution of the distal  left vertebral artery.  3. 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  There is reconstitution of the distal V4  segment of the right vertebral artery.  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  Interventional neurology was consulted recommended outpatient diagnostic catheter angiogram    SUBJECTIVE:  Stable overnight. No other overnight issues reported.   Patient seen and examined at bedside today a.m. still complains of right lower extremity weakness.  Later on patient did complain of right-sided facial droop, stroke alert was called, neurology recommended CTA head and neck, CT head without contrast  Records reviewed.         Temp (24hrs), Av.8 °F (36.6 °C), Min:97.4 °F (36.3 °C), Max:98.1 °F (36.7 °C)    DIET: ADULT DIET; Regular  CODE: Full Code    Intake/Output Summary (Last 24 hours) at 6/3/2024 1138  Last data filed at 6/3/2024 0928  Gross per 24 hour   Intake 100 ml   Output 750 ml   Net -650 ml       Review of Systems  All bolded are positive; please see HPI  General:  Fever, chills, diaphoresis, fatigue, malaise, 
    Hospitalist Progress Note      SYNOPSIS: Patient admitted on 2024 for Stroke-like symptoms  83 y.o. female who presented to [facility] with strokelike symptoms with left-sided weakness lasted for an hour or started around 11 AM .NIHSS was 0. CT head obtained negative for any acute findings. She was found to have elevated blood pressure of 202/81 on presentation.   CTA head and neck was done which revealed  Acute 4 cm infarct within the medial aspect of the right occipital lobe.  2. 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  There is reconstitution of the distal  left vertebral artery.  3. 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  There is reconstitution of the distal V4  segment of the right vertebral artery.  Patient was admitted under internal medicine, neurology was consulted        SUBJECTIVE:  Stable overnight. No other overnight issues reported.   Patient seen and examined at bedside today a.m. still complains of bilateral lower extremity weakness, she also complains of left-sided peripheral vision loss.  Records reviewed.         Temp (24hrs), Av.8 °F (36 °C), Min:95.2 °F (35.1 °C), Max:98 °F (36.7 °C)    DIET: ADULT DIET; Regular  CODE: Full Code    Intake/Output Summary (Last 24 hours) at 2024 1041  Last data filed at 2024 0626  Gross per 24 hour   Intake --   Output 800 ml   Net -800 ml       Review of Systems  All bolded are positive; please see HPI  General:  Fever, chills, diaphoresis, fatigue, malaise, night sweats, weight loss  Psychological:  Anxiety, disorientation, hallucinations.  ENT:  Epistaxis, headaches, vertigo, visual changes.  Cardiovascular:  Chest pain, irregular heartbeats, palpitations, paroxysmal nocturnal dyspnea.  Respiratory:  Shortness of breath, coughing, sputum production, hemoptysis, wheezing, 
    Hospitalist Progress Note      SYNOPSIS: Patient admitted on 2024 for Stroke-like symptoms  83 y.o. female who presented to [facility] with strokelike symptoms with left-sided weakness lasted for an hour or started around 11 AM .NIHSS was 0. CT head obtained negative for any acute findings. She was found to have elevated blood pressure of 202/81 on presentation.   CTA head and neck was done which revealed  Acute 4 cm infarct within the medial aspect of the right occipital lobe.  2. 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  There is reconstitution of the distal  left vertebral artery.  3. 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  There is reconstitution of the distal V4  segment of the right vertebral artery.  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    SUBJECTIVE:  Stable overnight. No other overnight issues reported.   Patient seen and examined at bedside today am, still complains of weakness in lower extremities.  Vision the same  Appetite is good,  Records reviewed.         Temp (24hrs), Av °F (36.7 °C), Min:97.6 °F (36.4 °C), Max:98.5 °F (36.9 °C)    DIET: ADULT DIET; Regular  CODE: Full Code    Intake/Output Summary (Last 24 hours) at 2024 1006  Last data filed at 2024 0531  Gross per 24 hour   Intake --   Output 2200 ml   Net -2200 ml       Review of Systems  All bolded are positive; please see HPI  General:  Fever, chills, diaphoresis, fatigue, malaise, night sweats, weight loss  Psychological:  Anxiety, disorientation, hallucinations.  ENT:  Epistaxis, headaches, vertigo, visual changes.  Cardiovascular:  Chest pain, irregular heartbeats, palpitations, paroxysmal 
   06/01/24 2249   NIV Type   NIV Started/Stopped Off  (Patient refused BiPAP.)   Equipment Type NOT IN ROOM       
   06/13/24 1723   Patient Observation   Pulse 75   Respirations 16   SpO2 100 %   Vent Information   Vent Mode AC/VC   Ventilator Settings   FiO2  (S)  75 %   Vt (Set, mL) 400 mL   Resp Rate (Set) 16 bpm   PEEP/CPAP (cmH2O) 5   Peak Inspiratory Flow (Set) 60 L/sec   Vent Patient Data (Readings)   Vt (Measured) 422 mL   Minute Volume (L/min) 6.74 Liters   Mean Airway Pressure (cmH2O) 8 cmH20   Plateau Pressure (cm H2O) 13 cm H2O   Driving Pressure 8   I:E Ratio 1:3.30   Static Compliance (L/cm H2O) 49   Vent Alarm Settings   High Pressure (cmH2O) 40 cmH2O   Low Minute Volume (lpm) 5 L/min   High Minute Volume (lpm) 20 L/min   Low Exhaled Vt (ml) 300 mL   High Exhaled Vt (ml) 800 mL   RR High (bpm) 35 br/min   Apnea (secs) 20 secs   Additional Respiratoray Assessments   Humidification Source Heated wire   Humidification Temp 37   Ambu Bag With Mask At Bedside Yes   ETT    Placement Date/Time: 06/13/24 1545   Present on Admission/Arrival: Yes  Placed By: Licensed provider  Placement Verified By: Colorimetric ETCO2 device;Auscultation  Preoxygenation: Yes  Mask Ventilation: Ventilated by mask (1)  Technique: Video laryng...   Secured At 22 cm   Measured From Lips   ETT Placement Center   Secured By Commercial tube [place]hton   Site Assessment Dry       
   06/22/24 0946   NICU Vent Information   Vent Type 980   Vent Mode (S)  AC/VC   Vt (Set, mL) (S)  320 mL   Vt (Measured) 379 mL   Resp Rate (Set) (S)  12 bpm   Minute Volume (L/min) 4.55 Liters   Peak Flow 50 L/min   FiO2  40 %   I:E Ratio 1:6.10   Sensitivity 3   PEEP/CPAP (cmH2O) 8   Mean Airway Pressure (cmH2O) 11 cmH20   Additional Respiratory Assessments   Pulse 61   Respirations 24   SpO2 97 %   Vent Alarm Settings   High Pressure (cmH2O) 50 cmH2O   Low Exhaled Vt (ml) 0 mL   Patient Observation   Observations (S)  Back to AC , D/T APNEA       
   06/23/24 0825   Patient Observation   Pulse 61   Respirations 15   SpO2 100 %   Observations (S)  Placed on PSV for weaning   Vent Information   Ventilator ID [id number]   Vent Mode (S)  CPAP/PS   Ventilator Settings   FiO2  40 %   PEEP/CPAP (cmH2O) 8   Pressure Support (cm H2O) (S)  8 cm H2O   Vent Patient Data (Readings)   Vt (Measured) 348 mL   Minute Volume (L/min) 6.99 Liters   Mean Airway Pressure (cmH2O) 11 cmH20   Plateau Pressure (cm H2O) 14 cm H2O   Driving Pressure 6   I:E Ratio 1:3.00   Backup I Time (S)  25   Vent Alarm Settings   High Pressure (cmH2O) 50 cmH2O   Low Minute Volume (lpm) 4 L/min   High Minute Volume (lpm) 15 L/min   Low Exhaled Vt (ml) 250 mL   High Exhaled Vt (ml) 700 mL   RR High (bpm) 30 br/min   Apnea (secs) 20 secs   B: Both Spontaneous Awakening and Breathing Trials   Did Patient Receive Sedative and/or Opioid IV Medications in the Last 24 Hours No   Weaning Parameters   Vega Agitation Sedation Scale (RASS) 0       
   06/23/24 1245   Patient Observation   Pulse 64   Respirations 21   SpO2 99 %   Vent Information   Ventilator ID [id number]   Vent Mode (S)  SIMV/VC   Ventilator Settings   FiO2  40 %   Vt (Set, mL) 320 mL   Resp Rate (Set) (S)  8 bpm   PEEP/CPAP (cmH2O) 8   Pressure Support (cm H2O) 8 cm H2O   Vent Patient Data (Readings)   Vt (Measured) 319 mL   Minute Volume (L/min) 5.99 Liters   Mean Airway Pressure (cmH2O) 10 cmH20   Plateau Pressure (cm H2O) 14 cm H2O   Driving Pressure 6   I:E Ratio 1:2.90   Backup I Time 20   Vent Alarm Settings   High Pressure (cmH2O) 50 cmH2O   Low Minute Volume (lpm) 4 L/min   High Minute Volume (lpm) 15 L/min   Low Exhaled Vt (ml) 250 mL   High Exhaled Vt (ml) 700 mL   RR High (bpm) 30 br/min   Apnea (secs) 20 secs       
   06/27/24 0924   Patient Observation   Pulse 72   Respirations 22   SpO2 99 %   Observations (S)  Placed on PSV for weaning   Vent Information   Ventilator ID [id number]   Vent Mode (S)  CPAP/PS   Ventilator Settings   FiO2  35 %   PEEP/CPAP (cmH2O) 8   Pressure Support (cm H2O) (S)  8 cm H2O   Vent Patient Data (Readings)   Vt (Measured) 293 mL   Rate Measured 22 br/min   Minute Volume (L/min) 6.9 Liters   Mean Airway Pressure (cmH2O) 11 cmH20   Plateau Pressure (cm H2O) 15 cm H2O   Driving Pressure 7   I:E Ratio 1:2.00   Backup Apnea On   Backup Rate 10 Breaths Per Minute   Backup Vt 320   Backup I Time 20   Vent Alarm Settings   High Pressure (cmH2O) 50 cmH2O   Low Minute Volume (lpm) 3.5 L/min   High Minute Volume (lpm) 15 L/min   Low Exhaled Vt (ml) 260 mL   High Exhaled Vt (ml) 700 mL   RR High (bpm) 30 br/min   Apnea (secs) 20 secs       
   06/28/24 1539   Patient Observation   Pulse 60   Respirations 13   SpO2 100 %   Vent Information   Ventilator ID [id number]   Vent Mode CPAP/PS  (remains on PS)   Ventilator Settings   FiO2  35 %   PEEP/CPAP (cmH2O) 8   Pressure Support (cm H2O) 8 cm H2O   Vent Patient Data (Readings)   Vt (Measured) 419 mL   Rate Measured 13 br/min   Minute Volume (L/min) 5 Liters   Mean Airway Pressure (cmH2O) 10 cmH20   I:E Ratio 1:2.8   Flow Sensitivity 3 L/min   Backup Apnea On   Backup Rate 8 Breaths Per Minute   Backup Vt 320   Vent Alarm Settings   High Pressure (cmH2O) 45 cmH2O   Low Minute Volume (lpm) 4 L/min   High Minute Volume (lpm) 15 L/min   Low Exhaled Vt (ml) 300 mL   High Exhaled Vt (ml) 800 mL   RR Low (bpm) 8   RR High (bpm) 35 br/min   Apnea (secs) 20 secs   Additional Respiratoray Assessments   Humidification Source Heated wire   Humidification Temp 37   Circuit Condensation Drained   Ambu Bag With Mask At Bedside Yes   Backup Trachs Available (Size) 8.0   Airway Clearance   Suction Trach   Suction Device Inline suction catheter   Sputum Method Obtained Tracheal   Sputum Amount Scant   Sputum Color/Odor Brown;Bloody   Sputum Consistency Thick   Surgical Airway (Trach) 06/20/24 Roxaneley Cuffed   Placement Date/Time: 06/20/24 1445   Surgical Airway Type: Tracheostomy  Brand: Trino  Style: Cuffed  Size: 8   Status Secured   Site Assessment Bleeding;Drainage   Ties Assessment Intact   Cuff Pressure   (mlt)   Spare Trach at Bedside Yes   Ambu Bag With Mask at Bedside Yes       
   06/28/24 2004   Patient Observation   Pulse 66   Respirations 21   SpO2 99 %   Vent Information   Ventilator ID [id number]   Vent Mode   (remains on PS)   Ventilator Settings   FiO2  35 %   PEEP/CPAP (cmH2O) 8   Pressure Support (cm H2O) 8 cm H2O   Vent Patient Data (Readings)   Vt (Measured) 401 mL   Rate Measured 21 br/min   Minute Volume (L/min) 6 Liters   Mean Airway Pressure (cmH2O) 10 cmH20   I:E Ratio 1:2   Flow Sensitivity 3 L/min   Backup Apnea On   Backup Rate 8 Breaths Per Minute   Backup Vt 320   Vent Alarm Settings   High Pressure (cmH2O) 45 cmH2O   Low Minute Volume (lpm) 4 L/min   High Minute Volume (lpm) 15 L/min   Low Exhaled Vt (ml) 300 mL   High Exhaled Vt (ml) 80 mL   RR Low (bpm) 8   RR High (bpm) 35 br/min   Apnea (secs) 20 secs   Additional Respiratoray Assessments   Humidification Source Heated wire   Humidification Temp 37   Backup Trachs Available (Size) 8.0   Airway Clearance   Suction Trach   Suction Device Inline suction catheter   Sputum Method Obtained Tracheal   Sputum Amount Scant   Sputum Color/Odor Brown;Bloody   Sputum Consistency Thick   Surgical Airway (Trach) 06/20/24 Roxaneley Cuffed   Placement Date/Time: 06/20/24 1445   Surgical Airway Type: Tracheostomy  Brand: Trino  Style: Cuffed  Size: 8   Status Secured   Site Assessment Bleeding  (old blood)   Ties Assessment Intact   Cuff Pressure   (mlt)   Spare Trach at Bedside Yes   Ambu Bag With Mask at Bedside Yes       
   06/29/24 0759   Vent Information   Vent Mode SIMV/VC   Ventilator Settings   FiO2  35 %   Resp Rate (Set) 8 bpm   PEEP/CPAP (cmH2O) 8   Pressure Support (cm H2O) 8 cm H2O   Peak Inspiratory Flow (Set) 50 L/sec   Vent Patient Data (Readings)   Vt (Measured) 354 mL   Peak Inspiratory Pressure (cmH2O) 16 cmH2O   Rate Measured 18 br/min   Minute Volume (L/min) 3.27 Liters   Peak Inspiratory Flow (lpm) 50 L/sec   Mean Airway Pressure (cmH2O) 9.9 cmH20   Plateau Pressure (cm H2O) 14 cm H2O   Driving Pressure 6   I:E Ratio 1:3.1   Flow Sensitivity 3 L/min   Static Compliance (L/cm H2O) 59   Backup Apnea On   Backup Rate 8 Breaths Per Minute   Backup Vt 320   Backup I Time 20   Vent Alarm Settings   High Pressure (cmH2O) 45 cmH2O   Low Minute Volume (lpm) 4 L/min   High Minute Volume (lpm) 15 L/min   Low Exhaled Vt (ml) 280 mL   High Exhaled Vt (ml) 800 mL   RR High (bpm) 35 br/min   Apnea (secs) 20 secs   Additional Respiratoray Assessments   Humidification Source Heated wire   Humidification Temp 36.9   Ambu Bag With Mask At Bedside Yes   Backup Trachs Available (Size) 8.0       
   06/29/24 1154   Vent Information   Vent Mode (S)  CPAP/PS   Ventilator Settings   FiO2  35 %   PEEP/CPAP (cmH2O) 8   Pressure Support (cm H2O) 8 cm H2O   Vent Patient Data (Readings)   Vt (Measured) 337 mL   Peak Inspiratory Pressure (cmH2O) 17 cmH2O   Rate Measured 18 br/min   Minute Volume (L/min) 5.88 Liters   Mean Airway Pressure (cmH2O) 10 cmH20   I:E Ratio 1:3.8   Flow Sensitivity 3 L/min   Backup Apnea On   Backup Rate 8 Breaths Per Minute   Backup Vt 320   Backup I Time 20   Vent Alarm Settings   High Pressure (cmH2O) 45 cmH2O   Low Minute Volume (lpm) 4 L/min   High Minute Volume (lpm) 15 L/min   Low Exhaled Vt (ml) 280 mL   High Exhaled Vt (ml) 800 mL   RR High (bpm) 35 br/min   Apnea (secs) 20 secs   Additional Respiratoray Assessments   Humidification Source Heated wire   Humidification Temp 37.3   Circuit Condensation Drained   Ambu Bag With Mask At Bedside Yes   Backup Trachs Available (Size) 8.0       
   06/29/24 1952   Vent Information   Ventilator Day(s) 10   Ventilator ID [id number]   Vent Mode SIMV/VC   Ventilator Settings   FiO2  35 %   Vt (Set, mL) 320 mL   Resp Rate (Set) 8 bpm   PEEP/CPAP (cmH2O) 8   Pressure Support (cm H2O) 8 cm H2O   Peak Inspiratory Flow (Set) 50 L/sec   Vent Patient Data (Readings)   Vt Spont (mL) 333 mL   Vt (Measured) 333 mL   Peak Inspiratory Pressure (cmH2O) 17 cmH2O   Rate Measured 20 br/min   Minute Volume (L/min) 3.78 Liters   Peak Inspiratory Flow (lpm) 50 L/sec   Peak Expiratory Flow (lpm) 29 L/min   Mean Airway Pressure (cmH2O) 9.8 cmH20   Plateau Pressure (cm H2O) 15 cm H2O   Driving Pressure 7   I:E Ratio 1:3.9   Flow Sensitivity 3 L/min   Static Compliance (L/cm H2O) 48   Dynamic Compliance (L/cm H2O) 55 L/cm H2O   Airway Resistance 5.5   Insp Rise Time (%) 65 %   Backup Apnea On   Backup Rate 8 Breaths Per Minute   Backup Vt 320   Vent Alarm Settings   High Pressure (cmH2O) 45 cmH2O   Low Minute Volume (lpm) 4 L/min   High Minute Volume (lpm) 15 L/min   Low Exhaled Vt (ml) 270 mL   High Exhaled Vt (ml) 800 mL   RR High (bpm) 35 br/min   Apnea (secs) 20 secs   Additional Respiratoray Assessments   Humidification Source Heated wire   Humidification Temp 37.1   Circuit Condensation Not drained   Ambu Bag With Mask At Bedside Yes   Backup Trachs Available (Size) 8.0   Surgical Airway (Trach) 06/20/24 Trino Cuffed   Placement Date/Time: 06/20/24 4094   Surgical Airway Type: Tracheostomy  Brand: Trino  Style: Cuffed  Size: 8   Status Secured       
   06/30/24 0748   Patient Observation   Pulse 68   Respirations 21   SpO2 100 %   Vent Information   Ventilator -39   Vent Mode (S)  CPAP/PS   $Ventilation $Subsequent Day   Ventilator Settings   FiO2  35 %   PEEP/CPAP (cmH2O) 8   Pressure Support (cm H2O) (S)  8 cm H2O   Vent Patient Data (Readings)   Vt (Measured) 362 mL   Peak Inspiratory Pressure (cmH2O) 19 cmH2O   Rate Measured 23 br/min   Minute Volume (L/min) 4.12 Liters   Mean Airway Pressure (cmH2O) 11 cmH20   Plateau Pressure (cm H2O) 13 cm H2O   Driving Pressure 5   I:E Ratio 1:3.0   Flow Sensitivity 3 L/min   Static Compliance (L/cm H2O) 50   Vent Alarm Settings   High Pressure (cmH2O) 45 cmH2O   Low Minute Volume (lpm) 4 L/min   High Minute Volume (lpm) 15 L/min   Low Exhaled Vt (ml) 270 mL   High Exhaled Vt (ml) 800 mL   RR High (bpm) 35 br/min   Apnea (secs) 20 secs   Additional Respiratoray Assessments   Humidification Source Heated wire   Humidification Temp 36.3   Circuit Condensation Drained   Ambu Bag With Mask At Bedside Yes   Airway Clearance   Suction Trach   Suction Device Inline suction catheter   Sputum Method Obtained Tracheal   Sputum Amount Small   Sputum Color/Odor Bloody   Sputum Consistency Thick   Surgical Airway (Trach) 06/20/24 Shiley Cuffed   Placement Date/Time: 06/20/24 1445   Surgical Airway Type: Tracheostomy  Brand: Trino  Style: Cuffed  Size: 8   Status Secured   Site Assessment Dry;Crusty   Ties Assessment Dry;Intact;Secure   Ambu Bag With Mask at Bedside Yes       
   06/30/24 0839   Vent Information   Vent Mode (S)  SIMV/VC   Ventilator Settings   FiO2  35 %   Vt (Set, mL) 320 mL   PEEP/CPAP (cmH2O) 8   Pressure Support (cm H2O) 8 cm H2O   Peak Inspiratory Flow (Set) 60 L/sec     Patient placed back on SIMV/VC d/t low minute ventilation and low RR on PSV.  
   06/30/24 1937   Patient Observation   Pulse 60   Respirations 18   SpO2 97 %   Breath Sounds   Right Upper Lobe Diminished   Right Middle Lobe Diminished   Right Lower Lobe Diminished   Left Upper Lobe Diminished   Left Lower Lobe Diminished   Vent Information   Ventilator -39   Vent Mode SIMV/VC   Ventilator Settings   FiO2  35 %   Vt (Set, mL) 320 mL   Resp Rate (Set) 8 bpm   PEEP/CPAP (cmH2O) 8   Pressure Support (cm H2O) 8 cm H2O   Peak Inspiratory Flow (Set) 60 L/sec   Vent Patient Data (Readings)   Vt (Measured) 331 mL   Peak Inspiratory Pressure (cmH2O) 18 cmH2O   Rate Measured 18 br/min   Minute Volume (L/min) 6.16 Liters   Mean Airway Pressure (cmH2O) 10 cmH20   Plateau Pressure (cm H2O) 15 cm H2O   Driving Pressure 7   I:E Ratio 1:4.2   Flow Sensitivity 3 L/min   Static Compliance (L/cm H2O) 43   Vent Alarm Settings   High Pressure (cmH2O) 45 cmH2O   Low Minute Volume (lpm) 4 L/min   High Minute Volume (lpm) 15 L/min   Low Exhaled Vt (ml) 270 mL   High Exhaled Vt (ml) 800 mL   RR High (bpm) 35 br/min   Apnea (secs) 20 secs   Additional Respiratoray Assessments   Humidification Source Heated wire   Humidification Temp 36.3   Circuit Condensation Drained   Ambu Bag With Mask At Bedside Yes   Airway Clearance   Suction Trach;Oral   Suction Device Inline suction catheter;Mirta   Sputum Method Obtained Tracheal   Sputum Amount Small   Sputum Color/Odor Bloody   Sputum Consistency Thick   Surgical Airway (Trach) 06/20/24 [place] Cuffed   Placement Date/Time: 06/20/24 1445   Surgical Airway Type: Tracheostomy  Brand: Trino  Style: Cuffed  Size: 8   Status Secured   Site Assessment Dry;Crusty   Ties Assessment Dry;Intact;Secure   Ambu Bag With Mask at Bedside Yes       
   07/01/24 0934   Vent Information   Vent Mode (S)  CPAP/PS   Ventilator Settings   FiO2  35 %   PEEP/CPAP (cmH2O) 8   Pressure Support (cm H2O) 8 cm H2O   Vent Patient Data (Readings)   Vt (Measured) 406 mL   Peak Inspiratory Pressure (cmH2O) 16 cmH2O   Rate Measured 18 br/min   Minute Volume (L/min) 7.02 Liters   Mean Airway Pressure (cmH2O) 11 cmH20   I:E Ratio 1:2.4   Flow Sensitivity 3 L/min   Backup Apnea On   Backup Rate 8 Breaths Per Minute   Backup Vt 320   Vent Alarm Settings   High Pressure (cmH2O) 45 cmH2O   Low Minute Volume (lpm) 3 L/min   Low Exhaled Vt (ml) 250 mL   RR High (bpm) 35 br/min   Apnea (secs) (S)  30 secs       
   07/01/24 1545   Patient Observation   Pulse 60   Respirations 12   SpO2 99 %   Breath Sounds   Right Upper Lobe Rhonchi   Right Middle Lobe Diminished   Right Lower Lobe Diminished   Left Upper Lobe Rhonchi   Left Lower Lobe Diminished   Vent Information   Ventilator -39   Vent Mode CPAP/PS   Ventilator Settings   FiO2  35 %   PEEP/CPAP (cmH2O) 8   Pressure Support (cm H2O) 8 cm H2O   Vent Patient Data (Readings)   Vt (Measured) 382 mL   Peak Inspiratory Pressure (cmH2O) 16 cmH2O   Rate Measured 16 br/min   Minute Volume (L/min) 6 Liters   Mean Airway Pressure (cmH2O) 10 cmH20   Plateau Pressure (cm H2O) 15 cm H2O   Driving Pressure 7   Flow Sensitivity 3 L/min   Static Compliance (L/cm H2O) 51   Vent Alarm Settings   High Pressure (cmH2O) 45 cmH2O   Low Minute Volume (lpm) 3 L/min   High Minute Volume (lpm) 15 L/min   Low Exhaled Vt (ml) 250 mL   High Exhaled Vt (ml) 800 mL   RR High (bpm) 30 br/min   Apnea (secs) 30 secs   Additional Respiratoray Assessments   Humidification Source Heated wire   Humidification Temp 36.8   Circuit Condensation Not drained   Ambu Bag With Mask At Bedside Yes   Surgical Airway (Trach) 06/20/24 Shiley Cuffed   Placement Date/Time: 06/20/24 1445   Surgical Airway Type: Tracheostomy  Brand: Trino  Style: Cuffed  Size: 8   Status Secured   Site Assessment Bleeding;Red;Oozing Secretions   Ties Assessment Soiled;Secure   Ambu Bag With Mask at Bedside Yes       
   07/02/24 0759   Vent Information   Vent Mode SIMV/VC   Ventilator Settings   FiO2  35 %   Vt (Set, mL) 320 mL   Resp Rate (Set) 8 bpm   PEEP/CPAP (cmH2O) 8   Pressure Support (cm H2O) 8 cm H2O   Peak Inspiratory Flow (Set) 60 L/sec   Vent Patient Data (Readings)   Vt (Measured) 346 mL   Peak Inspiratory Pressure (cmH2O) 20 cmH2O   Rate Measured 15 br/min   Minute Volume (L/min) 4.87 Liters   Peak Inspiratory Flow (lpm) 60 L/sec   Mean Airway Pressure (cmH2O) 9.9 cmH20   Plateau Pressure (cm H2O) 14 cm H2O   Driving Pressure 6   I:E Ratio 1:2.2   Flow Sensitivity 3 L/min   Static Compliance (L/cm H2O) 4   Backup Apnea On   Backup Rate 8 Breaths Per Minute   Backup Vt 320   Backup I Time 20   Vent Alarm Settings   High Pressure (cmH2O) 45 cmH2O   Low Minute Volume (lpm) 4 L/min   High Minute Volume (lpm) 15 L/min   Low Exhaled Vt (ml) 270 mL   High Exhaled Vt (ml) 800 mL   RR High (bpm) 35 br/min   Apnea (secs) 20 secs   Additional Respiratoray Assessments   Humidification Source Heated wire   Humidification Temp 37.1   Circuit Condensation Drained   Ambu Bag With Mask At Bedside Yes   Backup Trachs Available (Size) 8.0       
   07/02/24 0917   Vent Information   Vent Mode (S)  CPAP/PS   Ventilator Settings   FiO2  35 %   PEEP/CPAP (cmH2O) 8   Pressure Support (cm H2O) 8 cm H2O   Vent Patient Data (Readings)   Vt (Measured) 414 mL   Peak Inspiratory Pressure (cmH2O) 16 cmH2O   Rate Measured 16 br/min   Minute Volume (L/min) 5.68 Liters   Mean Airway Pressure (cmH2O) 10 cmH20   I:E Ratio 1:3.4   Flow Sensitivity 3 L/min   Backup Apnea On   Backup Rate 8 Breaths Per Minute   Backup Vt 320   Backup I Time 20   Vent Alarm Settings   High Pressure (cmH2O) 45 cmH2O   Low Minute Volume (lpm) 4 L/min   High Minute Volume (lpm) 15 L/min   Low Exhaled Vt (ml) 270 mL   High Exhaled Vt (ml) 800 mL   RR High (bpm) 35 br/min   Apnea (secs) 20 secs   Additional Respiratoray Assessments   Humidification Source Heated wire   Humidification Temp 37   Ambu Bag With Mask At Bedside Yes   Backup Trachs Available (Size) 8.0       
   07/02/24 1126   Vent Information   Vent Mode CPAP/PS   Ventilator Settings   FiO2  35 %   PEEP/CPAP (cmH2O) 8   Pressure Support (cm H2O) 8 cm H2O   Vent Patient Data (Readings)   Vt (Measured) 355 mL   Peak Inspiratory Pressure (cmH2O) 16 cmH2O   Rate Measured 19 br/min   Minute Volume (L/min) 5.77 Liters   I:E Ratio 1:4.2   Flow Sensitivity 3 L/min   Backup Apnea On   Backup Rate 8 Breaths Per Minute   Backup Vt 320   Backup I Time 20   Vent Alarm Settings   High Pressure (cmH2O) 45 cmH2O   Low Minute Volume (lpm) 3.5 L/min   High Minute Volume (lpm) 15 L/min   Low Exhaled Vt (ml) 270 mL   High Exhaled Vt (ml) 800 mL   RR High (bpm) 35 br/min   Apnea (secs) 20 secs   Additional Respiratoray Assessments   Humidification Source Heated wire   Humidification Temp 37   Circuit Condensation Drained   Ambu Bag With Mask At Bedside Yes   Backup Trachs Available (Size) 8.0       
   07/02/24 1600   Vent Information   Vent Mode CPAP/PS   Ventilator Settings   FiO2  35 %   PEEP/CPAP (cmH2O) 8   Pressure Support (cm H2O) 8 cm H2O   Vent Patient Data (Readings)   Vt (Measured) 314 mL   Peak Inspiratory Pressure (cmH2O) 16 cmH2O   Rate Measured 20 br/min   Minute Volume (L/min) 5.85 Liters   Mean Airway Pressure (cmH2O) 11 cmH20   I:E Ratio 1:2.8   Flow Sensitivity 3 L/min   Backup Apnea On   Backup Rate 8 Breaths Per Minute   Backup Vt 320   Backup I Time 20   Vent Alarm Settings   High Pressure (cmH2O) 45 cmH2O   Low Minute Volume (lpm) 3.5 L/min   High Minute Volume (lpm) 15 L/min   Low Exhaled Vt (ml) 270 mL   High Exhaled Vt (ml) 800 mL   RR High (bpm) 35 br/min   Apnea (secs) 20 secs   Additional Respiratoray Assessments   Humidification Source Heated wire   Humidification Temp 37   Ambu Bag With Mask At Bedside Yes   Backup Trachs Available (Size) 8.0       
  OCCUPATIONAL THERAPY TREATMENT NOTE   [facility]  [address] [place], [place]       Date:2024                                                               Patient Name: Malissa Chen  MRN: [id number]  : 1940  Room: [address] [place] [address]A    Re-Evaluating OT: Lexi Chen OTD, OTR/L, [id number]    Evaluating OT: Kasey Gonzalezmariaa OTD, OTR/L; [id number]     Re-evaluation indicated following decline in status requiring transfer to ICU and thrombectomy       Referring Provider:     Gerri Wolfe APRN - CNS        Specific Provider Orders/Date: OT Eval and Treat 2024       Diagnosis: Stroke-like symptoms [R29.90]  Stroke-like symptom [R29.90]       Surgery: 6/3: thrombectomy       Pertinent Medical History:  has a past medical history of Diverticulitis, Glaucoma associated with ocular disorder, Hyperlipidemia, Hypertension, Osteopenia, Palpitations, and PVC (premature ventricular contraction).        Recommended Adaptive Equipment: TBD      Precautions:  Fall Risk, R hemiparesis, <160 SBP, aphasia (expressive > receptive), alarms+ NGT      Assessment of current deficits    [x] Functional mobility            [x]ADLs           [x] Strength                  [x]Cognition    [x] Functional transfers          [x] IADLs         [x] Safety Awareness   [x]Endurance    [x] Fine Coordination                         [x] Balance      [x] Vision/perception   [x]Sensation      [x]Gross Motor Coordination             [x] ROM           [] Delirium                   [x] Motor Control      OT PLAN OF CARE   OT POC based on physician orders, patient diagnosis and results of clinical assessment     Frequency/Duration 2-5 days/wk for 2 weeks PRN   Specific OT Treatment Interventions to include:   * Instruction/training on adapted ADL techniques and AE recommendations to increase functional independence within precautions       * Training on energy conservation strategies, 
  Palliative Care Department  [phone]  Progress Note  Eloise Tsai, APRN - CNP     Malissa Chen  [id number]  Hospital Day: 19    Date of Initial Consult: 6/11/24  Referring Provider: Candis HUNT    Palliative Medicine was consulted for assistance with: goals of care        HPI:   Malissa Chen is a 83 y.o. with a past medical history of hypertension, hyperlipidemia intolerant to statins due to myalgia, glaucoma, ocular migraines, diverticulitis and LBBB with a first-degree AV block on EKG  who was admitted on 5/30/2024 from home with a CHIEF COMPLAINT of dizziness and left sided weakness. The patient stated that she has been having dizziness since April and was recently admitted to the hospital in April for this. She denied chest pain; shortness of breath and symptoms resolved. /81. 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  Both vertebral artery images were concerning for dissection.   There is reconstitution of the distal V4 segment of the right vertebral artery.  Interventional neurology was consulted recommended outpatient diagnostic catheter angiogram. On 6/3, pt had worsening stroke symptoms. Re-consulted neuro interventionalist.  Admitted to Neuro ICU following cerebral angiogram with bilateral vertebral artery thrombectomy & left vertebral artery stent assisted  repair. On 6/4 she developed sinus bradycardia and hypotension;  EP consulted. 6/7 pt developed aspiration pneumonia with Pulmonology consult. Palliative medicine consulted for goals of care.      ASSESSMENT/PLAN:     Pertinent Hospital Diagnoses   Acute hypoxic respiratory failure  Aspiration pneumonia  Bilateral vertebral artery dissections  R PCA stroke  Right sided paralysis  Expressive aphasia  Acute brain stem infarct       Palliative Care Encounter / 
  Palliative Care Department  [phone]  Progress Note  Deepali Barrett APRN-CNS    Malissa Chen  [id number]  Hospital Day: 14    Date of Initial Consult: 6/11/24  Referring Provider: Candis DASILVA-CNP    Palliative Medicine was consulted for assistance with: goals of care        HPI:   Malissa Chen is a 83 y.o. with a past medical history of hypertension, hyperlipidemia intolerant to statins due to myalgia, glaucoma, ocular migraines, diverticulitis and LBBB with a first-degree AV block on EKG  who was admitted on 5/30/2024 from home with a CHIEF COMPLAINT of dizziness and left sided weakness. The patient stated that she has been having dizziness since April and was recently admitted to the hospital in April for this. She denied chest pain; shortness of breath and symptoms resolved. /81. 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  Both vertebral artery images were concerning for dissection.   There is reconstitution of the distal V4 segment of the right vertebral artery.  Interventional neurology was consulted recommended outpatient diagnostic catheter angiogram. On 6/3, pt had worsening stroke symptoms. Re-consulted neuro interventionalist.  Admitted to Neuro ICU following cerebral angiogram with bilateral vertebral artery thrombectomy & left vertebral artery stent assisted  repair. On 6/4 she developed sinus bradycardia and hypotension;  EP consulted. 6/7 pt developed aspiration pneumonia with Pulmonology consult. Palliative medicine consulted for goals of care.      ASSESSMENT/PLAN:     Pertinent Hospital Diagnoses   Acute hypoxic respiratory failure  Aspiration pneumonia  Bilateral vertebral artery dissections  R PCA stroke  Right sided paralysis  Expressive aphasia  Acute brain stem infarct       Palliative Care Encounter / Counseling 
  Patient going apneic and placed back on SIMV 8 320 35% +8 PS 8.     06/29/24 1326   Vent Information   Vent Mode (S)  SIMV/VC   Ventilator Settings   FiO2  35 %   Resp Rate (Set) 8 bpm   PEEP/CPAP (cmH2O) 8   Pressure Support (cm H2O) 8 cm H2O   Peak Inspiratory Flow (Set) 50 L/sec   Vent Patient Data (Readings)   Vt (Measured) 363 mL   Peak Inspiratory Pressure (cmH2O) 15 cmH2O   Rate Measured 21 br/min   Minute Volume (L/min) 4.16 Liters   Peak Inspiratory Flow (lpm) 50 L/sec   Mean Airway Pressure (cmH2O) 10 cmH20   I:E Ratio 1:2.9   Flow Sensitivity 3 L/min   Backup Apnea On   Backup Rate 8 Breaths Per Minute   Backup Vt 320   Backup I Time 20   Vent Alarm Settings   High Pressure (cmH2O) 45 cmH2O   Low Minute Volume (lpm) 4 L/min   High Minute Volume (lpm) 15 L/min   Low Exhaled Vt (ml) 300 mL   High Exhaled Vt (ml) 800 mL   RR High (bpm) 35 br/min   Apnea (secs) 20 secs   Additional Respiratoray Assessments   Humidification Source Heated wire   Humidification Temp 37.2   Circuit Condensation Drained   Ambu Bag With Mask At Bedside Yes   Backup Trachs Available (Size) 8.0   Airway Clearance   Suction Trach   Suction Device Inline suction catheter   Sputum Method Obtained Tracheal   Sputum Amount Moderate   Sputum Color/Odor Bloody   Sputum Consistency Thick       
  Physician Progress Note      PATIENT:               KEVIN GALVEZ  [facility] #:                  [id number]  :                       1940  ADMIT DATE:       2024 12:53 PM  DISCH DATE:  RESPONDING  PROVIDER #:        TRAVIS DRAPER        QUERY TEXT:    Type of Anemia: Please provide further specificity, if known.    Clinical indicators include: hgb, hct, hg, anemia  Options provided:  -- Anemia due to acute blood loss  -- Anemia due to chronic blood loss  -- Anemia due to iron deficiency  -- Anemia due to postoperative blood loss  -- Anemia due to chronic disease  -- Other - I will add my own diagnosis  -- Disagree - Not applicable / Not valid  -- Disagree - Clinically Unable to determine / Unknown        PROVIDER RESPONSE TEXT:    The patient has anemia due to acute blood loss.      Electronically signed by:  TRAVIS DRAPER 2024 7:53 AM          
  SPEECH/LANGUAGE PATHOLOGY  CLINICAL ASSESSMENT OF SWALLOWING FUNCTION   and PLAN OF CARE    PATIENT NAME:  Malissa Chen  (female)     MRN:  [id number]    :  1940  (83 y.o.)  STATUS:  Inpatient: Room 4521/4521-A    TODAY'S DATE:  6/10/2024  06/09/24 1100    SLP eval and treat  Start:  24 1100,   End:  24 1100,   ONE TIME,   Standing Count:  1 Occurrences,   R       Gerri Wolfe, APRN - CNS  REASON FOR REFERRAL:     Please re-evaluate.  May need peg tube      EVALUATING THERAPIST: Tali Arnold, TERESSA                 RESULTS:    DYSPHAGIA DIAGNOSIS:   Clinical indicators of moderate-severe oropharyngeal phase dysphagia       DIET RECOMMENDATIONS:  NPO -- including medications. Nutrition, fluids and medication to be administered through current NG/PEG tube.     Very weak cough/ throat clear at baseline and following partially coated tsp of applesauce. Wet respirations noted prior to PO presentation. RN reports multiple NTS today.      FEEDING RECOMMENDATIONS:     Assistance level:  Not applicable      Compensatory strategies recommended: Thorough oral care to prevent colonization of oral bacteria       Discussed recommendations with:  patient nurse in person    SPEECH THERAPY  PLAN OF CARE   The dysphagia POC is established based on physician order, dysphagia diagnosis and results of clinical assessment     Ongoing intervention for dysphagia management is recommended to address the established treatment plan frequency and duration at the discretion of treating SLP    Conditions Requiring Skilled Therapeutic Intervention for dysphagia:    Patient is performing below functional baseline d/t  current acute condition, respiratory compromise, multiple medications, and/or increased dependency upon caregivers.  Throat clearing during PO intake     Specific dysphagia interventions to include:     ongoing evaluation of swallow function to determine when PO diet can be safely initiated  exercises to 
  Speech Language Pathology  NAME:  Malissa Chen  :  1940  DATE: 2024  ROOM:  [address] [place] [address]-A    Pt unavailable for Speech therapy  and Dysphagia therapy     REASON:  Patient intubated SLP will signoff at this time please reorder evaluation when medically appropriate     Will re-attempt as appropriate.       Thank You      
  Speech Language Pathology  NAME:  Malissa Chen  :  1940  DATE: 2024  ROOM:  [address] [place] [address]-A    Pt unavailable at 1015 for Clinical Swallow Evaluation Discussed with patient nurse in person     REASON: Patient presented with decreased alertness and decreased heart rate. RN recommended to delay evaluation until patient status improves.       Recommend NPO until evaluation can be completed.     Will re-attempt as appropriate.       Thank You    Tali Arnold M.S., CCC-SLP  Speech-Language Pathologist  [id number]  2024   
  Speech Language Pathology  NAME:  Malissa Chen  :  1940  DATE: 2024  [facility]OM[facility]  [facility]    Pt unavailable for Clinical Swallow Evaluation Discussed with patient nurse in person     REASON:  Other: Increased difficulty managing secretions, requiring suctioning.     Please order clinical swallow evaluation when medically appropriate for PO trials.       Thank You      Tali Arnold M.S., CCC-SLP  Speech-Language Pathologist  [id number]  2024    
  Speech Language Pathology  NAME:  Malissa Chen  :  1940  DATE: 6/3/2024  [facility]    Pt unavailable at 1500 for Clinical Swallow Evaluation Discussed with patient nurse in person     REASON:  Patient is scheduled for a thrombectomy per RN.     Will re-attempt as appropriate.       Thank You    Tali Arnold M.S., CCC-SLP  Speech-Language Pathologist  [id number]  6/3/2024    
2110 Patient noted to have bleeding/clots around PEG site and clots being suctioned during trach/oral care. Notified residents, plan to hold heparin gtt at this time.   
4 Eyes Skin Assessment     NAME:  Malissa Chen  YOB: 1940  MEDICAL RECORD NUMBER:  [id number]    The patient is being assessed for  Admission    I agree that at least one RN has performed a thorough Head to Toe Skin Assessment on the patient. ALL assessment sites listed below have been assessed.      Areas assessed by both nurses:    Head, Face, Ears, Shoulders, Back, Chest, Arms, Elbows, Hands, Sacrum. Buttock, Coccyx, Ischium, Legs. Feet and Heels, and Under Medical Devices         Does the Patient have a Wound? No noted wound(s)       Homero Prevention initiated by RN: Yes  Wound Care Orders initiated by RN: No    Pressure Injury (Stage 3,4, Unstageable, DTI, NWPT, and Complex wounds) if present, place Wound referral order by RN under : No    New Ostomies, if present place, Ostomy referral order under : No     Nurse 1 eSignature: Electronically signed by Aleah D'Amico, RN on 5/31/24 at 6:43 PM EDT    **SHARE this note so that the co-signing nurse can place an eSignature**    Nurse 2 eSignature: Electronically signed by Lourdes Anderson RN on 5/31/24 at 6:53 PM EDT   
4 Eyes Skin Assessment     NAME:  Malissa Chen  YOB: 1940  MEDICAL RECORD NUMBER:  [id number]    The patient is being assessed for  Post-Op Surgical    I agree that at least one RN has performed a thorough Head to Toe Skin Assessment on the patient. ALL assessment sites listed below have been assessed.      Areas assessed by both nurses:    Head, Face, Ears, Shoulders, Back, Chest, Arms, Elbows, Hands, Sacrum. Buttock, Coccyx, Ischium, Legs. Feet and Heels, and Under Medical Devices         Does the Patient have a Wound? No noted wound(s)       Homero Prevention initiated by RN: Yes  Wound Care Orders initiated by RN: No    Pressure Injury (Stage 3,4, Unstageable, DTI, NWPT, and Complex wounds) if present, place Wound referral order by RN under : No    New Ostomies, if present place, Ostomy referral order under : No     Nurse 1 eSignature: Electronically signed by Tyrese Odell RN on 6/3/24 at 8:16 PM EDT    **SHARE this note so that the co-signing nurse can place an eSignature**    Nurse 2 eSignature: Electronically signed by Laz Calvert RN on 6/5/24 at 6:10 AM EDT    
4 Eyes Skin Assessment     NAME:  Malissa Chen  YOB: 1940  MEDICAL RECORD NUMBER:  [id number]    The patient is being assessed for  Transfer to New Unit    I agree that at least one RN has performed a thorough Head to Toe Skin Assessment on the patient. ALL assessment sites listed below have been assessed.      Areas assessed by both nurses:    Head, Face, Ears, Shoulders, Back, Chest, Arms, Elbows, Hands, Sacrum. Buttock, Coccyx, Ischium, Legs. Feet and Heels, and Under Medical Devices         Does the Patient have a Wound? Yes wound(s) were present on assessment. LDA wound assessment was Initiated and completed by RN       Homero Prevention initiated by RN: Yes  Wound Care Orders initiated by RN: Yes    Pressure Injury (Stage 3,4, Unstageable, DTI, NWPT, and Complex wounds) if present, place Wound referral order by RN under : Yes    New Ostomies, if present place, Ostomy referral order under : No     Nurse 1 eSignature: Electronically signed by Saundra Kellogg RN on 6/11/24 at 10:56 AM EDT    **SHARE this note so that the co-signing nurse can place an eSignature**    Nurse 2 eSignature: Electronically signed by Marilyn Swanson RN on 6/11/24 at 10:59 AM EDT   
After giving patient morning meds, this RN noticed right side facial droop. Neuro assessment done and patients right arm significantly weaker than initial assessment this morning. RAH Freed also at bedside. TYSON Marie with neurology on unit notified. Patient transported to CT for stat CT's   
[facility]   Department of Internal Medicine   Internal Medicine Residency  MICU Progress Note    Patient:  Malissa Chen 83 y.o. female   MRN: [id number]      Room: [address] [place], [place] [address]    Admission date: 5/30/2024 12:53 PM ; Hospital day: 17   ICU day: 3d 21h      Allergy: Doxycycline, Ciprofloxacin, and Sulfa antibiotics    Subjective     Patient was seen and examined this morning at bedside. Overnight, there were no acute events. She awakens to voice, withdraws to noxious stimuli and follows commands.     Objective       I & O - 24hr:    Intake/Output Summary (Last 24 hours) at 6/17/2024 1300  Last data filed at 6/17/2024 1200  Gross per 24 hour   Intake 1603 ml   Output 745 ml   Net 858 ml     Net IO Since Admission: 246.65 mL [06/17/24 1300]    Physical Exam  BP (!) 113/45   Pulse 53   Temp 97.9 °F (36.6 °C) (Temporal)   Resp 13   Ht 1.676 m (5' 6\")   Wt 78.3 kg (172 lb 11 oz)   SpO2 98%   BMI 27.87 kg/m²   Ideal body weight: 59.3 kg (130 lb 11.7 oz)    General Appearance: alert, cooperative, and no distress  HEENT:  Head: Normal, normocephalic, atraumatic.  Eye: Normal external eye, conjunctiva, lids cornea, TOMAS.  Ears: Normal TM's bilaterally. Normal auditory canals and external ears. Non-tender.  Nose: Normal external nose, mucus membranes and septum.  Pharynx: . Normal buccal mucosa. ETT, in position.  Lung: clear to auscultation bilaterally and no rales, wheezes or rhonchi appreciated  Heart: regular rate and rhythm, S1, S2 normal, and no murmur, rub, appreciated  Abdomen:  soft, non-distended, no organomegaly, no rigidity  Extremities:  extremities normal, atraumatic, no cyanosis or edema  Neurologic: Withdraws to noxious stimuli, awakens to voive and folows command. Rt side is flaccid vs left side.       Medications     Continuous Infusions:   fentaNYL 100 mcg/hr (06/17/24 0407)    dextrose      sodium chloride Stopped (06/04/24 1010)     Scheduled Meds:   sennosides  5 mL Oral BID    
Antibiotic Extended Infusion Policy     This patient is on medication that requires renal, weight, and/or indication dose adjustment.      Date Body Weight IBW  Adjusted BW SCr  CrCl Dialysis status BMI   6/13/2024 74.2 kg (163 lb 8 oz) Ideal body weight: 59.3 kg (130 lb 11.7 oz)  Adjusted ideal body weight: 65.2 kg (143 lb 13.4 oz) Serum creatinine: 0.6 mg/dL 06/13/24 0604  Estimated creatinine clearance: 71 mL/min N/a Body mass index is 26.39 kg/m².       Pharmacy has dose-adjusted the following medication(s):    Ordered Medication: Zosyn 3375mg q8H     Order Changed/converted to: Zosyn 4500mg q8h    These changes were made per protocol according to the [facility]   Automatic Extended Infusion Dose Adjustment Policy.     *Please note this dose may need readjusted if patient's condition changes.    Please contact pharmacy with any questions regarding these changes.    Farhan Flores RPH  6/13/2024  4:08 PM    
Antibiotic Extended Infusion Policy     This patient is on medication that requires renal, weight, and/or indication dose adjustment.      Date Body Weight IBW  Adjusted BW SCr  CrCl Dialysis status BMI   6/6/2024 80.7 kg (178 lb) Ideal body weight: 59.3 kg (130 lb 11.7 oz)  Adjusted ideal body weight: 67.9 kg (149 lb 10.2 oz) Serum creatinine: 0.6 mg/dL 06/06/24 0410  Estimated creatinine clearance: 73 mL/min N/a Body mass index is 28.73 kg/m².       Pharmacy has dose-adjusted the following medication(s):    Ordered Medication: Zosyn 3375mg q8H     Order Changed/converted to: Zosyn 4500mg q8h    These changes were made per protocol according to the [facility]   Automatic Extended Infusion Dose Adjustment Policy.     *Please note this dose may need readjusted if patient's condition changes.    Please contact pharmacy with any questions regarding these changes.    Wendy Schrader RPH  6/6/2024  2:26 PM   
Attempted to see patient at bedside.  And family were discussing her care. Will try again at a later time.   
[facility]  PROGRESS NOTE  ATTENDING NOTE    CRITICAL CARE    Chief Complaint   Patient presents with    Dizziness     Patient states she was having weakness and \"felt floppy\" lkw 11:00       HPI  83 y.o. woman presented to ED with left-sided weakness.  NIHSS was 0. Initial BP was  202/81. 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  Both vertebral artery images were concerning for dissection.  Admitted to stroke floor.    concerning for a dissection.  There is reconstitution of the distal V4  segment of the right vertebral artery.  Interventional neurology was consulted recommended outpatient diagnostic catheter angiogram.  6/3 Worsening stroke symptoms. Re-consulted neuro interventionalist.  Admitted to Neuro ICU following cerebral angiogram with bilateral vertebral artery thrombectomy & left vertebral artery stent assisted repair.    6/4 No issues overnight.  Required 1 dose of labetalol.  Upper airway secretions--guaifenesin started  6/5  increasing secretions, rocephin started for aspiration  6/6  having some respiratory issues--mannitol given for possible increased ICP by Cory and start scop patch for secretions    Patient Active Problem List   Diagnosis    Diverticulitis    Lightheadedness    Palpitations    Stroke-like symptoms    Hypertensive urgency    Stroke-like symptom    Vertebral artery dissection (HCC)    Sinus bradycardia    LBBB (left bundle branch block)       OVERNIGHT EVENTS:  N/a    HOSPITAL COURSE:  6/3 Worsening stroke symptoms. Re-consulted neuro interventionalist.  Admitted to Neuro ICU following cerebral angiogram with bilateral vertebral artery thrombectomy & left vertebral artery stent assisted repair.    6/4 No issues overnight.  Required 1 dose of labetalol.  Upper airway secretions--guaifenesin started  6/5 
CT results communicated to Dr. Andujar via perfect serve.   
Call surgery when/if family decides about PEG.    Marlo Layton MD    
Called to see patient for bradycardia, hypotension & decreased mental status.  BP 94/60 per arterial line 84/42 per cuff.  Heart rate 42 bpm (reported to have dropped to 36 bpm)  Eyes open.  Non verbal.  Follows commands on left side only.      Plan:  Fluid bolus 500 ml  Levophed infusion to maintain BP.      Dr. Ray updated. Requested cardiology/EP consult for possible pacemaker since bradycardia is seen in posterior stroke.      EP consulted.    
Chart reviewed.  Patient seen at the bedside, team in the room in the process to place tracheostomy and PEG tube.  patient has no living relative, guardianship in process, may take up to 30 days.  Referral has been made to [place] for discharge disposition.  No further PM needs has been identified.  Going to sign off for now.  Please reconsult if new PM needs arises.    Eloise Tsai, APRN - CNP   
Complex situation with no real decision makers  Based on reports pt at some point had been agreeable to trach peg....   I will plan for trach peg next week but will need 2 physician consent given above complexities.    Marlo Layton MD    
Comprehensive Nutrition Assessment    Type and Reason for Visit:  Initial, Consult (ICU LOS, Consult for \"PN Rec's\")    Nutrition Recommendations/Plan:   Continue NPO.   Modify Current EN to appropriately meet current estimated needs & monitor.    TF Recommendations:  Peptide Based (Vital AF 1.2) @ 50ml/hr (Goal) Continuous x 24hr/d= 1200ml TV, 1440kcal, 90gm Pro, 973ml freewater.     Flush per neuro/critical care mgmt.     Malnutrition Assessment:  Malnutrition Status:  At risk for malnutrition (Comment) (06/04/24 1352)    Context:  Acute Illness     Findings of the 6 clinical characteristics of malnutrition:  Energy Intake:  Mild decrease in energy intake (Comment) (since adm)  Weight Loss:  Unable to assess (2/2 poor EMR wt hx pta)     Body Fat Loss:  No significant body fat loss     Muscle Mass Loss:  No significant muscle mass loss    Fluid Accumulation:  No significant fluid accumulation     Strength:  Not Performed    Nutrition Assessment:    Pt adm w/ chronic intermittent dizziness and sudden onset Lt sided ataxia/stroke symptoms just pta.  PMHx diverticulitis, HTN, HLD, PVC, osteopenia.  Adm w/ acure Rt occipital infarct, LBBB/first degree AV block, hypertensive urgency and vertebral artery dissection; noted stroke alert/Rt ataxia/facial droop 6/3; s/p cerebral angiogram, B/L vertebral artery thrombectomy and Lt vertebral artery stent repair 6/3.  Noted bradycardia, hypotension (on low dose pressor x 1 currently) and worsening mental status this AM; noted plan for NG insert and EN support.  At risk d/t NPO status/decreased intake 2/2 AMS/Infarct w/ suspected dysphagia and need for EN support.  Will Modify Current EN orders to appropriately meet current estimated needs and monitor.    Nutrition Related Findings:    AMSx4, non-verbal, Abd WDL, Hypo BS, MAP 80, low dose pressor x 1, no NG placed currently, no edema, -I/O's, hyperglycemia Wound Type: None       Current Nutrition Intake & Therapies:  
Comprehensive Nutrition Assessment    Type and Reason for Visit:  Reassess    Nutrition Recommendations/Plan:     Continue NPO, Continue Current Tube Feeding (possible trach/PEG TBD)       Malnutrition Assessment:  Malnutrition Status:  Insufficient data (06/17/24 1456)    Context:  Acute Illness     Findings of the 6 clinical characteristics of malnutrition:  Energy Intake:  Mild decrease in energy intake (TF at goal meets needs)  Weight Loss:  Unable to assess (limited wt hx within past year)     Body Fat Loss:  No significant body fat loss     Muscle Mass Loss:  No significant muscle mass loss    Fluid Accumulation:  No significant fluid accumulation     Strength:  Not Performed    Nutrition Assessment:    Pt status declined now intubated/ transferred to MICU 6/13 d/t Sepsis/ Respiratory failure 2/2 Aspiration PNA  Admit initially w/ Acute ischemic R medullary/ occipital strokes s/p IR thrombectomy & B/L vertebral artery dissection repair. Noted dysphagia s/p multiple failed swallow evals. PMHx Diverticulitis & HTN/HLD. Noted constipation vs ileus? EN support running at goal per previous RD rec & remains appropriate. Possible trach/PEG noted. Will monitor & follow.    Nutrition Related Findings:    Pt intubated/ sedated, +I/O\"s 4L, +1/+2 edema, hypoactive BS, constipation vs ileus (-BM x 4 days), previous dysphagia, NGT w/ TF, hypophosphatemia     Wound Type:  (buttocks wound site x 1)       Current Nutrition Intake & Therapies:    Current Tube Feeding (TF) Orders:  Feeding Route: Nasogastric  Formula: Immune Enhancing  Schedule: Continuous  Feeding Regimen: 40 ml/hr, running  Additives/Modulars: None  Water Flushes: 200 ml q 4 hr = 1200 ml water  Current TF & Flush Orders Provides: 960 ml tv, 1440 kcals, 90 gm pro, 720 ml ar water, 1920 ml total water w/ flushes      Anthropometric Measures:  Height: 167.6 cm (5' 6\")  Ideal Body Weight (IBW): 130 lbs (59 kg)    Admission Body Weight: 80.7 kg (178 lb) (bed 
Comprehensive Nutrition Assessment    Type and Reason for Visit:  Reassess    Nutrition Recommendations/Plan:     Continue NPO, Modify Tube Feeding to better meet est needs w/ Standard formula change:     Jevity 1.5 @ 40 ml/hr + 1 pro mod daily via feeding tube.   Will provide: 960 ml tv, 1440 kcals, 61 gm pro (1540 kcals & 87 gm pro w/ mod), 730 ml free water  Regimen meets 100% est calorie & protein needs    *Monitor glucose trends/ need for diabetic formula  blood sugars intermittently > 180 in ICU setting.       Malnutrition Assessment:  Malnutrition Status:  At risk for malnutrition  (06/24/24 1304)    Context:  Acute Illness     Findings of the 6 clinical characteristics of malnutrition:  Energy Intake:  75% or less of estimated energy requirements for 7 or more days (average)  Weight Loss:  Unable to assess (limited wt hx within past year)     Body Fat Loss:  Unable to assess     Muscle Mass Loss:  Unable to assess    Fluid Accumulation:  No significant fluid accumulation     Strength:  Not Performed    Nutrition Assessment:    Pt remains at risk d/t ongoing need for intubated & EN support now s/p trach&PEG 6/20. Admit initially w/ Acute ischemic R medullary/ occipital strokes s/p IR thrombectomy & B/L vertebral artery dissection repair complicated by Sepsis/ Respiratory failure 2/2 Aspiration PNA. Noted previous dysphagia s/p multiple failed swallow evals. PMHx Diverticulitis & HTN/HLD. Noted constipation vs ileus resolved? EN support running at goal. Will provide updated TF recs to better meet needs & monitor.    Nutrition Related Findings:    vent via trach, off sedation, intermittent hypotension (not on pressor), +I/O's 5L, +1/+3 edema, active BS, previous dysphagia, PEG w/ TF, hyperglycemia     Wound Type: None (per wound care eval)       Current Nutrition Intake & Therapies:    Current Tube Feeding (TF) Orders:  Feeding Route: PEG  Formula: Standard with Fiber  Schedule: Continuous  Feeding Regimen: 
Comprehensive Nutrition Assessment    Type and Reason for Visit:  Reassess    Nutrition Recommendations/Plan:   Continue NPO.   Modify Current EN to appropriately meet current estimated needs & monitor.    TF Recommendations:  Immune Enhancing (Pivot 1.5) @ 40ml/hr (Goal) Continuous x 24hrs/d = 960ml TV, 1440kcal, 90gm Pro, 729ml freewater.     Flush per neuro/critical care mgmt.     Malnutrition Assessment:  Malnutrition Status:  At risk for malnutrition (Comment) (06/04/24 4342)    Context:  Acute Illness     Findings of the 6 clinical characteristics of malnutrition:  Energy Intake:  Mild decrease in energy intake (Comment) (since adm)  Weight Loss:  Unable to assess (2/2 poor EMR wt hx pta)     Body Fat Loss:  No significant body fat loss     Muscle Mass Loss:  No significant muscle mass loss    Fluid Accumulation:  No significant fluid accumulation     Strength:  Not Performed    Nutrition Assessment:    Pt adm w/ chronic intermittent dizziness and sudden onset Lt sided ataxia/stroke symptoms just pta.  PMHx diverticulitis, HTN, HLD, PVC, osteopenia.  Adm w/ acure Rt occipital infarct, LBBB/first degree AV block, hypertensive urgency and vertebral artery dissection; noted stroke alert/Rt ataxia/facial droop 6/3; s/p cerebral angiogram, B/L vertebral artery thrombectomy and Lt vertebral artery stent repair 6/3.  Noted previous bradycardia/hypotension (currently off pressors) and ongoing AMS w/ +Dysautonomia, s/p NG insert 6/4, has been tolerating EN support thus far.  BSE w/ mod-sev OP Dysphagia/SLP rec for NPO on 6/8 and again 6/10.  Noted concerns for possible HAP/Asp PNA.  Remains at risk d/t NPO status/decreased intake and need for EN support 2/2 AMS/Infarct/dysphagia.  Will provide updated EN rec's PRN to appropriately meet current estimated needs and monitor.    Nutrition Related Findings:    A&Ox2-3, confused at times, dysautaonomia, MAP 66, no pressors, Abd WDL, Hypo BS, +diarrhea, NG w/ EN at goal, 
Comprehensive Nutrition Assessment    Type and Reason for Visit:  Reassess    Nutrition Recommendations/Plan:   Continue NPO.   Modify Current EN to appropriately meet current estimated needs & monitor.    TF Recommendations: Standard w/ Fiber (Jevity 1.5) @ 30ml/hr (Goal) Continuous x 24hrs/d + 1 Protein Modular BID= 720ml TV, 1080kcal, 46gm Pro, (1288kcal, 98gm Pro w/ Pro Mod BID), 547ml freewater.      Flush per physician mgmt; please consult for flush rec's PRN.     Malnutrition Assessment:  Malnutrition Status:  At risk for malnutrition (Comment) (06/24/24 1304)    Context:  Acute Illness     Findings of the 6 clinical characteristics of malnutrition:  Energy Intake:  75% or less of estimated energy requirements for 7 or more days (average)  Weight Loss:  Unable to assess (limited wt hx within past year)     Body Fat Loss:  Unable to assess     Muscle Mass Loss:  Unable to assess    Fluid Accumulation:  No significant fluid accumulation     Strength:  Not Performed    Nutrition Assessment:    Pt remains at risk d/t ongoing need for intubated & EN support now s/p trach&PEG 6/20. Admit initially w/ Acute ischemic R medullary/ occipital strokes s/p IR thrombectomy & B/L vertebral artery dissection repair complicated by Sepsis/ Respiratory failure 2/2 Aspiration PNA. Noted previous dysphagia s/p multiple failed swallow evals. PMHx Diverticulitis & HTN/HLD. Noted previous constipation vs ileus (resolved).  Pt tolerating EN support at goal thus far. Will provide updated TF recs PRN & monitor.    Nutrition Related Findings:    vent via trach, off sedation, MAP 62, off pressors, Abd/BS WDL, PEG w/ EN at goal, +1/2 edema, +I/O's, hyperglycemia Wound Type: None       Current Nutrition Intake & Therapies:    Average Meal Intake: NPO  Average Supplements Intake: NPO  Diet NPO  ADULT TUBE FEEDING; Nasogastric; Standard with Fiber; Continuous; 10; Yes; 10; Q 4 hours; 40; 30; Q 3 hours; Protein; 1 Dose; Daily  Current 
Coordination of care discussion and chart review with PM team. Pt has no family or legal next of kin. She has life long friends who provide support.  Her friends did share with LSW that they did locate an old POA but all the agents are . Today pt is not able to have a meaningful conversation and awaiting transfer to icu. Possible need for guardian noted.     
Coordination of care discussion and chart review with PM team. Pt remains intubated in icu. No immediate PM psychosocial needs identified for Malissa Chen.   
DAILY VENTILATOR WEANING ASSESSMENT PERFORMED    P/FIO2 Ratio = 299         (<100= do not Wean)                  Cs =   51                       (<32= Instability)  Plat. Pressure = 11  MV = 5.16  RSBI =    Instabilities:       Cardiovascular =       CNS =       Respiratory =       Metabolic =    Parameters    no    Wean per protocol  no    Ask Physician for a weaning plan yes    Additional Comments: Patient has past sbt for the last 2 days. Awaiting decision on trach and Dr. Yoon's input    Performed by CHARLEEN EspinoP RRT      Reference Table:    Cardiovascular     CNS      1. Mean BP less than or equal to 75   1. Neuromuscular blockade  2. Heart Rate greater than 130   2. RASS of -3, -4, -5  3. Myocardial Ischemia    3. RASS of +3, +4  4. Mechanical Assist Device    4. ICP greater than 15 or             Intracranial Hypertension         Respiratory      Metabolic  1. PEEP equal to or greater than 10cm/H20  1.Temp. (8hrs) less than 95 or > 103  2. Respiratory Rate greater than 35   2. WBC < 5000 or > 28919  3. Minute Volume greater than 15L  4. pH less than 7.30  5. Deteriorating chest X-ray         06/17/24 0950   Patient Observation   Pulse 64   Respirations 14   SpO2 98 %   Vent Information   Ventilator Day(s) 4   Ventilator -03   Vent Mode AC/VC   Ventilator Settings   FiO2  40 %   Vt (Set, mL) 350 mL   Resp Rate (Set) 12 bpm   PEEP/CPAP (cmH2O) 5   Peak Inspiratory Flow (Set) 50 L/sec   Vent Patient Data (Readings)   Vt (Measured) 373 mL   Minute Volume (L/min) 5.16 Liters   Mean Airway Pressure (cmH2O) 7 cmH20   Plateau Pressure (cm H2O) 11 cm H2O   Driving Pressure 6   I:E Ratio 1:3.70   Flow Sensitivity 3 L/min   Static Compliance (L/cm H2O) 51   Backup Apnea On   Backup Rate 12 Breaths Per Minute   Backup Vt 350   Vent Alarm Settings   High Pressure (cmH2O) 40 cmH2O   Low Minute Volume (lpm) 4 L/min   High Minute Volume (lpm) 20 L/min   Low Exhaled Vt (ml) 300 mL   RR High (bpm) 35 br/min 
Department of Internal Medicine  Infectious Diseases  Progress  Note      C/C :  ESBL  E coli pneumonia     All above noted   Pt is awake and alert  Denies fever or chills  SOB +  Low grade temp       Current Facility-Administered Medications   Medication Dose Route Frequency Provider Last Rate Last Admin    ipratropium 0.5 mg-albuterol 2.5 mg (DUONEB) nebulizer solution 1 Dose  1 Dose Inhalation 4x daily Lily Bliss MD   1 Dose at 06/21/24 1216    fentaNYL (SUBLIMAZE) injection 50 mcg  50 mcg IntraVENous Q2H PRN Lily Bliss MD        sennosides (SENOKOT) 8.8 MG/5ML syrup 5 mL  5 mL Oral BID Joe Stinson MD   5 mL at 06/21/24 0902    polyethylene glycol (GLYCOLAX) packet 17 g  17 g Oral BID Joe Stinson MD   17 g at 06/21/24 0902    Polyvinyl Alcohol-Povidone PF (REFRESH) 1.4-0.6 % ophthalmic solution 1 drop  1 drop Both Eyes Q4H Beny River MD   1 drop at 06/21/24 1156    Or    Polyvinyl Alcohol-Povidone PF (REFRESH) 1.4-0.6 % ophthalmic solution 1 drop  1 drop Both Eyes Q4H Beny River MD   1 drop at 06/21/24 0904    docusate sodium (COLACE) 150 MG/15ML liquid 100 mg  100 mg Oral Daily Lily Bliss MD   100 mg at 06/21/24 0902    sodium chloride flush 0.9 % injection 5-40 mL  5-40 mL IntraVENous 2 times per day Blair Barrios MD   10 mL at 06/21/24 0903    potassium chloride 20 mEq/50 mL IVPB (Central Line)  20 mEq IntraVENous PRN Blari Barrios MD        Or    potassium chloride 10 mEq/100 mL IVPB (Peripheral Line)  10 mEq IntraVENous PRN Blair Barrios MD   Stopped at 06/18/24 1153    magnesium sulfate 2000 mg in 50 mL IVPB premix  2,000 mg IntraVENous PRN Blair Barrios MD        enoxaparin (LOVENOX) injection 40 mg  40 mg SubCUTAneous Daily Ozogbo, Blair Angel, MD   40 mg at 06/21/24 0902    ondansetron (ZOFRAN-ODT) disintegrating tablet 4 mg  4 mg Oral Q8H PRN Blair Barrios MD        Or    
Department of Internal Medicine  Infectious Diseases  Progress  Note      C/C :  ESBL  E coli pneumonia     All above noted   Pt is awake and alert  Fever 100.6 F       Current Facility-Administered Medications   Medication Dose Route Frequency Provider Last Rate Last Admin    sodium phosphate 15 mmol in sodium chloride 0.9 % 250 mL IVPB  15 mmol IntraVENous Once Dennis Cheng MD 62.5 mL/hr at 06/23/24 0811 15 mmol at 06/23/24 0811    bisacodyl (DULCOLAX) suppository 10 mg  10 mg Rectal Daily PRN Zuleyka Romeo MD        ertapenem (INVanz) 1,000 mg in sodium chloride (PF) 0.9 % 10 mL IV syringe  1,000 mg IntraVENous Q24H Eder Adler MD   1,000 mg at 06/22/24 1254    ipratropium 0.5 mg-albuterol 2.5 mg (DUONEB) nebulizer solution 1 Dose  1 Dose Inhalation 4x daily Lily Bliss MD   1 Dose at 06/23/24 0821    fentaNYL (SUBLIMAZE) injection 50 mcg  50 mcg IntraVENous Q2H PRN Lily Bliss MD        sennosides (SENOKOT) 8.8 MG/5ML syrup 5 mL  5 mL Oral BID Joe Stinson MD   5 mL at 06/23/24 0741    polyethylene glycol (GLYCOLAX) packet 17 g  17 g Oral BID Joe Stinson MD   17 g at 06/23/24 0752    Polyvinyl Alcohol-Povidone PF (REFRESH) 1.4-0.6 % ophthalmic solution 1 drop  1 drop Both Eyes Q4H Beny River MD   1 drop at 06/23/24 0743    Or    Polyvinyl Alcohol-Povidone PF (REFRESH) 1.4-0.6 % ophthalmic solution 1 drop  1 drop Both Eyes Q4H Beny River MD   1 drop at 06/23/24 0002    docusate sodium (COLACE) 150 MG/15ML liquid 100 mg  100 mg Oral Daily Lily Bliss MD   100 mg at 06/23/24 0741    sodium chloride flush 0.9 % injection 5-40 mL  5-40 mL IntraVENous 2 times per day Blair Barrios MD   10 mL at 06/22/24 2011    potassium chloride 20 mEq/50 mL IVPB (Central Line)  20 mEq IntraVENous PRN Blair Barrios MD        Or    potassium chloride 10 mEq/100 mL IVPB (Peripheral Line)  10 mEq IntraVENous PRN Blair Barrios MD  
Department of Internal Medicine  Infectious Diseases  Progress  Note      C/C :  ESBL  E coli pneumonia     Pt is ventilated   Afebrile       Current Facility-Administered Medications   Medication Dose Route Frequency Provider Last Rate Last Admin    Polyvinyl Alcohol-Povidone PF (REFRESH) 1.4-0.6 % ophthalmic solution 1 drop  1 drop Both Eyes TID Judson Bradshaw DO        Or    Polyvinyl Alcohol-Povidone PF (REFRESH) 1.4-0.6 % ophthalmic solution 1 drop  1 drop Both Eyes TID Judson Bradshaw, DO        pantoprazole (PROTONIX) 40 mg in sodium chloride (PF) 0.9 % 10 mL injection  40 mg IntraVENous BID Blair Barrios MD   40 mg at 06/27/24 0826    enoxaparin (LOVENOX) injection 40 mg  40 mg SubCUTAneous Daily Dennis Cheng MD   40 mg at 06/27/24 0842    bisacodyl (DULCOLAX) suppository 10 mg  10 mg Rectal Daily PRN Zuleyka Romeo MD        ertapenem (INVanz) 1,000 mg in sodium chloride (PF) 0.9 % 10 mL IV syringe  1,000 mg IntraVENous Q24H Eder Adler MD   1,000 mg at 06/26/24 1159    ipratropium 0.5 mg-albuterol 2.5 mg (DUONEB) nebulizer solution 1 Dose  1 Dose Inhalation 4x daily Lily Bliss MD   1 Dose at 06/27/24 0921    fentaNYL (SUBLIMAZE) injection 50 mcg  50 mcg IntraVENous Q2H PRN Lily Bliss MD        sennosides (SENOKOT) 8.8 MG/5ML syrup 5 mL  5 mL Oral BID Joe Stinson MD   5 mL at 06/27/24 0826    polyethylene glycol (GLYCOLAX) packet 17 g  17 g Oral BID Joe Stinson MD   17 g at 06/27/24 0826    docusate sodium (COLACE) 150 MG/15ML liquid 100 mg  100 mg Oral Daily Lily Bliss MD   100 mg at 06/27/24 0826    sodium chloride flush 0.9 % injection 5-40 mL  5-40 mL IntraVENous 2 times per day Blair Barrios MD   10 mL at 06/27/24 0828    potassium chloride 20 mEq/50 mL IVPB (Central Line)  20 mEq IntraVENous PRN Blair Barrios MD        Or    potassium chloride 10 mEq/100 mL IVPB (Peripheral Line)  10 mEq 
Department of Internal Medicine  Infectious Diseases  Progress  Note      C/C :  ESBL  E coli pneumonia     Pt is ventilated   Afebrile       Current Facility-Administered Medications   Medication Dose Route Frequency Provider Last Rate Last Admin    enoxaparin (LOVENOX) injection 40 mg  40 mg SubCUTAneous Daily Dennis Cheng MD   40 mg at 06/26/24 0902    bisacodyl (DULCOLAX) suppository 10 mg  10 mg Rectal Daily PRN Zuleyka Romeo MD        ertapenem (INVanz) 1,000 mg in sodium chloride (PF) 0.9 % 10 mL IV syringe  1,000 mg IntraVENous Q24H Eder Adler MD   1,000 mg at 06/25/24 1157    ipratropium 0.5 mg-albuterol 2.5 mg (DUONEB) nebulizer solution 1 Dose  1 Dose Inhalation 4x daily Lily Bliss MD   1 Dose at 06/26/24 0842    fentaNYL (SUBLIMAZE) injection 50 mcg  50 mcg IntraVENous Q2H PRN Lily Bliss MD        sennosides (SENOKOT) 8.8 MG/5ML syrup 5 mL  5 mL Oral BID Joe Stinson MD   5 mL at 06/26/24 0907    polyethylene glycol (GLYCOLAX) packet 17 g  17 g Oral BID Joe Stinson MD   17 g at 06/26/24 0903    Polyvinyl Alcohol-Povidone PF (REFRESH) 1.4-0.6 % ophthalmic solution 1 drop  1 drop Both Eyes Q4H Beny River MD   1 drop at 06/26/24 0907    Or    Polyvinyl Alcohol-Povidone PF (REFRESH) 1.4-0.6 % ophthalmic solution 1 drop  1 drop Both Eyes Q4H Beyn River MD   1 drop at 06/25/24 1959    docusate sodium (COLACE) 150 MG/15ML liquid 100 mg  100 mg Oral Daily Lily Bliss MD   100 mg at 06/26/24 0901    sodium chloride flush 0.9 % injection 5-40 mL  5-40 mL IntraVENous 2 times per day Blair Barrios MD   10 mL at 06/26/24 0906    potassium chloride 20 mEq/50 mL IVPB (Central Line)  20 mEq IntraVENous PRN Blair Barrios MD        Or    potassium chloride 10 mEq/100 mL IVPB (Peripheral Line)  10 mEq IntraVENous PRN Blair Barrios MD   Stopped at 06/18/24 1153    magnesium sulfate 2000 mg in 50 mL IVPB premix  
Department of Internal Medicine  Infectious Diseases  Progress  Note      C/C :  ESBL  E coli pneumonia     Pt is ventilated   Afebrile       Current Facility-Administered Medications   Medication Dose Route Frequency Provider Last Rate Last Admin    heparin (porcine) injection 6,100 Units  80 Units/kg IntraVENous PRN Dennis Cheng MD        heparin (porcine) injection 3,000 Units  40 Units/kg IntraVENous PRN Dennis Cheng MD        heparin 25,000 units in dextrose 5% 250 mL (premix) infusion  5-30 Units/kg/hr IntraVENous Continuous Dennis Cheng MD 9.8 mL/hr at 06/25/24 0904 13 Units/kg/hr at 06/25/24 0904    bisacodyl (DULCOLAX) suppository 10 mg  10 mg Rectal Daily PRN Zuleyka Romeo MD        ertapenem (INVanz) 1,000 mg in sodium chloride (PF) 0.9 % 10 mL IV syringe  1,000 mg IntraVENous Q24H Eder Adler MD   1,000 mg at 06/24/24 1250    ipratropium 0.5 mg-albuterol 2.5 mg (DUONEB) nebulizer solution 1 Dose  1 Dose Inhalation 4x daily Lily Bliss MD   1 Dose at 06/25/24 0807    fentaNYL (SUBLIMAZE) injection 50 mcg  50 mcg IntraVENous Q2H PRN Lily Bliss MD        sennosides (SENOKOT) 8.8 MG/5ML syrup 5 mL  5 mL Oral BID Joe Stinson MD   5 mL at 06/25/24 0900    polyethylene glycol (GLYCOLAX) packet 17 g  17 g Oral BID Joe Stinson MD   17 g at 06/25/24 0857    Polyvinyl Alcohol-Povidone PF (REFRESH) 1.4-0.6 % ophthalmic solution 1 drop  1 drop Both Eyes Q4H Beny River MD   1 drop at 06/25/24 0858    Or    Polyvinyl Alcohol-Povidone PF (REFRESH) 1.4-0.6 % ophthalmic solution 1 drop  1 drop Both Eyes Q4H Beny River MD   1 drop at 06/23/24 1957    docusate sodium (COLACE) 150 MG/15ML liquid 100 mg  100 mg Oral Daily Lily Bliss MD   100 mg at 06/25/24 0855    sodium chloride flush 0.9 % injection 5-40 mL  5-40 mL IntraVENous 2 times per day Blair Barrios MD   10 mL at 06/25/24 0859    potassium chloride 20 mEq/50 mL IVPB 
Department of Internal Medicine  Infectious Diseases  Progress  Note      C/C :  ESBL  E coli pneumonia     Pt is ventilated   Afebrile       Current Facility-Administered Medications   Medication Dose Route Frequency Provider Last Rate Last Admin    heparin (porcine) injection 6,100 Units  80 Units/kg IntraVENous PRN Dennis Cheng MD        heparin (porcine) injection 3,000 Units  40 Units/kg IntraVENous PRN Dennis Cheng MD        heparin 25,000 units in dextrose 5% 250 mL (premix) infusion  5-30 Units/kg/hr IntraVENous Continuous Dennis hCeng MD   Stopped at 06/24/24 0701    bisacodyl (DULCOLAX) suppository 10 mg  10 mg Rectal Daily PRN Zuleyka Romeo MD        ertapenem (INVanz) 1,000 mg in sodium chloride (PF) 0.9 % 10 mL IV syringe  1,000 mg IntraVENous Q24H Eder Adler MD   1,000 mg at 06/23/24 1137    ipratropium 0.5 mg-albuterol 2.5 mg (DUONEB) nebulizer solution 1 Dose  1 Dose Inhalation 4x daily Lily Bliss MD   1 Dose at 06/24/24 0928    fentaNYL (SUBLIMAZE) injection 50 mcg  50 mcg IntraVENous Q2H PRN Lily Bliss MD        sennosides (SENOKOT) 8.8 MG/5ML syrup 5 mL  5 mL Oral BID Joe Stinson MD   5 mL at 06/24/24 0913    polyethylene glycol (GLYCOLAX) packet 17 g  17 g Oral BID Joe Stinson MD   17 g at 06/24/24 0910    Polyvinyl Alcohol-Povidone PF (REFRESH) 1.4-0.6 % ophthalmic solution 1 drop  1 drop Both Eyes Q4H Beny River MD   1 drop at 06/24/24 0910    Or    Polyvinyl Alcohol-Povidone PF (REFRESH) 1.4-0.6 % ophthalmic solution 1 drop  1 drop Both Eyes Q4H Beny River MD   1 drop at 06/23/24 1957    docusate sodium (COLACE) 150 MG/15ML liquid 100 mg  100 mg Oral Daily Lily Bliss MD   100 mg at 06/24/24 0908    sodium chloride flush 0.9 % injection 5-40 mL  5-40 mL IntraVENous 2 times per day Blair Barrios MD   10 mL at 06/24/24 0914    potassium chloride 20 mEq/50 mL IVPB (Central Line)  20 mEq IntraVENous 
Department of Internal Medicine  Infectious Diseases  Progress  Note      C/C :  ESBL  E coli pneumonia     Pt is ventilated   Low grade temp       Current Facility-Administered Medications   Medication Dose Route Frequency Provider Last Rate Last Admin    Polyvinyl Alcohol-Povidone PF (REFRESH) 1.4-0.6 % ophthalmic solution 1 drop  1 drop Both Eyes TID Judson Bradshaw, DO   1 drop at 06/27/24 2015    Or    Polyvinyl Alcohol-Povidone PF (REFRESH) 1.4-0.6 % ophthalmic solution 1 drop  1 drop Both Eyes TID Judson Bradshaw, DO   1 drop at 06/28/24 0933    pantoprazole (PROTONIX) 40 mg in sodium chloride (PF) 0.9 % 10 mL injection  40 mg IntraVENous BID Blair Barrios MD   40 mg at 06/28/24 0933    enoxaparin (LOVENOX) injection 40 mg  40 mg SubCUTAneous Daily Dennis Cheng MD   40 mg at 06/28/24 0933    bisacodyl (DULCOLAX) suppository 10 mg  10 mg Rectal Daily PRN Zuleyka Romeo MD        ertapenem (INVanz) 1,000 mg in sodium chloride (PF) 0.9 % 10 mL IV syringe  1,000 mg IntraVENous Q24H Eder Adler MD   1,000 mg at 06/27/24 1426    ipratropium 0.5 mg-albuterol 2.5 mg (DUONEB) nebulizer solution 1 Dose  1 Dose Inhalation 4x daily Lily Bliss MD   1 Dose at 06/28/24 0802    fentaNYL (SUBLIMAZE) injection 50 mcg  50 mcg IntraVENous Q2H PRN Lily Bliss MD        sennosides (SENOKOT) 8.8 MG/5ML syrup 5 mL  5 mL Oral BID Joe Stinson MD   5 mL at 06/28/24 0933    polyethylene glycol (GLYCOLAX) packet 17 g  17 g Oral BID Joe Stinson MD   17 g at 06/28/24 0934    docusate sodium (COLACE) 150 MG/15ML liquid 100 mg  100 mg Oral Daily Lily Bliss MD   100 mg at 06/28/24 0933    sodium chloride flush 0.9 % injection 5-40 mL  5-40 mL IntraVENous 2 times per day Blair Barrios MD   10 mL at 06/28/24 0990    potassium chloride 20 mEq/50 mL IVPB (Central Line)  20 mEq IntraVENous PRN Blair Barrios MD        Or    potassium chloride 
Department of Internal Medicine  Infectious Diseases  Progress  Note      C/C :  ESBL  E coli pneumonia     Pt is ventilated   Low grade temp       Current Facility-Administered Medications   Medication Dose Route Frequency Provider Last Rate Last Admin    ipratropium 0.5 mg-albuterol 2.5 mg (DUONEB) nebulizer solution 1 Dose  1 Dose Inhalation Q4H PRN Victorina Mooney MD        apixaban (ELIQUIS) tablet 10 mg  10 mg Oral BID Victorina Mooney MD   10 mg at 06/30/24 2226    Followed by    [START ON 7/5/2024] apixaban (ELIQUIS) tablet 5 mg  5 mg Oral BID Victorina Mooney MD        Polyvinyl Alcohol-Povidone PF (REFRESH) 1.4-0.6 % ophthalmic solution 1 drop  1 drop Both Eyes TID Judson Bradshaw DO   1 drop at 06/28/24 2036    Or    Polyvinyl Alcohol-Povidone PF (REFRESH) 1.4-0.6 % ophthalmic solution 1 drop  1 drop Both Eyes TID Judson Bradshaw DO   1 drop at 07/01/24 0811    pantoprazole (PROTONIX) 40 mg in sodium chloride (PF) 0.9 % 10 mL injection  40 mg IntraVENous BID Blair Barrios MD   40 mg at 07/01/24 0810    bisacodyl (DULCOLAX) suppository 10 mg  10 mg Rectal Daily PRN Zuleyka Romeo MD        fentaNYL (SUBLIMAZE) injection 50 mcg  50 mcg IntraVENous Q2H PRN Lily Bliss MD        sennosides (SENOKOT) 8.8 MG/5ML syrup 5 mL  5 mL Oral BID Joe Stinson MD   5 mL at 07/01/24 0811    polyethylene glycol (GLYCOLAX) packet 17 g  17 g Oral BID Joe Stinson MD   17 g at 07/01/24 0811    docusate sodium (COLACE) 150 MG/15ML liquid 100 mg  100 mg Oral Daily Lily Bliss MD   100 mg at 07/01/24 0810    sodium chloride flush 0.9 % injection 5-40 mL  5-40 mL IntraVENous 2 times per day Blair Barrios MD   10 mL at 07/01/24 0814    potassium chloride 20 mEq/50 mL IVPB (Central Line)  20 mEq IntraVENous PRN Blair Barrios MD        Or    potassium chloride 10 mEq/100 mL IVPB (Peripheral Line)  
Discussed case with ICU resident this AM.  Once a decision is made regarding trach peg please let me know I will be happy to help.    May need ethics consult if no clear decision is able to made.    Marlo Layton MD    
Discussed patient with Dr. Concepcion. Rec. SENA due to minimal help at home.   
EP consult completed  
Entered pt room to find pt's gown saturated in blood along with abdominal binder. PEG site bleeding; pressure held at site until bleeding slowed and residents notified and at bedside. Stat H & H drawn. Awaiting surgical residents to assess. VSS. Pt resting calmly with no signs of distress noted.   
[facility]- [facility] [place] [place] [facility]  Consultation Report  PATIENT: Malissa Chen  MEDICAL RECORD NUMBER: [id number]  DATE OF SERVICE:  6/5/2024  ATTENDING ELECTROPHYSIOLOGIST: Saida Ulloa MD   PRIMARY ELECTROPHYSIOLOGIST: Saida Ulloa Md   REFERRING PHYSICIAN: Ed Collins Jr., MD  CHIEF COMPLAINT: Stroke.     HPI: This is a 83 y.o. female with a history of hypertension, hyperlipidemia intolerant to statins due to myalgia, glaucoma, ocular migraines, diverticulitis and LBBB with a first-degree AV block on EKG dating back to at least 2017.  She is known to Dr. Williamson and is managed on Toprol, Benicar, Evista.     She was seen by Dr. Quintero on 04/08/2024 presenting to the ER with complaints of headache, palpitations, visual hallucinations and upper respiratory symptoms. Dr. Quintero was consulted for PVCs and Elevated troponin 63>61. She had no complains  of chest pain. A stress test was performed at that time with normal perfusion as well as an Echocardiogram which revealed LVEF of 60-65% with mild mitral stenosis.     On 05/30/2024 she presented to the ER , [facility], with left sided weakness that resolved spontaneously after an hour.  She also complained of a worsening right-sided headache and difficulty with vision and her left eye.  CT of the head and neck was noted to show an acute infarct in the medial aspect of the right occipital lobe.  She was seen by Dr. Andujar as well as Dr. Garza who noted an abrupt occlusion of the proximal left vertebral artery then there is distal reconstitution from the thyroid and costocervical trunk which then supply antegrade flow to the remainder of the left vertebral artery, left PICA, the VB junction and even the distal V4 segment of the right vertebral artery up to the origin of PICA.The right vertebral artery had good flow until the mid V3 segment and thereafter there is abrupt occlusion. The basilar artery was widely patent.      On 
GENERAL SURGERY  DAILY PROGRESS NOTE  6/25/2024    Chief Complaint   Patient presents with    Dizziness     Patient states she was having weakness and \"felt floppy\" lkw 11:00       Subjective:  No acute issues overnight. No bleeding around site     Objective:  BP (!) 107/44   Pulse 52   Temp 97.4 °F (36.3 °C) (Temporal)   Resp 19   Ht 1.676 m (5' 6\")   Wt 80.7 kg (178 lb)   SpO2 100%   BMI 28.73 kg/m²     GENERAL:  Laying in bed,   LUNGS:  ACVC, trach in place   CARDIOVASCULAR:  RR  ABDOMEN:  Soft, non-tender, non-distended      Assessment/Plan:  83 y.o. female with acute resp failure s/p tracheostomy      No bleeding around trach  Okay to restart heparin gtt from our POV  No other acute surgical care needed   Please call if any other concerns     Electronically signed by Miah Aguilar DO on 6/25/2024 at 6:20 AM     Attending Attestation     I have seen and examined this patient.  I have personally reviewed and interpreted all relevant labs and imaging.  I agree with the resident documentation.    No additional bleeding from the tracheostomy.  Surgery will be available if needed.      CBC:   Lab Results   Component Value Date/Time    WBC 12.9 06/25/2024 04:05 AM    RBC 2.66 06/25/2024 04:05 AM    HGB 7.2 06/25/2024 04:05 AM    HCT 24.4 06/25/2024 04:05 AM    MCV 91.7 06/25/2024 04:05 AM    MCH 27.1 06/25/2024 04:05 AM    MCHC 29.5 06/25/2024 04:05 AM    RDW 15.2 06/25/2024 04:05 AM     06/25/2024 04:05 AM    MPV 11.1 06/25/2024 04:05 AM     CMP:    Lab Results   Component Value Date/Time     06/25/2024 05:27 AM    K 4.4 06/25/2024 05:27 AM    K 3.9 04/11/2019 05:38 AM     06/25/2024 05:27 AM    CO2 24 06/25/2024 05:27 AM    BUN 19 06/25/2024 05:27 AM    CREATININE 0.5 06/25/2024 05:27 AM    GFRAA >60 04/11/2019 05:38 AM    LABGLOM >90 06/25/2024 05:27 AM    LABGLOM 87 04/08/2024 06:02 AM    GLUCOSE 147 06/25/2024 05:27 AM    GLUCOSE 113 06/27/2011 04:15 PM    CALCIUM 8.4 06/25/2024 
GENERAL SURGERY  DAILY PROGRESS NOTE  6/26/2024    Chief Complaint   Patient presents with    Dizziness     Patient states she was having weakness and \"felt floppy\" lkw 11:00       Subjective:  Paged that patient was bleeding around PEG site    Objective:  BP (!) 140/46   Pulse 63   Temp 99.6 °F (37.6 °C) (Axillary)   Resp 19   Ht 1.676 m (5' 6\")   Wt 80.3 kg (177 lb 1.6 oz)   SpO2 99%   BMI 28.58 kg/m²     GENERAL:  Laying in bed,   LUNGS:  ACVC, trach in place   CARDIOVASCULAR:  RR  ABDOMEN:  Soft, non-tender, non-distended. PEG 3.5 at skin, appeared to have some dried blood at lateral aspect of incision      Assessment/Plan:  83 y.o. female with acute resp failure s/p tracheostomy and PEG placement     - bleeding was likely due to skin edge bleeding which resolved when pressure was applied, talked to nursing about inserting surgicel into area of bleeding to help with hemostasis   - okay to continue tube feeds   - Hb 7.8 from 7.8   - continue lovenox   - no further surgical intervention indicated at this time      Electronically signed by Nicole Tijerina DO on 6/26/2024 at 9:22 PM       
GENERAL SURGERY PROGRESS NOTE:    Pt seen and examined. No acute issues overnight. PEG in place at 3.5 cm with bumper easily rotated. Abdomen is soft, nontender, nondistended. Tracheostomy in good position. No bleeding noted around the trach bandage.     A/P    - Okay for tube feeds and medications through PEG. Management per primary.   - Remove trach sutures in 1 week   - Please page if needed       Electronically signed by Keon Cabrera DO on 6/21/2024 at 5:52 AM   
[facility]  PROGRESS NOTE  ATTENDING NOTE    CRITICAL CARE    Chief Complaint   Patient presents with    Dizziness     Patient states she was having weakness and \"felt floppy\" lkw 11:00       HPI  83 y.o. woman presented to ED with left-sided weakness.  NIHSS was 0. Initial BP was  202/81. 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  Both vertebral artery images were concerning for dissection.  Admitted to stroke floor.    concerning for a dissection.  There is reconstitution of the distal V4  segment of the right vertebral artery.  Interventional neurology was consulted recommended outpatient diagnostic catheter angiogram.  6/3 Worsening stroke symptoms. Re-consulted neuro interventionalist.  Admitted to Neuro ICU following cerebral angiogram with bilateral vertebral artery thrombectomy & left vertebral artery stent assisted repair.    6/4 No issues overnight.  Required 1 dose of labetalol.  Upper airway secretions--guaifenesin started  6/5  increasing secretions, rocephin started for aspiration  6/6  having some respiratory issues--mannitol given for possible increased ICP by Cory and start scop patch for secretions    Patient Active Problem List   Diagnosis    Diverticulitis    Lightheadedness    Palpitations    Stroke-like symptoms    Hypertensive urgency    Stroke-like symptom    Vertebral artery dissection (HCC)    Sinus bradycardia    LBBB (left bundle branch block)    Aspiration pneumonia of right lower lobe (HCC)       OVERNIGHT EVENTS:  Did not go on bipap overnight due to secretions; coughing better this morning    HOSPITAL COURSE:  6/3 Worsening stroke symptoms. Re-consulted neuro interventionalist.  Admitted to Neuro ICU following cerebral angiogram with bilateral vertebral artery thrombectomy & left vertebral artery stent 
I stopped by to visit with the patient. She was unable to respond, but looked to be resting comfortably. I reached out to her contact list and talked to a friend who has visited the patient. They were concerned about the move to ICU.     I will continue to serve the needs of both the patient and family.  If you need additional support, you may reach out to us at [facility], [phone].     Daniel Bell; FAIZA Mcdaniel     
Intensive Care Unit  Critical Care Consult  Daily Progress Note   6/10/2024    Date of Admission: 5/30  Date of Admission to ICU: 6/3    EVENTS:   5/30   83 y.o. woman presented to ED with left-sided weakness.  NIHSS was 0. Initial BP was  202/81. 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  Both vertebral artery images were concerning for dissection.  Admitted to stroke floor.    concerning for a dissection.  There is reconstitution of the distal V4  segment of the right vertebral artery.  Interventional neurology was consulted recommended outpatient diagnostic catheter angiogram.  6/3 Worsening stroke symptoms. Re-consulted neuro interventionalist.  Admitted to Neuro ICU following cerebral angiogram with bilateral vertebral artery thrombectomy & left vertebral artery stent assisted repair.    6/4 No issues overnight.  Required 1 dose of labetalol.    6/5 having increased secretions requiring frequent suctioning   6/6 increasing O2 requirements ON but back on NC this am.   6/7 No issues overnight.  T max 99.4 F.  On BiPap for a short time last evening. O2 at 2 Lnc.  Did not require any prn antihypertensive agents.    6/8 No issues overnight.  Did not require any prn antihypertensive agents.    6/9 No issues overnight.  Did not require any prn antihypertensives.    6/10 no issues overnight.       PHYSICAL EXAM:    BP (!) 98/39   Pulse 56   Temp 98 °F (36.7 °C) (Temporal)   Resp 12   Ht 1.676 m (5' 6\")   Wt 80.5 kg (177 lb 6.4 oz)   SpO2 96%   BMI 28.63 kg/m²       Pain Description: none    GCS:    4 - Opens eyes on own   6 - Follows simple motor commands  5 - Alert and oriented    Pupil size: Left 3 mm  Right 3 mm  Pupil reaction: Yes  Wiggles fingers: Left No Right Yes  Hand grasp:   Left  absent  Right normal  Wiggles toes: Left No    Right 
Intensive Care Unit  Critical Care Consult  Daily Progress Note 6/5/2024    Date of Admission: 5/30    Chief Complaint   Patient presents with    Dizziness     Patient states she was having weakness and \"felt floppy\" lkw 11:00        EVENTS:   5/30   83 y.o. woman presented to ED with left-sided weakness.  NIHSS was 0. Initial BP was  202/81. 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  Both vertebral artery images were concerning for dissection.  Admitted to stroke floor.    concerning for a dissection.  There is reconstitution of the distal V4  segment of the right vertebral artery.  Interventional neurology was consulted recommended outpatient diagnostic catheter angiogram.  6/3 Worsening stroke symptoms. Re-consulted neuro interventionalist.  Admitted to Neuro ICU following cerebral angiogram with bilateral vertebral artery thrombectomy & left vertebral artery stent assisted repair.    6/4 No issues overnight.  Required 1 diose of labetalol.    6/5 having increased secretions requiring frequent suctioning     PHYSICAL EXAM:    BP (!) 142/59   Pulse 77   Temp 98.3 °F (36.8 °C) (Temporal)   Resp 24   Ht 1.676 m (5' 6\")   Wt 80.7 kg (178 lb)   SpO2 92%   BMI 28.73 kg/m²     General appearance:  Comfortable.     Pain Description: none    GCS:    4 - Opens eyes on own   6 - Follows simple motor commands  5 - Alert and oriented    Pupil size:  Left 3 mm  Right 3 mm  Pupil reaction: Yes  Wiggles fingers: Left Yes Right No  Hand grasp:   Left decreased     Right absent  Wiggles toes: Left Yes    Right No  Plantar flexion: Left decreased    Right absent    CONSTITUTIONAL: no acute distress, lying in hospital bed, alert and cooperative   NEUROLOGIC: PERRL, slurred speech  CARDIOVASCULAR: S1 S2, regular rate, regular rhythm, no murmur/gallop/rub. Monitor: 
Intensive Care Unit  Critical Care Consult  Daily Progress Note 6/6/2024    Date of Admission: 5/30    Chief Complaint   Patient presents with    Dizziness     Patient states she was having weakness and \"felt floppy\" lkw 11:00        EVENTS:   5/30   83 y.o. woman presented to ED with left-sided weakness.  NIHSS was 0. Initial BP was  202/81. 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  Both vertebral artery images were concerning for dissection.  Admitted to stroke floor.    concerning for a dissection.  There is reconstitution of the distal V4  segment of the right vertebral artery.  Interventional neurology was consulted recommended outpatient diagnostic catheter angiogram.  6/3 Worsening stroke symptoms. Re-consulted neuro interventionalist.  Admitted to Neuro ICU following cerebral angiogram with bilateral vertebral artery thrombectomy & left vertebral artery stent assisted repair.    6/4 No issues overnight.  Required 1 diose of labetalol.    6/5 having increased secretions requiring frequent suctioning   6/6 increasing O2 requirements ON but back on NC this am    PHYSICAL EXAM:    BP (!) 123/52   Pulse 91   Temp 98.3 °F (36.8 °C) (Temporal)   Resp 25   Ht 1.676 m (5' 6\")   Wt 80.7 kg (178 lb)   SpO2 90%   BMI 28.73 kg/m²     General appearance:  Comfortable.     Pain Description: none    GCS:    4 - Opens eyes on own   6 - Follows simple motor commands  5 - Alert and oriented    Pupil size:  Left 3 mm  Right 3 mm  Pupil reaction: Yes  Wiggles fingers: Left Yes Right No  Hand grasp:   Left decreased     Right absent  Wiggles toes: Left Yes    Right No  Plantar flexion: Left decreased    Right absent    CONSTITUTIONAL: no acute distress, lying in hospital bed, alert and cooperative   NEUROLOGIC: PERRL, slurred speech  CARDIOVASCULAR: S1 S2, regular 
Lab called for blood cultures to be drawn.  
[facility]  Department of Internal Medicine   Internal Medicine Residency   MICU Progress Note    Patient:  Malissa Chen 83 y.o. female  MRN: [id number]     Date of Service: 6/16/2024    Allergy: Doxycycline, Ciprofloxacin, and Sulfa antibiotics    Subjective     Patient was seen this morning. She had spontaneous eye opening and follows command on left side. No extremity movements on the right side. She has cough and gag reflex present. Awaiting trach and PEG for the patient.  On Tube feeds, we will watch for refeeding syndrome. We will do SAT/SBT trial today.    Awaiting family POA papers for trach and PEG    ROS: Denies Fever/chills/CP/SOB/N/V/D/C/Dysuria/Blood in stool or urine  Objective     VS: BP (!) 122/47   Pulse 52   Temp 98 °F (36.7 °C) (Axillary)   Resp 12   Ht 1.676 m (5' 6\")   Wt 78.3 kg (172 lb 11.2 oz)   SpO2 99%   BMI 27.87 kg/m²   ABP (Arterial line BP): 147/47  ABP Mean (Arterial Line Mean): 81 mmHg    I & O - 24hr:   Intake/Output Summary (Last 24 hours) at 6/16/2024 1056  Last data filed at 6/16/2024 0800  Gross per 24 hour   Intake 2070.97 ml   Output 655 ml   Net 1415.97 ml         Physical Exam:  General Appearance:  intubated and follows command  Neck: no adenopathy, no carotid bruit, no JVD, supple, symmetrical, trachea midline, and thyroid not enlarged, symmetric, no tenderness/mass/nodules  Lung: clear to auscultation bilaterally  Heart: regular rate and rhythm, S1, S2 normal, no murmur, click, rub or gallop  Abdomen: soft, non-tender; bowel sounds normal; no masses,  no organomegaly  Extremities:  extremities normal, atraumatic, no cyanosis or edema  Musculoskeletal: No joint swelling, no muscle tenderness. ROM normal in all joints of extremities.   Neurologic: Mental status: intubated, follows command on the left side only, gag and cough reflex present.    Lines     site day    Art line   None    TLC None    PICC None    Hemoaccess peripherals      ABG:     Lab 
Living will brought in by Anaya and  placed on chart.  Anaya states no information to be given to Anyone but the three people listed on chart, Anaya Rivera and Tania.  
Low air loss module ordered     
MRI screening form needs completed, thank you.  
MRI screening form needs filled out, thank you!  
[facility]   Department of Internal Medicine   Internal Medicine Residency  MICU Progress Note    Patient:  Malissa Chen 83 y.o. female   MRN: [id number]      Room: [address] [place], [place] [address]    Admission date: 5/30/2024 12:53 PM ; Hospital day: 21   ICU day: 20h      Allergy: Doxycycline, Ciprofloxacin, and Sulfa antibiotics    Subjective     Patient was seen and examined this morning at bedside. She is day 2 S/P tracheostomy and PEG. No bleeding, erythema from incision site.  Patient awakens to voice and withdraws to noxious stimuli, denies any pain, follows commands on left side.    She had low grade fever overnight.     Objective       I & O - 24hr:    Intake/Output Summary (Last 24 hours) at 6/21/2024 1150  Last data filed at 6/21/2024 1000  Gross per 24 hour   Intake 220 ml   Output 860 ml   Net -640 ml     Net IO Since Admission: 1,260.91 mL [06/21/24 1150]    Physical Exam  BP (!) 117/43   Pulse (!) 45   Temp 99.9 °F (37.7 °C) (Bladder)   Resp 12   Ht 1.676 m (5' 6\")   Wt 82.1 kg (181 lb)   SpO2 97%   BMI 29.21 kg/m²   Ideal body weight: 59.3 kg (130 lb 11.7 oz)    General Appearance: on trach , awakens to voice command and follows command on left side  HEENT:  Head: Normal, normocephalic, atraumatic.  Eye: Normal external eye, conjunctiva, lids cornea, PERRL.  Lung: bilateral rhonchi  Heart: regular rate and rhythm, S1, S2 normal, and no murmurs  Abdomen:  soft, non- TTP, no organomegaly  Extremities:  extremities normal, atraumatic, no cyanosis or edema  Neurologic: Awakens to voice, withdraws to painful stimuli, follows commands on left side, right side complete paralysis      Medications     Continuous Infusions:   dextrose      sodium chloride Stopped (06/04/24 1010)     Scheduled Meds:   ipratropium 0.5 mg-albuterol 2.5 mg  1 Dose Inhalation 4x daily    sennosides  5 mL Oral BID    polyethylene glycol  17 g Oral BID    Polyvinyl Alcohol-Povidone PF  1 drop Both Eyes Q4H    Or    Polyvinyl 
Message left with Whitney regarding PMR consult.   
Message sent to Dr Alvaro Nick about restarting patient tube feed.     Per Dr Alvaro Nick ok to hold off on tube feeds for now   
Message sent to wound care for consult.   
Met with patient at bedside. I explained the ARU program. She is interested in therapy however she lives by herself and has limited help at home upon discharge. Will continue to follow for functional improvement. Updated PT and OT are needed when medically stable.   
[facility]   Department of Internal Medicine   Internal Medicine Residency  MICU Progress Note    Patient:  Malissa Chen 83 y.o. female   MRN: [id number]      Room: [address] [place], [place] [address]    Admission date: 5/30/2024 12:53 PM ; Hospital day: 18   ICU day: 4d 22h      Allergy: Doxycycline, Ciprofloxacin, and Sulfa antibiotics    Subjective     Patient was seen and examined this morning at bedside. Overnight, there were no acute events. Patient awakens to voice and withdraws to noxious stimuli, denies any pain, follows commands.    Objective       I & O - 24hr:    Intake/Output Summary (Last 24 hours) at 6/18/2024 1330  Last data filed at 6/18/2024 0719  Gross per 24 hour   Intake 3030.39 ml   Output 650 ml   Net 2380.39 ml     Net IO Since Admission: 2,217.04 mL [06/18/24 1330]    Physical Exam  BP (!) 122/46   Pulse 58   Temp 99.7 °F (37.6 °C) (Bladder)   Resp 16   Ht 1.676 m (5' 6\")   Wt 78.3 kg (172 lb 11 oz)   SpO2 99%   BMI 27.87 kg/m²   Ideal body weight: 59.3 kg (130 lb 11.7 oz)    General Appearance: cooperative and in NAD  HEENT:  Head: Normal, normocephalic, atraumatic.  Eye: Normal external eye, conjunctiva, lids cornea, PERRL.  Ears: Normal auditory canals and external ears. Non-tender.  Nose: Normal external nose, dry mucus membranes, ETT in position.  Lung: clear to auscultation bilaterally and no wheezing, rhonchi on auscultation  Heart: regular rate and rhythm, S1, S2 normal, and no murmurs  Abdomen:  soft, non- TTP, no organomegaly  Extremities:  extremities normal, atraumatic, no cyanosis or edema  Neurologic: Awakens to voice, withdraws to painful stimuli, follows commands      Medications     Continuous Infusions:   dextrose      sodium chloride Stopped (06/04/24 1010)     Scheduled Meds:   ipratropium 0.5 mg-albuterol 2.5 mg  1 Dose Inhalation TID    sennosides  5 mL Oral BID    polyethylene glycol  17 g Oral BID    Polyvinyl Alcohol-Povidone PF  1 drop Both Eyes Q4H    Or    
NG XR order placed @12, attempted to do NG image patient did not have NG at 115 or 250...please call xray if pt gets ng   
[facility] Intensive Care Unit  Critical Care  Daily Progress Note 6/7/2024    Date of Admission: 05/30/2024  Date of ICU Admission: 06/03/2024     CC: Follow up for stroke    HOSPITAL COURSE/OVERNIGHT EVENTS:    5/30   83 y.o. woman presented to ED with left-sided weakness.  NIHSS was 0. Initial BP was  202/81. 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  Both vertebral artery images were concerning for dissection.  Admitted to stroke floor.    concerning for a dissection.  There is reconstitution of the distal V4  segment of the right vertebral artery.  Interventional neurology was consulted recommended outpatient diagnostic catheter angiogram.  6/3 Worsening stroke symptoms. Re-consulted neuro interventionalist.  Admitted to Neuro ICU following cerebral angiogram with bilateral vertebral artery thrombectomy & left vertebral artery stent assisted repair.    6/4 No issues overnight.  Required 1 dose of labetalol.    6/5 having increased secretions requiring frequent suctioning   6/6 increasing O2 requirements ON but back on NC this am.   6/7 No issues overnight.  T max 99.4 F.  On BiPap for a short time last evening. O2 at 2 Lnc.  Did not require any prn antihypertensive agents.      PHYSICAL EXAM:   BP (!) 133/54   Pulse 72   Temp 98.3 °F (36.8 °C) (Temporal)   Resp 22   Ht 1.676 m (5' 6\")   Wt 80.7 kg (178 lb)   SpO2 97%   BMI 28.73 kg/m²     Intake/Output Summary (Last 24 hours) at 6/7/2024 0710  Last data filed at 6/7/2024 0600  Gross per 24 hour   Intake 1403.63 ml   Output 1100 ml   Net 303.63 ml      General appearance:  Ill appearing.    NEUROLOGIC:   RASS Score:  -1  GCS:  10  3 - Opens eyes to loud noise or command   6 - Follows simple motor commands  1 - Makes no noise       Pupil size:  Left 3 mm  Right 3 mm  Pupil reaction: Yes   
[facility] Intensive Care Unit  Critical Care  Daily Progress Note 6/8/2024    Date of Admission: 05/30/2024  Date of ICU Admission: 06/03/2024     CC: Follow up for stroke    HOSPITAL COURSE/OVERNIGHT EVENTS:    5/30   83 y.o. woman presented to ED with left-sided weakness.  NIHSS was 0. Initial BP was  202/81. 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  Both vertebral artery images were concerning for dissection.  Admitted to stroke floor.    concerning for a dissection.  There is reconstitution of the distal V4  segment of the right vertebral artery.  Interventional neurology was consulted recommended outpatient diagnostic catheter angiogram.  6/3 Worsening stroke symptoms. Re-consulted neuro interventionalist.  Admitted to Neuro ICU following cerebral angiogram with bilateral vertebral artery thrombectomy & left vertebral artery stent assisted repair.    6/4 No issues overnight.  Required 1 dose of labetalol.    6/5 having increased secretions requiring frequent suctioning   6/6 increasing O2 requirements ON but back on NC this am.   6/7 No issues overnight.  T max 99.4 F.  On BiPap for a short time last evening. O2 at 2 Lnc.  Did not require any prn antihypertensive agents.    6/8 No issues overnight.  Did not require any prn antihypertensive agents.      PHYSICAL EXAM:   BP (!) 143/42   Pulse 69   Temp 97.6 °F (36.4 °C) (Temporal)   Resp 21   Ht 1.676 m (5' 6\")   Wt 80.5 kg (177 lb 6.4 oz)   SpO2 97%   BMI 28.63 kg/m²     Intake/Output Summary (Last 24 hours) at 6/8/2024 0720  Last data filed at 6/8/2024 0604  Gross per 24 hour   Intake 1485 ml   Output 1100 ml   Net 385 ml      General appearance:  More alert this am.    NEUROLOGIC:   RASS Score:  -1  GCS:  10  3 - Opens eyes to loud noise or command   6 - Follows simple motor commands  1 - 
[facility] Intensive Care Unit  Critical Care  Daily Progress Note 6/9/2024    Date of Admission: 05/30/2024  Date of ICU Admission: 06/03/2024     CC: Follow up for stroke    HOSPITAL COURSE/OVERNIGHT EVENTS:    5/30   83 y.o. woman presented to ED with left-sided weakness.  NIHSS was 0. Initial BP was  202/81. 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  Both vertebral artery images were concerning for dissection.  Admitted to stroke floor.    concerning for a dissection.  There is reconstitution of the distal V4  segment of the right vertebral artery.  Interventional neurology was consulted recommended outpatient diagnostic catheter angiogram.  6/3 Worsening stroke symptoms. Re-consulted neuro interventionalist.  Admitted to Neuro ICU following cerebral angiogram with bilateral vertebral artery thrombectomy & left vertebral artery stent assisted repair.    6/4 No issues overnight.  Required 1 dose of labetalol.    6/5 having increased secretions requiring frequent suctioning   6/6 increasing O2 requirements ON but back on NC this am.   6/7 No issues overnight.  T max 99.4 F.  On BiPap for a short time last evening. O2 at 2 Lnc.  Did not require any prn antihypertensive agents.    6/8 No issues overnight.  Did not require any prn antihypertensive agents.    6/9 No issues overnight.  Did not require any prn antihypertensives.      PHYSICAL EXAM:   BP (!) 152/47   Pulse 73   Temp 97.3 °F (36.3 °C) (Temporal)   Resp 22   Ht 1.676 m (5' 6\")   Wt 80.5 kg (177 lb 6.4 oz)   SpO2 97%   BMI 28.63 kg/m²     Intake/Output Summary (Last 24 hours) at 6/9/2024 0807  Last data filed at 6/9/2024 0600  Gross per 24 hour   Intake 1091 ml   Output 2100 ml   Net -1009 ml      General appearance:  More alert this am.    NEUROLOGIC:   RASS Score:  -1  GCS:  15  4 - 
Neurology consult sent to Dr. Andujar via perfect serve.   
Notified Dr Casper alicia that respiratory unable to get abgs  
Notified [facility] of pulm note stating \"Patient continues to exhibit the inability to clear her own secretions therefore making her unable to maintain a patent airway.  It is strongly advised that she undergo a tracheostomy and PEG tube insertion.  If a meaningful recovery is possible, it will be a lengthy rehabilitation of process.  She has no family and no legal power of .  She is alert and oriented and nods appropriately when answering questions.  Yesterday her speech was clear enough to decipher what she was attempting to say.  She did agree to a trach and PEG when we had asked her yesterday. \"  
Notified dr Layton and sroc of new consult  
Nurse to nurse called to [phone]  
Nurse to nurse given to Neuro ICU  
Nurse to nurse report given to RN at [facility]. All questions answered to satisfaction by this RN.     
OCCUPATIONAL THERAPY TREATMENT NOTE    [facility]  OT BEDSIDE TREATMENT NOTE      Date:6/10/2024  Patient Name: Malissa Chen  MRN: [id number]  : 1940  Room: [address] [place] [address]-A     OT Eval:  Re-Evaluating OT: Lexi Chen OTD, OTR/L, [id number]    Evaluating OT: Kasey Viramontes, OTD, OTR/L; [id number]     Re-evaluation indicated following decline in status requiring transfer to ICU and thrombectomy       Referring Provider:     Gerri Wolfe APRN - CNS        Specific Provider Orders/Date: OT Eval and Treat 2024       Diagnosis: Stroke-like symptoms [R29.90]  Stroke-like symptom [R29.90]       Surgery: 6/3: thrombectomy       Pertinent Medical History:  has a past medical history of Diverticulitis, Glaucoma associated with ocular disorder, Hyperlipidemia, Hypertension, Osteopenia, Palpitations, and PVC (premature ventricular contraction).        Recommended Adaptive Equipment: TBD      Precautions:  Fall Risk, R hemiparesis, <160 SBP, aphasia (expressive > receptive), alarms+, NGT      Assessment of current deficits    [x] Functional mobility            [x]ADLs           [x] Strength                  [x]Cognition    [x] Functional transfers          [x] IADLs         [x] Safety Awareness   [x]Endurance    [x] Fine Coordination                         [x] Balance      [x] Vision/perception   [x]Sensation      [x]Gross Motor Coordination             [x] ROM           [] Delirium                   [x] Motor Control      OT PLAN OF CARE   OT POC based on physician orders, patient diagnosis and results of clinical assessment     Frequency/Duration 2-5 days/wk for 2 weeks PRN   Specific OT Treatment Interventions to include:   * Instruction/training on adapted ADL techniques and AE recommendations to increase functional independence within precautions       * Training on energy conservation strategies, correct breathing pattern and techniques to improve independence/tolerance for self-care 
OCCUPATIONAL THERAPY TREATMENT SESSION  [facility]  OT BEDSIDE TREATMENT NOTE      Date:2024  Patient Name: Malissa Chen  MRN: [id number]  : 1940  Room: [address] [place], [place] [address]A     OT Eval:  Re-Evaluating OT: Lexi Chen OTD, OTR/L, [id number]    Evaluating OT: Kasey Viramontes, OTD, OTR/L; [id number]     Re-evaluation indicated following decline in status requiring transfer to ICU and thrombectomy       Referring Provider:   Rossy Triplett MD     Specific Provider Orders/Date: OT Eval and Treat 2024       Diagnosis: Stroke-like symptoms [R29.90]  Stroke-like symptom [R29.90]       Surgery: 6/3: thrombectomy; : intubated; : bronchoscopy, trach/PEG       Pertinent Medical History:  has a past medical history of Diverticulitis, Glaucoma associated with ocular disorder, Hyperlipidemia, Hypertension, Osteopenia, Palpitations, and PVC (premature ventricular contraction).        Recommended Adaptive Equipment: TBD      Precautions:  Fall Risk, trach to vent, PEG, Contact Isolation (ESBL), R hemiparesis, <160 SBP, aphasia (expressive > receptive), alarms+      Assessment of current deficits    [x] Functional mobility            [x]ADLs           [x] Strength                  [x]Cognition    [x] Functional transfers          [x] IADLs         [x] Safety Awareness   [x]Endurance    [x] Fine Coordination                         [x] Balance      [x] Vision/perception   [x]Sensation      [x]Gross Motor Coordination             [x] ROM           [] Delirium                   [x] Motor Control      OT PLAN OF CARE   OT POC based on physician orders, patient diagnosis and results of clinical assessment     Frequency/Duration 2-5 days/wk for 2 weeks PRN   Specific OT Treatment Interventions to include:   * Instruction/training on adapted ADL techniques and AE recommendations to increase functional independence within precautions       * Training on energy conservation strategies, 
OK for trickle feed through NG starting at 10ml/hr per Dr. Alvaro Nick  
Occupational Therapy    OCCUPATIONAL THERAPY RE-EVALUATION    [facility]  [address], [place]        Date:2024                                                  Patient Name: Malissa Chen    MRN: [id number]    : 1940    Room: [address] [place], [place] [address]      Re-Evaluating OT: Lexi Chen OTD, OTR/L, [id number]    Evaluating OT: Kasey Viramontes OTD, OTR/L; [id number]    Re-evaluation indicated following decline in status requiring transfer to ICU and thrombectomy      Referring Provider:     Gerri Wolfe APRN - CNS        Specific Provider Orders/Date: OT Eval and Treat 2024      Diagnosis: Stroke-like symptoms [R29.90]  Stroke-like symptom [R29.90]       Surgery: 6/3: thrombectomy      Pertinent Medical History:  has a past medical history of Diverticulitis, Glaucoma associated with ocular disorder, Hyperlipidemia, Hypertension, Osteopenia, Palpitations, and PVC (premature ventricular contraction).       Recommended Adaptive Equipment: TBD     Precautions:  Fall Risk, R hemiparesis, <160 SBP, aphasia (expressive > receptive), alarms+     Assessment of current deficits    [x] Functional mobility  [x]ADLs  [x] Strength               [x]Cognition    [x] Functional transfers   [x] IADLs         [x] Safety Awareness   [x]Endurance    [x] Fine Coordination              [x] Balance      [x] Vision/perception   [x]Sensation     [x]Gross Motor Coordination  [x] ROM  [] Delirium                   [x] Motor Control     OT PLAN OF CARE   OT POC based on physician orders, patient diagnosis and results of clinical assessment    Frequency/Duration 2-5 days/wk for 2 weeks PRN   Specific OT Treatment Interventions to include:   * Instruction/training on adapted ADL techniques and AE recommendations to increase functional independence within precautions       * Training on energy conservation strategies, correct breathing pattern and techniques to improve 
Occupational Therapy  OT BEDSIDE TREATMENT NOTE   [facility]  [address], [place]       Date:2024  Patient Name: Malissa Chen  MRN: [id number]  : 1940  Room: [address] [place] [address]     Per OT Eval:        Evaluating OT: Kasey Viramontes, JUAND, OTR/L; [id number]       Referring Provider: Kervin Domingo MD     Specific Provider Orders/Date: OT Eval and Treat 2024       Diagnosis: Stroke-like symptoms [R29.90]  Stroke-like symptom [R29.90]       Surgery: none this admission        Pertinent Medical History:  has a past medical history of Diverticulitis, Glaucoma associated with ocular disorder, Hyperlipidemia, Hypertension, Osteopenia, Palpitations, and PVC (premature ventricular contraction).     Recommended Adaptive Equipment: TBD      Precautions:  Fall Risk, R sided weakness, , acute CVA      Assessment of current deficits    [x] Functional mobility            [x]ADLs           [x] Strength                  [x]Cognition    [x] Functional transfers          [x] IADLs         [x] Safety Awareness   [x]Endurance    [x] Fine Coordination                         [x] Balance      [x] Vision/perception   [x]Sensation      []Gross Motor Coordination             [x] ROM           [] Delirium                   [] Motor Control      OT PLAN OF CARE   OT POC based on physician orders, patient diagnosis and results of clinical assessment     Frequency/Duration 2-5 days/wk for 2 weeks PRN   Specific OT Treatment Interventions to include:   * Instruction/training on adapted ADL techniques and AE recommendations to increase functional independence within precautions       * Training on energy conservation strategies, correct breathing pattern and techniques to improve independence/tolerance for self-care routine  * Functional transfer/mobility training/DME recommendations for increased independence, safety, and fall prevention  * Patient/Family 
Patient admitted to NSICU with the following belongings:  Glasses, shoes, clothes. The following belongings admitted with the patient, None, were sent home with the patient's family.   
Patient had long run of VT. Residents notified EKG obtained blood work drawn.   
Patient has required an increase in suctioning throughout the day for large amounts of thick secretions. Pt has been maintaining oxygen saturations on 2L NC. Bipap on standby at pts bedside if required for desaturations or an increase WOB. RN aware.   
Patient in soft left wrist restraint, when released reaching for lines and tubes.   
Patient stating she no longer is taking Lisinopril. States she is taking Benicar 20mg daily. Perfect serve sent to Dr. Galindo, and new orders given. Therapeutic interchange to Losartan.  
Patient was seen and examined at bedside for tracheal bleeding. Upon exam source of bleeding appeared to be consistent with that of strap bleeding at incision site. No evidence of pulsatile bright red bleeding. No evidence of fresh blood during suction. Dressing was changed and Surgicel was placed.   
Patient's friend, Roma Crocker, updated  
Per Dr Gaming place pt on non rebreather  
Pharmacy Consultation Note  (Antibiotic Dosing and Monitoring)    Initial consult date: 6/6/24  Consulting physician/provider: Dr. Milanes Marino  Drug: Vancomycin  Indication: Pneumonia    Age/  Gender Height Weight IBW  Allergy Information   83 y.o./female 167.6 cm (5' 6\") 76.2 kg (168 lb)     Ideal body weight: 59.3 kg (130 lb 11.7 oz)  Adjusted ideal body weight: 67.9 kg (149 lb 10.2 oz)   Doxycycline, Ciprofloxacin, and Sulfa antibiotics      Renal Function:  Recent Labs     06/04/24  0458 06/05/24  0442 06/06/24  0410   BUN 16 20 24*   CREATININE 0.6 0.6 0.6       Intake/Output Summary (Last 24 hours) at 6/6/2024 1429  Last data filed at 6/6/2024 0600  Gross per 24 hour   Intake 1406.41 ml   Output 1100 ml   Net 306.41 ml       Vancomycin Monitoring:  Trough:  No results for input(s): \"VANCOTROUGH\" in the last 72 hours.  Random:  No results for input(s): \"VANCORANDOM\" in the last 72 hours.    Vancomycin Administration Times:  Recent vancomycin administrations        No vancomycin IV orders with administrations found.            Orders not given:            vancomycin (VANCOCIN) 1,250 mg in sodium chloride 0.9 % 250 mL IVPB                  Assessment:  Patient is a 83 y.o. female who has been initiated on vancomycin  Estimated Creatinine Clearance: 73 mL/min (based on SCr of 0.6 mg/dL).    Plan:  Will continue vancomycin 1250 mg IV every 24 hours  Will check vancomycin levels when appropriate  Will continue to monitor renal function   Pharmacy to follow      Gunjan Rodrigues, PharmD, BCPS, BCCCP 6/6/2024 2:29 PM      
Pharmacy Consultation Note  (Antibiotic Dosing and Monitoring)    Vancomycin has been discontinued; pharmacy will sign-off.  Please reconsult if needed.    Thank you,    Jennifer Marie, PharmD, [facility]  PGY1 Pharmacy Resident   [id number]      
Phase 1 time in: 1905  Phase 1 time out: 2005  Recovery complete, see ICU documentation for further care.   
Physical Therapy    PT evaluation and treatment completed on 5/31/24. Patient would benefit from a PM&R consult based on findings of today's evaluation. Thank you.    Georgia Harman, PT, DPT  [id number]      
Physical Therapy    Physical Therapy Re-Assessment     Name: Malissa Chen  : 1940  MRN: [id number]      Date of Service: 2024    Re-Evaluating PT:  Wei Colon, PT, DPT  [id number]     Room #:  4428/4428-A  Diagnosis:  Stroke-like symptoms [R29.90]  Stroke-like symptom [R29.90]  PMHx/PSHx:   has a past medical history of Diverticulitis, Glaucoma associated with ocular disorder, Hyperlipidemia, Hypertension, Osteopenia, Palpitations, and PVC (premature ventricular contraction).   Procedure/Surgery:  Diagnostic Cerebral Angiogram with B vertebral artery thrombectomy & L vertebral artery complete stent 6/3;  Intubated ;  Trach and PEG   Precautions:  Falls, Contact isolation, R hemiplegia, Trach to Vent, PEG, L soft wrist restraint  Equipment Needs:  NA    Reason for re-evaluation:  Change in status, transfer back to intensive care, prolonged intubation, trach and PEG  Date of re-evaluation:  24     SUBJECTIVE:    Pt lives alone in a 2 story home with 1+4 step(s) to enter and 0+1 rail(s).  Full flight of steps and 1 rail to bedroom.    Pt ambulated with no AD PTA.    OBJECTIVE:   Re-Evaluation  Date: 24 Treatment Short Term/ Long Term   Goals   AM-PAC 6 Clicks      Was pt agreeable to Re-Eval/treatment? Yes      Does pt have pain? No c/o pain     Bed Mobility  Rolling: Max A to R, DEP to L   Supine to sit: Max A  Sit to supine: Max A x2  Scooting: Max A  Rolling: Mod A  Supine to sit: Mod A  Sit to supine: Mod A  Scooting: Mod A   Transfers Sit to stand: NT  Stand to sit: NT  Stand pivot: NT  Sit to stand: Max A  Stand to sit: Max A  Stand pivot: Max A with AAD   Ambulation    NT  TBD   Stair negotiation: ascended and descended    TBD   ROM BUE:  Per OT eval   RLE:  Limited d/t recent CVA  LLE:  Passively WFL     Strength BUE:  Per OT eval  RLE:  Grossly 0/5   LLE:  Grossly 2/5      Balance Sitting EOB:  Max A  Dynamic Standing:  NT   Sitting EOB:  Mod A  Dynamic Standing:  Max A 
Physical Therapy    Physical Therapy Treatment     Name: Malissa Chen  : 1940  MRN: [id number]      Date of Service: 2024    Re-Evaluating PT:  Wei Colon, PT, DPT  [id number]     Room #:  4428/4428-A  Diagnosis:  Stroke-like symptoms [R29.90]  Stroke-like symptom [R29.90]  PMHx/PSHx:   has a past medical history of Diverticulitis, Glaucoma associated with ocular disorder, Hyperlipidemia, Hypertension, Osteopenia, Palpitations, and PVC (premature ventricular contraction).   Procedure/Surgery:  Diagnostic Cerebral Angiogram with B vertebral artery thrombectomy & L vertebral artery complete stent 6/3;  Intubated ;  Trach and PEG   Precautions:  Falls, Contact isolation, R hemiplegia, Trach to Vent, PEG, L soft wrist restraint  Equipment Needs:  NA    Reason for re-evaluation:  Change in status, transfer back to intensive care, prolonged intubation, trach and PEG  Date of re-evaluation:  24     SUBJECTIVE:    Pt lives alone in a 2 story home with 1+4 step(s) to enter and 0+1 rail(s).  Full flight of steps and 1 rail to bedroom.    Pt ambulated with no AD PTA.    OBJECTIVE:   Re-Evaluation  Date: 24 Treatment  24 Short Term/ Long Term   Goals   AM-PAC 6 Clicks     Was pt agreeable to Re-Eval/treatment? Yes  Yes     Does pt have pain? No c/o pain No c/o pain     Bed Mobility  Rolling: Max A to R, DEP to L   Supine to sit: Max A  Sit to supine: Max A x2  Scooting: Max A Rolling: NT  Supine to sit: Max A  Sit to supine: Max A x2  Scooting: Max A Rolling: Mod A  Supine to sit: Mod A  Sit to supine: Mod A  Scooting: Mod A   Transfers Sit to stand: NT  Stand to sit: NT  Stand pivot: NT Sit to stand: NT  Stand to sit: NT  Stand pivot: NT Sit to stand: Max A  Stand to sit: Max A  Stand pivot: Max A with AAD   Ambulation    NT NT TBD   Stair negotiation: ascended and descended    TBD   ROM BUE:  Per OT eval   RLE:  Limited d/t recent CVA  LLE:  Passively WFL     Strength BUE:  Per 
Physical Therapy    Pt on PT caseload.  Pt had change in status requiring transfer to intensive care and re-intubation.  Pt not appropriate for PT services at this time.  Please re-consult as needed.    Wei Colon, PT, DPT  [id number]     
Physical Therapy  Physical Therapy Re-Eval Assessment     Name: Malissa Chen  : 1940  MRN: [id number]      Date of Service: 2024    Evaluating PT:  Damian Bettencourt PT, DPT [id number]    Room #:  4521/4521-A  Diagnosis:  Stroke-like symptoms [R29.90]  Stroke-like symptom [R29.90]  PMHx/PSHx:    Past Medical History:   Diagnosis Date    Diverticulitis     Glaucoma associated with ocular disorder     Hyperlipidemia     Hypertension     Osteopenia     Palpitations     PVC (premature ventricular contraction)      Procedure/Surgery:  6/3 Diagnostic Cerebral Angiogram with bilateral vertebral artery thrombectomy  Left vertebral artery complete stent assisted V1-V4 repair   Precautions:  Falls, SBP <160, bed alarm, aphasia, dysarthria, decreased light touch    Equipment Needs:  TBD     SUBJECTIVE:    Pt lives alone in a 2 story home with 1+4 step(s) to enter and 0+1 rail(s).  Full flight of steps and 1 rail to bedroom.    Pt ambulated with no AD PTA.    OBJECTIVE:   Re-Evaluation  Date: 24 Treatment Short Term/ Long Term   Goals   AM-PAC 6 Clicks      Was pt agreeable to Eval/treatment? Yes      Does pt have pain? No c/o pain     Bed Mobility  Rolling: NT   Supine to sit: MaxA x 2 with HOB elevated   Sit to supine: MaxA x 2   Scooting: MaxA  ModA    Transfers Sit to stand: MaxA x 2    Stand to sit: MaxA x 2  Stand pivot: NT  ModA with AAD    Ambulation   NT  >10 feet with ModA with AAD    Stair negotiation: ascended and descended NT      ROM BUE:  Defer to OT note  BLE:  WFL     Strength BUE:  Defer to OT note  LLE:  4/5  R knee ext: 1/5  R ankle DF 0/5  R hip 1/5  Increase by 1/3 MMT grade   Balance Sitting EOB:  MaxA   Dynamic Standing:  MaxA x 2   Sitting EOB:  ModA   Dynamic Standing:  ModA with AAD       Pt is A & O x 3-4, location with choices   CAM-ICU: NT  RASS: 0  Sensation: decreased light touch to feet   Edema:  none     Vitals:  Heart Rate at rest 72 bpm Heart Rate post session 65 bpm   SpO2 
Physical Therapy  Physical Therapy Treatment Note    Name: Malissa Chen  : 1940  MRN: [id number]      Date of Service: 2024    Evaluating PT:  Damian Bettencourt, PT, DPT [id number]    Room #:  4521/4521-A  Diagnosis:  Stroke-like symptoms [R29.90]  Stroke-like symptom [R29.90]  PMHx/PSHx:    Past Medical History:   Diagnosis Date    Diverticulitis     Glaucoma associated with ocular disorder     Hyperlipidemia     Hypertension     Osteopenia     Palpitations     PVC (premature ventricular contraction)      Procedure/Surgery:  6/3 Diagnostic Cerebral Angiogram with bilateral vertebral artery thrombectomy  Left vertebral artery complete stent assisted V1-V4 repair   Precautions:  Falls, SBP <160, bed alarm, aphasia, dysarthria     Equipment Needs:  TBD     SUBJECTIVE:    Pt lives alone in a 2 story home with 1+4 step(s) to enter and 0+1 rail(s).  Full flight of steps and 1 rail to bedroom.    Pt ambulated with no AD PTA.    OBJECTIVE:   Re-Evaluation  Date: 24 Treatment  24 Short Term/ Long Term   Goals   AM-PAC 6 Clicks     Was pt agreeable to Eval/treatment? Yes  Yes     Does pt have pain? No c/o pain No c/o pain     Bed Mobility  Rolling: NT   Supine to sit: MaxA x 2 with HOB elevated   Sit to supine: MaxA x 2   Scooting: MaxA Rolling: NT   Supine to sit: MaxA with HOB elevated   Sit to supine: MaxA x 2   Scooting: MaxA ModA    Transfers Sit to stand: MaxA x 2    Stand to sit: MaxA x 2  Stand pivot: NT Sit to stand: MaxA x 2    Stand to sit: MaxA x 2  Stand pivot: NT ModA with AAD    Ambulation   NT NT >10 feet with ModA with AAD    Stair negotiation: ascended and descended NT  NT    ROM BUE:  Defer to OT note  BLE:  WFL     Strength BUE:  Defer to OT note  LLE:  4/5  R knee ext: 1/5  R ankle DF 0/5  R hip 1/5 BUE:  Defer to OT note  LLE:  4/5  R knee ext: 0/5  R ankle DF 0/5   Increase by 1/3 MMT grade   Balance Sitting EOB:  MaxA   Dynamic Standing:  MaxA x 2 no AD Sitting EOB:  MaxA 
Physical Therapy  Physical Therapy Treatment Note    Name: Malissa Chen  : 1940  MRN: [id number]      Date of Service: 6/10/2024    Evaluating PT:  Damian Bettencourt, PT, DPT [id number]    Room #:  4521/4521-A  Diagnosis:  Stroke-like symptoms [R29.90]  Stroke-like symptom [R29.90]  PMHx/PSHx:    Past Medical History:   Diagnosis Date    Diverticulitis     Glaucoma associated with ocular disorder     Hyperlipidemia     Hypertension     Osteopenia     Palpitations     PVC (premature ventricular contraction)      Procedure/Surgery:  6/3 Diagnostic Cerebral Angiogram with bilateral vertebral artery thrombectomy  Left vertebral artery complete stent assisted V1-V4 repair   Precautions:  Falls, SBP <160, bed alarm, aphasia, dysarthria     Equipment Needs:  TBD     SUBJECTIVE:    Pt lives alone in a 2 story home with 1+4 step(s) to enter and 0+1 rail(s).  Full flight of steps and 1 rail to bedroom.    Pt ambulated with no AD PTA.    OBJECTIVE:   Re-Evaluation  Date: 24 Treatment  6/10/24 Short Term/ Long Term   Goals   AM-PAC 6 Clicks     Was pt agreeable to Eval/treatment? Yes  Yes     Does pt have pain? No c/o pain No c/o pain     Bed Mobility  Rolling: NT   Supine to sit: MaxA x 2 with HOB elevated   Sit to supine: MaxA x 2   Scooting: MaxA Rolling: NT   Supine to sit: MaxA x 2 with HOB elevated   Sit to supine: MaxA x 2   Scooting: MaxA ModA    Transfers Sit to stand: MaxA x 2    Stand to sit: MaxA x 2  Stand pivot: NT Sit to stand: MaxA x 2    Stand to sit: MaxA x 2  Stand pivot: NT ModA with AAD    Ambulation   NT NT >10 feet with ModA with AAD    Stair negotiation: ascended and descended NT  NT    ROM BUE:  Defer to OT note  BLE:  WFL     Strength BUE:  Defer to OT note  LLE:  4/5  R knee ext: 1/5  R ankle DF 0/5  R hip 1/5 BUE:  Defer to OT note  LLE:  4/5  R knee ext: 0/5  R ankle DF 0/5   Increase by 1/3 MMT grade   Balance Sitting EOB:  MaxA   Dynamic Standing:  MaxA x 2 no AD Sitting EOB:  
Physical Therapy  Treatment Note    Name: Malissa Chen  : 1940  MRN: [id number]      Date of Service: 2024    Evaluating PT:  Georgia Harman, PT, DPT [id number]    Room #:  8516/8516-B  Diagnosis:  Stroke-like symptoms [R29.90]  Stroke-like symptom [R29.90]  PMHx/PSHx:   has a past medical history of Diverticulitis, Glaucoma associated with ocular disorder, Hyperlipidemia, Hypertension, Osteopenia, Palpitations, and PVC (premature ventricular contraction).  Precautions:  Fall risk, R hemiparesis, Purewick  Equipment Needs:  TBD    SUBJECTIVE:    Pt lives alone in a 2 story home with 1 step (no railings) +4 steps (with one railing) to enter. Pt's bed and bathroom are on the second level with a full flight upstairs and one hand railing. Pt ambulated with a SPC PTA. She reports that she owns a SPC, SBQC, FWW, and transport chair.    OBJECTIVE:   Initial Evaluation  Date: 24 Treatment  Date: 24 Short Term/ Long Term   Goals   AM-PAC 6 Clicks     Was pt agreeable to Eval/treatment? Yes yes    Does pt have pain? Denied No pain    Bed Mobility  Rolling: NT  Supine to sit: Na  Sit to supine: ModA  Scooting: Na Rolling: NT  Supine to sit: min A  Sit to supine: NT  Scooting: min A Rolling: Independent  Supine to sit: Independent  Sit to supine: Independent  Scooting: Independent   Transfers Sit to stand: ModA  Stand to sit: ModA  Stand pivot: NT Sit to stand: mod A  Stand to sit: mod A  Stand pivot: mod Ax2 with arm in arm A Sit to stand: Independent  Stand to sit: Independent  Stand pivot: Modified Independent with AA   Ambulation    Few feet of lateral steps with FWW and MaxA NT >50 feet with AAD and SBA   Stair negotiation: ascended and descended  NT  >4 steps with unilateral rail and SBA   ROM BUE:  Refer to OT  BLE:  WFL     Strength BUE:  Refer to OT  BLE:  5/5 knee extension, 5/5 ankle DF, 5/5 ankle PF     Balance Sitting EOB:  SBA  Dynamic Standing:  MaxA with FWW itting EOB:  
Placed on ps of 10-40-5 per T.Rock GRAHAM.  
Pt arrived from 82 with shoes socks shirt jacket purse cell phone   
Pt is producing large amounts of thick secretions. Pt has been maintaining O2 saturation on 2L NC. The bipap is on standby at pts beside if required for desaturations or increased WoB. RN aware.  
[facility]   Department of Internal Medicine   Internal Medicine Residency  MICU Progress Note    Patient:  Malissa Chen 83 y.o. female   MRN: [id number]      Room: [address] [place], [place] [address]    Admission date: 5/30/2024 12:53 PM ; Hospital day: 19   ICU day: 5d 22h      Allergy: Doxycycline, Ciprofloxacin, and Sulfa antibiotics    Subjective     Patient was seen and examined this morning at bedside. Overnight, there were no acute events. Patient awakens to voice and withdraws to noxious stimuli, denies any pain, follows commands.    Objective       I & O - 24hr:    Intake/Output Summary (Last 24 hours) at 6/19/2024 1344  Last data filed at 6/19/2024 1235  Gross per 24 hour   Intake 2538.87 ml   Output 2085 ml   Net 453.87 ml     Net IO Since Admission: 2,670.91 mL [06/19/24 1344]    Physical Exam  BP (!) 112/38   Pulse 55   Temp 100.2 °F (37.9 °C) (Bladder)   Resp 17   Ht 1.676 m (5' 6\")   Wt 70.9 kg (156 lb 6.4 oz)   SpO2 99%   BMI 25.24 kg/m²   Ideal body weight: 59.3 kg (130 lb 11.7 oz)    General Appearance: cooperative and in NAD  HEENT:  Head: Normal, normocephalic, atraumatic.  Eye: Normal external eye, conjunctiva, lids cornea, PERRL.  Ears: Normal auditory canals and external ears. Non-tender.  Nose: Normal external nose, dry mucus membranes, ETT in position.  Lung: bilateral clear air entry  Heart: regular rate and rhythm, S1, S2 normal, and no murmurs  Abdomen:  soft, non- TTP, no organomegaly  Extremities:  extremities normal, atraumatic, no cyanosis or edema  Neurologic: Awakens to voice, withdraws to painful stimuli, follows commands      Medications     Continuous Infusions:   dextrose      sodium chloride Stopped (06/04/24 1010)     Scheduled Meds:   ipratropium 0.5 mg-albuterol 2.5 mg  1 Dose Inhalation TID    sennosides  5 mL Oral BID    polyethylene glycol  17 g Oral BID    Polyvinyl Alcohol-Povidone PF  1 drop Both Eyes Q4H    Or    Polyvinyl Alcohol-Povidone PF  1 drop Both Eyes Q4H    
[facility]  Department of Internal Medicine   Internal Medicine Residency   MICU Progress Note    Patient:  Malissa Chen 83 y.o. female  MRN: [id number]     Date of Service: 6/14/2024    Allergy: Doxycycline, Ciprofloxacin, and Sulfa antibiotics    Subjective     Patient was seen this morning. She had spontaneous eye opening and follows command on left side. No extremity movements on the right side. She has cough and gag reflex present. Awaiting trach and PEG for the patient. Tube feeds restarted today.     ROS: Denies Fever/chills/CP/SOB/N/V/D/C/Dysuria/Blood in stool or urine  Objective     VS: BP (!) 107/49   Pulse 65   Temp 97.8 °F (36.6 °C) (Temporal)   Resp 15   Ht 1.676 m (5' 6\")   Wt 78.7 kg (173 lb 9.6 oz)   SpO2 99%   BMI 28.02 kg/m²   ABP (Arterial line BP): 147/47  ABP Mean (Arterial Line Mean): 81 mmHg    I & O - 24hr:   Intake/Output Summary (Last 24 hours) at 6/14/2024 1324  Last data filed at 6/14/2024 0530  Gross per 24 hour   Intake 530.03 ml   Output 468 ml   Net 62.03 ml       Physical Exam:  General Appearance:  intubated and follows command  Neck: no adenopathy, no carotid bruit, no JVD, supple, symmetrical, trachea midline, and thyroid not enlarged, symmetric, no tenderness/mass/nodules  Lung: clear to auscultation bilaterally  Heart: regular rate and rhythm, S1, S2 normal, no murmur, click, rub or gallop  Abdomen: soft, non-tender; bowel sounds normal; no masses,  no organomegaly  Extremities:  extremities normal, atraumatic, no cyanosis or edema  Musculoskeletal: No joint swelling, no muscle tenderness. ROM normal in all joints of extremities.   Neurologic: Mental status: intubated, follows command on the left side only, gag and cough reflex present.    Lines     site day    Art line   None    TLC None    PICC None    Hemoaccess peripherals      ABG:     Lab Results   Component Value Date/Time    PH 7.453 06/14/2024 04:25 AM    PCO2 36.1 06/14/2024 04:25 AM    PO2 
Report called for transfer to [facility].  
Request placed for pt transport to [facility].  
Right soft wrist restraint discontinued due to patient having right-sided flaccidity.  
SPEECH LANGUAGE PATHOLOGY  DAILY PROGRESS NOTE        PATIENT NAME:  Malissa Chen      ROOM:  8509/8509-B :  1940         TODAY'S DATE:  2024       Per pulmonary's progress note, pt unable to clear her own secretions and requires suction..   An MBS is not appropriate at this time as pt should be placed NPO until pulmonary status improves significantly.        Will discontinue order and request an MBS as appropriate.  
SPEECH/LANGUAGE PATHOLOGY  SPEECH/LANGUAGE/COGNITIVE EVALUATION   and PLAN OF CARE      PATIENT NAME:  Malissa Chen  (female)     MRN:  [id number]    :  1940  (83 y.o.)  STATUS:  Inpatient: Room 8516/8516-B    TODAY'S DATE:  6/3/2024  06/03/24 1100    SLP eval and treat  Start:  24 1100,   End:  24 1100,   ONE TIME,   Standing Count:  1 Occurrences,   R       Brock Galindo MD  REASON FOR REFERRAL:      stroke like symptoms/new droop     EVALUATING THERAPIST: TERESSA Zaragoza    ADMITTING DIAGNOSIS: Stroke-like symptoms [R29.90]  Stroke-like symptom [R29.90]    VISIT DIAGNOSIS:        SPEECH THERAPY  PLAN OF CARE   The speech therapy  POC is established based on physician order, speech pathology diagnosis and results of clinical assessment     SPEECH PATHOLOGY DIAGNOSIS:    mild cognitive-linguistic impairment, minimal to mild dysarthria     Speech Pathology intervention is recommended 3-6 times per week for LOS or when goals are met with emphasis on the following:      Conditions Requiring Skilled Therapeutic Intervention for speech, language and/or cognition    Cognitive linguistic impairment  Decreased short term memory  Decreased thought organization  Speech intelligibility     Specific Speech Therapy Interventions to Include:   Therapeutic tasks for Speech Intelligibility and Cognition    Specific instructions for next treatment:     To initiate POC    SHORT/LONG TERM GOALS  Pt will improve immediate, short term, recent memory during structured and unstructured tasks with 80% accuracy   Pt will improve problem solving/thought organization during structured and unstructured tasks with 80% accuracy   Pt will improve receptive and expressive language skills with adequate thought content, organization, and processing time to facilitate improved communication with minimal verbal prompts.   Pt will use communication strategies including slow rate and annunciated speech to improve 
SPEECH/LANGUAGE PATHOLOGY  SPEECH/LANGUAGE/COGNITIVE EVALUATION   and PLAN OF CARE      PATIENT NAME:  Malissa Chen  (female)     MRN:  [id number]    :  1940  (83 y.o.)  STATUS:  Inpatient: Room 4521/4521-A    TODAY'S DATE:  24    Speech Language Pathology eval  Start:  24,   End:  24,   ONE TIME,   Standing Count:  1 Occurrences,   R       Puma Bone MD  REASON FOR REFERRAL:  STROKE  EVALUATING THERAPIST: Shahana Rodriguez  SUPERVISING THERAPIST: Tali Arnold     ADMITTING DIAGNOSIS: Stroke-like symptoms [R29.90]  Stroke-like symptom [R29.90]    VISIT DIAGNOSIS:        SPEECH THERAPY  PLAN OF CARE   The speech therapy  POC is established based on physician order, speech pathology diagnosis and results of clinical assessment     SPEECH PATHOLOGY DIAGNOSIS:    mild-to-moderate receptive and severe expressive aphasia.     Patient was non-verbal during this assessment.     Speech Pathology intervention is recommended up to 6 times per week for LOS or when goals are met with emphasis on the following:      Conditions Requiring Skilled Therapeutic Intervention for speech, language and/or cognition    Expressive Aphasia   Receptive Aphasia     Specific Speech Therapy Interventions to Include:   Receptive language training  Expressive language training       Specific instructions for next treatment:     To initiate POC    SHORT/LONG TERM GOALS    Pt will Improve receptive language skills for comprehension of simple level yes/no questions, basic commands (auditory/written format), and for comprehension of basic conversation with 80% accuracy  Pt will improve receptive language skills for response to WH- questions and follow through of simple to multi-step directions with 80% accuracy.  Pt will improve expressive language skills to improve accuracy of completion of automatic speech tasks, object/picture naming, phrase completion, and phrasal expression of 
[facility]   Department of Internal Medicine   Internal Medicine Residency  MICU Progress Note    Patient:  Malissa Chen 83 y.o. female   MRN: [id number]      Room: [address] [place], [place] [address]    Admission date: 5/30/2024 12:53 PM ; Hospital day: 24   ICU day: 3d 22h      Allergy: Doxycycline, Ciprofloxacin, and Sulfa antibiotics    Subjective     Patient was seen and examined this morning at bedside. Overnight, she had some bleeding from the tracheostomy site when the heparin infusion was held. She was alert and following commands. She denied pain or any other complaint. She was breathing comfortably on SIMV.    Objective       I & O - 24hr:    Intake/Output Summary (Last 24 hours) at 6/24/2024 1357  Last data filed at 6/24/2024 1314  Gross per 24 hour   Intake 1419.83 ml   Output 1100 ml   Net 319.83 ml     Net IO Since Admission: 3,844.28 mL [06/24/24 1357]    Physical Exam  BP (!) 129/45   Pulse (!) 49   Temp 97.8 °F (36.6 °C) (Temporal)   Resp 16   Ht 1.676 m (5' 6\")   Wt 75.7 kg (166 lb 12.8 oz)   SpO2 100%   BMI 26.92 kg/m²   Ideal body weight: 59.3 kg (130 lb 11.7 oz)    General Appearance: alert, appears stated age, and cooperative  HEENT:  Head: Normal, normocephalic, atraumatic.  Lung: diminished breath sounds bibasilar  Heart: regular rate and rhythm and S1, S2 normal  Abdomen:  normal BS  Extremities:   no edema  Neurologic: alert and following commands      Medications     Continuous Infusions:   heparin (PORCINE) Infusion 13 Units/kg/hr (06/24/24 1353)    dextrose      sodium chloride Stopped (06/04/24 1010)     Scheduled Meds:   ertapenem (INVanz) IV  1,000 mg IntraVENous Q24H    ipratropium 0.5 mg-albuterol 2.5 mg  1 Dose Inhalation 4x daily    sennosides  5 mL Oral BID    polyethylene glycol  17 g Oral BID    Polyvinyl Alcohol-Povidone PF  1 drop Both Eyes Q4H    Or    Polyvinyl Alcohol-Povidone PF  1 drop Both Eyes Q4H    docusate sodium  100 mg Oral Daily    sodium chloride flush  5-40 mL 
Subjective:      Malissa Chen post op follow up     Patient has increased secretions and increased lethargy today   But still able to wake up and follow commands          Review of Systems  Pertinent items are noted in HPI.      Objective:     Vitals:    06/06/24 1400   BP: (!) 131/48   Pulse: 72   Resp: 24   Temp:    SpO2: 93%     Patient lethargic    But when woken up able to follow commands appropriately     P02 is around 50        Lab Review  Lab Results   Component Value Date/Time    CHOL 159 05/31/2024 11:43 AM    TRIG 131 05/31/2024 11:43 AM    HDL 48 05/31/2024 11:43 AM           CT head shows minimal stroke burden    Assessment:     Acute ischemic stroke s/p thrombectomy and stent assisted reconstruction of bilateral vertebral arteries    Respiratory decline 6/6/24    Minimal cerebellar edema with no evidence of hydrocephalus         Plan:       Trial of mannitol 6 gm once to see how she does , if she wakeup then cerebral edema may be etiology     If not then likely due to aspiration pneumonia    Discussed with ICU team     
Subjective:      Malissa Chen post op follow up     Patient is clinically stable     Review of Systems  Review of systems not obtained due to patient factors.      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     IF GETTING a PEG a lovenox bridge with ASA would be preferred due to multiple stents X 6  Once PEG is placed and safe then can be back on DAPT      I did talk to patient today and see seems to verbalize understanding and wants to go with further measures       Speech therapy to follow to see if her swallow comes back     Pulm on board for Aspiration pneumonia GPC+      Follow up in 3 months post discharge      
Switched patient back to IMV settings d/t repeated alarms indicating low minute volume and apnea ventilation. Will switch to PSV again in the morning.    06/29/24 0420   Vent Information   Vent Mode (S)  SIMV/VC   $Ventilation $Subsequent Day   Ventilator Settings   FiO2  35 %   Resp Rate (Set) 8 bpm   PEEP/CPAP (cmH2O) 8   Pressure Support (cm H2O) 8 cm H2O   Peak Inspiratory Flow (Set) 50 L/sec   Vent Patient Data (Readings)   Vt (Measured) 333 mL   Peak Inspiratory Pressure (cmH2O) 16 cmH2O   Rate Measured 12 br/min   Minute Volume (L/min) 4.19 Liters   Peak Inspiratory Flow (lpm) 50 L/sec   Mean Airway Pressure (cmH2O) 9.9 cmH20   I:E Ratio 1:3.2   Flow Sensitivity 3 L/min       
TUYET Tsai notified of palliative care consult per [place].  Pt added to census  
[facility]- [facility] Electrophysiology  Inpatient Progress Report  PATIENT: Malissa Chen  MEDICAL RECORD NUMBER: [id number]  DATE OF SERVICE:  6/6/2024  ATTENDING ELECTROPHYSIOLOGIST: Saida Ulloa MD   PRIMARY ELECTROPHYSIOLOGIST: Saida Ulloa Md   REFERRING PHYSICIAN: Ed Collins Jr., MD  CHIEF COMPLAINT: Stroke.     HPI: This is a 83 y.o. female with a history of hypertension, hyperlipidemia intolerant to statins due to myalgia, glaucoma, ocular migraines, diverticulitis and LBBB with a first-degree AV block on EKG dating back to at least 2017.  She is known to Dr. Williamson and is managed on Toprol, Benicar, Evista.     She was seen by Dr. Quintero on 04/08/2024 presenting to the ER with complaints of headache, palpitations, visual hallucinations and upper respiratory symptoms. Dr. Quintero was consulted for PVCs and Elevated troponin 63>61. She had no complains  of chest pain. A stress test was performed at that time with normal perfusion as well as an Echocardiogram which revealed LVEF of 60-65% with mild mitral stenosis.     On 05/30/2024 she presented to the ER , [facility], with left sided weakness that resolved spontaneously after an hour.  She also complained of a worsening right-sided headache and difficulty with vision and her left eye.  CT of the head and neck was noted to show an acute infarct in the medial aspect of the right occipital lobe.  She was seen by Dr. Andujar as well as Dr. Garza who noted an abrupt occlusion of the proximal left vertebral artery then there is distal reconstitution from the thyroid and costocervical trunk which then supply antegrade flow to the remainder of the left vertebral artery, left PICA, the VB junction and even the distal V4 segment of the right vertebral artery up to the origin of PICA.The right vertebral artery had good flow until the mid V3 segment and thereafter there is abrupt occlusion. The basilar artery was widely patent.   
This RT found blood in the trach care.  Suction pressure was decreased.    
Trauma Surgery Note     I have seen and examined the patient.  No active bleeding at there trach site or neck hematoma.  Minimal blood secretions suctioned from the trach.  Dressing changed.  Recommend holding hep drip for 6hrs and resuming if there is no additional; bleeding.  My team will continue to monitor.      Hakan Kelly MD   
[facility]  PROGRESS NOTE  ATTENDING NOTE    CRITICAL CARE    Chief Complaint   Patient presents with    Dizziness     Patient states she was having weakness and \"felt floppy\" lkw 11:00       HPI  83 y.o. woman presented to ED with left-sided weakness.  NIHSS was 0. Initial BP was  202/81. 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  Both vertebral artery images were concerning for dissection.  Admitted to stroke floor.    concerning for a dissection.  There is reconstitution of the distal V4  segment of the right vertebral artery.  Interventional neurology was consulted recommended outpatient diagnostic catheter angiogram.  6/3 Worsening stroke symptoms. Re-consulted neuro interventionalist.  Admitted to Neuro ICU following cerebral angiogram with bilateral vertebral artery thrombectomy & left vertebral artery stent assisted repair.    6/4 No issues overnight.  Required 1 dose of labetalol.  Upper airway secretions--guaifenesin started  6/5  increasing secretions, rocephin started for aspiration  6/6  having some respiratory issues--mannitol given for possible increased ICP by Cory and start scop patch for secretions    Patient Active Problem List   Diagnosis    Diverticulitis    Lightheadedness    Palpitations    Stroke-like symptoms    Hypertensive urgency    Stroke-like symptom    Vertebral artery dissection (HCC)    Sinus bradycardia    LBBB (left bundle branch block)    Aspiration pneumonia of right lower lobe (HCC)       OVERNIGHT EVENTS:  Pulling at BiPAP overnight    HOSPITAL COURSE:  6/3 Worsening stroke symptoms. Re-consulted neuro interventionalist.  Admitted to Neuro ICU following cerebral angiogram with bilateral vertebral artery thrombectomy & left vertebral artery stent assisted repair.    6/4 No issues overnight.  
[facility]   Department of Internal Medicine   Internal Medicine Residency  MICU Progress Note    Patient:  Malissa Chen 83 y.o. female   MRN: [id number]      Room: [address] [place], [place] [address]    Admission date: 5/30/2024 12:53 PM ; Hospital day: 23   ICU day: 2d 22h      Allergy: Doxycycline, Ciprofloxacin, and Sulfa antibiotics    Subjective     Patient was seen and examined this morning at bedside. No events overnight. She was alert and following commands. She denied pain or any other complaint. She was on an SBT with recurrent apneic episodes.    Objective       I & O - 24hr:    Intake/Output Summary (Last 24 hours) at 6/23/2024 1401  Last data filed at 6/23/2024 0600  Gross per 24 hour   Intake 1702.54 ml   Output 200 ml   Net 1502.54 ml     Net IO Since Admission: 3,524.45 mL [06/23/24 1401]    Physical Exam  BP (!) 139/43   Pulse 65   Temp 98 °F (36.7 °C) (Axillary)   Resp 21   Ht 1.676 m (5' 6\")   Wt 75.7 kg (166 lb 12.8 oz)   SpO2 99%   BMI 26.92 kg/m²   Ideal body weight: 59.3 kg (130 lb 11.7 oz)    General Appearance: alert, appears stated age, and cooperative  HEENT:  Head: Normal, normocephalic, atraumatic.  Lung: diminished breath sounds bibasilar  Heart: regular rate and rhythm and S1, S2 normal  Abdomen:  normal BS  Extremities:   no edema  Neurologic: alert and following commands      Medications     Continuous Infusions:   heparin (PORCINE) Infusion 18 Units/kg/hr (06/23/24 1321)    dextrose      sodium chloride Stopped (06/04/24 1010)     Scheduled Meds:   ertapenem (INVanz) IV  1,000 mg IntraVENous Q24H    ipratropium 0.5 mg-albuterol 2.5 mg  1 Dose Inhalation 4x daily    sennosides  5 mL Oral BID    polyethylene glycol  17 g Oral BID    Polyvinyl Alcohol-Povidone PF  1 drop Both Eyes Q4H    Or    Polyvinyl Alcohol-Povidone PF  1 drop Both Eyes Q4H    docusate sodium  100 mg Oral Daily    sodium chloride flush  5-40 mL IntraVENous 2 times per day    chlorhexidine  15 mL Mouth/Throat BID 
Vascular Surgery Progress Note        Subjective  Pt s/e.  Was asked to resee the patient for possible IVC filter evaluation.  Following discussion with nursing at bedside and chart review, Vascular surgery was consulted to evaluate for possible IVC filter in the setting of need for anticoagulation therapy who presents with persistent bleeding around her tracheostomy site. General surgery is currently following for tracheostomy and PEG.  Patient was last seen by general surgery on 6/26.       Patient is admitted to the hospital for treatment of bilateral vertebral artery occlusion with bilateral thrombectomy. 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  L DVTs in peroneal and posterior tibial  veins.  Patient is currently on aspirin, Brilinta, and Eliquis.    Hemoglobin 7.1 from 7.3, appears around baseline.  Per nursing and chart review no evidence for any melena, hematochezia, hematemesis.      Current Medications:    dextrose      sodium chloride Stopped (06/04/24 1010)      ipratropium 0.5 mg-albuterol 2.5 mg, bisacodyl, fentanNYL, potassium chloride **OR** potassium chloride, magnesium sulfate, ondansetron **OR** ondansetron, acetaminophen **OR** acetaminophen, glucose, dextrose bolus **OR** dextrose bolus, glucagon (rDNA), dextrose, hydrALAZINE, medicated lip balm, sodium chloride, labetalol, sodium chloride flush    Polyvinyl Alcohol-Povidone PF  1 drop Both Eyes TID    Or    Polyvinyl Alcohol-Povidone PF  1 drop Both Eyes TID    pantoprazole (PROTONIX) 40 mg in sodium chloride (PF) 0.9 % 10 mL injection  40 mg IntraVENous BID    sennosides  5 mL Oral BID    polyethylene glycol  17 g Oral BID    docusate sodium  100 mg Oral Daily    sodium chloride flush  5-40 mL IntraVENous 2 times per day    chlorhexidine  15 mL Mouth/Throat BID    multivitamin with iron-minerals  15 mL Oral Daily    [Held by provider] amLODIPine  5 mg Per NG 
Vascular Surgery Progress Note    Pt is being seen in f/u today regarding R femoral artery pseudoaneurysm    Subjective  Pt s/e.  Remains intubated and sedated. No issues overnight.    Current Medications:    dextrose      sodium chloride Stopped (06/04/24 1010)      bisacodyl, fentanNYL, potassium chloride **OR** potassium chloride, magnesium sulfate, ondansetron **OR** ondansetron, acetaminophen **OR** acetaminophen, glucose, dextrose bolus **OR** dextrose bolus, glucagon (rDNA), dextrose, hydrALAZINE, medicated lip balm, sodium chloride, labetalol, sodium chloride flush    ertapenem (INVanz) IV  1,000 mg IntraVENous Q24H    ipratropium 0.5 mg-albuterol 2.5 mg  1 Dose Inhalation 4x daily    sennosides  5 mL Oral BID    polyethylene glycol  17 g Oral BID    Polyvinyl Alcohol-Povidone PF  1 drop Both Eyes Q4H    Or    Polyvinyl Alcohol-Povidone PF  1 drop Both Eyes Q4H    docusate sodium  100 mg Oral Daily    sodium chloride flush  5-40 mL IntraVENous 2 times per day    enoxaparin  40 mg SubCUTAneous Daily    chlorhexidine  15 mL Mouth/Throat BID    famotidine (PEPCID) injection  20 mg IntraVENous BID    multivitamin with iron-minerals  15 mL Oral Daily    acetylcysteine  600 mg Inhalation BID RT    [Held by provider] amLODIPine  5 mg Per NG tube Daily    atorvastatin  40 mg Per NG tube Nightly    psyllium husk-aspartame  1 packet Per NG tube BID    Vitamin D  1,000 Units Per NG tube Daily    ticagrelor  90 mg Per NG tube BID    aspirin  81 mg Per NG tube Daily    [Held by provider] losartan  50 mg Oral Daily    dorzolamide  1 drop Left Eye BID    latanoprost  1 drop Both Eyes Nightly    [Held by provider] metoprolol succinate  25 mg Oral Daily    brimonidine  1 drop Both Eyes BID    And    timolol  1 drop Both Eyes BID        PHYSICAL EXAM:    BP (!) 134/42   Pulse 60   Temp 99.8 °F (37.7 °C) (Axillary)   Resp 18   Ht 1.676 m (5' 6\")   Wt 75.7 kg (166 lb 12.8 oz)   SpO2 99%   BMI 26.92 kg/m² 
Will plan for trach and PEG today.      Electronically signed by Miah Aguilar,    PGY-4  6/20/2024 at 6:05 AM    
Will plan trach peg in next 24-48 hrs.     Marlo Layton MD    
\"Caroline\" patients \"friend\" called in to get status of patient.  This nurse explained that she is not on the chart and she states that she was previously when she was on Neuro ICU.  This nurse explained no information could be given.  Caroline did explain that patient would not want to be intubated or trach and peg if she understood her situation and that she actually has a living will that someone would be bringing it in tonight.  All information given to Dr Bishop and Dr Nicole and also to Nursing supervisor.  
24hr:    Intake/Output Summary (Last 24 hours) at 6/26/2024 1308  Last data filed at 6/26/2024 1145  Gross per 24 hour   Intake 1637.68 ml   Output 1100 ml   Net 537.68 ml     Net IO Since Admission: 4,614.42 mL [06/26/24 1308]  Recent Labs     06/24/24 0527 06/25/24 0527 06/25/24 2135   CREATININE 0.5 0.5 0.4*       GASTROENTEROLOGY  Last BM (including prior to admit): 06/24/24  Diet:   Diet NPO  ADULT TUBE FEEDING; PEG; Standard with Fiber; Continuous; 10; Yes; 10; Q 4 hours; 40; 30; Q 3 hours; Protein; 1 Dose; Daily     Labs and Imaging Studies     Labs  Recent Labs     06/24/24 0527 06/25/24 0405 06/26/24  0450   WBC 13.9* 12.9* 12.8*   RBC 2.62* 2.66* 2.88*   HGB 7.4* 7.2* 7.8*   HCT 24.2* 24.4* 25.9*   MCV 92.4 91.7 89.9   MCH 28.2 27.1 27.1   MCHC 30.6* 29.5* 30.1*   RDW 15.3* 15.2* 14.9    256 279   MPV 11.3 11.1 11.1     Recent Labs     06/24/24 0527 06/25/24 0405 06/25/24 0527 06/25/24 2135 06/26/24  0450     --  139 140  --    K 4.4  --  4.4 4.6  --    *  --  106 105  --    MG 2.4 2.5  --   --  2.3   PHOS 2.7 2.8  --   --  3.3   CO2 28 --  24 26  --    BUN 20  --  19 17  --    CREATININE 0.5  --  0.5 0.4*  --    ANIONGAP 8  --  9 9  --    GLUCOSE 161*  --  147* 136*  --    CALCIUM 8.4*  --  8.4* 8.6  --    BILITOT 0.4  --   --   --   --    ALKPHOS 73  --   --   --   --    AST 31  --   --   --   --    ALT 29  --   --   --   --      Recent Labs     06/25/24  2135 06/26/24  0011   TROPHS 26* 27*     Recent Labs     06/25/24  0405   PROTIME 12.8*   INR 1.2     No results for input(s): \"CKTOTAL\", \"CKMB\", \"CKMBINDEX\", \"TROPONINI\" in the last 72 hours.  No results for input(s): \"BNP\" in the last 72 hours.  No results for input(s): \"CHOL\", \"HDL\", \"TRIG\" in the last 72 hours.    Invalid input(s): \"CHOLHDLR\", \"LDLCALCU\"  Hemoglobin A1C   Date Value Ref Range Status   05/31/2024 5.9 (H) 4.0 - 5.6 % Final      Lab Results   Component Value Date    TSH 0.73 05/31/2024          Infectious 
Per NG tube Daily    ticagrelor  90 mg Per NG tube BID    aspirin  81 mg Per NG tube Daily    [Held by provider] losartan  50 mg Oral Daily    dorzolamide  1 drop Left Eye BID    latanoprost  1 drop Both Eyes Nightly    [Held by provider] metoprolol succinate  25 mg Oral Daily    brimonidine  1 drop Both Eyes BID    And    timolol  1 drop Both Eyes BID       Physical Exam:  General Appearance: appears comfortable in no acute distress.   HEENT: MMM  Neck: Lips, mucosa, and tongue normal.  Supple, symmetrical, trachea midline, no adenopathy;thyroid:  no enlargement/tenderness/nodules or JVD.  Lung: Breath sounds clear respirations   unlabored. Symmetrical expansion.    Heart: RRR, normal S1, S2. No MRG  Abdomen: Soft, NT, ND. BS present x 4 quadrants. No bruit or organomegaly.   Extremities: Pedal pulses 2+ symmetric b/l.  Extremities normal, no cyanosis, clubbing, or edema.   Musculokeletal: Right side flaccid    Pertinent/ New Labs and Imaging Studies     Imaging personally reviewed:  CXR 6/22/24  FINDINGS:  Cardiac silhouette is within normal limits. Pulmonary vasculature is within  normal limits. Tracheostomy tube is stable.  There is stable increased  density in the medial right lung base.  No pneumothorax is identified. Bony  structures are unremarkable.     IMPRESSION:  Stable increased density in the right infrahilar region.          Echocardiogram 4/19/24:      Left Ventricle: Normal left ventricular systolic function with a visually estimated EF of 60 - 65%. Left ventricle size is normal. Mild septal thickening. Normal wall motion. Grade I diastolic dysfunction with normal LAP.    Mitral Valve: Mild annular calcification of the mitral valve. Mild stenosis noted.    Image quality is good.     Brain perfusion 6/3/2024  IMPRESSION:  1. Acute dissections involving the bilateral vertebral arteries, similar to  the recent CTA from 05/31/2024.  2. Small perfusion abnormality associated with the evolving right 
in tact  Heel-to-shin test: in tact  Edema:  Unremarkable   Vitals:  BP: 142/72, HR 61 bpm (seated EOB)  BP: 115/64, HR 67 bpm (standing)    Therapeutic Exercises:  Dynamic balance activities seated at EOB with Na for ~20 minutes; Dynamic balance activities in standing with and without FWW with MaxA; STS x4 from EOB with ModA    Patient education  Pt educated on role of PT in acute setting, benefits of upright mobility, use of call light in room, functional impact of neurologic injury, benefits and expectations of rehabilitation post-DC.    Patient response to education:   Pt verbalized understanding Pt demonstrated skill Pt requires further education in this area   Yes Partially  Reinforce     ASSESSMENT:    Conditions Requiring Skilled Therapeutic Intervention:    [x]Decreased strength     [x]Decreased ROM  [x]Decreased functional mobility  [x]Decreased balance   [x]Decreased endurance   [x]Decreased posture  [x]Decreased sensation  [x]Decreased coordination   [x]Decreased vision  [x]Decreased safety awareness   [x]Increased pain       Comments:  Pt was supine in bed upon therapist arrival, agreeable to session. Pt was very pleasant and cooperative. Patient participated in mobility as documented above. Bed mobility required increased time, and dizziness was reported with positional change from supine > sitting. Pt participated in various transfers, dynamic balance activities (seated at EOB and in standing), and ambulation, which revealed the following deficits: reduced R stance control, reduced postural stability, reduced R limb advancement and foot clearance, dizziness, and overall decreased functional independence. Upright activity was initially attempted with no AD and [id number] assist, but FWW was introduced and pt demonstrated improved stance control due to ability to offload RLE. After session, patient was assisted back to bed and was left skillfully positioned with all needs met, questions answered, 
multivitamin-minerals  1 tablet Per NG tube Daily    Vitamin D  1,000 Units Per NG tube Daily    ticagrelor  90 mg Per NG tube BID    aspirin  81 mg Per NG tube Daily    albuterol  2.5 mg Nebulization 4x Daily RT    enoxaparin  40 mg SubCUTAneous Daily    [Held by provider] losartan  50 mg Oral Daily    dorzolamide  1 drop Left Eye BID    latanoprost  1 drop Both Eyes Nightly    sodium chloride flush  5-40 mL IntraVENous 2 times per day    [Held by provider] metoprolol succinate  25 mg Oral Daily    brimonidine  1 drop Both Eyes BID    And    timolol  1 drop Both Eyes BID     hydrALAZINE, 5 mg, Q6H PRN  acetaminophen, 650 mg, Q4H PRN  medicated lip balm, , PRN  sodium chloride, , PRN  ondansetron, 4 mg, Q8H PRN   Or  ondansetron, 4 mg, Q6H PRN  labetalol, 5 mg, Q10 Min PRN  sodium chloride flush, 5-40 mL, PRN  polyethylene glycol, 17 g, Daily PRN         Objective:    BP (!) 117/42   Pulse 68   Temp 98.2 °F (36.8 °C)   Resp 20   Ht 1.676 m (5' 6\")   Wt 74.2 kg (163 lb 8 oz)   SpO2 93%   BMI 26.39 kg/m²     General Appearance: alert and oriented to person, place and time and in no acute distress  Skin: warm and dry  Head: normocephalic and atraumatic  Eyes: pupils equal, round, and reactive to light, extraocular eye movements intact, conjunctivae normal  Neck: neck supple and non tender without mass   Pulmonary/Chest: Positive rhonchi cardiovascular: normal rate, normal S1 and S2 and no carotid bruits  Abdomen: soft, non-tender, non-distended, normal bowel sounds, no masses or organomegaly  Extremities: no cyanosis, no clubbing and no edema  Neurologic: 0 out of 5 right upper and lower extremity        Recent Labs     06/09/24  0405 06/10/24  0437 06/11/24  0302    139 135   K 4.1 4.3 4.4    103 99   CO2 30* 27 25   BUN 26* 24* 26*   CREATININE 0.6 0.6 0.6   GLUCOSE 142* 118* 147*   CALCIUM 9.2 9.2 8.5*       Recent Labs     06/09/24  0405 06/10/24  0437 06/11/24  0302   WBC 19.2* 18.3* 19.6*   RBC 
oz)   SpO2 96%   BMI 28.63 kg/m²   Physical Exam  HENT:      Head: Normocephalic and atraumatic.      Nose: Nose normal.      Mouth/Throat:      Mouth: Mucous membranes are moist.      Pharynx: Oropharynx is clear.   Eyes:      Extraocular Movements: Extraocular movements intact.      Pupils: Pupils are equal, round, and reactive to light.   Cardiovascular:      Rate and Rhythm: Normal rate and regular rhythm.      Pulses: Normal pulses.      Heart sounds: Normal heart sounds.   Pulmonary:      Effort: Pulmonary effort is normal.      Breath sounds: Normal breath sounds.   Abdominal:      General: There is no distension.      Palpations: Abdomen is soft.      Tenderness: There is no abdominal tenderness.   Musculoskeletal:         General: No tenderness or signs of injury.      Cervical back: Normal range of motion and neck supple.   Skin:     General: Skin is warm and dry.   Neurological:      Mental Status: She is alert.      Comments: Globally weak; aphasic, follows commands   Psychiatric:         Mood and Affect: Mood normal.         Behavior: Behavior normal.         Thought Content: Thought content normal.         Judgment: Judgment normal.         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 
piperacillin-tazobactam  4,500 mg IntraVENous Q8H    multivitamin with iron-minerals  15 mL Oral Daily    acetylcysteine  600 mg Inhalation BID RT    [Held by provider] amLODIPine  5 mg Per NG tube Daily    atorvastatin  40 mg Per NG tube Nightly    psyllium husk-aspartame  1 packet Per NG tube BID    Vitamin D  1,000 Units Per NG tube Daily    [Held by provider] ticagrelor  90 mg Per NG tube BID    aspirin  81 mg Per NG tube Daily    [Held by provider] losartan  50 mg Oral Daily    dorzolamide  1 drop Left Eye BID    latanoprost  1 drop Both Eyes Nightly    [Held by provider] metoprolol succinate  25 mg Oral Daily    brimonidine  1 drop Both Eyes BID    And    timolol  1 drop Both Eyes BID       Physical Exam:  General Appearance: appears comfortable in no acute distress.   HEENT: Intubated  Neck: Lips, mucosa, and tongue normal.  Supple, symmetrical, trachea midline, no adenopathy;thyroid:  no enlargement/tenderness/nodules or JVD.  Lung: Breath sounds clear respirations   unlabored. Symmetrical expansion.    Heart: RRR, normal S1, S2. No MRG  Abdomen: Soft, NT, ND. BS present x 4 quadrants. No bruit or organomegaly.   Extremities: Pedal pulses 2+ symmetric b/l.  Extremities normal, no cyanosis, clubbing, or edema.   Musculokeletal: Right side flaccid  Neurologic: Mental status: Intubated and sedated    Pertinent/ New Labs and Imaging Studies     Imaging personally reviewed:  CXR 6/18/24  FINDINGS:  Lines and tubes remaining good position with special attention to the  endotracheal tube which is 3-4 cm above bambi.  The cardiac silhouette  appears normal.  There is no evidence of a pneumothorax.     IMPRESSION:  No significant interval change of the past 24 hours.       Echocardiogram 4/19/24:      Left Ventricle: Normal left ventricular systolic function with a visually estimated EF of 60 - 65%. Left ventricle size is normal. Mild septal thickening. Normal wall motion. Grade I diastolic dysfunction with normal 
  GLUCOSE 135* 143*  --  118*  --  138*   CALCIUM 9.1 8.6  --  8.5*  --  8.3*   BILITOT 0.9 1.1  --  1.0  --  0.7   ALKPHOS 79 75  --  83  --  66   AST 46* 50*  --  38*  --  42*   ALT 41* 38*  --  34*  --  30     No results for input(s): \"LACTA\" in the last 72 hours.  No results for input(s): \"TROPHS\", \"PROBNP\" in the last 72 hours.  Recent Labs     06/13/24  2300 06/14/24  0425 06/15/24  0556   PH 7.464* 7.453* 7.432   PCO2 34.7* 36.1 41.6   PO2 151.3* 113.6* 105.9*   HCO3 24.3 24.7 27.1*   BE 1.0 1.0 2.6   O2SAT 99.1* 98.4 98.0         Imaging Studies:    XR CHEST PORTABLE   Final Result   Stable appearance of the chest compared to 06/14/2024.         XR CHEST PORTABLE   Final Result   1. Streaky densities in the lower lung fields, left greater than right, most   likely representing atelectasis.         XR CHEST PORTABLE   Final Result   1. No acute cardiopulmonary process.   2. Endotracheal tube and NG tube in satisfactory position.         CT HEAD WO CONTRAST   Final Result   No acute intracranial abnormality.         XR CHEST ABDOMEN NG PLACEMENT   Final Result   Tip of the NG tube in the region of the stomach.         XR CHEST PORTABLE   Final Result   No acute process.         XR CHEST PORTABLE   Final Result   Improvement seen in the aeration of the right lower lung field with   improvement in the airspace disease.  No change seen in the ill-defined   opacification at the left lung base or small left pleural effusion.         XR CHEST PORTABLE   Final Result   Interval improvement seen in the region of the right lung base.  The   remainder of the chest is stable with no new focal parenchymal opacification   present.         XR CHEST PORTABLE   Final Result   1. New airspace disease involving the right lower lobe concerning for   pneumonia.  Follow-up to resolution is recommended.   2. Left basilar atelectasis.         CT HEAD WO CONTRAST   Final Result   Subacute right occipital and bilateral cerebellar 
  The Patient did not demonstrate complete understanding of the diagnosis, prognosis and plan of care     Rehabilitation Potential/Prognosis: good-but anticipate lengthy recovery process                      ADMITTING DIAGNOSIS: Stroke-like symptoms [R29.90]  Stroke-like symptom [R29.90]    VISIT DIAGNOSIS:      PATIENT REPORT/COMPLAINT: difficulty controlling own secretions   RN cleared patient for participation in assessment     yes     PRIOR LEVEL OF SWALLOW FUNCTION:    PAST HISTORY OF OROPHARYNGEAL DYSPHAGIA?: none reported    Home diet: Regular consistency solids (IDDSI level 7) with  thin liquids (IDDSI level 0)---prior to hospitalization   Current Diet Order:  Diet NPO  ADULT TUBE FEEDING; Nasogastric; Immune Enhancing; Continuous; 10; Yes; 10; Q 4 hours; 35; 30; Q 4 hours    PROCEDURE:  Consistencies Administered During the Evaluation   Liquids: thin liquid and ice chips/lemon ice to stimulate thinner saliva production   Solids:  Not appropriate      Method of Intake:   coated spoon  Fed by clinician      Position:   Sitting in bed with head elevated above 75 degrees    CLINICAL ASSESSMENT:  Oral Stage:       Increased mucoidal saliva which she is unable to clear.  Suctioned from oral cavity and pharyngeal following cough/throat clear.  Patient with good oral movements to command.  Able to lateralize tongue and gather lemon ice/saliva and propel posteriorly but not able to fully clear oral cavity and needed to have residue suctioned.      Pharyngeal Stage:    Patient able to achieve swallow with laryngeal elevation palpated.  Very audible swallow with ongoing moist thick secretions being present.  She is unable to fully clear the thick secretions yet despite max effort.  Patient with ongoing spontaneous swallow attempts following stimulation of salivary production but with these ongoing swallowing attempts not able to fully clear oral or pharyngeal secretions.  Her cough and throat clear is very weak at not 
LAP.    Mitral Valve: Mild annular calcification of the mitral valve. Mild stenosis noted.    Image quality is good.     Brain perfusion 6/3/2024  IMPRESSION:  1. Acute dissections involving the bilateral vertebral arteries, similar to  the recent CTA from 05/31/2024.  2. Small perfusion abnormality associated with the evolving right PCA  territory subacute cortical infarct.  3. The right distal cervical vertebral artery dissection extends into and  occludes the right proximal V4 segment. There is distal reconstitution and  likely retrograde filling of the right PICA.    Labs:  Lab Results   Component Value Date/Time    WBC 18.3 06/10/2024 04:37 AM    RBC 4.12 06/10/2024 04:37 AM    HGB 11.5 06/10/2024 04:37 AM    HCT 36.4 06/10/2024 04:37 AM    MCV 88.3 06/10/2024 04:37 AM    MCH 27.9 06/10/2024 04:37 AM    MCHC 31.6 06/10/2024 04:37 AM    RDW 15.3 06/10/2024 04:37 AM     06/10/2024 04:37 AM    MPV 11.2 06/10/2024 04:37 AM     Lab Results   Component Value Date/Time     06/10/2024 04:37 AM    K 4.3 06/10/2024 04:37 AM    K 3.9 04/11/2019 05:38 AM     06/10/2024 04:37 AM    CO2 27 06/10/2024 04:37 AM    BUN 24 06/10/2024 04:37 AM    CREATININE 0.6 06/10/2024 04:37 AM    CALCIUM 9.2 06/10/2024 04:37 AM    GFRAA >60 04/11/2019 05:38 AM    LABGLOM 87 06/10/2024 04:37 AM    LABGLOM 87 04/08/2024 06:02 AM     Lab Results   Component Value Date/Time    PROTIME 12.0 06/01/2024 06:18 AM    PROTIME 11.7 06/28/2011 03:55 AM    INR 1.1 06/01/2024 06:18 AM         Micro:  Specimen Description .SUCTIONED SPUTUM   Direct Exam MANY Polymorphonuclear leukocytes    FEW EPITHELIAL CELLS    MODERATE GRAM POSITIVE COCCI IN CLUSTERS Abnormal     MODERATE GRAM NEGATIVE RODS   Culture NORMAL RESPIRATORY DAVE   Resulting Agency [facility]              Specimen Collected: 06/06/24 16:30 EDT Last Resulted: 06/08/24 12:39 EDT              Assessment:    Acute hypoxic respiratory failure  Aspiration 
and alert in no acute distress   skin: warm and dry  Head: normocephalic and atraumatic  Eyes: pupils equal, round, and reactive to light, extraocular eye movements intact, conjunctivae normal  Neck: Positive trach in place, no bleeding  pulmonary/Chest: clear to auscultation bilaterally- no wheezes, rales or rhonchi, normal air movement, no respiratory distress  Cardiovascular: normal rate, normal S1 and S2 and no carotid bruits  Abdomen: soft, non-tender, non-distended, normal bowel sounds, no masses or organomegaly  Extremities: no cyanosis, no clubbing and no edema  Neurologic: no cranial nerve deficit and speech normal        Recent Labs     06/27/24  0423      K 4.1      CO2 25   BUN 16   CREATININE 0.5   GLUCOSE 139*   CALCIUM 8.2*         Recent Labs     06/27/24  0423 06/28/24  0711 06/29/24  0723   WBC 10.6 11.8* 10.5   RBC 2.70* 2.72* 2.50*   HGB 7.4* 7.4* 7.1*   HCT 24.0* 24.6* 22.8*   MCV 88.9 90.4 91.2   MCH 27.4 27.2 28.4   MCHC 30.8* 30.1* 31.1*   RDW 15.1* 15.3* 15.1*    257 232   MPV 11.2 11.5 11.8         Radiology:   XR CHEST PORTABLE   Final Result   Density in the right infrahilar region could reflect infiltrate but other   etiologies not excluded. Follow-up to resolution is recommended.         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         US GUIDED NEEDLE PLACEMENT   Final Result   Successful ultrasound-guided thrombin injection into a right common femoral   artery pseudoaneurysm of the right common femoral artery.         Vascular duplex lower extremity arteries right   Final Result   Positive study for pseudoaneurysm which appears to arise off of the right   common femoral artery.  Pseudoaneurysm sac measures maximum dimension of 2.4   cm.  The neck appears to measure approximately 3 mm in width and 2 mm in   depth.      The findings were 
movements intact, conjunctivae normal  Neck: neck supple and non tender without mass   Pulmonary/Chest: Positive rhonchi cardiovascular: normal rate, normal S1 and S2 and no carotid bruits  Abdomen: soft, non-tender, non-distended, normal bowel sounds, no masses or organomegaly  Extremities: no cyanosis, no clubbing and no edema  Neurologic: 0 out of 5 right upper and lower extremity        Recent Labs     06/16/24 0427 06/17/24 0411 06/18/24 0416    137 139   K 3.7 3.9 3.4*    105 103   CO2 25 25 27   BUN 25* 20 22   CREATININE 0.7 0.6 0.6   GLUCOSE 139* 163* 176*   CALCIUM 8.4* 8.4* 8.3*       Recent Labs     06/16/24 0427 06/17/24 0411 06/18/24 0416   WBC 13.3* 14.3* 13.0*   RBC 3.12* 3.09* 2.88*   HGB 8.7* 8.7* 8.0*   HCT 27.7* 27.8* 25.9*   MCV 88.8 90.0 89.9   MCH 27.9 28.2 27.8   MCHC 31.4* 31.3* 30.9*   RDW 14.7 14.8 15.1*    298 293   MPV 10.8 10.8 10.7       Radiology:   XR CHEST PORTABLE   Final Result   No interval change         XR ABDOMEN (KUB) (SINGLE AP VIEW)   Final Result   Gastric tube with good positioning.         XR ABDOMEN (KUB) (SINGLE AP VIEW)   Final Result   No evidence of bowel obstruction.      Colonic fecal retention involving the left side of the colon.      Enteric tube tip in the mid gastric body.         XR CHEST PORTABLE   Final Result   Stable lines and tubes.  Stable chest when compared to previous.         XR ABDOMEN FOR NG/OG/NE TUBE PLACEMENT   Final Result   Satisfactory position of the enteric tube.         XR CHEST PORTABLE   Final Result   Stable appearance of the chest compared to 06/15/2024.         XR CHEST PORTABLE   Final Result   Stable appearance of the chest compared to 06/14/2024.         XR CHEST PORTABLE   Final Result   1. Streaky densities in the lower lung fields, left greater than right, most   likely representing atelectasis.         XR CHEST PORTABLE   Final Result   1. No acute cardiopulmonary process.   2. Endotracheal tube and 
noted.    Image quality is good.     Brain perfusion 6/3/2024  IMPRESSION:  1. Acute dissections involving the bilateral vertebral arteries, similar to  the recent CTA from 05/31/2024.  2. Small perfusion abnormality associated with the evolving right PCA  territory subacute cortical infarct.  3. The right distal cervical vertebral artery dissection extends into and  occludes the right proximal V4 segment. There is distal reconstitution and  likely retrograde filling of the right PICA.    Labs:  Lab Results   Component Value Date/Time    WBC 14.3 06/17/2024 04:11 AM    RBC 3.09 06/17/2024 04:11 AM    HGB 8.7 06/17/2024 04:11 AM    HCT 27.8 06/17/2024 04:11 AM    MCV 90.0 06/17/2024 04:11 AM    MCH 28.2 06/17/2024 04:11 AM    MCHC 31.3 06/17/2024 04:11 AM    RDW 14.8 06/17/2024 04:11 AM     06/17/2024 04:11 AM    MPV 10.8 06/17/2024 04:11 AM     Lab Results   Component Value Date/Time     06/17/2024 04:11 AM    K 3.9 06/17/2024 04:11 AM    K 3.9 04/11/2019 05:38 AM     06/17/2024 04:11 AM    CO2 25 06/17/2024 04:11 AM    BUN 20 06/17/2024 04:11 AM    CREATININE 0.6 06/17/2024 04:11 AM    CALCIUM 8.4 06/17/2024 04:11 AM    GFRAA >60 04/11/2019 05:38 AM    LABGLOM 89 06/17/2024 04:11 AM    LABGLOM 87 04/08/2024 06:02 AM     Lab Results   Component Value Date/Time    PROTIME 13.9 06/14/2024 12:10 PM    PROTIME 11.7 06/28/2011 03:55 AM    INR 1.3 06/14/2024 12:10 PM         Micro:  Specimen Description .TRACHEAL ASPIRATE   Direct Exam FEW Polymorphonuclear leukocytes    NO EPITHELIAL CELLS    NO ORGANISMS SEEN   Culture  Abnormal   ESCHERICHIA COLI ESBL This organism is an Extended Spectrum Beta Lactamase (ESBL)  and resistance to therapy with Penicillins, Cephalosporins and Aztreonam is expected. These organisms generally remain susceptible to Carbapenems. Consider ID Consultation. CONTACT PRECAUTIONS INDICATED.     NORMAL RESPIRATORY DAVE   Resulting Agency [facility] 
      Radiology: REVIEWED DAILY    +++++++++++++++++++++++++++++++++++++++++++++++++  Maria Marino Milanes, MD  [facility]   [place], [place]  +++++++++++++++++++++++++++++++++++++++++++++++++  NOTE: This report was transcribed using voice recognition software. Every effort was made to ensure accuracy; however, inadvertent computerized transcription errors may be present.     
   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 Nutrition via NGT, will likely need to transition to [place]  Oral suction as needed-if unable to clear appropriately secretions, may require tracheostomy  Continue rocephin. Respiratory culture grew GPC's  Bronchopulmonary hygiene- mucomyst with chest vest BID  Scheduled bronchodilators - albuterol nebs QID  PT/OT/SLP  DVT/PE prophylaxis - lovenox, brilinta     Electronically signed by VIDYA Mason - CNP on 6/9/2024 at 10:11 AM                
131 05/31/2024    HDL 48 05/31/2024    TSH 0.73 05/31/2024    INR 1.1 06/01/2024    LABA1C 5.9 (H) 05/31/2024       Radiology: REVIEWED DAILY    +++++++++++++++++++++++++++++++++++++++++++++++++  Brock Galindo MD   Hospitalist  [place], [place]  +++++++++++++++++++++++++++++++++++++++++++++++++  NOTE: This report was transcribed using voice recognition software. Every effort was made to ensure accuracy; however, inadvertent computerized transcription errors may be present.       
FEW Polymorphonuclear leukocytes    NO EPITHELIAL CELLS    NO ORGANISMS SEEN   Culture  Abnormal   ESCHERICHIA COLI ESBL This organism is an Extended Spectrum Beta Lactamase (ESBL)  and resistance to therapy with Penicillins, Cephalosporins and Aztreonam is expected. These organisms generally remain susceptible to Carbapenems. Consider ID Consultation. CONTACT PRECAUTIONS INDICATED.     NORMAL RESPIRATORY DAVE   Resulting Agency [facility]        Susceptibility    Escherichia coli ESBL (1)    Antibiotic Interpretation Microscan Method Status    ceFAZolin Resistant >=64 BACTERIAL SUSCEPTIBILITY PANEL SABINO Final    cefepime Sensitive 2 BACTERIAL SUSCEPTIBILITY PANEL SABINO Final    cefotaxime Resistant 8 BACTERIAL SUSCEPTIBILITY PANEL SABINO Final    cefOXitin Intermediate 16 BACTERIAL SUSCEPTIBILITY PANEL SABINO Final    cefTAZidime Resistant >=64 BACTERIAL SUSCEPTIBILITY PANEL SABINO Final    ciprofloxacin Sensitive <=0.06 BACTERIAL SUSCEPTIBILITY PANEL SABINO Final    levofloxacin Sensitive <=0.12 BACTERIAL SUSCEPTIBILITY PANEL SABINO Final    meropenem Sensitive <=0.25 BACTERIAL SUSCEPTIBILITY PANEL SABINO Final    piperacillin-tazobactam Sensitive 8 BACTERIAL SUSCEPTIBILITY PANEL SABINO Final    trimethoprim-sulfamethoxazole Resistant <=20 BACTERIAL SUSCEPTIBILITY PANEL SABINO Final          Specimen Collected: 06/15/24 11:45 EDT Last Resulted: 06/17/24 10:40 EDT            Latest Reference Range & Units 06/25/24 05:00   Source:  Blood Arterial   pH, Blood Gas 7.350 - 7.450  7.476 (H)   PCO2 35.0 - 45.0 mmHg 39.7   pO2 75.0 - 100.0 mmHg 128.7 (H)   HCO3 22.0 - 26.0 mmol/L 28.6 (H)   Base Excess -3.0 - 3.0 mmol/L 4.7 (H)   O2 Sat 92.0 - 98.5 % 98.7 (H)   THB,THB 11.5 - 16.5 g/dL 8.3 (L)   O2Hb 94.0 - 97.0 % 97.7 (H)   Carboxy-Hgb 0.0 - 1.5 % 0.6   METHB,METHB 0.0 - 1.5 % 0.4   HHb 0.0 - 5.0 % 1.3   AaDO2 mmHg 110.8   RI(T)  0.86   FIO2 % 40.0   PO2/FIO2 mmHg/% 3.22   Pt Temp C 37.0   Critical(s) Notified  . No 
Specimen Description .INDWELLING CATH URINE     Special Requests Site: Urine     Culture NO GROWTH    Culture, Respiratory [[id number]]  (Abnormal)  (Susceptibility) Collected: 06/19/24 1008    Order Status: Completed Specimen: Sputum, Suctioned Updated: 06/21/24 0921     Specimen Description .SUCTIONED SPUTUM     Direct Exam MODERATE EPITHELIAL CELLS      MANY Polymorphonuclear leukocytes      FEW GRAM NEGATIVE RODS      RARE GRAM POSITIVE COCCI     Culture ESCHERICHIA COLI ESBL Identification by MALDI-TOF HEAVY GROWTH This organism is an Extended Spectrum Beta Lactamase (ESBL)  and resistance to therapy with Penicillins, Cephalosporins and Aztreonam is expected. These organisms generally remain susceptible to Carbapenems. Consider ID Consultation. CONTACT PRECAUTIONS INDICATED.        NORMAL RESPIRATORY DAVE    Susceptibility        Escherichia coli ESBL      BACTERIAL SUSCEPTIBILITY PANEL SABINO      ceFAZolin >=64  Resistant      cefepime 2  Sensitive      cefotaxime 8  Resistant      cefOXitin 16  Intermediate      cefTAZidime >=64  Resistant      ciprofloxacin <=0.06  Sensitive      levofloxacin <=0.12  Sensitive      meropenem <=0.25  Sensitive      piperacillin-tazobactam 8  Sensitive      trimethoprim-sulfamethoxazole <=20  Resistant                           Culture, Respiratory [[id number]]  (Abnormal)  (Susceptibility) Collected: 06/15/24 1145    Order Status: Completed Specimen: Tracheal Aspirate Updated: 06/17/24 1040     Specimen Description .TRACHEAL ASPIRATE     Direct Exam FEW Polymorphonuclear leukocytes      NO EPITHELIAL CELLS      NO ORGANISMS SEEN     Culture ESCHERICHIA COLI ESBL This organism is an Extended Spectrum Beta Lactamase (ESBL)  and resistance to therapy with Penicillins, Cephalosporins and Aztreonam is expected. These organisms generally remain susceptible to Carbapenems. Consider ID Consultation. CONTACT PRECAUTIONS INDICATED.        NORMAL RESPIRATORY DAVE    
    Candis Emery, APRN - CNP    
  Final Result   1. Endotracheal tube in good position.   2. Orogastric tube in good position.   3. No acute cardiopulmonary disease.         XR CHEST PORTABLE   Final Result   No significant interval change of the past 24 hours.         XR CHEST PORTABLE   Final Result   No interval change         XR ABDOMEN (KUB) (SINGLE AP VIEW)   Final Result   Gastric tube with good positioning.         XR ABDOMEN (KUB) (SINGLE AP VIEW)   Final Result   No evidence of bowel obstruction.      Colonic fecal retention involving the left side of the colon.      Enteric tube tip in the mid gastric body.         XR CHEST PORTABLE   Final Result   Stable lines and tubes.  Stable chest when compared to previous.         XR ABDOMEN FOR NG/OG/NE TUBE PLACEMENT   Final Result   Satisfactory position of the enteric tube.         XR CHEST PORTABLE   Final Result   Stable appearance of the chest compared to 06/15/2024.         XR CHEST PORTABLE   Final Result   Stable appearance of the chest compared to 06/14/2024.         XR CHEST PORTABLE   Final Result   1. Streaky densities in the lower lung fields, left greater than right, most   likely representing atelectasis.         XR CHEST PORTABLE   Final Result   1. No acute cardiopulmonary process.   2. Endotracheal tube and NG tube in satisfactory position.         CT HEAD WO CONTRAST   Final Result   No acute intracranial abnormality.         XR CHEST ABDOMEN NG PLACEMENT   Final Result   Tip of the NG tube in the region of the stomach.         XR CHEST PORTABLE   Final Result   No acute process.         XR CHEST PORTABLE   Final Result   Improvement seen in the aeration of the right lower lung field with   improvement in the airspace disease.  No change seen in the ill-defined   opacification at the left lung base or small left pleural effusion.         XR CHEST PORTABLE   Final Result   Interval improvement seen in the region of the right lung base.  The   remainder of the chest is stable 
 Overall patient demonstrated decreased independence, activity tolerance, sitting/standing balance and safety during completion of ADL/functional transfer/mobility tasks.Therapist facilitated ADL tasks, functional transfers, bed mobility to address safety awareness, implementation of fall prevention strategies, & safety awareness throughout ADLs. Pt would benefit from continued skilled OT to increase safety and independence with completion of ADL/IADL tasks for functional independence and quality of life.    Treatment: OT treatment provided this date includes:   ADL-  Instruction/training on safety and adapted techniques for completion of ADLs: Therapist facilitated & pt educated on activity modifications/adaptations to promote implementation of fall prevention strategies, skin/joint integrity, & safety awareness throughout ADLs while seated EOB.   Mobility-  Instruction/training on safety and improved independence with bed mobility/functional transfers. Pt required assist/cues to manage RUE throughout bed mobility.    Sitting EOB ~8 minutes to improve dynamic sitting balance and activity tolerance during ADLs.    Skilled positioning/alignment-  Therapist facilitated proper positioning/alignment with cues throughout session to maintain skin/joint integrity & proper body mechanics.    Skilled monitoring of vitals - To maximize safe participation throughout functional activities.    Therapeutic Exercises- Pt participated in RUE PROM exercise to promote neuroplasticity & functional use/management for skin/joint integrity & prevention contracture. Pt completed x5 PROM shoulder flexion & then x5 elbow flexion AAROM exercises.    Line management and environmental modifications made prior to and end of session to ensure patient safety and to increase efficiency of session.  Skilled monitoring of HR, O2 saturation, blood pressure and patient's response to activity performed throughout session.    Patient presents with 
5.0 % 1.3   AaDO2 mmHg 110.8   RI(T)  0.86   FIO2 % 40.0   PO2/FIO2 mmHg/% 3.22   Pt Temp C 37.0   Critical(s) Notified  . No Critical Values   Time Analyzed  0502   Mode  imv   Peep/Cpap cmH2O 8.0   PS cmH20 8   Vt Mechanical mL 320.0   Rr Mechanical b/min 8.0   (H): Data is abnormally high  (L): Data is abnormally low     Assessment:    Acute hypoxic respiratory failure  Ventilator dependence  Tracheostomy size 8 Shiley inserted 6/20/2024 by general surgery Dr. 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  Ask surgery to remove sutures day 10  Monitor for further hemoptysis/trach site bleeding  Completed zosyn and Invanz   Monitor for anemia hemoglobin 7.1 receiving Ferrlecit  bronchopulmonary hygiene-stop Mucomyst.  Scheduled bronchodilators - albuterol nebs QID  Current receiving Eliquis with loading dose for right leg DVT  GI prophylaxis  Bowel regimen  IR intervention for pseudoaneurysm- thrombin injection completed yesterday. Recommend US RLE 1-2 weeks.  Consider stopping Eliquis and placement of IVC filter.  Persistent anemia.  Patient is currently receiving baby aspirin 81 mg, Brilinta for recent stroke 90 mg twice daily post tPA infusion and vertebral artery thrombectomy    This plan of care was reviewed in collaboration with Dr. Be      Electronically signed by Franko Del Castillo, VIDYA - CNP on 7/1/2024 at 2:36 PM    This is confirmation that I have personally performed a substantial portion of 
6/20/24      Plan:     Continue vent weaning - recommend PSV trials  Completed Zosyn for aspiration coverage  bronchopulmonary hygiene- mucomyst with chest vest BID  Scheduled bronchodilators - albuterol nebs QID  Follow CXR and ABG  DVT/GI prophylaxis  Vascular recommends IR intervention for the pseudoaneurysm - defer further to ICU team  Pending placement   This plan of care was reviewed in collaboration with Dr. Martin    Electronically signed by VIDYA Mason - CNP on 6/25/2024 at 1:59 PM    This is confirmation that I have personally performed a substantial portion of medical decision making (>50%) related to this patient encounter.  The medications & laboratory data and imagery were discussed and adjusted where necessary. Key issues of the case were discussed among consultants.  Review of CNP documentation was conducted and revisions were made as appropriate. I agree with the above documented exam, problem list and plan of care.    Mikki Martin MD      
Instruction/training on safety and adapted techniques for completion of ADLs: Therapist facilitated & pt educated on activity modifications/adaptations to promote implementation of fall prevention strategies, EC/WS strategies, & safety awareness throughout ADLs.   Mobility-  Instruction/training on safety and improved independence with bed mobility transitional movements   Sitting EOB x 10 minutes to improve dynamic sitting balance and activity tolerance during ADLs.   Activity tolerance- Instruction/training on energy conservation/work simplification for completion of ADLs:.     Neuromuscular Reeducation to facilitate balance/righting reactions for increased function with ADLs tasks:    Neuromuscular Facilitation of UE functional movement/ROM./fine motor dexterity    Cognitive retraining -  Cues for safety, sequencing, and problem solving    Visual/Perception-Facilitation of Visual Perceptual Skills for increased safety and independence with ADLs.    Skilled positioning/alignment-  Therapist facilitated proper positioning/alignment throughout session to maintain skin/joint integrity & proper body mechanics.   Skilled monitoring of vitals- To maximize safe participation throughout functional activities.    Therapeutic Exercises- Instruction on UE ROM exercises to improve strength and function of UE for improved indep with ADLs    Pt appeared to have tolerated session well and appears motivated/cooperative/pleasant. Pt instructed on use of call light for assistance and fall prevention. Pt demo'ing good understanding of education provided. Continue to educate.     Rehab Potential: Good  for established goals     LTG: maximize independence with ADLs to return to PLOF    Patient and/or family were instructed on functional diagnosis, prognosis/goals and OT plan of care. Demonstrated good understanding.     Eval Complexity: Low   History: Expanded chart review of medical records and additional review of physical, 
Nasopharyngeal Swab Updated: 06/13/24 1952     Specimen Description .NASOPHARYNGEAL SWAB     Adenovirus PCR Not Detected     Coronavirus 229E PCR Not Detected     Coronavirus HKU1 PCR Not Detected     Coronavirus NL63 PCR Not Detected     Coronavirus OC43 PCR Not Detected     SARS-CoV-2, PCR Not Detected     Human Metapneumovirus PCR Not Detected     Rhino/Enterovirus PCR Not Detected     Influenza A by PCR Not Detected     Influenza B by PCR Not Detected     Parainfluenza 1 PCR Not Detected     Parainfluenza 2 PCR Not Detected     Parainfluenza 3 PCR Not Detected     Parainfluenza 4 PCR Not Detected     Resp Syncytial Virus PCR Not Detected     Bordetella parapertussis by PCR Not Detected     B Pertussis by PCR Not Detected     Chlamydia pneumoniae By PCR Not Detected     Mycoplasma pneumo by PCR Not Detected     Comment: Performed by multiplexed nucleic acid assay.       Culture, Respiratory [[id number]]  (Abnormal) Collected: 06/06/24 1630    Order Status: Completed Specimen: Sputum, Suctioned Updated: 06/08/24 1239     Specimen Description .SUCTIONED SPUTUM     Direct Exam MANY Polymorphonuclear leukocytes      FEW EPITHELIAL CELLS      MODERATE GRAM POSITIVE COCCI IN CLUSTERS      MODERATE GRAM NEGATIVE RODS     Culture NORMAL RESPIRATORY DAVE    Culture, MRSA, Screening [[id number]] Collected: 06/06/24 1224    Order Status: Completed Specimen: Nares Updated: 06/07/24 1352     Specimen Description .NARES     Culture NEGATIVE FOR: METHICILLIN RESISTANT STAPHYLOCOCCUS AUREUS            Imaging Studies:    XR CHEST PORTABLE   Final Result   No significant interval change of the past 24 hours.         XR CHEST PORTABLE   Final Result   No interval change         XR ABDOMEN (KUB) (SINGLE AP VIEW)   Final Result   Gastric tube with good positioning.         XR ABDOMEN (KUB) (SINGLE AP VIEW)   Final Result   No evidence of bowel obstruction.      Colonic fecal retention involving the left side of the colon.    
PM       PT/INR:    Lab Results   Component Value Date/Time    PROTIME 13.9 06/14/2024 12:10 PM    PROTIME 11.7 06/28/2011 03:55 AM    INR 1.3 06/14/2024 12:10 PM       TSH:    Lab Results   Component Value Date/Time    TSH 0.73 05/31/2024 04:45 PM       U/A:    Lab Results   Component Value Date/Time    NITRITE neg. 09/18/2020 10:16 AM    COLORU Yellow 06/14/2024 04:45 AM    PHUR 5.5 06/14/2024 04:45 AM    PHUR 6.0 04/07/2024 08:58 PM    WBCUA 0 TO 5 06/14/2024 04:45 AM    WBCUA NONE 06/27/2011 05:28 PM    RBCUA 0 TO 2 06/14/2024 04:45 AM    RBCUA NONE 06/27/2011 05:28 PM    BACTERIA 1+ 06/14/2024 04:45 AM    CLARITYU Clear 04/09/2019 11:55 PM    SPECGRAV 1.010 09/18/2020 10:16 AM    LEUKOCYTESUR NEGATIVE 06/14/2024 04:45 AM    UROBILINOGEN 0.2 06/14/2024 04:45 AM    BILIRUBINUR NEGATIVE 06/14/2024 04:45 AM    BLOODU moderate 09/18/2020 10:16 AM    BLOODU Negative 04/09/2019 11:55 PM    GLUCOSEU NEGATIVE 06/14/2024 04:45 AM    GLUCOSEU NEGATIVE 06/27/2011 05:28 PM    AMORPHOUS FEW 06/27/2011 05:28 PM       ABG:  No results found for: \"BUD1CGF\", \"BEART\", \"F0JNKXOV\", \"PHART\", \"THGBART\", \"ZGO9INW\", \"PO2ART\", \"LNH8ETP\"    MICROBIOLOGY:    Blood culture - (6/19) neg         Sputum Culture -      ESCHERICHIA COLI Identification by MALDI-TOF HEAVY GROWTH Susceptibility to follow. Abnormal        NORMAL RESPIRATORY DAVE     6/15/2024    Susceptibility    Escherichia coli ESBL (1)    Antibiotic Interpretation Microscan Method Status   ceFAZolin Resistant >=64 BACTERIAL SUSCEPTIBILITY PANEL SABINO Final   cefepime Sensitive 2 BACTERIAL SUSCEPTIBILITY PANEL SABINO Final   cefotaxime Resistant 8 BACTERIAL SUSCEPTIBILITY PANEL SABINO Final   cefOXitin Intermediate 16 BACTERIAL SUSCEPTIBILITY PANEL SABINO Final   cefTAZidime Resistant >=64 BACTERIAL SUSCEPTIBILITY PANEL SABINO Final   ciprofloxacin Sensitive <=0.06 BACTERIAL SUSCEPTIBILITY PANEL SABINO Final   levofloxacin Sensitive <=0.12 BACTERIAL SUSCEPTIBILITY PANEL SABINO Final   meropenem 
associated with the evolving right PCA   territory subacute cortical infarct.   3. The right distal cervical vertebral artery dissection extends into and   occludes the right proximal V4 segment. There is distal reconstitution and   likely retrograde filling of the right PICA.            CTA NECK W CONTRAST   Final Result   Addendum (preliminary) 1 of 1   ADDENDUM:   The findings were sent to the Radiology Results Communication Center at    10:55   am on 6/3/2024 to be communicated to a licensed caregiver, received by    Kamari Arango NP at 11:03 am.         Final   1. Acute dissections involving the bilateral vertebral arteries, similar to   the recent CTA from 05/31/2024.   2. Small perfusion abnormality associated with the evolving right PCA   territory subacute cortical infarct.   3. The right distal cervical vertebral artery dissection extends into and   occludes the right proximal V4 segment. There is distal reconstitution and   likely retrograde filling of the right PICA.            CT BRAIN PERFUSION   Final Result   Addendum (preliminary) 1 of 1   ADDENDUM:   The findings were sent to the Radiology Results Communication Center at    10:55   am on 6/3/2024 to be communicated to a licensed caregiver, received by    Kamari Arango NP at 11:03 am.         Final   1. Acute dissections involving the bilateral vertebral arteries, similar to   the recent CTA from 05/31/2024.   2. Small perfusion abnormality associated with the evolving right PCA   territory subacute cortical infarct.   3. The right distal cervical vertebral artery dissection extends into and   occludes the right proximal V4 segment. There is distal reconstitution and   likely retrograde filling of the right PICA.            XR EYE FOREIGN BODY   Final Result   1.  Presence of a 2 mm vertical oriented line of a metallic density in the   left orbital area, projecting anteriorly.      2.  Patient is not a MRI candidate.         CTA HEAD W 
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  Recent respiratory culture positive for ESBL E. coli, sensitive to Zosyn -complete today  bronchopulmonary hygiene- mucomyst with chest vest BID  Scheduled bronchodilators - albuterol nebs QID  Follow daily CXR and ABG  DVT/PE prophylaxis - lovenox, brilinta   Bedside trach and PEG today    This plan of care was reviewed in collaboration with Dr. Mcadams    Electronically signed by VIDYA Mason - CNP on 6/20/2024 at 2:52 PM      I personally saw, examined, and cared for the patient. I performed the substantive portion of the visit. Labs, medications, radiographs reviewed. I agree with history exam and plans detailed in NP note.    Will need trach/PEG.  This was discussed with patient prior to intubation and she was fully agreeable and oriented.  Consent signed    Shane Mcadams DO      
for ESBL E. coli, sensitive to Zosyn  bronchopulmonary hygiene- mucomyst with chest vest BID  Scheduled bronchodilators - albuterol nebs QID  Follow daily CXR and ABG  DVT/PE prophylaxis - lovenox, brilinta   Will need 2 physician signature for trach and peg      This plan of care was reviewed in collaboration with Dr. Mcadams    Electronically signed by VIDYA Mason CNP on 6/18/2024 at 11:10 AM      I personally saw, examined, and cared for the patient. I performed the substantive portion of the visit. Labs, medications, radiographs reviewed. I agree with history exam and plans detailed in NP note.    Will need trach/PEG    Shane Mcadams,     
lung fields, left greater than right, most   likely representing atelectasis.         XR CHEST PORTABLE   Final Result   1. No acute cardiopulmonary process.   2. Endotracheal tube and NG tube in satisfactory position.         CT HEAD WO CONTRAST   Final Result   No acute intracranial abnormality.         XR CHEST ABDOMEN NG PLACEMENT   Final Result   Tip of the NG tube in the region of the stomach.         XR CHEST PORTABLE   Final Result   No acute process.         XR CHEST PORTABLE   Final Result   Improvement seen in the aeration of the right lower lung field with   improvement in the airspace disease.  No change seen in the ill-defined   opacification at the left lung base or small left pleural effusion.         XR CHEST PORTABLE   Final Result   Interval improvement seen in the region of the right lung base.  The   remainder of the chest is stable with no new focal parenchymal opacification   present.         XR CHEST PORTABLE   Final Result   1. New airspace disease involving the right lower lobe concerning for   pneumonia.  Follow-up to resolution is recommended.   2. Left basilar atelectasis.         CT HEAD WO CONTRAST   Final Result   Subacute right occipital and bilateral cerebellar infarcts.      Mild cerebral atrophy.         CT HEAD WO CONTRAST   Final Result   Evolving vascular insults of the right occipital lobe and inferomedial   cerebellar hemispheres. No appreciable hemorrhage by CT. No significant mass   effect or midline shift.         XR ABDOMEN FOR NG/OG/NE TUBE PLACEMENT   Final Result   Enteric tube tip in the mid gastric body.         IR MECHANICAL ART THROMBECTOMY INTRACRANIAL   Final Result      IR INTRO NEEDLE/INTRACATHETER EXT ART   Final Result      IR TRANSCATH PLACE STENT UPPER W PTA INIT ARTERY   Final Result      CT HEAD WO CONTRAST   Final Result   Addendum (preliminary) 1 of 1   ADDENDUM:   The findings were sent to the [facility] at    10:55   am on 
occipital and bilateral cerebellar infarcts.      Mild cerebral atrophy.         CT HEAD WO CONTRAST   Final Result   Evolving vascular insults of the right occipital lobe and inferomedial   cerebellar hemispheres. No appreciable hemorrhage by CT. No significant mass   effect or midline shift.         XR ABDOMEN FOR NG/OG/NE TUBE PLACEMENT   Final Result   Enteric tube tip in the mid gastric body.         IR MECHANICAL ART THROMBECTOMY INTRACRANIAL   Final Result      IR INTRO NEEDLE/INTRACATHETER EXT ART   Final Result      IR TRANSCATH PLACE STENT UPPER W PTA INIT ARTERY   Final Result      CT HEAD WO CONTRAST   Final Result   Addendum (preliminary) 1 of 1   ADDENDUM:   The findings were sent to the [facility] at    10:55   am on 6/3/2024 to be communicated to a licensed caregiver, received by    Kamari Arango NP at 11:03 am.         Final   1. Acute dissections involving the bilateral vertebral arteries, similar to   the recent CTA from 05/31/2024.   2. Small perfusion abnormality associated with the evolving right PCA   territory subacute cortical infarct.   3. The right distal cervical vertebral artery dissection extends into and   occludes the right proximal V4 segment. There is distal reconstitution and   likely retrograde filling of the right PICA.            CTA HEAD W CONTRAST   Final Result   Addendum (preliminary) 1 of 1   ADDENDUM:   The findings were sent to the [facility] at    10:55   am on 6/3/2024 to be communicated to a licensed caregiver, received by    Kamari Arango NP at 11:03 am.         Final   1. Acute dissections involving the bilateral vertebral arteries, similar to   the recent CTA from 05/31/2024.   2. Small perfusion abnormality associated with the evolving right PCA   territory subacute cortical infarct.   3. The right distal cervical vertebral artery dissection extends into and   occludes the right proximal V4 segment. 
vertebral artery   concerning for a dissection.  There is reconstitution of the distal V4   segment of the right vertebral artery.   The findings were sent to the [facility] at 7:31   am on 5/31/2024 to be communicated to a licensed caregiver.         CT HEAD WO CONTRAST   Final Result   No evidence of acute intracranial hemorrhage or mass effect.         XR CHEST PORTABLE   Final Result   1. Cardiomegaly with mild pulmonary vascular congestion.   2. Question mild retrocardiac density which may be due to atelectasis or   pneumonia.              Assessment:    Principal Problem:    Stroke-like symptoms  Active Problems:    Hypertensive urgency    Stroke-like symptom    Vertebral artery dissection (HCC)    Sinus bradycardia    LBBB (left bundle branch block)    Aspiration pneumonia of right lower lobe (HCC)  Resolved Problems:    * No resolved hospital problems. *      Plan:  Acute brain stem infarct. Posterior circulation stroke/bilateral vertebral artery occlusion status post thrombectomy and vertebral artery repair and stent placement bilaterally on 6/3/2024 -continue aspirin, Brilinta and atorvastatin, continue blood pressure control  Dysautonomia - EP was consulted to evaluate for pacemaker placement given hypotension and bradycardia in the presence of dysautonomia. Recommended monitoring, no intervention so far   Hypertension -continue on amlodipine  Hyperlipidemia  LBBB  Glaucoma  Hypotension and bradycardia  AMS  HAP/ aspiration pneumonia.  Procalcitonin is slightly elevated. CXR concern for RLL peumonia.  Pulmonology following recommendations appreciated, patient on Rocephin.  Pulmonology recommending tracheostomy due to weak cough with poor airway clearance.  Dysphagia-on NG tube feeds, restart trickle feeds today, general surgery consulted for trach and PEG evaluation, palliative care has also been consulted    Disposition continue on antibiotics for aspiration pneumonia, trach 
  3. Complete occlusion of the V3 segment of the right vertebral artery   concerning for a dissection.  There is reconstitution of the distal V4   segment of the right vertebral artery.   The findings were sent to the [facility] at 7:31   am on 5/31/2024 to be communicated to a licensed caregiver.         CT HEAD WO CONTRAST   Final Result   No evidence of acute intracranial hemorrhage or mass effect.         XR CHEST PORTABLE   Final Result   1. Cardiomegaly with mild pulmonary vascular congestion.   2. Question mild retrocardiac density which may be due to atelectasis or   pneumonia.         XR CHEST PORTABLE    (Results Pending)   Vascular duplex lower extremity venous bilateral    (Results Pending)        Assessment:    Principal Problem:    Stroke-like symptoms  Active Problems:    Hypertensive urgency    Stroke-like symptom    Vertebral artery dissection (HCC)    Sinus bradycardia    LBBB (left bundle branch block)    Aspiration pneumonia of right lower lobe (HCC)    Goals of care, counseling/discussion    Palliative care encounter  Resolved Problems:    * No resolved hospital problems. *      Plan:  Acute brain stem infarct. Posterior circulation stroke/bilateral vertebral artery occlusion status post thrombectomy and vertebral artery repair and stent placement bilaterally on 6/3/2024 -continue aspirin, Brilinta and atorvastatin, continue blood pressure control -s/p trach and peg 6/20  Acute hypoxic respiratory failure, inability to protect airway, reintubated 6/13 - vent management per ICU ; s/p trach and peg  Dysautonomia - EP was consulted to evaluate for pacemaker placement given hypotension and bradycardia in the presence of dysautonomia. Recommended monitoring, no intervention so far   Hypertension -continue on amlodipine  Hyperlipidemia  LBBB  Glaucoma  Hypotension and bradycardia  AMS  HAP/ aspiration pneumonia.  Procalcitonin is slightly elevated. CXR concern for RLL 
LBBB (left bundle branch block)    Aspiration pneumonia of right lower lobe (HCC)    Goals of care, counseling/discussion    Palliative care encounter  Resolved Problems:    * No resolved hospital problems. *      Plan:  Acute brain stem infarct. Posterior circulation stroke/bilateral vertebral artery occlusion status post thrombectomy and vertebral artery repair and stent placement bilaterally on 6/3/2024 -continue aspirin, Brilinta and atorvastatin, continue blood pressure control - low threshold for repeat CT if continuous to be more somnolent. Would highly benefit from trache given her weak cough and secretions   Acute hypoxic respiratory failure, inability to protect airway, reintubated 6/13 - vent management per ICU   Dysautonomia - EP was consulted to evaluate for pacemaker placement given hypotension and bradycardia in the presence of dysautonomia. Recommended monitoring, no intervention so far   Hypertension -continue on amlodipine  Hyperlipidemia  LBBB  Glaucoma  Hypotension and bradycardia  AMS  HAP/ aspiration pneumonia.  Procalcitonin is slightly elevated. CXR concern for RLL peumonia.  Pulmonology following recommendations appreciated, patient on Rocephin.  Pulmonology recommending tracheostomy due to weak cough with poor airway clearance.  Dysphagia- on NG tube feeds, continue on trickle feeds today, general surgery consulted for trache and PEG evaluation, palliative care has also been consulted - remains full code.  Acute anemia - Hgb 10 now at 8.7, FOBT pending    Disposition continue on antibiotics for aspiration pneumonia, trache and PEG evaluation,     NOTE: This report was transcribed using voice recognition software. Every effort was made to ensure accuracy; however, inadvertent computerized transcription errors may be present.  Electronically signed by Victorina Nick MD on 6/16/2024 at 11:45 AM     
consulted for trache and PEG evaluation, palliative care has also been consulted - remains full code.     Disposition continue on antibiotics for aspiration pneumonia, trache and PEG evaluation, accepted to [facility] when medically stable.    NOTE: This report was transcribed using voice recognition software. Every effort was made to ensure accuracy; however, inadvertent computerized transcription errors may be present.  Electronically signed by Victorina Nick MD on 6/13/2024 at 10:00 AM     
vertebral arteries, similar to   the recent CTA from 05/31/2024.   2. Small perfusion abnormality associated with the evolving right PCA   territory subacute cortical infarct.   3. The right distal cervical vertebral artery dissection extends into and   occludes the right proximal V4 segment. There is distal reconstitution and   likely retrograde filling of the right PICA.            CTA NECK W CONTRAST   Final Result   Addendum (preliminary) 1 of 1   ADDENDUM:   The findings were sent to the Radiology Results Communication Center at    10:55   am on 6/3/2024 to be communicated to a licensed caregiver, received by    Kamari Arango NP at 11:03 am.         Final   1. Acute dissections involving the bilateral vertebral arteries, similar to   the recent CTA from 05/31/2024.   2. Small perfusion abnormality associated with the evolving right PCA   territory subacute cortical infarct.   3. The right distal cervical vertebral artery dissection extends into and   occludes the right proximal V4 segment. There is distal reconstitution and   likely retrograde filling of the right PICA.            CT BRAIN PERFUSION   Final Result   Addendum (preliminary) 1 of 1   ADDENDUM:   The findings were sent to the Radiology Results Communication Center at    10:55   am on 6/3/2024 to be communicated to a licensed caregiver, received by    Kamari Arango NP at 11:03 am.         Final   1. Acute dissections involving the bilateral vertebral arteries, similar to   the recent CTA from 05/31/2024.   2. Small perfusion abnormality associated with the evolving right PCA   territory subacute cortical infarct.   3. The right distal cervical vertebral artery dissection extends into and   occludes the right proximal V4 segment. There is distal reconstitution and   likely retrograde filling of the right PICA.            XR EYE FOREIGN BODY   Final Result   1.  Presence of a 2 mm vertical oriented line of a metallic density in the   left 
continue on antibiotics for aspiration pneumonia, s/p trach peg    NOTE: This report was transcribed using voice recognition software. Every effort was made to ensure accuracy; however, inadvertent computerized transcription errors may be present.  Electronically signed by Tawanna Chicas MD on 6/23/2024 at 2:40 PM     
following recommendations appreciated  Dysautonomia - EP was consulted to evaluate for pacemaker placement given hypotension and bradycardia in the presence of dysautonomia. Recommended monitoring, no intervention so far   right common femoral artery pseudoaneurysm IR intervention for R femoral artery pseudoaneurysm per vas sx.  Currently on heparin drip  Hypotension and bradycardia-resolved  AMS- improving; following commands  HAP/ ESBL pneumonia. Id on board;  patient on  invanz   S/p PEG; on tube feeds;    palliative care has also been consulted - remains full code.      Disposition continue on antibiotics for aspiration pneumonia, s/p trach peg      NOTE: This report was transcribed using voice recognition software. Every effort was made to ensure accuracy; however, inadvertent computerized transcription errors may be present.  Electronically signed by Victorina Nick MD on 6/26/2024 at 11:30 AM     
thrombectomy and vertebral artery repair and stent placement bilaterally on 6/3/2024 -continue aspirin, Brilinta and atorvastatin, continue blood pressure control   Acute hypoxic respiratory failure, inability to protect airway, reintubated 6/13 - -s/p trach and peg 6/20; vent management per ICU.  Pulmonology following recommendations appreciated  Dysautonomia - EP was consulted to evaluate for pacemaker placement given hypotension and bradycardia in the presence of dysautonomia. Recommended monitoring, no intervention so far   Right common femoral artery pseudoaneurysm s/p thrombin injection 6/27   Hypotension and bradycardia-resolved  AMS- improving; following commands  HAP/ ESBL pneumonia. Id on board;  patient completed invanz 6/28   L peroneal and posterior tibial DVT - will cautiously start on eliquis, closely monitor cbc.   S/p PEG; on tube feeds;    palliative care has also been consulted - remains full code.   Acute Anemia - on lovenox 7.2 today, continue to monitor, transfuse for Hgb <7      Disposition: continue to monitor anemia while on anticoagulation, [facility]cert to [facility] pending.       NOTE: This report was transcribed using voice recognition software. Every effort was made to ensure accuracy; however, inadvertent computerized transcription errors may be present.  Electronically signed by Victorina Nick MD on 6/28/2024 at 11:50 AM     
hemorrhage or mass effect.         XR CHEST PORTABLE   Final Result   1. Cardiomegaly with mild pulmonary vascular congestion.   2. Question mild retrocardiac density which may be due to atelectasis or   pneumonia.         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 coli PNA   Acute hypoxemic respiratory failure 2/2 PNA s/p tracheostomy  Distal DVT (L peroneal and posterior tibial)  R common femoral artery pseudoaneurysm  Leukocytosis - improving  Normocytic anemia - stable  Oropharyngeal dysfunction s/p PEG  HTN  HLD  Glaucoma  Malnutrition  Prealbumin 11    Vascular, general surgery, ID, PMR, pulmonology, palliative on board  Trach and PEG, on ventilator and tube feeds  Continue DAPT  Vascular surgery recommending IR intervention for R femoral artery pseudoaneurysm  Continue ertapenem  Heparin infusion for distal DVT was stopped due to elevated HAS BLED score (3) (so won't be discharged on anticoagulation) and recurrent bleeds from trach and PEG sites  Continue SIMV    LDA:  Drain(s): PEG  Airway: cuffed tracheostomy    PT/OT evaluation: on board   DVT prophylaxis:  heparin infusion discontinued; will start lovenox 6/26  GI prophylaxis: not indicated at this time  Diet:   Diet NPO  ADULT TUBE FEEDING; PEG; Standard with Fiber; Continuous; 10; Yes; 10; Q 4 hours; 40; 30; Q 3 hours; Protein; 1 Dose; Daily   Bowel regimen: polyethylene glycol  Pain management: as needed  Code status: Full Code   Disposition: Admitted to ICU; transfer to LTAC, precert pending  Family: updated as available    Dennis Lennon MD, PGY-1   Attending physician: Dr. Bradshaw    [facility]  Department of Pulmonary, Critical Care and Sleep Medicine  [facility] 
        XR EYE FOREIGN BODY   Final Result   1.  Presence of a 2 mm vertical oriented line of a metallic density in the   left orbital area, projecting anteriorly.      2.  Patient is not a MRI candidate.         CTA HEAD W CONTRAST   Final Result   1. Acute 4 cm infarct within the medial aspect of the right occipital lobe.   2. Complete occlusion of the distal V1 segment of the left vertebral artery   concerning for an acute dissection.  There is reconstitution of the distal   left vertebral artery.   3. Complete occlusion of the V3 segment of the right vertebral artery   concerning for a dissection.  There is reconstitution of the distal V4   segment of the right vertebral artery.   The findings were sent to the Radiology Results Communication Center at 7:31   am on 5/31/2024 to be communicated to a licensed caregiver.         CTA NECK W CONTRAST   Final Result   1. Acute 4 cm infarct within the medial aspect of the right occipital lobe.   2. Complete occlusion of the distal V1 segment of the left vertebral artery   concerning for an acute dissection.  There is reconstitution of the distal   left vertebral artery.   3. Complete occlusion of the V3 segment of the right vertebral artery   concerning for a dissection.  There is reconstitution of the distal V4   segment of the right vertebral artery.   The findings were sent to the Radiology Results Communication Center at 7:31   am on 5/31/2024 to be communicated to a licensed caregiver.         CT HEAD WO CONTRAST   Final Result   No evidence of acute intracranial hemorrhage or mass effect.         XR CHEST PORTABLE   Final Result   1. Cardiomegaly with mild pulmonary vascular congestion.   2. Question mild retrocardiac density which may be due to atelectasis or   pneumonia.         Vascular duplex lower extremity arteries right    (Results Pending)        Resident's Assessment and Plan     Assessment and Plan:    Assessment:  R medullary and R occipital stroke s/p

## 2024-07-03 NOTE — CARE COORDINATION
Received a call from  Don Colindres regarding pending [employer] process.  In order for this process to continue patient would require a facility in [facility].  Spoke with Jyoti at [facility], she will contact and work with [facility] to get the patient to [facility] and work on a transfer to [facility] at the request of  Don Colindres.  Updated amanda Colindres and SALVADOR Sewell of this plan.  Insurance approved [facility] after P2P and the patient will need to DC there prior to placement in [facility].

## 2024-07-03 NOTE — DISCHARGE SUMMARY
Hospitalist Discharge Summary    Patient ID: Malissa Chen   Patient : [id number]  Patient's PCP: Ed Collins Jr., MD    Admit Date: 2024   Admitting Physician: Brock Galindo MD    Discharge Date:  7/3/2024   Discharge Physician: Jamir Casanova MD   Discharge Condition: Stable  Discharge Disposition: Skilled Facility      Hospital course in brief:  (Please refer to daily progress notes for a comprehensive review of the hospitalization by requesting medical records)    83 y.o. female who presented to [facility] with strokelike symptoms with left-sided weakness lasted for an hour or started around 11 AM . NIHSS was 0. CT head obtained negative for any acute findings. She was found to have elevated blood pressure of 202/81 on presentation. 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 Complete occlusion of the distal V1 segment of the left vertebral artery concerning for an acute dissection.  There is reconstitution of the distal left vertebral artery. Started on aspirin and plavix, ordered MRI brain/orbit w/without contrast. Pt's shunt in Lt eye has a metallic component therefore cannot be deemed MRI safe. Interventional neurology was consulted with initial plan for outpatient diagnostic angiogram.  Patient repeat the stroke on 6/3.  SIMEON was consulted again and recommended investigational experimental protocol thrombectomy and dissection repair.  Then patient underwent bilateral vertebral artery thrombectomy with stent placement and repair of dissection.  Patient was transferred from the procedure area to NICU.  Patient developed aspiration pneumonia and was started on ceftriaxone. Pulmonology is following.  patient had lethargy and deteriorating mentation, was transferred to the ICU and subsequently reintubated.  Patient underwent trach and PEG .  Patient was also found to have a pseudoaneurysm of the

## 2024-07-03 NOTE — CARE COORDINATION
CM Update: Discharge Order Noted. Discharge plan is [facility]. Destination and [facility] updated. PASRR, Face Sheet, Ambulance Form, and Envelope in soft chart. Transport set with [facility] for [facility]. Nursing Notified, Roma (friend) Notified, Facility Notified. CM/SW to follow. MT

## 2025-06-04 ENCOUNTER — OFFICE VISIT (OUTPATIENT)
Age: 85
End: 2025-06-04
Payer: MEDICARE

## 2025-06-04 VITALS
DIASTOLIC BLOOD PRESSURE: 70 MMHG | RESPIRATION RATE: 16 BRPM | HEART RATE: 61 BPM | BODY MASS INDEX: 29.05 KG/M2 | OXYGEN SATURATION: 94 % | TEMPERATURE: 97.2 F | SYSTOLIC BLOOD PRESSURE: 163 MMHG | WEIGHT: 180 LBS

## 2025-06-04 DIAGNOSIS — Z93.1 PRESENCE OF EXTERNALLY REMOVABLE PERCUTANEOUS ENDOSCOPIC GASTROSTOMY (PEG) TUBE (HCC): Primary | ICD-10-CM

## 2025-06-04 PROCEDURE — 99213 OFFICE O/P EST LOW 20 MIN: CPT | Performed by: SURGERY

## 2025-06-04 PROCEDURE — 1123F ACP DISCUSS/DSCN MKR DOCD: CPT | Performed by: SURGERY

## 2025-06-04 PROCEDURE — 1159F MED LIST DOCD IN RCRD: CPT | Performed by: SURGERY

## 2025-06-04 RX ORDER — ONDANSETRON 8 MG/1
8 TABLET, FILM COATED ORAL EVERY 8 HOURS PRN
COMMUNITY

## 2025-06-04 RX ORDER — POLYETHYLENE GLYCOL 3350 17 G/17G
17 POWDER, FOR SOLUTION ORAL DAILY
COMMUNITY

## 2025-06-04 RX ORDER — FAMOTIDINE 20 MG/1
20 TABLET, FILM COATED ORAL 2 TIMES DAILY
COMMUNITY

## 2025-06-04 RX ORDER — BISACODYL 10 MG
10 SUPPOSITORY, RECTAL RECTAL DAILY
COMMUNITY

## 2025-06-04 RX ORDER — GUAIFENESIN 400 MG/1
400 TABLET ORAL 4 TIMES DAILY PRN
COMMUNITY

## 2025-06-04 RX ORDER — ROPINIROLE 1 MG/1
1 TABLET, FILM COATED ORAL 3 TIMES DAILY
COMMUNITY

## 2025-06-04 NOTE — PROGRESS NOTES
Progress Note    Subjective:  Tolerating an oral diet. Taking meds with applesauce. Not using PEG     Objective:   Physical Exam  Incision: PEG site c/d/I     Assessment:   Diagnosis Orders   1. Presence of externally removable percutaneous endoscopic gastrostomy (PEG) tube (HCC)            Plan:     PEG removed intake without any issues     Physician Signature: Electronically signed by Chong Deleon MD

## (undated) DEVICE — DEFENDO AIR WATER SUCTION AND BIOPSY VALVE KIT FOR  OLYMPUS: Brand: DEFENDO AIR/WATER/SUCTION AND BIOPSY VALVE

## (undated) DEVICE — KIT PEG 20FR STD PUL EN ACCS DEV ENDOVIVE

## (undated) DEVICE — SINGLE USE SUCTION VALVE MAJ-209: Brand: SINGLE USE SUCTION VALVE (STERILE)

## (undated) DEVICE — GAUZE,SPONGE,4"X4",8PLY,STRL,LF,10/TRAY: Brand: MEDLINE

## (undated) DEVICE — SINGLE USE BIOPSY VALVE MAJ-210: Brand: SINGLE USE BIOPSY VALVE (STERILE)

## (undated) DEVICE — BITEBLOCK 54FR W/ DENT RIM BLOX